# Patient Record
Sex: FEMALE | Race: BLACK OR AFRICAN AMERICAN | Employment: OTHER | URBAN - METROPOLITAN AREA
[De-identification: names, ages, dates, MRNs, and addresses within clinical notes are randomized per-mention and may not be internally consistent; named-entity substitution may affect disease eponyms.]

---

## 2017-01-20 DIAGNOSIS — Z01.812 ENCOUNTER FOR PRE-OPERATIVE LABORATORY TESTING: Primary | ICD-10-CM

## 2017-01-20 DIAGNOSIS — Z01.810 PRE-OPERATIVE CARDIOVASCULAR EXAMINATION: ICD-10-CM

## 2017-01-20 DIAGNOSIS — Z01.811 PRE-OPERATIVE RESPIRATORY EXAMINATION: ICD-10-CM

## 2017-01-20 DIAGNOSIS — M17.11 PRIMARY OSTEOARTHRITIS OF RIGHT KNEE: ICD-10-CM

## 2017-01-23 ENCOUNTER — HOSPITAL ENCOUNTER (OUTPATIENT)
Dept: PREADMISSION TESTING | Age: 82
Discharge: HOME | End: 2017-01-23
Payer: MEDICARE

## 2017-01-23 ENCOUNTER — HOSPITAL ENCOUNTER (OUTPATIENT)
Dept: GENERAL RADIOLOGY | Age: 82
Discharge: HOME | End: 2017-01-23
Payer: MEDICARE

## 2017-01-23 DIAGNOSIS — Z01.810 PRE-OPERATIVE CARDIOVASCULAR EXAMINATION: ICD-10-CM

## 2017-01-23 DIAGNOSIS — Z01.812 ENCOUNTER FOR PRE-OPERATIVE LABORATORY TESTING: ICD-10-CM

## 2017-01-23 DIAGNOSIS — Z01.811 PRE-OPERATIVE RESPIRATORY EXAMINATION: ICD-10-CM

## 2017-01-23 DIAGNOSIS — M17.11 PRIMARY OSTEOARTHRITIS OF RIGHT KNEE: ICD-10-CM

## 2017-01-23 LAB
ALBUMIN SERPL BCP-MCNC: 3.4 G/DL (ref 3.4–5)
ANION GAP BLD CALC-SCNC: 9 MMOL/L (ref 3–18)
APPEARANCE UR: CLEAR
APTT PPP: 34.5 SEC (ref 23–36.4)
ATRIAL RATE: 86 BPM
BACTERIA URNS QL MICRO: ABNORMAL /HPF
BASOPHILS # BLD AUTO: 0 K/UL (ref 0–0.1)
BASOPHILS # BLD: 0 % (ref 0–2)
BILIRUB UR QL: NEGATIVE
BUN SERPL-MCNC: 32 MG/DL (ref 7–18)
BUN/CREAT SERPL: 23 (ref 12–20)
CALCIUM SERPL-MCNC: 9.1 MG/DL (ref 8.5–10.1)
CALCULATED P AXIS, ECG09: 64 DEGREES
CALCULATED R AXIS, ECG10: 32 DEGREES
CALCULATED T AXIS, ECG11: 53 DEGREES
CHLORIDE SERPL-SCNC: 107 MMOL/L (ref 100–108)
CO2 SERPL-SCNC: 26 MMOL/L (ref 21–32)
COLOR UR: YELLOW
CREAT SERPL-MCNC: 1.42 MG/DL (ref 0.6–1.3)
DIAGNOSIS, 93000: NORMAL
DIFFERENTIAL METHOD BLD: ABNORMAL
EOSINOPHIL # BLD: 0.1 K/UL (ref 0–0.4)
EOSINOPHIL NFR BLD: 1 % (ref 0–5)
EPITH CASTS URNS QL MICRO: ABNORMAL /LPF (ref 0–5)
ERYTHROCYTE [DISTWIDTH] IN BLOOD BY AUTOMATED COUNT: 13.8 % (ref 11.6–14.5)
EST. AVERAGE GLUCOSE BLD GHB EST-MCNC: 108 MG/DL
GLUCOSE SERPL-MCNC: 116 MG/DL (ref 74–99)
GLUCOSE UR STRIP.AUTO-MCNC: NEGATIVE MG/DL
HBA1C MFR BLD: 5.4 % (ref 4.2–5.6)
HCT VFR BLD AUTO: 31.4 % (ref 35–45)
HGB BLD-MCNC: 10.1 G/DL (ref 12–16)
HGB UR QL STRIP: NEGATIVE
INR PPP: 1 (ref 0.8–1.2)
KETONES UR QL STRIP.AUTO: NEGATIVE MG/DL
LEUKOCYTE ESTERASE UR QL STRIP.AUTO: ABNORMAL
LYMPHOCYTES # BLD AUTO: 19 % (ref 21–52)
LYMPHOCYTES # BLD: 1.5 K/UL (ref 0.9–3.6)
MCH RBC QN AUTO: 27.1 PG (ref 24–34)
MCHC RBC AUTO-ENTMCNC: 32.2 G/DL (ref 31–37)
MCV RBC AUTO: 84.2 FL (ref 74–97)
MONOCYTES # BLD: 0.3 K/UL (ref 0.05–1.2)
MONOCYTES NFR BLD AUTO: 4 % (ref 3–10)
NEUTS SEG # BLD: 6.2 K/UL (ref 1.8–8)
NEUTS SEG NFR BLD AUTO: 76 % (ref 40–73)
NITRITE UR QL STRIP.AUTO: NEGATIVE
P-R INTERVAL, ECG05: 166 MS
PH UR STRIP: 5 [PH] (ref 5–8)
PLATELET # BLD AUTO: 324 K/UL (ref 135–420)
PMV BLD AUTO: 11 FL (ref 9.2–11.8)
POTASSIUM SERPL-SCNC: 4.1 MMOL/L (ref 3.5–5.5)
PROT UR STRIP-MCNC: NEGATIVE MG/DL
PROTHROMBIN TIME: 13.3 SEC (ref 11.5–15.2)
Q-T INTERVAL, ECG07: 358 MS
QRS DURATION, ECG06: 92 MS
QTC CALCULATION (BEZET), ECG08: 428 MS
RBC # BLD AUTO: 3.73 M/UL (ref 4.2–5.3)
RBC #/AREA URNS HPF: ABNORMAL /HPF (ref 0–5)
SODIUM SERPL-SCNC: 142 MMOL/L (ref 136–145)
SP GR UR REFRACTOMETRY: 1.02 (ref 1–1.03)
URATE CRY URNS QL MICRO: ABNORMAL
UROBILINOGEN UR QL STRIP.AUTO: 0.2 EU/DL (ref 0.2–1)
VENTRICULAR RATE, ECG03: 86 BPM
WBC # BLD AUTO: 8.1 K/UL (ref 4.6–13.2)
WBC URNS QL MICRO: ABNORMAL /HPF (ref 0–5)

## 2017-01-23 PROCEDURE — 85610 PROTHROMBIN TIME: CPT | Performed by: ORTHOPAEDIC SURGERY

## 2017-01-23 PROCEDURE — 85025 COMPLETE CBC W/AUTO DIFF WBC: CPT | Performed by: ORTHOPAEDIC SURGERY

## 2017-01-23 PROCEDURE — 81001 URINALYSIS AUTO W/SCOPE: CPT | Performed by: ORTHOPAEDIC SURGERY

## 2017-01-23 PROCEDURE — 93005 ELECTROCARDIOGRAM TRACING: CPT

## 2017-01-23 PROCEDURE — 87086 URINE CULTURE/COLONY COUNT: CPT | Performed by: ORTHOPAEDIC SURGERY

## 2017-01-23 PROCEDURE — 85730 THROMBOPLASTIN TIME PARTIAL: CPT | Performed by: ORTHOPAEDIC SURGERY

## 2017-01-23 PROCEDURE — 82040 ASSAY OF SERUM ALBUMIN: CPT | Performed by: ORTHOPAEDIC SURGERY

## 2017-01-23 PROCEDURE — 86900 BLOOD TYPING SEROLOGIC ABO: CPT | Performed by: ORTHOPAEDIC SURGERY

## 2017-01-23 PROCEDURE — 87186 SC STD MICRODIL/AGAR DIL: CPT | Performed by: ORTHOPAEDIC SURGERY

## 2017-01-23 PROCEDURE — 87077 CULTURE AEROBIC IDENTIFY: CPT | Performed by: ORTHOPAEDIC SURGERY

## 2017-01-23 PROCEDURE — 80048 BASIC METABOLIC PNL TOTAL CA: CPT | Performed by: ORTHOPAEDIC SURGERY

## 2017-01-23 PROCEDURE — 83036 HEMOGLOBIN GLYCOSYLATED A1C: CPT | Performed by: ORTHOPAEDIC SURGERY

## 2017-01-23 PROCEDURE — 71020 XR CHEST PA LAT: CPT

## 2017-01-23 PROCEDURE — 36415 COLL VENOUS BLD VENIPUNCTURE: CPT | Performed by: ORTHOPAEDIC SURGERY

## 2017-01-23 NOTE — PROGRESS NOTES
Edmundo Mcgarry attended the Joint Replacement Pre-Operative class on 1/23/17. Topics discussed included surgery preparation, what to expect the day of surgery, medications, physical and occupational therapy, and discharge planning. She was given a knee patient education notebook to take home. Opportunity was given to ask questions and phone number of  was given for any questions or concerns that may arise later.

## 2017-01-24 LAB
ABO + RH BLD: NORMAL
BLOOD GROUP ANTIBODIES SERPL: NORMAL
SPECIMEN EXP DATE BLD: NORMAL

## 2017-01-26 DIAGNOSIS — N39.0 URINARY TRACT INFECTION WITHOUT HEMATURIA, SITE UNSPECIFIED: Primary | ICD-10-CM

## 2017-01-26 LAB
BACTERIA SPEC CULT: NORMAL
SERVICE CMNT-IMP: NORMAL

## 2017-01-26 RX ORDER — CIPROFLOXACIN 500 MG/1
500 TABLET ORAL 2 TIMES DAILY
Qty: 14 TAB | Refills: 0 | Status: CANCELLED | OUTPATIENT
Start: 2017-01-26

## 2017-01-26 RX ORDER — CIPROFLOXACIN 500 MG/1
500 TABLET ORAL 2 TIMES DAILY
Qty: 14 TAB | Refills: 0 | Status: SHIPPED | OUTPATIENT
Start: 2017-01-26 | End: 2017-04-05

## 2017-02-02 ENCOUNTER — OFFICE VISIT (OUTPATIENT)
Dept: ORTHOPEDIC SURGERY | Facility: CLINIC | Age: 82
End: 2017-02-02

## 2017-02-02 VITALS
DIASTOLIC BLOOD PRESSURE: 51 MMHG | SYSTOLIC BLOOD PRESSURE: 148 MMHG | HEIGHT: 64 IN | HEART RATE: 84 BPM | TEMPERATURE: 97.5 F | WEIGHT: 228 LBS | BODY MASS INDEX: 38.93 KG/M2

## 2017-02-02 DIAGNOSIS — M25.561 RIGHT KNEE PAIN, UNSPECIFIED CHRONICITY: Primary | ICD-10-CM

## 2017-02-02 RX ORDER — CELECOXIB 100 MG/1
400 CAPSULE ORAL ONCE
Status: CANCELLED | OUTPATIENT
Start: 2017-02-02 | End: 2017-02-02

## 2017-02-02 RX ORDER — PREGABALIN 25 MG/1
50 CAPSULE ORAL ONCE
Status: CANCELLED | OUTPATIENT
Start: 2017-02-02 | End: 2017-02-02

## 2017-02-02 RX ORDER — ACETAMINOPHEN 325 MG/1
1000 TABLET ORAL ONCE
Status: CANCELLED | OUTPATIENT
Start: 2017-02-02 | End: 2017-02-02

## 2017-02-02 RX ORDER — WARFARIN 1 MG/1
10 TABLET ORAL ONCE
Status: CANCELLED | OUTPATIENT
Start: 2017-02-02 | End: 2017-02-02

## 2017-02-02 RX ORDER — OXYCODONE HCL 10 MG/1
10 TABLET, FILM COATED, EXTENDED RELEASE ORAL EVERY 12 HOURS
Status: CANCELLED | OUTPATIENT
Start: 2017-02-02

## 2017-02-02 NOTE — H&P
[address] [place] [address], [facility]  [phone]           HISTORY & PHYSICAL      Patient: Zackary Calhoun                MRN: [id number]       SSN: [id number]  YOB: 1935        AGE: 80 y.o. SEX: female  Body mass index is 39.14 kg/(m^2). PCP: Zeina Graham MD  02/02/17      CC: right knee end stage OA  Problem List Items Addressed This Visit     None      Visit Diagnoses     Right knee pain, unspecified chronicity    -  Primary    Relevant Orders    AMB POC XRAY, KNEE; COMPLETE, 4+ VIEW (Completed)            HPI:  The patient is a pleasant 80 y.o. whom has end stage OA of their Right knee and has failed conservative treatment including but not limited to NSAIDS, cortisone injections, viscosupplementation, PT, and pain medicine. Due to the current findings and affected activities of daily living, surgical intervention is indicated. The alternatives, risks, complications, as well as expected outcome were discussed. These include but are not limited to infection, blood loss, need for blood transfusion, neurovascular damage, DVT, PE,  post-op stiffness and pain, leg length discrepancy, dislocation, anesthetic complications, prothesis longevity, need for more surgery, MI, stroke, and even death. The patient understands and wishes to proceed with surgery.       Past Medical History   Diagnosis Date    Arthritis     Arthritis of both hips     Back pain     Balance problem     Chronic back pain     Cough     Easy bruising     Essential hypertension     GERD (gastroesophageal reflux disease)     Hiatal hernia     Hypertension     Irregular heart beat     Left hip pain 10/8/2010    Neck pain     Nervousness     Night sweat     Osteoarthritis, knee bilateral     Pain with urination     Polymyalgia rheumatica (HCC)     Reflux     Spinal stenosis     Trapezius strain     Trochanteric bursitis of left hip     Venous insufficiency          Current Outpatient Prescriptions:     ciprofloxacin HCl (CIPRO) 500 mg tablet, Take 1 Tab by mouth two (2) times a day., Disp: 14 Tab, Rfl: 0    potassium 99 mg tablet, Take 99 mg by mouth daily. , Disp: , Rfl:     omeprazole (PRILOSEC) 40 mg capsule, Take 40 mg by mouth daily. , Disp: , Rfl:     acetaminophen (TYLENOL) 650 mg CR tablet, Take 1,300 mg by mouth two (2) times a day., Disp: , Rfl:     aMILoride-hydrochlorothiazide (MODURETIC) 5-50 mg tab, Take 1 Tab by mouth daily. , Disp: , Rfl:     amLODIPine (NORVASC) 10 mg tablet, 10 mg daily. , Disp: , Rfl: 3    losartan (COZAAR) 100 mg tablet, Take 100 mg by mouth daily. , Disp: , Rfl:     metoclopramide HCl (REGLAN) 5 mg tablet, Take 5 mg by mouth two (2) times a day., Disp: , Rfl:     cetirizine (ZYRTEC) 10 mg tablet, Take  by mouth daily. , Disp: , Rfl:     aspirin 81 mg tablet, Take  by mouth., Disp: , Rfl:     Allergies   Allergen Reactions    Citric Acid Unknown (comments)    Crinone [Progesterone Micronized] Unknown (comments)    Iodine Unknown (comments)    Percocet [Oxycodone-Acetaminophen] Other (comments)     Gets rebound headaches after taking Percocet       Social History     Social History    Marital status:      Spouse name: N/A    Number of children: N/A    Years of education: N/A     Occupational History    Not on file.      Social History Main Topics    Smoking status: Never Smoker    Smokeless tobacco: Never Used    Alcohol use No    Drug use: No    Sexual activity: No     Other Topics Concern    Not on file     Social History Narrative       Past Surgical History   Procedure Laterality Date    Hx hysterectomy      Hx cholecystectomy      Hx orthopaedic       right foot and ankle    Hx heent  06/2011     left cornea transplant    Hx ankle fracture tx      Colonoscopy N/A 7/27/2016     COLONOSCOPY w/polypectomy performed by Mich Venegas MD at [address] [place] [address] Place       PE:  Visit Vitals    /51    Pulse 84    Temp 97.5 °F (36.4 °C)    Ht 5' 4\" (1.626 m)    Wt 228 lb (103.4 kg)    BMI 39.14 kg/m2     A&O X3, NAD, well develop, well nourished  Heart: S1-S2, rrr  Lungs: CTA bilat  Abd: soft, nt, nt, + bs in all quadrants  Ext:  Pos distal pulses to DP, PT      X-ray: right knee shows end stage OA    Labs: labs were reviewed and wnl.  ua pos, treated with Cipro    A:  Right  knee end stage OA    P:  At this point we will move forward with surgery. Again, the alternatives, risks, complications, as well as expected outcome were discussed and the patient wishes to proceed with surgery. Pt has been instructed to stop aspirin, nsaids, rheumatologic medications and blood thinners. They have also been instructed to continue on any heart and bp meds and to take them the morning of surgery with sips of water.          Kellen Rajan

## 2017-02-02 NOTE — PATIENT INSTRUCTIONS
Total Knee Replacement: Before Your Surgery  What is a total knee replacement? A total knee replacement replaces the worn ends of the bones where they meet at the knee. Those bones are the thighbone (femur) and the lower leg bone (tibia). Your doctor will remove the damaged bone. Then he or she will replace it with plastic and metal parts. These new parts may be attached to your bones with cement. Your doctor will make a cut down the center of your knee. This cut is called an incision. It will be about 8 to 10 inches long. Sometimes the surgery can be done with a smaller incision that is 4 to 6 inches. Both kinds of incisions leave scars that usually fade with time. You probably will be able to go home about 3 to 5 days after surgery. If you have both knees done at the same time, you may need to be in the hospital for 1 week. If there is no one to help you at home, you may go to a rehab center. Most people go back to normal activities or work in 4 to 16 weeks. This depends on your health. It also depends on how well your knee does in your rehab program. This may take longer if you have both knees done at the same time. Follow-up care is a key part of your treatment and safety. Be sure to make and go to all appointments, and call your doctor if you are having problems. It's also a good idea to know your test results and keep a list of the medicines you take. What happens before surgery? Surgery can be stressful. This information will help you understand what you can expect. And it will help you safely prepare for surgery. Preparing for surgery  · Understand exactly what surgery is planned, along with the risks, benefits, and other options. · Tell your doctors ALL the medicines, vitamins, supplements, and herbal remedies you take. Some of these can increase the risk of bleeding or interact with anesthesia.   · If you take blood thinners, such as warfarin (Coumadin), clopidogrel (Plavix), or aspirin, be sure to talk to your doctor. He or she will tell you if you should stop taking these medicines before your surgery. Make sure that you understand exactly what your doctor wants you to do. · Your doctor will tell you which medicines to take or stop before your surgery. You may need to stop taking certain medicines a week or more before surgery. So talk to your doctor as soon as you can. · If you have an advance directive, let your doctor know. It may include a living will and a durable power of  for health care. Bring a copy to the hospital. If you don't have one, you may want to prepare one. It lets your doctor and loved ones know your health care wishes. Doctors advise that everyone prepare these papers before any type of surgery or procedure. · You may need to shower or bathe with a special soap the night before and the morning of your surgery. The soap contains chlorhexidine. It reduces the amount of bacteria on your skin that could cause an infection after surgery. What happens on the day of surgery? · Follow the instructions exactly about when to stop eating and drinking. If you don't, your surgery may be canceled. If your doctor told you to take your medicines on the day of surgery, take them with only a sip of water. · Take a bath or shower before you come in for your surgery. Do not apply lotions, perfumes, deodorants, or nail polish. · Do not shave the surgical site yourself. · Take off all jewelry and piercings. And take out contact lenses, if you wear them. At the hospital or surgery center  · Bring a picture ID. · The area for surgery is often marked to make sure there are no errors. · You will be kept comfortable and safe by your anesthesia provider. The anesthesia may make you sleep. Or it may just numb the area being worked on.  · You also will get antibiotics through the IV tube before surgery. This lowers the risk of an infection of the incision.   · The surgery will take about 2 to 3 hours. Going home  · Be sure you have someone to drive you home. Anesthesia and pain medicine make it unsafe for you to drive. · You will be given more specific instructions about recovering from your surgery. They will cover things like diet, wound care, follow-up care, driving, and getting back to your normal routine. When should you call your doctor? · You have questions or concerns. · You don't understand how to prepare for your surgery. · You become ill before the surgery (such as fever, flu, or a cold). · You need to reschedule or have changed your mind about having the surgery. Where can you learn more? Go to http://bernadette-zaida.info/. Enter R433 in the search box to learn more about \"Total Knee Replacement: Before Your Surgery. \"  Current as of: May 23, 2016  Content Version: 11.1  © 9994-5394 [employer]. Care instructions adapted under license by [employer] (which disclaims liability or warranty for this information). If you have questions about a medical condition or this instruction, always ask your healthcare professional. [address] any warranty or liability for your use of this information.

## 2017-02-03 ENCOUNTER — HOSPITAL ENCOUNTER (OUTPATIENT)
Dept: LAB | Age: 82
Discharge: HOME | DRG: 470 | End: 2017-02-03
Payer: MEDICARE

## 2017-02-03 DIAGNOSIS — N39.0 URINARY TRACT INFECTION, SITE NOT SPECIFIED: Primary | ICD-10-CM

## 2017-02-03 DIAGNOSIS — N39.0 URINARY TRACT INFECTION, SITE NOT SPECIFIED: ICD-10-CM

## 2017-02-03 DIAGNOSIS — Z01.818 PREOPERATIVE TESTING: ICD-10-CM

## 2017-02-03 LAB
APPEARANCE UR: CLEAR
BACTERIA URNS QL MICRO: ABNORMAL /HPF
BILIRUB UR QL: NEGATIVE
COLOR UR: YELLOW
EPITH CASTS URNS QL MICRO: ABNORMAL /LPF (ref 0–5)
GLUCOSE UR STRIP.AUTO-MCNC: NEGATIVE MG/DL
HGB UR QL STRIP: NEGATIVE
KETONES UR QL STRIP.AUTO: NEGATIVE MG/DL
LEUKOCYTE ESTERASE UR QL STRIP.AUTO: ABNORMAL
NITRITE UR QL STRIP.AUTO: NEGATIVE
PH UR STRIP: 5 [PH] (ref 5–8)
PROT UR STRIP-MCNC: NEGATIVE MG/DL
RBC #/AREA URNS HPF: NEGATIVE /HPF (ref 0–5)
SP GR UR REFRACTOMETRY: 1.02 (ref 1–1.03)
UROBILINOGEN UR QL STRIP.AUTO: 0.2 EU/DL (ref 0.2–1)
WBC URNS QL MICRO: ABNORMAL /HPF (ref 0–4)

## 2017-02-05 ENCOUNTER — ANESTHESIA EVENT (OUTPATIENT)
Dept: SURGERY | Age: 82
DRG: 470 | End: 2017-02-05
Payer: MEDICARE

## 2017-02-05 LAB
BACTERIA SPEC CULT: NORMAL
SERVICE CMNT-IMP: NORMAL

## 2017-02-06 ENCOUNTER — HOSPITAL ENCOUNTER (INPATIENT)
Age: 82
LOS: 4 days | Discharge: NURSING, REHAB OR LONG-TERM CARE | DRG: 470 | End: 2017-02-10
Attending: ORTHOPAEDIC SURGERY | Admitting: ORTHOPAEDIC SURGERY
Payer: MEDICARE

## 2017-02-06 ENCOUNTER — ANESTHESIA (OUTPATIENT)
Dept: SURGERY | Age: 82
DRG: 470 | End: 2017-02-06
Payer: MEDICARE

## 2017-02-06 ENCOUNTER — SURGERY (OUTPATIENT)
Age: 82
End: 2017-02-06

## 2017-02-06 ENCOUNTER — APPOINTMENT (OUTPATIENT)
Dept: GENERAL RADIOLOGY | Age: 82
DRG: 470 | End: 2017-02-06
Attending: PHYSICIAN ASSISTANT
Payer: MEDICARE

## 2017-02-06 DIAGNOSIS — M17.10 ARTHRITIS OF KNEE: Primary | ICD-10-CM

## 2017-02-06 PROCEDURE — 64447 NJX AA&/STRD FEMORAL NRV IMG: CPT | Performed by: ANESTHESIOLOGY

## 2017-02-06 PROCEDURE — 77030008683 HC TU ET CUF COVD -A: Performed by: ANESTHESIOLOGY

## 2017-02-06 PROCEDURE — 77030012411 HC DRN WND CARD -A: Performed by: ORTHOPAEDIC SURGERY

## 2017-02-06 PROCEDURE — 0SRC0J9 REPLACEMENT OF RIGHT KNEE JOINT WITH SYNTHETIC SUBSTITUTE, CEMENTED, OPEN APPROACH: ICD-10-PCS | Performed by: ORTHOPAEDIC SURGERY

## 2017-02-06 PROCEDURE — 77030011640 HC PAD GRND REM COVD -A: Performed by: ORTHOPAEDIC SURGERY

## 2017-02-06 PROCEDURE — 97162 PT EVAL MOD COMPLEX 30 MIN: CPT

## 2017-02-06 PROCEDURE — C1713 ANCHOR/SCREW BN/BN,TIS/BN: HCPCS | Performed by: ORTHOPAEDIC SURGERY

## 2017-02-06 PROCEDURE — C9290 INJ, BUPIVACAINE LIPOSOME: HCPCS | Performed by: PHYSICIAN ASSISTANT

## 2017-02-06 PROCEDURE — 77030018836 HC SOL IRR NACL ICUM -A: Performed by: ORTHOPAEDIC SURGERY

## 2017-02-06 PROCEDURE — 77030016544 HC BLD SAW RECIP1 STRY -B: Performed by: ORTHOPAEDIC SURGERY

## 2017-02-06 PROCEDURE — 74011250637 HC RX REV CODE- 250/637: Performed by: PHYSICIAN ASSISTANT

## 2017-02-06 PROCEDURE — 76010000131 HC OR TIME 2 TO 2.5 HR: Performed by: ORTHOPAEDIC SURGERY

## 2017-02-06 PROCEDURE — 77030034850: Performed by: ORTHOPAEDIC SURGERY

## 2017-02-06 PROCEDURE — 3E0T3CZ INTRODUCE REGIONAL ANESTH IN PERIPH NRV, PLEXI, PERC: ICD-10-PCS | Performed by: ANESTHESIOLOGY

## 2017-02-06 PROCEDURE — C1776 JOINT DEVICE (IMPLANTABLE): HCPCS | Performed by: ORTHOPAEDIC SURGERY

## 2017-02-06 PROCEDURE — 77030028224 HC PDNG CST BSNM -A: Performed by: ORTHOPAEDIC SURGERY

## 2017-02-06 PROCEDURE — 77030033263 HC DRSG MEPILEX 16-48IN BORD MOLN -B: Performed by: ORTHOPAEDIC SURGERY

## 2017-02-06 PROCEDURE — 77030010785: Performed by: ORTHOPAEDIC SURGERY

## 2017-02-06 PROCEDURE — 74011250636 HC RX REV CODE- 250/636: Performed by: NURSE ANESTHETIST, CERTIFIED REGISTERED

## 2017-02-06 PROCEDURE — 74011000258 HC RX REV CODE- 258

## 2017-02-06 PROCEDURE — 76210000006 HC OR PH I REC 0.5 TO 1 HR: Performed by: ORTHOPAEDIC SURGERY

## 2017-02-06 PROCEDURE — 74011000250 HC RX REV CODE- 250: Performed by: PHYSICIAN ASSISTANT

## 2017-02-06 PROCEDURE — 97530 THERAPEUTIC ACTIVITIES: CPT

## 2017-02-06 PROCEDURE — 74011000250 HC RX REV CODE- 250

## 2017-02-06 PROCEDURE — 74011250637 HC RX REV CODE- 250/637: Performed by: NURSE ANESTHETIST, CERTIFIED REGISTERED

## 2017-02-06 PROCEDURE — 77030008467 HC STPLR SKN COVD -B: Performed by: ORTHOPAEDIC SURGERY

## 2017-02-06 PROCEDURE — 77030012890: Performed by: ORTHOPAEDIC SURGERY

## 2017-02-06 PROCEDURE — 74011250636 HC RX REV CODE- 250/636

## 2017-02-06 PROCEDURE — 77030029494 HC CLD THER UNIT ICMN DJOR -B

## 2017-02-06 PROCEDURE — 77030020782 HC GWN BAIR PAWS FLX 3M -B: Performed by: ORTHOPAEDIC SURGERY

## 2017-02-06 PROCEDURE — 77030020753 HC CUF TRNQT 1BLA STRY -B: Performed by: ORTHOPAEDIC SURGERY

## 2017-02-06 PROCEDURE — 74011000258 HC RX REV CODE- 258: Performed by: PHYSICIAN ASSISTANT

## 2017-02-06 PROCEDURE — 76942 ECHO GUIDE FOR BIOPSY: CPT | Performed by: ANESTHESIOLOGY

## 2017-02-06 PROCEDURE — 65270000029 HC RM PRIVATE

## 2017-02-06 PROCEDURE — 74011250636 HC RX REV CODE- 250/636: Performed by: PHYSICIAN ASSISTANT

## 2017-02-06 PROCEDURE — 73560 X-RAY EXAM OF KNEE 1 OR 2: CPT

## 2017-02-06 PROCEDURE — 74011000250 HC RX REV CODE- 250: Performed by: ORTHOPAEDIC SURGERY

## 2017-02-06 PROCEDURE — 77030002933 HC SUT MCRYL J&J -A: Performed by: ORTHOPAEDIC SURGERY

## 2017-02-06 PROCEDURE — 77030013708 HC HNDPC SUC IRR PULS STRY –B: Performed by: ORTHOPAEDIC SURGERY

## 2017-02-06 PROCEDURE — 77030031139 HC SUT VCRL2 J&J -A: Performed by: ORTHOPAEDIC SURGERY

## 2017-02-06 PROCEDURE — 74011250637 HC RX REV CODE- 250/637: Performed by: INTERNAL MEDICINE

## 2017-02-06 PROCEDURE — 77030003029 HC SUT VCRL J&J -B: Performed by: ORTHOPAEDIC SURGERY

## 2017-02-06 PROCEDURE — 77030003666 HC NDL SPINAL BD -A: Performed by: ORTHOPAEDIC SURGERY

## 2017-02-06 PROCEDURE — 77030018883 HC BLD SAW SAG4 STRY -B: Performed by: ORTHOPAEDIC SURGERY

## 2017-02-06 PROCEDURE — 74011250637 HC RX REV CODE- 250/637: Performed by: ORTHOPAEDIC SURGERY

## 2017-02-06 PROCEDURE — 74011000258 HC RX REV CODE- 258: Performed by: ORTHOPAEDIC SURGERY

## 2017-02-06 PROCEDURE — 76060000035 HC ANESTHESIA 2 TO 2.5 HR: Performed by: ORTHOPAEDIC SURGERY

## 2017-02-06 PROCEDURE — 77030019557 HC ELECTRD VES SEAL MEDT -F: Performed by: ORTHOPAEDIC SURGERY

## 2017-02-06 PROCEDURE — 74011250636 HC RX REV CODE- 250/636: Performed by: ORTHOPAEDIC SURGERY

## 2017-02-06 DEVICE — COMPNT FEM PS CEM TRIATHLN 4 R --: Type: IMPLANTABLE DEVICE | Site: KNEE | Status: FUNCTIONAL

## 2017-02-06 DEVICE — INSERT TIB SZ 3 THK11MM KNEE X3 TOT STBL + TRIATHLON: Type: IMPLANTABLE DEVICE | Site: KNEE | Status: FUNCTIONAL

## 2017-02-06 DEVICE — CEMENT BNE 20ML 41GM FULL DOSE PMMA W/ TOBRA M VISC RADPQ: Type: IMPLANTABLE DEVICE | Site: KNEE | Status: FUNCTIONAL

## 2017-02-06 DEVICE — COMPONENT KNEE CEM X3 TRIATHLON: Type: IMPLANTABLE DEVICE | Site: KNEE | Status: FUNCTIONAL

## 2017-02-06 DEVICE — PAT ASYM TRITHLON X3 32X10MM -- TRIATHLON ASYMMETRIC X3: Type: IMPLANTABLE DEVICE | Site: KNEE | Status: FUNCTIONAL

## 2017-02-06 DEVICE — BASEPLT TIB UNIV TRIATHLN 3 --: Type: IMPLANTABLE DEVICE | Site: KNEE | Status: FUNCTIONAL

## 2017-02-06 RX ORDER — SODIUM CHLORIDE 0.9 % (FLUSH) 0.9 %
5-10 SYRINGE (ML) INJECTION EVERY 8 HOURS
Status: DISCONTINUED | OUTPATIENT
Start: 2017-02-06 | End: 2017-02-10 | Stop reason: HOSPADM

## 2017-02-06 RX ORDER — FAMOTIDINE 20 MG/1
20 TABLET, FILM COATED ORAL ONCE
Status: COMPLETED | OUTPATIENT
Start: 2017-02-06 | End: 2017-02-06

## 2017-02-06 RX ORDER — FENTANYL CITRATE 50 UG/ML
INJECTION, SOLUTION INTRAMUSCULAR; INTRAVENOUS AS NEEDED
Status: DISCONTINUED | OUTPATIENT
Start: 2017-02-06 | End: 2017-02-06 | Stop reason: HOSPADM

## 2017-02-06 RX ORDER — GLYCOPYRROLATE 0.2 MG/ML
INJECTION INTRAMUSCULAR; INTRAVENOUS AS NEEDED
Status: DISCONTINUED | OUTPATIENT
Start: 2017-02-06 | End: 2017-02-06 | Stop reason: HOSPADM

## 2017-02-06 RX ORDER — PREGABALIN 50 MG/1
50 CAPSULE ORAL ONCE
Status: COMPLETED | OUTPATIENT
Start: 2017-02-06 | End: 2017-02-06

## 2017-02-06 RX ORDER — DIPHENHYDRAMINE HYDROCHLORIDE 50 MG/ML
12.5 INJECTION, SOLUTION INTRAMUSCULAR; INTRAVENOUS
Status: DISCONTINUED | OUTPATIENT
Start: 2017-02-06 | End: 2017-02-10 | Stop reason: HOSPADM

## 2017-02-06 RX ORDER — PROPOFOL 10 MG/ML
INJECTION, EMULSION INTRAVENOUS AS NEEDED
Status: DISCONTINUED | OUTPATIENT
Start: 2017-02-06 | End: 2017-02-06 | Stop reason: HOSPADM

## 2017-02-06 RX ORDER — CELECOXIB 100 MG/1
100 CAPSULE ORAL 2 TIMES DAILY
Status: DISCONTINUED | OUTPATIENT
Start: 2017-02-06 | End: 2017-02-07

## 2017-02-06 RX ORDER — ZOLPIDEM TARTRATE 5 MG/1
5 TABLET ORAL
Status: DISCONTINUED | OUTPATIENT
Start: 2017-02-06 | End: 2017-02-10 | Stop reason: HOSPADM

## 2017-02-06 RX ORDER — LOSARTAN POTASSIUM 50 MG/1
100 TABLET ORAL DAILY
Status: DISCONTINUED | OUTPATIENT
Start: 2017-02-06 | End: 2017-02-06

## 2017-02-06 RX ORDER — AMLODIPINE BESYLATE 10 MG/1
10 TABLET ORAL DAILY
Status: DISCONTINUED | OUTPATIENT
Start: 2017-02-06 | End: 2017-02-06

## 2017-02-06 RX ORDER — SODIUM CHLORIDE, SODIUM LACTATE, POTASSIUM CHLORIDE, CALCIUM CHLORIDE 600; 310; 30; 20 MG/100ML; MG/100ML; MG/100ML; MG/100ML
75 INJECTION, SOLUTION INTRAVENOUS CONTINUOUS
Status: DISCONTINUED | OUTPATIENT
Start: 2017-02-06 | End: 2017-02-06 | Stop reason: HOSPADM

## 2017-02-06 RX ORDER — LIDOCAINE HYDROCHLORIDE 10 MG/ML
3 INJECTION, SOLUTION EPIDURAL; INFILTRATION; INTRACAUDAL; PERINEURAL ONCE
Status: DISCONTINUED | OUTPATIENT
Start: 2017-02-06 | End: 2017-02-06 | Stop reason: HOSPADM

## 2017-02-06 RX ORDER — FENTANYL CITRATE 50 UG/ML
25 INJECTION, SOLUTION INTRAMUSCULAR; INTRAVENOUS AS NEEDED
Status: DISCONTINUED | OUTPATIENT
Start: 2017-02-06 | End: 2017-02-06 | Stop reason: HOSPADM

## 2017-02-06 RX ORDER — SODIUM CHLORIDE 9 MG/ML
100 INJECTION, SOLUTION INTRAVENOUS CONTINUOUS
Status: DISPENSED | OUTPATIENT
Start: 2017-02-06 | End: 2017-02-07

## 2017-02-06 RX ORDER — BUPIVACAINE HYDROCHLORIDE 5 MG/ML
INJECTION, SOLUTION EPIDURAL; INTRACAUDAL AS NEEDED
Status: DISCONTINUED | OUTPATIENT
Start: 2017-02-06 | End: 2017-02-06 | Stop reason: HOSPADM

## 2017-02-06 RX ORDER — LOSARTAN POTASSIUM 25 MG/1
25 TABLET ORAL DAILY
Status: DISCONTINUED | OUTPATIENT
Start: 2017-02-07 | End: 2017-02-07

## 2017-02-06 RX ORDER — SUCCINYLCHOLINE CHLORIDE 20 MG/ML
INJECTION INTRAMUSCULAR; INTRAVENOUS AS NEEDED
Status: DISCONTINUED | OUTPATIENT
Start: 2017-02-06 | End: 2017-02-06 | Stop reason: HOSPADM

## 2017-02-06 RX ORDER — DEXTROSE 50 % IN WATER (D50W) INTRAVENOUS SYRINGE
25-50 AS NEEDED
Status: DISCONTINUED | OUTPATIENT
Start: 2017-02-06 | End: 2017-02-06 | Stop reason: HOSPADM

## 2017-02-06 RX ORDER — MIDAZOLAM HYDROCHLORIDE 1 MG/ML
2 INJECTION, SOLUTION INTRAMUSCULAR; INTRAVENOUS ONCE
Status: COMPLETED | OUTPATIENT
Start: 2017-02-06 | End: 2017-02-06

## 2017-02-06 RX ORDER — ACETAMINOPHEN 500 MG
1000 TABLET ORAL ONCE
Status: COMPLETED | OUTPATIENT
Start: 2017-02-06 | End: 2017-02-06

## 2017-02-06 RX ORDER — CELECOXIB 100 MG/1
200 CAPSULE ORAL 2 TIMES DAILY
Status: DISCONTINUED | OUTPATIENT
Start: 2017-02-06 | End: 2017-02-06

## 2017-02-06 RX ORDER — AMILORIDE HYDROCHLORIDE 5 MG/1
5 TABLET ORAL DAILY
Status: DISCONTINUED | OUTPATIENT
Start: 2017-02-06 | End: 2017-02-06

## 2017-02-06 RX ORDER — SODIUM CHLORIDE 0.9 % (FLUSH) 0.9 %
5-10 SYRINGE (ML) INJECTION EVERY 8 HOURS
Status: DISCONTINUED | OUTPATIENT
Start: 2017-02-06 | End: 2017-02-06 | Stop reason: HOSPADM

## 2017-02-06 RX ORDER — MAGNESIUM SULFATE 100 %
4 CRYSTALS MISCELLANEOUS AS NEEDED
Status: DISCONTINUED | OUTPATIENT
Start: 2017-02-06 | End: 2017-02-06 | Stop reason: HOSPADM

## 2017-02-06 RX ORDER — INSULIN LISPRO 100 [IU]/ML
INJECTION, SOLUTION INTRAVENOUS; SUBCUTANEOUS ONCE
Status: DISCONTINUED | OUTPATIENT
Start: 2017-02-06 | End: 2017-02-06 | Stop reason: HOSPADM

## 2017-02-06 RX ORDER — LABETALOL HYDROCHLORIDE 5 MG/ML
INJECTION, SOLUTION INTRAVENOUS AS NEEDED
Status: DISCONTINUED | OUTPATIENT
Start: 2017-02-06 | End: 2017-02-06 | Stop reason: HOSPADM

## 2017-02-06 RX ORDER — FENTANYL CITRATE 50 UG/ML
100 INJECTION, SOLUTION INTRAMUSCULAR; INTRAVENOUS ONCE
Status: COMPLETED | OUTPATIENT
Start: 2017-02-06 | End: 2017-02-06

## 2017-02-06 RX ORDER — ONDANSETRON 2 MG/ML
4 INJECTION INTRAMUSCULAR; INTRAVENOUS
Status: DISCONTINUED | OUTPATIENT
Start: 2017-02-06 | End: 2017-02-10 | Stop reason: HOSPADM

## 2017-02-06 RX ORDER — ROPIVACAINE HYDROCHLORIDE 2 MG/ML
30 INJECTION, SOLUTION EPIDURAL; INFILTRATION; PERINEURAL
Status: COMPLETED | OUTPATIENT
Start: 2017-02-06 | End: 2017-02-06

## 2017-02-06 RX ORDER — LANOLIN ALCOHOL/MO/W.PET/CERES
1 CREAM (GRAM) TOPICAL 2 TIMES DAILY WITH MEALS
Status: DISCONTINUED | OUTPATIENT
Start: 2017-02-06 | End: 2017-02-10 | Stop reason: HOSPADM

## 2017-02-06 RX ORDER — SODIUM CHLORIDE 0.9 % (FLUSH) 0.9 %
5-10 SYRINGE (ML) INJECTION AS NEEDED
Status: DISCONTINUED | OUTPATIENT
Start: 2017-02-06 | End: 2017-02-06 | Stop reason: HOSPADM

## 2017-02-06 RX ORDER — LIDOCAINE HYDROCHLORIDE 20 MG/ML
INJECTION, SOLUTION EPIDURAL; INFILTRATION; INTRACAUDAL; PERINEURAL AS NEEDED
Status: DISCONTINUED | OUTPATIENT
Start: 2017-02-06 | End: 2017-02-06 | Stop reason: HOSPADM

## 2017-02-06 RX ORDER — PANTOPRAZOLE SODIUM 40 MG/1
40 TABLET, DELAYED RELEASE ORAL
Status: DISCONTINUED | OUTPATIENT
Start: 2017-02-07 | End: 2017-02-08

## 2017-02-06 RX ORDER — VANCOMYCIN HYDROCHLORIDE 1 G/20ML
INJECTION, POWDER, LYOPHILIZED, FOR SOLUTION INTRAVENOUS AS NEEDED
Status: DISCONTINUED | OUTPATIENT
Start: 2017-02-06 | End: 2017-02-06 | Stop reason: HOSPADM

## 2017-02-06 RX ORDER — CELECOXIB 400 MG/1
400 CAPSULE ORAL ONCE
Status: COMPLETED | OUTPATIENT
Start: 2017-02-06 | End: 2017-02-06

## 2017-02-06 RX ORDER — SODIUM CHLORIDE 0.9 % (FLUSH) 0.9 %
5-10 SYRINGE (ML) INJECTION AS NEEDED
Status: DISCONTINUED | OUTPATIENT
Start: 2017-02-06 | End: 2017-02-10 | Stop reason: HOSPADM

## 2017-02-06 RX ORDER — ONDANSETRON 2 MG/ML
4 INJECTION INTRAMUSCULAR; INTRAVENOUS ONCE
Status: DISCONTINUED | OUTPATIENT
Start: 2017-02-06 | End: 2017-02-06 | Stop reason: HOSPADM

## 2017-02-06 RX ORDER — NEOSTIGMINE METHYLSULFATE 5 MG/5 ML
SYRINGE (ML) INTRAVENOUS AS NEEDED
Status: DISCONTINUED | OUTPATIENT
Start: 2017-02-06 | End: 2017-02-06 | Stop reason: HOSPADM

## 2017-02-06 RX ORDER — HYDROCODONE BITARTRATE AND ACETAMINOPHEN 7.5; 325 MG/1; MG/1
1-2 TABLET ORAL
Status: DISCONTINUED | OUTPATIENT
Start: 2017-02-06 | End: 2017-02-10 | Stop reason: HOSPADM

## 2017-02-06 RX ORDER — MELATONIN
1000 DAILY
COMMUNITY

## 2017-02-06 RX ORDER — NALOXONE HYDROCHLORIDE 0.4 MG/ML
0.4 INJECTION, SOLUTION INTRAMUSCULAR; INTRAVENOUS; SUBCUTANEOUS AS NEEDED
Status: DISCONTINUED | OUTPATIENT
Start: 2017-02-06 | End: 2017-02-10 | Stop reason: HOSPADM

## 2017-02-06 RX ORDER — POLYMYXIN B 500000 [USP'U]/1
INJECTION, POWDER, LYOPHILIZED, FOR SOLUTION INTRAMUSCULAR; INTRATHECAL; INTRAVENOUS; OPHTHALMIC AS NEEDED
Status: DISCONTINUED | OUTPATIENT
Start: 2017-02-06 | End: 2017-02-06 | Stop reason: HOSPADM

## 2017-02-06 RX ORDER — ROCURONIUM BROMIDE 10 MG/ML
INJECTION, SOLUTION INTRAVENOUS AS NEEDED
Status: DISCONTINUED | OUTPATIENT
Start: 2017-02-06 | End: 2017-02-06 | Stop reason: HOSPADM

## 2017-02-06 RX ORDER — AMLODIPINE BESYLATE 5 MG/1
5 TABLET ORAL DAILY
Status: DISCONTINUED | OUTPATIENT
Start: 2017-02-07 | End: 2017-02-10 | Stop reason: HOSPADM

## 2017-02-06 RX ORDER — CEFAZOLIN SODIUM 2 G/50ML
2 SOLUTION INTRAVENOUS
Status: COMPLETED | OUTPATIENT
Start: 2017-02-06 | End: 2017-02-06

## 2017-02-06 RX ORDER — HYDROCHLOROTHIAZIDE 25 MG/1
50 TABLET ORAL DAILY
Status: DISCONTINUED | OUTPATIENT
Start: 2017-02-06 | End: 2017-02-06

## 2017-02-06 RX ORDER — WARFARIN 10 MG/1
10 TABLET ORAL ONCE
Status: COMPLETED | OUTPATIENT
Start: 2017-02-06 | End: 2017-02-06

## 2017-02-06 RX ORDER — ADHESIVE BANDAGE
30 BANDAGE TOPICAL DAILY PRN
Status: DISCONTINUED | OUTPATIENT
Start: 2017-02-06 | End: 2017-02-10 | Stop reason: HOSPADM

## 2017-02-06 RX ORDER — DIPHENHYDRAMINE HYDROCHLORIDE 50 MG/ML
12.5 INJECTION, SOLUTION INTRAMUSCULAR; INTRAVENOUS
Status: DISCONTINUED | OUTPATIENT
Start: 2017-02-06 | End: 2017-02-06 | Stop reason: HOSPADM

## 2017-02-06 RX ORDER — OXYCODONE HCL 10 MG/1
10 TABLET, FILM COATED, EXTENDED RELEASE ORAL ONCE
Status: COMPLETED | OUTPATIENT
Start: 2017-02-06 | End: 2017-02-06

## 2017-02-06 RX ORDER — KETOROLAC TROMETHAMINE 30 MG/ML
INJECTION, SOLUTION INTRAMUSCULAR; INTRAVENOUS AS NEEDED
Status: DISCONTINUED | OUTPATIENT
Start: 2017-02-06 | End: 2017-02-06 | Stop reason: HOSPADM

## 2017-02-06 RX ORDER — ONDANSETRON 2 MG/ML
INJECTION INTRAMUSCULAR; INTRAVENOUS AS NEEDED
Status: DISCONTINUED | OUTPATIENT
Start: 2017-02-06 | End: 2017-02-06 | Stop reason: HOSPADM

## 2017-02-06 RX ORDER — EPINEPHRINE 1 MG/ML
INJECTION, SOLUTION, CONCENTRATE INTRAVENOUS AS NEEDED
Status: DISCONTINUED | OUTPATIENT
Start: 2017-02-06 | End: 2017-02-06 | Stop reason: HOSPADM

## 2017-02-06 RX ORDER — METOCLOPRAMIDE 5 MG/1
5 TABLET ORAL 2 TIMES DAILY
Status: DISCONTINUED | OUTPATIENT
Start: 2017-02-06 | End: 2017-02-10 | Stop reason: HOSPADM

## 2017-02-06 RX ORDER — EPHEDRINE SULFATE/0.9% NACL/PF 25 MG/5 ML
SYRINGE (ML) INTRAVENOUS AS NEEDED
Status: DISCONTINUED | OUTPATIENT
Start: 2017-02-06 | End: 2017-02-06 | Stop reason: HOSPADM

## 2017-02-06 RX ORDER — DEXAMETHASONE SODIUM PHOSPHATE 4 MG/ML
INJECTION, SOLUTION INTRA-ARTICULAR; INTRALESIONAL; INTRAMUSCULAR; INTRAVENOUS; SOFT TISSUE AS NEEDED
Status: DISCONTINUED | OUTPATIENT
Start: 2017-02-06 | End: 2017-02-06 | Stop reason: HOSPADM

## 2017-02-06 RX ADMIN — METOCLOPRAMIDE HYDROCHLORIDE 5 MG: 5 TABLET ORAL at 17:13

## 2017-02-06 RX ADMIN — PREGABALIN 50 MG: 50 CAPSULE ORAL at 06:54

## 2017-02-06 RX ADMIN — PROPOFOL 150 MG: 10 INJECTION, EMULSION INTRAVENOUS at 07:32

## 2017-02-06 RX ADMIN — TRANEXAMIC ACID 1 G: 100 INJECTION, SOLUTION INTRAVENOUS at 07:43

## 2017-02-06 RX ADMIN — Medication 325 MG: at 17:13

## 2017-02-06 RX ADMIN — ACETAMINOPHEN 1000 MG: 500 TABLET, FILM COATED ORAL at 06:53

## 2017-02-06 RX ADMIN — Medication 10 MG: at 08:47

## 2017-02-06 RX ADMIN — POLYMYXIN B SULFATE 750000 UNITS: 500000 INJECTION, POWDER, LYOPHILIZED, FOR SOLUTION INTRAMUSCULAR; INTRATHECAL; INTRAVENOUS; OPHTHALMIC at 08:12

## 2017-02-06 RX ADMIN — Medication 2 MG: at 09:30

## 2017-02-06 RX ADMIN — ALUMINUM HYDROXIDE AND MAGNESIUM HYDROXIDE 10 ML: 200; 200 SUSPENSION ORAL at 19:36

## 2017-02-06 RX ADMIN — SODIUM CHLORIDE 250 ML: 9 INJECTION, SOLUTION INTRAVENOUS at 08:15

## 2017-02-06 RX ADMIN — METOCLOPRAMIDE HYDROCHLORIDE 5 MG: 5 TABLET ORAL at 11:23

## 2017-02-06 RX ADMIN — SODIUM CHLORIDE 100 ML/HR: 900 INJECTION, SOLUTION INTRAVENOUS at 11:00

## 2017-02-06 RX ADMIN — Medication 10 ML: at 14:00

## 2017-02-06 RX ADMIN — FENTANYL CITRATE 100 MCG: 50 INJECTION, SOLUTION INTRAMUSCULAR; INTRAVENOUS at 07:32

## 2017-02-06 RX ADMIN — ONDANSETRON 4 MG: 2 INJECTION INTRAMUSCULAR; INTRAVENOUS at 07:32

## 2017-02-06 RX ADMIN — TRANEXAMIC ACID 1 G: 100 INJECTION, SOLUTION INTRAVENOUS at 08:53

## 2017-02-06 RX ADMIN — FENTANYL CITRATE 150 MCG: 50 INJECTION, SOLUTION INTRAMUSCULAR; INTRAVENOUS at 07:45

## 2017-02-06 RX ADMIN — FAMOTIDINE 20 MG: 20 TABLET ORAL at 06:54

## 2017-02-06 RX ADMIN — Medication 10 ML: at 20:39

## 2017-02-06 RX ADMIN — OXYCODONE HYDROCHLORIDE 10 MG: 10 TABLET, FILM COATED, EXTENDED RELEASE ORAL at 06:54

## 2017-02-06 RX ADMIN — FENTANYL CITRATE 100 MCG: 50 INJECTION INTRAMUSCULAR; INTRAVENOUS at 07:06

## 2017-02-06 RX ADMIN — VANCOMYCIN HYDROCHLORIDE 3 G: 1 INJECTION, POWDER, LYOPHILIZED, FOR SOLUTION INTRAVENOUS at 08:12

## 2017-02-06 RX ADMIN — ROPIVACAINE HYDROCHLORIDE 60 MG: 2 INJECTION, SOLUTION EPIDURAL; INFILTRATION at 07:10

## 2017-02-06 RX ADMIN — MIDAZOLAM HYDROCHLORIDE 1 MG: 1 INJECTION, SOLUTION INTRAMUSCULAR; INTRAVENOUS at 07:06

## 2017-02-06 RX ADMIN — SUCCINYLCHOLINE CHLORIDE 100 MG: 20 INJECTION INTRAMUSCULAR; INTRAVENOUS at 07:32

## 2017-02-06 RX ADMIN — KETOROLAC TROMETHAMINE 30 MG: 30 INJECTION, SOLUTION INTRAMUSCULAR; INTRAVENOUS at 08:14

## 2017-02-06 RX ADMIN — SODIUM CHLORIDE, SODIUM LACTATE, POTASSIUM CHLORIDE, AND CALCIUM CHLORIDE 75 ML/HR: 600; 310; 30; 20 INJECTION, SOLUTION INTRAVENOUS at 06:40

## 2017-02-06 RX ADMIN — SODIUM CHLORIDE 50 ML: 9 INJECTION, SOLUTION INTRAVENOUS at 08:13

## 2017-02-06 RX ADMIN — ROCURONIUM BROMIDE 10 MG: 10 INJECTION, SOLUTION INTRAVENOUS at 07:32

## 2017-02-06 RX ADMIN — CELECOXIB 100 MG: 100 CAPSULE ORAL at 17:13

## 2017-02-06 RX ADMIN — CEFAZOLIN SODIUM 3 G: 2 SOLUTION INTRAVENOUS at 07:30

## 2017-02-06 RX ADMIN — BUPIVACAINE HYDROCHLORIDE 25 ML: 5 INJECTION, SOLUTION EPIDURAL; INTRACAUDAL at 08:14

## 2017-02-06 RX ADMIN — LABETALOL HYDROCHLORIDE 5 MG: 5 INJECTION, SOLUTION INTRAVENOUS at 08:15

## 2017-02-06 RX ADMIN — BUPIVACAINE 266 MG: 13.3 INJECTION, SUSPENSION, LIPOSOMAL INFILTRATION at 08:28

## 2017-02-06 RX ADMIN — LIDOCAINE HYDROCHLORIDE 20 MG: 20 INJECTION, SOLUTION EPIDURAL; INFILTRATION; INTRACAUDAL; PERINEURAL at 07:32

## 2017-02-06 RX ADMIN — Medication 3 G: at 15:40

## 2017-02-06 RX ADMIN — CELECOXIB 400 MG: 400 CAPSULE ORAL at 06:54

## 2017-02-06 RX ADMIN — GLYCOPYRROLATE 0.2 MG: 0.2 INJECTION INTRAMUSCULAR; INTRAVENOUS at 09:30

## 2017-02-06 RX ADMIN — SODIUM CHLORIDE 100 ML/HR: 900 INJECTION, SOLUTION INTRAVENOUS at 20:36

## 2017-02-06 RX ADMIN — DEXAMETHASONE SODIUM PHOSPHATE 4 MG: 4 INJECTION, SOLUTION INTRA-ARTICULAR; INTRALESIONAL; INTRAMUSCULAR; INTRAVENOUS; SOFT TISSUE at 07:32

## 2017-02-06 RX ADMIN — WARFARIN SODIUM 10 MG: 10 TABLET ORAL at 06:54

## 2017-02-06 RX ADMIN — EPINEPHRINE 0.5 MG: 1 INJECTION, SOLUTION INTRAMUSCULAR; SUBCUTANEOUS at 08:14

## 2017-02-06 NOTE — H&P (VIEW-ONLY)
[address] [place] [address]  [phone]           HISTORY & PHYSICAL      Patient: Betty Goldstein                MRN: [id number]       SSN: [id number]  YOB: 1935        AGE: 80 y.o. SEX: female  Body mass index is 39.14 kg/(m^2). PCP: Isael Sanchez MD  02/02/17      CC: right knee end stage OA  Problem List Items Addressed This Visit     None      Visit Diagnoses     Right knee pain, unspecified chronicity    -  Primary    Relevant Orders    AMB POC XRAY, KNEE; COMPLETE, 4+ VIEW (Completed)            HPI:  The patient is a pleasant 80 y.o. whom has end stage OA of their Right knee and has failed conservative treatment including but not limited to NSAIDS, cortisone injections, viscosupplementation, PT, and pain medicine. Due to the current findings and affected activities of daily living, surgical intervention is indicated. The alternatives, risks, complications, as well as expected outcome were discussed. These include but are not limited to infection, blood loss, need for blood transfusion, neurovascular damage, DVT, PE,  post-op stiffness and pain, leg length discrepancy, dislocation, anesthetic complications, prothesis longevity, need for more surgery, MI, stroke, and even death. The patient understands and wishes to proceed with surgery.       Past Medical History   Diagnosis Date    Arthritis     Arthritis of both hips     Back pain     Balance problem     Chronic back pain     Cough     Easy bruising     Essential hypertension     GERD (gastroesophageal reflux disease)     Hiatal hernia     Hypertension     Irregular heart beat     Left hip pain 10/8/2010    Neck pain     Nervousness     Night sweat     Osteoarthritis, knee bilateral     Pain with urination     Polymyalgia rheumatica (HCC)     Reflux     Spinal stenosis     Trapezius strain     Trochanteric bursitis of left hip     Venous insufficiency          Current Outpatient Prescriptions:     ciprofloxacin HCl (CIPRO) 500 mg tablet, Take 1 Tab by mouth two (2) times a day., Disp: 14 Tab, Rfl: 0    potassium 99 mg tablet, Take 99 mg by mouth daily. , Disp: , Rfl:     omeprazole (PRILOSEC) 40 mg capsule, Take 40 mg by mouth daily. , Disp: , Rfl:     acetaminophen (TYLENOL) 650 mg CR tablet, Take 1,300 mg by mouth two (2) times a day., Disp: , Rfl:     aMILoride-hydrochlorothiazide (MODURETIC) 5-50 mg tab, Take 1 Tab by mouth daily. , Disp: , Rfl:     amLODIPine (NORVASC) 10 mg tablet, 10 mg daily. , Disp: , Rfl: 3    losartan (COZAAR) 100 mg tablet, Take 100 mg by mouth daily. , Disp: , Rfl:     metoclopramide HCl (REGLAN) 5 mg tablet, Take 5 mg by mouth two (2) times a day., Disp: , Rfl:     cetirizine (ZYRTEC) 10 mg tablet, Take  by mouth daily. , Disp: , Rfl:     aspirin 81 mg tablet, Take  by mouth., Disp: , Rfl:     Allergies   Allergen Reactions    Citric Acid Unknown (comments)    Crinone [Progesterone Micronized] Unknown (comments)    Iodine Unknown (comments)    Percocet [Oxycodone-Acetaminophen] Other (comments)     Gets rebound headaches after taking Percocet       Social History     Social History    Marital status:      Spouse name: N/A    Number of children: N/A    Years of education: N/A     Occupational History    Not on file.      Social History Main Topics    Smoking status: Never Smoker    Smokeless tobacco: Never Used    Alcohol use No    Drug use: No    Sexual activity: No     Other Topics Concern    Not on file     Social History Narrative       Past Surgical History   Procedure Laterality Date    Hx hysterectomy      Hx cholecystectomy      Hx orthopaedic       right foot and ankle    Hx heent  06/2011     left cornea transplant    Hx ankle fracture tx      Colonoscopy N/A 7/27/2016     COLONOSCOPY w/polypectomy performed by Altagracia Shipley MD at [address] [place] [address] Place       PE:  Visit Vitals    /51    Pulse 84    Temp 97.5 °F (36.4 °C)    Ht 5' 4\" (1.626 m)    Wt 228 lb (103.4 kg)    BMI 39.14 kg/m2     A&O X3, NAD, well develop, well nourished  Heart: S1-S2, rrr  Lungs: CTA bilat  Abd: soft, nt, nt, + bs in all quadrants  Ext:  Pos distal pulses to DP, PT      X-ray: right knee shows end stage OA    Labs: labs were reviewed and wnl.  ua pos, treated with Cipro    A:  Right  knee end stage OA    P:  At this point we will move forward with surgery. Again, the alternatives, risks, complications, as well as expected outcome were discussed and the patient wishes to proceed with surgery. Pt has been instructed to stop aspirin, nsaids, rheumatologic medications and blood thinners. They have also been instructed to continue on any heart and bp meds and to take them the morning of surgery with sips of water.          Shahab Sanchez

## 2017-02-06 NOTE — ANESTHESIA PROCEDURE NOTES
Peripheral Block    Start time: 2/6/2017 7:03 AM  End time: 2/6/2017 7:11 AM  Performed by: Stefania Drivers  Authorized by: Stefania Drivers       Pre-procedure: Indications: at surgeon's request, post-op pain management and procedure for pain    Preanesthetic Checklist: patient identified, risks and benefits discussed, site marked, timeout performed, anesthesia consent given and patient being monitored    Timeout Time: 07:04          Block Type:   Block Type:  Femoral single shot  Laterality:  Right  Monitoring:  Standard ASA monitoring, continuous pulse ox, frequent vital sign checks, oxygen, heart rate and responsive to questions  Injection Technique:  Single shot  Procedures: ultrasound guided and nerve stimulator    Patient Position: supine  Prep: chlorhexidine    Location:  Upper thigh  Needle Type:  Stimuplex  Needle Gauge:  21 G  Needle Localization:  Ultrasound guidance and nerve stimulator  Motor Response: minimal motor response >0.4 mA    Medication Injected:  0.2%  ropivacaine    Assessment:  Number of attempts:  1  Injection Assessment:  No paresthesia, incremental injection every 5 mL, ultrasound image on chart, no intravascular symptoms, negative aspiration for blood and local visualized surrounding nerve on ultrasound  Patient tolerance:  Patient tolerated the procedure well with no immediate complications  Location:  PREOP HOLDING    Patient given 1 mg IV Versed and 100 mcg IV Fentanyl for sedation.     2/6/2017     7:19 AM     Lisa Chinchilla MD

## 2017-02-06 NOTE — ROUTINE PROCESS
Bedside and Verbal shift change report given to Najma Devi (oncoming nurse) by Shirley Albright RN (offgoing nurse). Report included the following information SBAR, Kardex, MAR and Recent Results.     SITUATION:    Code Status: No Order   Reason for Admission: Primary osteoarthritis of right knee [address] day: 0   Problem List:       Hospital Problems  Date Reviewed: 2/6/2017          Codes Class Noted POA    Arthritis of knee ICD-10-CM: M19.90  ICD-9-CM: 716.96  2/6/2017 Unknown              BACKGROUND:    Past Medical History:   Past Medical History   Diagnosis Date    Arthritis     Arthritis of both hips     Back pain     Balance problem     Chronic back pain     Cough     Easy bruising     Essential hypertension     GERD (gastroesophageal reflux disease)     Hiatal hernia     Hypertension     Irregular heart beat     Left hip pain 10/8/2010    Neck pain     Nervousness     Night sweat     Osteoarthritis, knee bilateral     Pain with urination     Polymyalgia rheumatica (HCC)     Reflux     Spinal stenosis     Trapezius strain     Trochanteric bursitis of left hip     Venous insufficiency          Patient taking anticoagulants yes     ASSESSMENT:    Changes in Assessment Throughout Shift: n/a     Patient has Central Line: no Reasons if yes: n/a   Patient has Krishna Cath: yes Reasons if yes: surgery      Last Vitals:     Vitals:    02/06/17 1005 02/06/17 1059 02/06/17 1101 02/06/17 1546   BP: 113/56 102/59 102/59 102/50   Pulse: 80 73 73 77   Resp:   16 16   Temp:   97.6 °F (36.4 °C) 96.8 °F (36 °C)   SpO2:   96% 97%   Weight:       Height:            IV and DRAINS (will only show if present)   Peripheral IV 02/06/17 Left Wrist-Site Assessment: Clean, dry, & intact  Jean-Claude-Reyes Drain 02/06/17 Right Knee-Site Assessment: Clean, dry, & intact     WOUND (if present)   Wound Type:    knee   Dressing present Dressing Present : Yes   Wound Concerns/Notes:  none     PAIN    Pain Assessment    Pain Intensity 1: 0 (02/06/17 1128)    Pain Location 1: Knee         Patient Stated Pain Goal: 0  o Interventions for Pain:  See mar pt denies  o Intervention effective:   o Time of last intervention  o Reassessment Completed:     Last 3 Weights:  Last 3 Recorded Weights in this Encounter    01/23/17 1022 02/06/17 0612   Weight: 103.4 kg (228 lb) 104 kg (229 lb 6 oz)     Weight change:      INTAKE/OUPUT    Current Shift: 02/06 0701 - 02/06 1900  In: 1011 [P.O.:240; I.V.:1000]  Out: 550 [Urine:450]    Last three shifts:       LAB RESULTS   No results for input(s): WBC, HGB, HCT, PLT, HGBEXT, HCTEXT, PLTEXT in the last 72 hours. No results for input(s): NA, K, GLU, BUN, CREA, CA, MG, INR in the last 72 hours. No lab exists for component: PT, PTT, INREXT    RECOMMENDATIONS AND DISCHARGE PLANNING     1. Pending tests/procedures/ Plan of Care or Other Needs: physical therapy and drain removal     2. Discharge plan for patient and Needs/Barriers: home    3. Estimated Discharge Date: 2/8/17 Posted on Whiteboard in [facility]: yes      4. The patient's care plan was reviewed with the oncoming nurse. \"HEALS\" SAFETY CHECK      Fall Risk    Total Score: 3    Safety Measures: Safety Measures: Bed/Chair-Wheels locked, Bed in low position, Call light within reach, Gripper socks    A safety check occurred in the patient's room between off going nurse and oncoming nurse listed above.     The safety check included the below items  Area Items   H  High Alert Medications - Verify all high alert medication drips (heparin, PCA, etc.)   E  Equipment - Suction is set up for ALL patients (with yanker)  - Red plugs utilized for all equipment (IV pumps, etc.)  - WOWs wiped down at end of shift.  - Room stocked with oxygen, suction, and other unit-specific supplies   A  Alarms - Bed alarm is set for fall risk patients  - Ensure chair alarm is in place and activated if patient is up in a chair   L  Lines - Check IV for any infiltration  - Krishna bag is empty if patient has a Krishna   - Tubing and IV bags are labeled   S  Safety   - Room is clean, patient is clean, and equipment is clean. - Hallways are clear from equipment besides carts. - Fall bracelet on for fall risk patients  - Ensure room is clear and free of clutter  - Suction is set up for ALL patients (with yanker)  - Hallways are clear from equipment besides carts.    - Isolation precautions followed, supplies available outside room, sign posted     Vasu Nova RN

## 2017-02-06 NOTE — PROGRESS NOTES
Pt informed her potassium medication is not stocked at the hospital and asked if she could bring from home.

## 2017-02-06 NOTE — CONSULTS
[facility]  Pulmonary, Critical Care, and Sleep Medicine    Initial Patient Consult    Name: Zackary Calhoun MRN: [id number]   : 1935 Hospital: [address] [place] [address] Dr   Date: 2017        Subjective: This patient has been seen and evaluated at the request of Dr. Dinora Wheeler for post-op medical management. Patient is s/p RT TKR today  Patient is a 80 y. o.AA female with hx of HTN, chronic RT shoulder pains, RT knee OA. Patient awake, alert  Pain controlled  She has no chest pains or SOB or nausea. EBL: 50 mls    Past Medical History   Diagnosis Date    Arthritis     Arthritis of both hips     Back pain     Balance problem     Chronic back pain     Cough     Easy bruising     Essential hypertension     GERD (gastroesophageal reflux disease)     Hiatal hernia     Hypertension     Irregular heart beat     Left hip pain 10/8/2010    Neck pain     Nervousness     Night sweat     Osteoarthritis, knee bilateral     Pain with urination     Polymyalgia rheumatica (HCC)     Reflux     Spinal stenosis     Trapezius strain     Trochanteric bursitis of left hip     Venous insufficiency       Past Surgical History   Procedure Laterality Date    Hx hysterectomy      Hx cholecystectomy      Hx orthopaedic       right foot and ankle    Hx heent  2011     left cornea transplant    Hx ankle fracture tx      Colonoscopy N/A 2016     COLONOSCOPY w/polypectomy performed by Deejay Rosas MD at [address]      Prior to Admission medications    Medication Sig Start Date End Date Taking? Authorizing Provider   cholecalciferol (VITAMIN D3) 1,000 unit tablet Take 1,000 Units by mouth daily. Yes Historical Provider   potassium 99 mg tablet Take 99 mg by mouth daily. Yes Historical Provider   omeprazole (PRILOSEC) 40 mg capsule Take 40 mg by mouth daily.    Yes Historical Provider   acetaminophen (TYLENOL) 650 mg CR tablet Take 1,300 mg by mouth two (2) times a day.   Yes Historical Provider   aMILoride-hydrochlorothiazide (MODURETIC) 5-50 mg tab Take 1 Tab by mouth daily. Yes Historical Provider   amLODIPine (NORVASC) 10 mg tablet 10 mg daily. 9/10/15  Yes Historical Provider   losartan (COZAAR) 100 mg tablet Take 100 mg by mouth daily. Yes Historical Provider   metoclopramide HCl (REGLAN) 5 mg tablet Take 5 mg by mouth two (2) times a day. Yes Historical Provider   cetirizine (ZYRTEC) 10 mg tablet Take  by mouth daily. Yes Historical Provider   aspirin 81 mg tablet Take  by mouth. Yes Historical Provider   ciprofloxacin HCl (CIPRO) 500 mg tablet Take 1 Tab by mouth two (2) times a day.  1/26/17   Vivica Klinefelter, MD     Allergies   Allergen Reactions    Citric Acid Unknown (comments)    Crinone [Progesterone Micronized] Unknown (comments)    Iodine Unknown (comments)    Percocet [Oxycodone-Acetaminophen] Other (comments)     Gets rebound headaches after taking Percocet      Social History   Substance Use Topics    Smoking status: Never Smoker    Smokeless tobacco: Never Used    Alcohol use No      Family History   Problem Relation Age of Onset    Arthritis-rheumatoid Other     Diabetes Other     Hypertension Other     Arthritis-osteo Other         Current Facility-Administered Medications   Medication Dose Route Frequency    WARFARIN INFORMATION NOTE (COUMADIN)   Other Q24H    amLODIPine (NORVASC) tablet 10 mg  10 mg Oral DAILY    losartan (COZAAR) tablet 100 mg  100 mg Oral DAILY    metoclopramide HCl (REGLAN) tablet 5 mg  5 mg Oral BID    [START ON 2/7/2017] pantoprazole (PROTONIX) tablet 40 mg  40 mg Oral ACB    potassium tablet 99 mg  99 mg Oral DAILY    0.9% sodium chloride infusion  100 mL/hr IntraVENous CONTINUOUS    sodium chloride (NS) flush 5-10 mL  5-10 mL IntraVENous Q8H    ceFAZolin (ANCEF) 3 g in 0.9%  ml IVPB  3 g IntraVENous Q8H    ferrous sulfate tablet 325 mg  1 Tab Oral BID WITH MEALS    celecoxib (CELEBREX) capsule 200 mg  200 mg Oral BID    aMILoride (MIDAMOR) tablet 5 mg  5 mg Oral DAILY    And    hydroCHLOROthiazide (HYDRODIURIL) tablet 50 mg  50 mg Oral DAILY       Review of Systems:  HEENT: No epistaxis, no nasal drainage, no difficulty in swallowing  Respiratory: no cough or SOB  Cardiovascular: no chest pain, no palpitations, no chronic leg edema, no syncope  Gastrointestinal: no abd pain, no vomiting, no diarrhea, no bleeding symptoms  Genitourinary: No urinary symptoms  Integument/breast: No ulcers  Musculoskeletal: as above  Neurological: No focal weakness, no seizures, no headaches  Behvioral/Psych: No anxiety, no depression  Constitutional: No fever, no chills, no weight loss, no night sweats    Objective:   Vital Signs:    Visit Vitals    /59    Pulse 73    Temp 97.6 °F (36.4 °C)    Resp 16    Ht 5' 4\" (1.626 m)    Wt 104 kg (229 lb 6 oz)    SpO2 96%    Breastfeeding Unknown    BMI 39.37 kg/m2       O2 Device: Nasal cannula   O2 Flow Rate (L/min): 2 l/min   Temp (24hrs), Av.7 °F (36.5 °C), Min:97.6 °F (36.4 °C), Max:97.8 °F (36.6 °C)       Intake/Output:     Intake/Output Summary (Last 24 hours) at 17 1149  Last data filed at 17 0951   Gross per 24 hour   Intake             1000 ml   Output              300 ml   Net              700 ml         Physical Exam:   General: comfortable; on 2 lits nc O2  Neck: No adenopathy or thyroid swelling  CVS: S1S2 no murmurs  RS: Good AE bilaterally, lungs clear  Abd: soft, non tender, no hepatosplenomegaly  Neuro: non focal, awake, alert  Extrm: no leg edema   Skin: no rash  RT knee dressing; no bleeding or hematoma     Data review:   Labs:  No results for input(s): WBC, HGB, HCT, PLT, HGBEXT, HCTEXT, PLTEXT in the last 72 hours. No results for input(s): NA, K, CL, CO2, GLU, BUN, CREA, CA, MG, PHOS, ALB, TBIL, SGOT, ALT, INR in the last 72 hours.     No lab exists for component: INREXT  No results for input(s): PH, PCO2, PO2, HCO3, FIO2 in the last 72 hours. Ref. Range 1/23/2017 10:14   WBC Latest Ref Range: 4.6 - 13.2 K/uL 8.1   RBC Latest Ref Range: 4.20 - 5.30 M/uL 3.73 (L)   HGB Latest Ref Range: 12.0 - 16.0 g/dL 10.1 (L)   HCT Latest Ref Range: 35.0 - 45.0 % 31.4 (L)   MCV Latest Ref Range: 74.0 - 97.0 FL 84.2   MCH Latest Ref Range: 24.0 - 34.0 PG 27.1   MCHC Latest Ref Range: 31.0 - 37.0 g/dL 32.2   RDW Latest Ref Range: 11.6 - 14.5 % 13.8   PLATELET Latest Ref Range: 135 - 420 K/uL 324      Ref. Range 1/23/2017 10:14   Sodium Latest Ref Range: 136 - 145 mmol/L 142   Potassium Latest Ref Range: 3.5 - 5.5 mmol/L 4.1   Chloride Latest Ref Range: 100 - 108 mmol/L 107   CO2 Latest Ref Range: 21 - 32 mmol/L 26   Anion gap Latest Ref Range: 3.0 - 18 mmol/L 9   Glucose Latest Ref Range: 74 - 99 mg/dL 116 (H)   BUN Latest Ref Range: 7.0 - 18 MG/DL 32 (H)   Creatinine Latest Ref Range: 0.6 - 1.3 MG/DL 1.42 (H)   BUN/Creatinine ratio Latest Ref Range: 12 - 20   23 (H)   Calcium Latest Ref Range: 8.5 - 10.1 MG/DL 9.1        Ref. Range 1/23/2017 10:14   Hemoglobin A1c, (calculated) Latest Ref Range: 4.2 - 5.6 % 5.4     1/23/17  Diagnosis   Final      Normal sinus rhythm   Normal ECG   When compared with ECG of 20-APR-2011 11:16,   No significant change was found   Confirmed by Hernan Diaz ([id number]) on 1/23/2017 1:17:55 PM        Imaging:  I have personally reviewed the patients radiographs and have reviewed the reports:  CXR 1/23/17  Pre-Op. COMPARISON: Chest x-ray April 20, 2011. FINDINGS:   PA and lateral views obtained. The cardiac silhouette is normal. Calcified  plaque is seen in the aortic arch with mild tortuosity of the arch and  descending thoracic aorta. The lungs are clear. The costophrenic angles are  sharply defined. Pulmonary vascularity is normal. Probable mild compression  fracture of T4, T5, and T7 unchanged. Mild disc space narrowing with marginal  spurring of the thoracic spine.  Stable mild compression fractures of the upper  and mid thoracic spine. 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   Stable multiple mild compression fractures of the thoracic spine     IMPRESSION:   · RT Knee OA - s/p TKR   · HTN  · Chronic RT shoulder pain       RECOMMENDATIONS:   Hold diuretics till renal function stable  Resume HTN meds in low doses - amlodipine and losartan   IV fluids with NS at 100/hr; follow IOs  Pain control, iron tabs   Incentive spirometry   Post op labs - cbc, bmp tomorrow   Post op joint management and rehab per Ortho team   DVT proph - management per Ortho team - patient on coumadin   Dc kaur cath as per post-op protocol   D/w Nurse to let Ortho team know of patient chronic RT shoulder pains   Discussed with patient   Thank you for the consultation Annalise Stringer MD

## 2017-02-06 NOTE — PROGRESS NOTES
Problem: Mobility Impaired (Adult and Pediatric)  Goal: *Acute Goals and Plan of Care (Insert Text)  STGs to be addressed within 3 days:  1. Bed mobility: Supine to sit to supine S with HR for meals. 2. Activity tolerance: Tolerate up in chair 1-2 hrs for ADLs. 3. Transfers: Sit to stand to chair S with LRAD for ADLs. LTGs to be addressed within 7 days:  1. Standing/Ambulation Balance: Increase to Good with LRAD for safe transfers and gait. 2. Ambulation: Ambulate > 200 ft. S with LRAD for home mobility. 3. Patient Education: Independent with HEP for home safety. 4. Stairs: Up/Down 3 steps CGA with HR for home entry. Outcome: Progressing Towards Goal  PHYSICAL THERAPY EVALUATION     Patient: Joan Ward (40 y.o. female)  Date: 2/6/2017  Primary Diagnosis: Primary osteoarthritis of right knee [M17.11]  Procedure(s) (LRB):  RIGHT TOTAL KNEE ARTHROPLASTY (Right) Day of Surgery   Precautions: Fall, WBAT      ASSESSMENT :  Based on the objective data described below, the patient presents to PT s/p R total knee replacement with decreased functional mobility including bed mobility, transfers, ambulation, stairs, and general activity tolerance. PTA, patient reports ambulating with SPC with increased pain, performed all ADLs independently. Today, patient presents supine in bed with HOB elevated, heating pad at R shoulder, daughter in room. Patient trained in supine bed exercises including ankle pumps, quad sets, SLR, and heel slides. She demonstrated (+) quad lag on R LE during SLR, secondary to ongoing effects of femoral nerve shot and importance of mindful activation of R quad during mobility at this time. Patient performed bed mobility with Virgilio DRIVER, trained in seated LAQ with notable decreased R knee AROM. Patient demonstrated seated R knee AROM 55-90 degrees with inability to sustain extension against gravity.  Patient required Mod A X 2 for sit <> stand transfer from EOB X 2 attempts with assistance at R knee for improved quad control. Patient demonstrated inability to support self on R LE at this time secondary to decreased active quad control, gait training deferred until able. Patient returned to supine in bed with HOB elevated, heating pad on R shoulder for pain relief, ice machine applied to R knee. Patient encouraged to perform supine exercises throughout the day and to call for assistance prior to standing with RW. Patient will benefit from skilled intervention to address the above impairments and facilitate D/C planning. Patients rehabilitation potential is considered to be Good  Factors which may influence rehabilitation potential include:   [ ]         None noted  [ ]         Mental ability/status  [X]         Medical condition  [ ]         Home/family situation and support systems  [X]         Safety awareness  [X]         Pain tolerance/management  [ ]         Other:        PLAN :  Recommendations and Planned Interventions:  [X]           Bed Mobility Training             [X]    Neuromuscular Re-Education  [X]           Transfer Training                   [ ]    Orthotic/Prosthetic Training  [X]           Gait Training                          [ ]    Modalities  [X]           Therapeutic Exercises          [ ]    Edema Management/Control  [X]           Therapeutic Activities            [X]    Patient and Family Training/Education  [ ]           Other (comment):     Frequency/Duration: Patient will be followed by physical therapy twice daily to address goals. Discharge Recommendations: Home Health  Further Equipment Recommendations for Discharge: rolling walker and N/A       SUBJECTIVE:   Patient stated I'm very tired right now, I didn't sleep much last night.       OBJECTIVE DATA SUMMARY:       Past Medical History   Diagnosis Date    Arthritis      Arthritis of both hips      Back pain      Balance problem      Chronic back pain      Cough      Easy bruising      Essential hypertension  GERD (gastroesophageal reflux disease)      Hiatal hernia      Hypertension      Irregular heart beat      Left hip pain 10/8/2010    Neck pain      Nervousness      Night sweat      Osteoarthritis, knee bilateral      Pain with urination      Polymyalgia rheumatica (HCC)      Reflux      Spinal stenosis      Trapezius strain      Trochanteric bursitis of left hip      Venous insufficiency       Past Surgical History   Procedure Laterality Date    Hx hysterectomy        Hx cholecystectomy        Hx orthopaedic           right foot and ankle    Hx heent   06/2011       left cornea transplant    Hx ankle fracture tx        Colonoscopy N/A 7/27/2016       COLONOSCOPY w/polypectomy performed by Samra Armenta MD at [facility]     Barriers to Learning/Limitations: None  Compensate with: N/A  Prior Level of Function/Home Situation:  Home Situation  Home Environment: Private residence  # Steps to Enter: 3  Rails to Enter: Yes  Hand Rails : Bilateral  One/Two Story Residence: Two story, live on 1st floor  # of Interior Steps: 14  Height of Each Step (in): 0 inches  Interior Rails: Both  Lift Chair Available: No  Living Alone: No  Support Systems: Spouse/Significant Other/Partner, Family member(s)  Patient Expects to be Discharged to[de-identified] Private residence  Current DME Used/Available at Home: princess Rosario  Critical Behavior:  Neurologic State: Alert  Psychosocial  Patient Behaviors: Calm; Cooperative  Family  Behaviors: Calm; Cooperative (Daughter)  Purposeful Interaction: Yes  Pt Identified Daily Priority: Clinical issues (comment)  Caring Interventions: Reassure; Therapeutic modalities  Reassure: Informing;Support family  Therapeutic Modalities: Deep breathing  Strength:    Strength: Grossly decreased, non-functional (R LE, all else [facility])  Tone & Sensation:   Sensation: Intact (B LE to LT)   Range Of Motion:  AROM: Grossly decreased, non-functional (R LE, all else [facility])  Functional Mobility:  Bed Mobility:  Rolling: Minimum assistance  Supine to Sit: Minimum assistance  Sit to Supine: Minimum assistance  Scooting: Minimum assistance  Transfers:  Sit to Stand: Moderate assistance;Assist x2  Stand to Sit: Moderate assistance;Assist x2  Balance:   Sitting: Impaired  Sitting - Static: Fair (occasional)  Sitting - Dynamic: Fair (occasional)  Standing: Impaired  Standing - Static: Poor   Therapeutic Exercises:   Supine ankle pumps, quad sets, heel slides, SLR X 5. Seated LAQ X 5  Pain:  Pain Scale 1: Numeric (0 - 10)  Pain Intensity 1: 0  Pain Location 1: Knee  Pain Orientation 1: Right;Left  Pain Description 1: Aching  Activity Tolerance:   Fair/good  Please refer to the flowsheet for vital signs taken during this treatment. After treatment:   [ ] Patient left in no apparent distress sitting up in chair  [ ] Patient left sitting on EOB  [X] Patient left in no apparent distress in bed  [ ] Patient declined to be OOB at this time due to  [X] Call bell left within reach  [X] Nursing notified([facility])  [X] Caregiver present  [ ] Bed alarm activated  COMMUNICATION/EDUCATION:   [X]         Fall prevention education was provided and the patient/caregiver indicated understanding. [X]         Patient/family have participated as able in goal setting and plan of care. [X]         Patient/family agree to work toward stated goals and plan of care. [ ]         Patient understands intent and goals of therapy, but is neutral about his/her participation. [ ]         Patient is unable to participate in goal setting and plan of care. Thank you for this referral.  Edinson Lane   Time Calculation: 33 mins     G-codes: Mobility [id number] Current  CK= 40-59%   Goal  CI= 1-19%. The severity rating is based on the Level of Assistance required for Functional Mobility and ADLs.      Eval Complexity: History: MEDIUM  Complexity : 1-2 comorbidities / personal factors will impact the outcome/ POC Exam:MEDIUM Complexity : 3 Standardized tests and measures addressing body structure, function, activity limitation and / or participation in recreation  Presentation: MEDIUM Complexity : Evolving with changing characteristics  Overall Complexity:MEDIUM

## 2017-02-06 NOTE — PROGRESS NOTES
conducted a pre-surgery visit with Stephany Santillan, who is a 80 y.o.,female. The  provided the following Interventions:  Initiated a relationship of care and support. Plan:  Chaplains will continue to follow and will provide pastoral care on an as needed/requested basis.  recommends bedside caregivers page  on duty if patient shows signs of acute spiritual or emotional distress.     [address] [facility]   Board Certified [address] [place] [address]   [phone]

## 2017-02-06 NOTE — IP AVS SNAPSHOT
Current Discharge Medication List  
  
Take these medications at their scheduled times Dose & Instructions Dispensing Information Comments Morning Noon Evening Bedtime  
 acetaminophen 650 mg CR tablet Commonly known as:  TYLENOL Your next dose is: Today, Tomorrow Other:  ____________ Dose:  1300 mg Take 1,300 mg by mouth two (2) times a day. Refills:  0  
     
   
   
   
  
 aMILoride-hydroCHLOROthiazide 5-50 mg Tab Commonly known as:  MODURETIC Your next dose is: Today, Tomorrow Other:  ____________ Dose:  1 Tab Take 1 Tab by mouth daily. Refills:  0  
     
   
   
   
  
 amLODIPine 10 mg tablet Commonly known as:  Rudene Post Your next dose is: Today, Tomorrow Other:  ____________ Dose:  10 mg  
10 mg daily. Refills:  3  
     
   
   
   
  
 aspirin 325 mg tablet Commonly known as:  ASPIRIN Your next dose is: Today, Tomorrow Other:  ____________ Dose:  325 mg Take 1 Tab by mouth two (2) times a day. Quantity:  60 Tab Refills:  0  
     
   
   
   
  
 ciprofloxacin HCl 500 mg tablet Commonly known as:  CIPRO Your next dose is: Today, Tomorrow Other:  ____________ Dose:  500 mg Take 1 Tab by mouth two (2) times a day. Quantity:  14 Tab Refills:  0  
     
   
   
   
  
 ferrous sulfate 325 mg (65 mg iron) tablet Your next dose is: Today, Tomorrow Other:  ____________ Dose:  325 mg Take 1 Tab by mouth two (2) times daily (with meals). Quantity:  60 Tab Refills:  2  
     
   
   
   
  
 losartan 100 mg tablet Commonly known as:  COZAAR Your next dose is: Today, Tomorrow Other:  ____________ Dose:  100 mg Take 100 mg by mouth daily. Refills:  0  
     
   
   
   
  
 metoclopramide HCl 5 mg tablet Commonly known as:  REGLAN Your next dose is: Today, Tomorrow Other:  ____________ Dose:  5 mg Take 5 mg by mouth two (2) times a day. Refills:  0  
     
   
   
   
  
 omeprazole 40 mg capsule Commonly known as:  PRILOSEC Your next dose is: Today, Tomorrow Other:  ____________ Dose:  40 mg Take 40 mg by mouth daily. Refills:  0  
     
   
   
   
  
 potassium 99 mg tablet Your next dose is: Today, Tomorrow Other:  ____________ Dose:  99 mg Take 99 mg by mouth daily. Refills:  0  
     
   
   
   
  
 VITAMIN D3 1,000 unit tablet Generic drug:  cholecalciferol Your next dose is: Today, Tomorrow Other:  ____________ Dose:  1000 Units Take 1,000 Units by mouth daily. Refills:  0 ZyrTEC 10 mg tablet Generic drug:  cetirizine Your next dose is: Today, Tomorrow Other:  ____________ Take  by mouth daily. Refills:  0 Take these medications as needed Dose & Instructions Dispensing Information Comments Morning Noon Evening Bedtime HYDROcodone-acetaminophen 7.5-325 mg per tablet Commonly known as:  Cori Chandlerthergill Your next dose is: Today, Tomorrow Other:  ____________ Dose:  1-2 Tab Take 1-2 Tabs by mouth every four (4) hours as needed. Max Daily Amount: 12 Tabs. Quantity:  60 Tab Refills:  0 Take these medications as directed Dose & Instructions Dispensing Information Comments Morning Noon Evening Bedtime  
 levoFLOXacin 500 mg tablet Commonly known as:  Aleshia Sarmiento Your next dose is: Today, Tomorrow Other:  ____________ 1 po q day x 7 days Quantity:  7 Tab Refills:  0 Where to Get Your Medications Information about where to get these medications is not yet available ! Ask your nurse or doctor about these medications  
  aspirin 325 mg tablet ferrous sulfate 325 mg (65 mg iron) tablet HYDROcodone-acetaminophen 7.5-325 mg per tablet  
 levoFLOXacin 500 mg tablet

## 2017-02-06 NOTE — ANESTHESIA POSTPROCEDURE EVALUATION
Post-Anesthesia Evaluation and Assessment    Patient: Alonzo Cespedes MRN: [id number]  SSN: [id number]    YOB: 1935  Age: 80 y.o. Sex: female       Cardiovascular Function/Vital Signs  Visit Vitals    /56    Pulse 80    Temp 36.6 °C (97.8 °F)    Resp 16    Ht 5' 4\" (1.626 m)    Wt 104 kg (229 lb 6 oz)    SpO2 99%    BMI 39.37 kg/m2       Patient is status post general anesthesia for Procedure(s):  RIGHT TOTAL KNEE ARTHROPLASTY. Nausea/Vomiting: None    Postoperative hydration reviewed and adequate. Pain:  Pain Scale 1: Numeric (0 - 10) (02/06/17 1003)  Pain Intensity 1: 0 (02/06/17 1003)   Managed    Neurological Status:   Neuro (WDL): Within Defined Limits (02/06/17 0945)   At baseline    Mental Status and Level of Consciousness: Arousable    Pulmonary Status:   O2 Device: Nasal cannula (02/06/17 1003)   Adequate oxygenation and airway patent    Complications related to anesthesia: None    Post-anesthesia assessment completed.  No concerns    Signed By: Geri White MD     February 6, 2017

## 2017-02-06 NOTE — IP AVS SNAPSHOT
[address] Patient: Zackary Calhoun MRN: [id number] MALDONADO:[birth year] You are allergic to the following Allergen Reactions Citric Acid Unknown (comments) Crinone (Progesterone Micronized) Unknown (comments) Iodine Unknown (comments) Percocet (Oxycodone-Acetaminophen) Other (comments) Gets rebound headaches after taking Percocet Recent Documentation Height Weight Breastfeeding? BMI OB Status Smoking Status 1.626 m 113.8 kg Unknown 43.06 kg/m2 Hysterectomy Never Smoker Emergency Contacts Name Discharge Info Relation Home Work Mobile [address] [facility] CAREGIVER [3] Spouse [3] [phone] Layla Viveros  Daughter [21] [phone] About your hospitalization You were admitted on:  February 6, 2017 You last received care in the:  [facility] [address] You were discharged on:  February 10, 2017 Unit phone number:  [phone] Why you were hospitalized Your primary diagnosis was:  Not on File Your diagnoses also included: Arthritis Of Knee Providers Seen During Your Hospitalizations Provider Role Specialty Primary office phone Christine Chadwick MD Attending Provider Orthopedic Surgery [phone] Your Primary Care Physician (PCP) Primary Care Physician Office Phone Office Fax Willa Killian Delgado 72 [phone] Follow-up Information Follow up With Details Comments Contact Info Serjio Escamilla PA-C On 2/23/2017 Appointment at 10:30am [place] [address] [place] [address] 
[phone] Jessica Fong MD On 2/14/2017 Appointment at 2:00 pm [address] [place] [address] 
[phone] Your Appointments  Thursday February 23, 2017 10:30 AM EST  
POST OP with Serjio Escamilla PA-C  
 [address] [facility], [address] and [facility] ([facility]) [address] [facility], [address] [place] [address]  
[phone] Current Discharge Medication List  
  
START taking these medications Dose & Instructions Dispensing Information Comments Morning Noon Evening Bedtime  
 ferrous sulfate 325 mg (65 mg iron) tablet Your next dose is: Today, Tomorrow Other:  _________ Dose:  325 mg Take 1 Tab by mouth two (2) times daily (with meals). Quantity:  60 Tab Refills:  2 HYDROcodone-acetaminophen 7.5-325 mg per tablet Commonly known as:  Martha Fothergill Your next dose is: Today, Tomorrow Other:  _________ Dose:  1-2 Tab Take 1-2 Tabs by mouth every four (4) hours as needed. Max Daily Amount: 12 Tabs. Quantity:  60 Tab Refills:  0  
     
   
   
   
  
 levoFLOXacin 500 mg tablet Commonly known as:  Aleshia Guillermina Your next dose is: Today, Tomorrow Other:  _________ 1 po q day x 7 days Quantity:  7 Tab Refills:  0 CONTINUE these medications which have CHANGED Dose & Instructions Dispensing Information Comments Morning Noon Evening Bedtime  
 aspirin 325 mg tablet Commonly known as:  ASPIRIN What changed:   
- medication strength 
- how much to take - when to take this Your next dose is: Today, Tomorrow Other:  _________ Dose:  325 mg Take 1 Tab by mouth two (2) times a day. Quantity:  60 Tab Refills:  0 CONTINUE these medications which have NOT CHANGED Dose & Instructions Dispensing Information Comments Morning Noon Evening Bedtime  
 acetaminophen 650 mg CR tablet Commonly known as:  TYLENOL Your next dose is: Today, Tomorrow Other:  _________ Dose:  1300 mg Take 1,300 mg by mouth two (2) times a day. Refills:  0 aMILoride-hydroCHLOROthiazide 5-50 mg Tab Commonly known as:  MODURETIC Your next dose is: Today, Tomorrow Other:  _________ Dose:  1 Tab Take 1 Tab by mouth daily. Refills:  0  
     
   
   
   
  
 amLODIPine 10 mg tablet Commonly known as:  Antonio Tae Your next dose is: Today, Tomorrow Other:  _________ Dose:  10 mg  
10 mg daily. Refills:  3  
     
   
   
   
  
 ciprofloxacin HCl 500 mg tablet Commonly known as:  CIPRO Your next dose is: Today, Tomorrow Other:  _________ Dose:  500 mg Take 1 Tab by mouth two (2) times a day. Quantity:  14 Tab Refills:  0  
     
   
   
   
  
 losartan 100 mg tablet Commonly known as:  COZAAR Your next dose is: Today, Tomorrow Other:  _________ Dose:  100 mg Take 100 mg by mouth daily. Refills:  0  
     
   
   
   
  
 metoclopramide HCl 5 mg tablet Commonly known as:  REGLAN Your next dose is: Today, Tomorrow Other:  _________ Dose:  5 mg Take 5 mg by mouth two (2) times a day. Refills:  0  
     
   
   
   
  
 omeprazole 40 mg capsule Commonly known as:  PRILOSEC Your next dose is: Today, Tomorrow Other:  _________ Dose:  40 mg Take 40 mg by mouth daily. Refills:  0  
     
   
   
   
  
 potassium 99 mg tablet Your next dose is: Today, Tomorrow Other:  _________ Dose:  99 mg Take 99 mg by mouth daily. Refills:  0  
     
   
   
   
  
 VITAMIN D3 1,000 unit tablet Generic drug:  cholecalciferol Your next dose is: Today, Tomorrow Other:  _________ Dose:  1000 Units Take 1,000 Units by mouth daily. Refills:  0 ZyrTEC 10 mg tablet Generic drug:  cetirizine Your next dose is: Today, Tomorrow Other:  _________ Take  by mouth daily. Refills:  0 Where to Get Your Medications Information on where to get these meds will be given to you by the nurse or doctor. ! Ask your nurse or doctor about these medications  
  aspirin 325 mg tablet  
 ferrous sulfate 325 mg (65 mg iron) tablet HYDROcodone-acetaminophen 7.5-325 mg per tablet  
 levoFLOXacin 500 mg tablet Discharge Instructions DISCHARGE SUMMARY from Nurse The following personal items are in your possession at time of discharge: 
 
Dental Appliances: Lowers, Partials, Uppers, None Visual Aid: Glasses, At bedside Hearing Aids/Status: Does not own Home Medications: None Jewelry: Ring Clothing: Pants, Shirt, Undergarments Other Valuables: None Personal Items Sent to Safe: n/a PATIENT INSTRUCTIONS: 
 
After general anesthesia or intravenous sedation, for 24 hours or while taking prescription Narcotics: · Limit your activities · Do not drive and operate hazardous machinery · Do not make important personal or business decisions · Do  not drink alcoholic beverages · If you have not urinated within 8 hours after discharge, please contact your surgeon on call. Report the following to your surgeon: 
· Excessive pain, swelling, redness or odor of or around the surgical area · Temperature over 100.5 · Nausea and vomiting lasting longer than 4 hours or if unable to take medications · Any signs of decreased circulation or nerve impairment to extremity: change in color, persistent  numbness, tingling, coldness or increase pain · Any questions Patient armband removed and shredded [employer] Activation Thank you for requesting access to [facility]. Please follow the instructions below to securely access and download your online medical record. [facility]ista allows you to send messages to your doctor, view your test results, renew your prescriptions, schedule appointments, and more. How Do I Sign Up? 1. In your internet browser, go to [email or web address] 
 2. Click on the First Time User? Click Here link in the Sign In box. You will be redirect to the New Member Sign Up page. 3. Enter your HeyAnita Access Code exactly as it appears below. You will not need to use this code after youve completed the sign-up process. If you do not sign up before the expiration date, you must request a new code. Ximalayahart Access Code: Activation code not generated Current HeyAnita Status: Patient Declined (This is the date your MyChart access code will ) 4. Enter the last four digits of your Social Security Number (xxxx) and Date of Birth (mm/dd/yyyy) as indicated and click Submit. You will be taken to the next sign-up page. 5. Create a [employer] ID. This will be your HeyAnita login ID and cannot be changed, so think of one that is secure and easy to remember. 6. Create a HeyAnita password. You can change your password at any time. 7. Enter your Password Reset Question and Answer. This can be used at a later time if you forget your password. 8. Enter your e-mail address. You will receive e-mail notification when new information is available in [address] 9. Click Sign Up. You can now view and download portions of your medical record. 10. Click the Download Summary menu link to download a portable copy of your medical information. Additional Information If you have questions, please visit the Frequently Asked Questions section of the HeyAnita website at [email or web address] Remember, HeyAnita is NOT to be used for urgent needs. For medical emergencies, dial 911. What to do at Home: 
Recommended activity: Activity as tolerated *  Please give a list of your current medications to your Primary Care Provider. *  Please update this list whenever your medications are discontinued, doses are 
    changed, or new medications (including over-the-counter products) are added.  
 
*  Please carry medication information at all times in case of emergency situations. These are general instructions for a healthy lifestyle: No smoking/ No tobacco products/ Avoid exposure to second hand smoke Surgeon General's Warning:  Quitting smoking now greatly reduces serious risk to your health. Obesity, smoking, and sedentary lifestyle greatly increases your risk for illness A healthy diet, regular physical exercise & weight monitoring are important for maintaining a healthy lifestyle You may be retaining fluid if you have a history of heart failure or if you experience any of the following symptoms:  Weight gain of 3 pounds or more overnight or 5 pounds in a week, increased swelling in our hands or feet or shortness of breath while lying flat in bed. Please call your doctor as soon as you notice any of these symptoms; do not wait until your next office visit. Recognize signs and symptoms of STROKE: 
 
F-face looks uneven A-arms unable to move or move unevenly S-speech slurred or non-existent T-time-call 911 as soon as signs and symptoms begin-DO NOT go Back to bed or wait to see if you get better-TIME IS BRAIN. Warning Signs of HEART ATTACK Call 911 if you have these symptoms: 
? Chest discomfort. Most heart attacks involve discomfort in the center of the chest that lasts more than a few minutes, or that goes away and comes back. It can feel like uncomfortable pressure, squeezing, fullness, or pain. ? Discomfort in other areas of the upper body. Symptoms can include pain or discomfort in one or both arms, the back, neck, jaw, or stomach. ? Shortness of breath with or without chest discomfort. ? Other signs may include breaking out in a cold sweat, nausea, or lightheadedness. Don't wait more than five minutes to call [address]! Fast action can save your life. Calling 911 is almost always the fastest way to get lifesaving treatment.  Emergency Medical Services staff can begin treatment when they arrive  up to an hour sooner than if someone gets to the hospital by car. The discharge information has been reviewed with the patient. The patient verbalized understanding. Discharge medications reviewed with the patient and appropriate educational materials and side effects teaching were provided. Discharge Orders Procedure Order Date Status Priority Quantity Spec Type Associated Dx WALKER STANDARD 02/07/17 1024 Normal Routine 1  Arthritis of knee [4816298] ELEVATED TOILET SEAT 02/07/17 1024 Normal Routine 1  Arthritis of knee [[id number]] COMMODE CHAIR 02/07/17 1024 Normal Routine 1  Arthritis of knee [[id number]] SHOWER CHAIR 02/07/17 1024 Normal Routine 1  Arthritis of knee [[id number]] [facility] 02/07/17 1024 Normal Routine 1  Arthritis of knee [[id number]] Comments: Total knee protocol, wbat Aspirin therapy 
aquacel ag dressing pod 7 and prn MyChart Announcement We are excited to announce that we are making your provider's discharge notes available to you in Withlocal[employer]. You will see these notes when they are completed and signed by the physician that discharged you from your recent hospital stay. If you have any questions or concerns about any information you see in [employer], please call the Health Information Department where you were seen or reach out to your Primary Care Provider for more information about your plan of care. General Information Please provide this summary of care documentation to your next provider. Patient Signature:  ____________________________________________________________ Date:  ____________________________________________________________  
  
Estiven Parson Provider Signature:  ____________________________________________________________ Date:  ____________________________________________________________

## 2017-02-06 NOTE — OP NOTES
[address]    Name:  Home Smith  MR#:  [id number]  :  1935  Account #:  [de-identified]  Date of Adm:  2017  Date of Surgery:  2017      PREOPERATIVE DIAGNOSES  1. End-stage arthritis, right knee. 2. Significant morbid obesity, body mass index of 40. POSTOPERATIVE DIAGNOSES  1. End-stage arthritis, right knee. 2. Significant morbid obesity, body mass index of 40. PROCEDURES PERFORMED: Right total knee replacement using the  Triathlon system, size 4 right posterior stabilized femoral component,  size 3 tibia, a size 3 11 TS insert and a 32 asymmetric patella. COMPLICATIONS: No complication. SPECIMENS REMOVED: No specimen. ESTIMATED BLOOD LOSS: 60 mL. SURGEON: Maximino Mccall MD    FIRST ASSISTANT: Danna Minor    SECOND ASSISTANT: Luis Wilcox    ANESTHETIST: Dr. Nadine Lake: Preoperative femoral nerve block with light general.    DESCRIPTION OF PROCEDURE: After the anesthetic was  successfully induced, it was confirmed the patient did receive  preoperative antibiotics and Coumadin, and appropriate time-out was  performed. Midline incision, knee debrided in the usual fashion. Femoral canal aspirated, lavaged, and re-aspirated prior to  instrumentation, cut for 5 degrees to the appropriate side. Crab claw  was utilized to prevent undercutting. All cuts checked for trueness and  squareness and all soft tissue structures protected during the sawing  process.  The knee would be gap balanced and we would end up taking  an extra 2 off the distal femur to help correct the fixed flexion deformity  and made preliminary femoral cuts and switched our attention to the  tibia, used the external alignment guide and resected enough to get a  decent cleanup cut, ensuring no skive and protecting neurovascular  structures and then increased the medial release proximally, removed  posterior osteophytes while protecting neurovascular bundle and used  the Aquamantys and Exparel cocktail. With an appropriate medial  release, we then gap balanced the knee between medial and lateral,  flexion and extension, thus, confirming correct femoral rotation. We  then did our femoral finishing, followed by placement of the trial  components to set our tibial rotation, which was marked and later re-  punched. Resurfaced the patella, restoring patellar thickness  anatomically and using a rongeur to smooth out the edges, with all trial  components in place. We checked the overall alignment, range of  motion, soft tissue balance, patellar tracking, stability and balance of  the knee, all of which we were delighted with. Fashioned a bone plug  for the femoral canal, cemented in the knee, removing all extraneous  cement and holding the knee in full extension until the cement was fully  cured. Further cement removal, further pulse lavage, further trialing. I  was happiest with the 11, locked it in place, again reducing the knee. Let the tourniquet down, routine closure, and fully flexed the knee prior  to closure of the skin. At the end of the case, instrument, sponge and  needle counts were correct. There were no complications. The patient  tolerated the procedure well, and blood loss less than 50 mL. Excellent  outcome.         Onur Cole MD AM / Fareed  D:  02/06/2017   09:25  T:  02/06/2017   09:46  Job #:  [id number]

## 2017-02-06 NOTE — ANESTHESIA PREPROCEDURE EVALUATION
Anesthetic History               Review of Systems / Medical History  Patient summary reviewed, nursing notes reviewed and pertinent labs reviewed    Pulmonary                   Neuro/Psych              Cardiovascular    Hypertension: well controlled                   GI/Hepatic/Renal     GERD: poorly controlled           Endo/Other        Arthritis     Other Findings   Comments: Current Smoker? NO       Elective Surgery? Yes       Abstained from smoking 24 hours prior to anesthesia? N/A    Risk Factors for Postoperative nausea/vomiting:       History of postoperative nausea/vomiting? NO       Female? YES       Motion sickness? NO       Intended opioid administration for postoperative analgesia?   YES         Physical Exam    Airway  Mallampati: III  TM Distance: > 6 cm  Neck ROM: normal range of motion   Mouth opening: Normal     Cardiovascular    Rhythm: regular  Rate: normal         Dental    Dentition: Poor dentition     Pulmonary  Breath sounds clear to auscultation               Abdominal  GI exam deferred       Other Findings            Anesthetic Plan    ASA: 3  Anesthesia type: general      Post-op pain plan if not by surgeon: peripheral nerve block single    Induction: Intravenous  Anesthetic plan and risks discussed with: Patient

## 2017-02-06 NOTE — PROGRESS NOTES
Pt arrived to unit. Pt set up and oriented to room. SCDs in place. Vitals stable. Pt placed in new gown. Pt complaining of shoulder pain and wanted heating pad. Will notify doctor when he arrives.  Pedal pulses and sensation present bilateral.

## 2017-02-06 NOTE — PERIOP NOTES
TRANSFER - OUT REPORT:    Verbal report given to Vito Champion on Stephanie Sings  being transferred to [facility] for routine post - op       Report consisted of patients Situation, Background, Assessment and   Recommendations(SBAR). Information from the following report(s) SBAR and MAR was reviewed with the receiving nurse. Lines:   Peripheral IV 02/06/17 Left Wrist (Active)   Site Assessment Clean, dry, & intact 2/6/2017  9:45 AM   Phlebitis Assessment 0 2/6/2017  9:45 AM   Infiltration Assessment 0 2/6/2017  9:45 AM   Dressing Status Clean, dry, & intact 2/6/2017  9:45 AM   Dressing Type Tape;Transparent 2/6/2017  9:45 AM   Hub Color/Line Status Pink;Positional 2/6/2017  9:45 AM        Opportunity for questions and clarification was provided.       Patient transported with:   O2 @ 2 liters

## 2017-02-06 NOTE — BRIEF OP NOTE
BRIEF OPERATIVE NOTE    Date of Procedure: 2/6/2017   Preoperative Diagnosis: Primary osteoarthritis of right knee [M17.11]  Postoperative Diagnosis: Primary osteoarthritis of right knee [M17.11]    Procedure(s):  RIGHT TOTAL KNEE ARTHROPLASTY  Surgeon(s) and Role:     * Rashida Wills MD - Primary       Physician Assistant: Monse Morris PA-C    Surgical Staff:  Circ-1: Arianna Amin RN  Physician Assistant: Monse Morris PA-C  Scrub Tech-1: Dany Alba  Surg Asst-1: Corky Reamer  Event Time In   Incision Start 0801   Incision Close      Anesthesia: General   Estimated Blood Loss: 50ml  Specimens: * No specimens in log *   Findings: same   Complications: none  Implants:   Implant Name Type Inv.  Item Serial No.  Lot No. LRB No. Used Action   CEMENT BNE SIMPLEX TOBRA 4 --  - [id number]  CEMENT BNE SIMPLEX TOBRA 4 --   [employer] HOW [id number] Right 1 Implanted   CEMENT BNE SIMPLEX TOBRA 4 --  - [id number]  CEMENT BNE SIMPLEX TOBRA 4 --   [employer] HOW [id number] Right 1 Implanted   PAT ASYM TRITHLON X3 [id number] -- TRIATHLON ASYMMETRIC X3 - [id number]  PAT ASYM TRITHLON X3 [id number] -- TRIATHLON ASYMMETRIC X3  [employer] HOW [id number] Right 1 Implanted   BASEPLT TIB UNIV TRIATHLN 3 --  - [id number]  BASEPLT TIB UNIV TRIATHLN 3 --   [employer] HOW [id number] Right 1 Implanted   COMPNT FEM PS KALA TRIATHLN 4 R --  - [id number]  COMPNT FEM PS KALA TRIATHLN 4 R --   [employer] HOW [id number] Right 1 Implanted   INSERT TIB+TS TRIATH X3 [id number] --  - [id number]   INSERT TIB+TS TRIATH X3 [id number] --    [employer] HOW [id number] Right 1 Implanted

## 2017-02-07 ENCOUNTER — APPOINTMENT (OUTPATIENT)
Dept: ULTRASOUND IMAGING | Age: 82
DRG: 470 | End: 2017-02-07
Attending: INTERNAL MEDICINE
Payer: MEDICARE

## 2017-02-07 ENCOUNTER — HOME HEALTH ADMISSION (OUTPATIENT)
Dept: HOME HEALTH SERVICES | Facility: HOME HEALTH | Age: 82
End: 2017-02-07

## 2017-02-07 LAB
ANION GAP BLD CALC-SCNC: 10 MMOL/L (ref 3–18)
BASOPHILS # BLD AUTO: 0 K/UL (ref 0–0.06)
BASOPHILS # BLD: 0 % (ref 0–2)
BUN SERPL-MCNC: 29 MG/DL (ref 7–18)
BUN/CREAT SERPL: 20 (ref 12–20)
CALCIUM SERPL-MCNC: 8.3 MG/DL (ref 8.5–10.1)
CHLORIDE SERPL-SCNC: 110 MMOL/L (ref 100–108)
CO2 SERPL-SCNC: 23 MMOL/L (ref 21–32)
CREAT SERPL-MCNC: 1.47 MG/DL (ref 0.6–1.3)
DIFFERENTIAL METHOD BLD: ABNORMAL
EOSINOPHIL # BLD: 0 K/UL (ref 0–0.4)
EOSINOPHIL NFR BLD: 0 % (ref 0–5)
ERYTHROCYTE [DISTWIDTH] IN BLOOD BY AUTOMATED COUNT: 14.1 % (ref 11.6–14.5)
FERRITIN SERPL-MCNC: 295 NG/ML (ref 8–388)
GLUCOSE SERPL-MCNC: 99 MG/DL (ref 74–99)
HCT VFR BLD AUTO: 26.3 % (ref 35–45)
HCT VFR BLD AUTO: 27.1 % (ref 35–45)
HGB BLD-MCNC: 8.6 G/DL (ref 12–16)
HGB BLD-MCNC: 8.7 G/DL (ref 12–16)
INR PPP: 1.4 (ref 0.8–1.2)
IRON SATN MFR SERPL: 12 %
IRON SERPL-MCNC: 29 UG/DL (ref 50–175)
LYMPHOCYTES # BLD AUTO: 10 % (ref 21–52)
LYMPHOCYTES # BLD: 1.2 K/UL (ref 0.9–3.6)
MCH RBC QN AUTO: 26.9 PG (ref 24–34)
MCHC RBC AUTO-ENTMCNC: 32.1 G/DL (ref 31–37)
MCV RBC AUTO: 83.9 FL (ref 74–97)
MONOCYTES # BLD: 1.1 K/UL (ref 0.05–1.2)
MONOCYTES NFR BLD AUTO: 9 % (ref 3–10)
NEUTS SEG # BLD: 9.7 K/UL (ref 1.8–8)
NEUTS SEG NFR BLD AUTO: 81 % (ref 40–73)
PHOSPHATE SERPL-MCNC: 2.5 MG/DL (ref 2.5–4.9)
PLATELET # BLD AUTO: 291 K/UL (ref 135–420)
PMV BLD AUTO: 10.5 FL (ref 9.2–11.8)
POTASSIUM SERPL-SCNC: 3.8 MMOL/L (ref 3.5–5.5)
PROTHROMBIN TIME: 16.3 SEC (ref 11.5–15.2)
RBC # BLD AUTO: 3.23 M/UL (ref 4.2–5.3)
SODIUM SERPL-SCNC: 143 MMOL/L (ref 136–145)
TIBC SERPL-MCNC: 251 UG/DL (ref 250–450)
WBC # BLD AUTO: 12 K/UL (ref 4.6–13.2)

## 2017-02-07 PROCEDURE — 74011250637 HC RX REV CODE- 250/637: Performed by: PHYSICIAN ASSISTANT

## 2017-02-07 PROCEDURE — 74011250637 HC RX REV CODE- 250/637: Performed by: ORTHOPAEDIC SURGERY

## 2017-02-07 PROCEDURE — 97110 THERAPEUTIC EXERCISES: CPT

## 2017-02-07 PROCEDURE — 97116 GAIT TRAINING THERAPY: CPT

## 2017-02-07 PROCEDURE — 80048 BASIC METABOLIC PNL TOTAL CA: CPT | Performed by: ORTHOPAEDIC SURGERY

## 2017-02-07 PROCEDURE — 74011250636 HC RX REV CODE- 250/636: Performed by: ORTHOPAEDIC SURGERY

## 2017-02-07 PROCEDURE — 36415 COLL VENOUS BLD VENIPUNCTURE: CPT | Performed by: ORTHOPAEDIC SURGERY

## 2017-02-07 PROCEDURE — 82728 ASSAY OF FERRITIN: CPT | Performed by: INTERNAL MEDICINE

## 2017-02-07 PROCEDURE — 65270000029 HC RM PRIVATE

## 2017-02-07 PROCEDURE — 84100 ASSAY OF PHOSPHORUS: CPT | Performed by: INTERNAL MEDICINE

## 2017-02-07 PROCEDURE — 74011250637 HC RX REV CODE- 250/637: Performed by: INTERNAL MEDICINE

## 2017-02-07 PROCEDURE — 97535 SELF CARE MNGMENT TRAINING: CPT

## 2017-02-07 PROCEDURE — 85610 PROTHROMBIN TIME: CPT | Performed by: ORTHOPAEDIC SURGERY

## 2017-02-07 PROCEDURE — 77030012935 HC DRSG AQUACEL BMS -B

## 2017-02-07 PROCEDURE — 85018 HEMOGLOBIN: CPT | Performed by: INTERNAL MEDICINE

## 2017-02-07 PROCEDURE — 76770 US EXAM ABDO BACK WALL COMP: CPT

## 2017-02-07 PROCEDURE — 85025 COMPLETE CBC W/AUTO DIFF WBC: CPT | Performed by: ORTHOPAEDIC SURGERY

## 2017-02-07 PROCEDURE — 83540 ASSAY OF IRON: CPT | Performed by: INTERNAL MEDICINE

## 2017-02-07 PROCEDURE — 74011250636 HC RX REV CODE- 250/636: Performed by: PHYSICIAN ASSISTANT

## 2017-02-07 PROCEDURE — 97165 OT EVAL LOW COMPLEX 30 MIN: CPT

## 2017-02-07 RX ORDER — WARFARIN 7.5 MG/1
7.5 TABLET ORAL EVERY EVENING
Status: COMPLETED | OUTPATIENT
Start: 2017-02-07 | End: 2017-02-07

## 2017-02-07 RX ORDER — ASPIRIN 325 MG
325 TABLET ORAL 2 TIMES DAILY
Qty: 60 TAB | Refills: 0 | Status: SHIPPED | OUTPATIENT
Start: 2017-02-07 | End: 2017-04-05

## 2017-02-07 RX ORDER — LANOLIN ALCOHOL/MO/W.PET/CERES
325 CREAM (GRAM) TOPICAL 2 TIMES DAILY WITH MEALS
Qty: 60 TAB | Refills: 2 | Status: SHIPPED | OUTPATIENT
Start: 2017-02-07 | End: 2017-04-05

## 2017-02-07 RX ORDER — SODIUM CHLORIDE 9 MG/ML
500 INJECTION, SOLUTION INTRAVENOUS ONCE
Status: COMPLETED | OUTPATIENT
Start: 2017-02-07 | End: 2017-02-07

## 2017-02-07 RX ORDER — HYDROCODONE BITARTRATE AND ACETAMINOPHEN 7.5; 325 MG/1; MG/1
1-2 TABLET ORAL
Qty: 60 TAB | Refills: 0 | Status: SHIPPED | OUTPATIENT
Start: 2017-02-07 | End: 2017-04-05

## 2017-02-07 RX ADMIN — WARFARIN SODIUM 7.5 MG: 7.5 TABLET ORAL at 20:00

## 2017-02-07 RX ADMIN — Medication 325 MG: at 08:26

## 2017-02-07 RX ADMIN — Medication 10 ML: at 04:35

## 2017-02-07 RX ADMIN — Medication 3 G: at 00:59

## 2017-02-07 RX ADMIN — SODIUM CHLORIDE 500 ML: 900 INJECTION, SOLUTION INTRAVENOUS at 15:40

## 2017-02-07 RX ADMIN — Medication 325 MG: at 16:57

## 2017-02-07 RX ADMIN — Medication 10 ML: at 16:58

## 2017-02-07 RX ADMIN — HYDROCODONE BITARTRATE AND ACETAMINOPHEN 1 TABLET: 7.5; 325 TABLET ORAL at 08:26

## 2017-02-07 RX ADMIN — HYDROCODONE BITARTRATE AND ACETAMINOPHEN 2 TABLET: 7.5; 325 TABLET ORAL at 13:35

## 2017-02-07 RX ADMIN — METOCLOPRAMIDE HYDROCHLORIDE 5 MG: 5 TABLET ORAL at 08:25

## 2017-02-07 RX ADMIN — ALUMINUM HYDROXIDE AND MAGNESIUM HYDROXIDE 10 ML: 200; 200 SUSPENSION ORAL at 01:00

## 2017-02-07 RX ADMIN — CELECOXIB 100 MG: 100 CAPSULE ORAL at 08:24

## 2017-02-07 RX ADMIN — Medication 10 ML: at 23:04

## 2017-02-07 RX ADMIN — HYDROCODONE BITARTRATE AND ACETAMINOPHEN 1 TABLET: 7.5; 325 TABLET ORAL at 04:35

## 2017-02-07 RX ADMIN — METOCLOPRAMIDE HYDROCHLORIDE 5 MG: 5 TABLET ORAL at 18:00

## 2017-02-07 RX ADMIN — SODIUM CHLORIDE 100 ML/HR: 900 INJECTION, SOLUTION INTRAVENOUS at 06:24

## 2017-02-07 RX ADMIN — LOSARTAN POTASSIUM 25 MG: 25 TABLET ORAL at 08:26

## 2017-02-07 RX ADMIN — PANTOPRAZOLE SODIUM 40 MG: 40 TABLET, DELAYED RELEASE ORAL at 08:25

## 2017-02-07 NOTE — PROGRESS NOTES
Hemovac drain dc'd from right knee and dressing changed. Staples intact and wound edges well approximated. No drainage noted. Aquacel dsg applied. Ice pack reapplied to knee. Tolerated well by patient.

## 2017-02-07 NOTE — PROGRESS NOTES
Ortho    Pt. Seen and evaluated. Doing well, pain well controlled, up in chair  Denies cp, sob, abd pain    Blood pressure 101/55, pulse 85, temperature 97.9 °F (36.6 °C), resp. rate 18, height 5' 4\" (1.626 m), weight 229 lb 6 oz (104 kg), SpO2 98 %, unknown if currently breastfeeding.    rightKnee woundclean, dry, no drainage  Sensory intact to LT  Motor intact  nv intact  Neg calf tenderness    Labs:  CBC  @  CBC:   Lab Results   Component Value Date/Time    WBC 12.0 02/07/2017 03:28 AM    RBC 3.23 02/07/2017 03:28 AM    HGB 8.7 02/07/2017 03:28 AM    HCT 27.1 02/07/2017 03:28 AM    PLATELET 789 11/97/9216 03:28 AM     BMP:   Lab Results   Component Value Date/Time    Glucose 99 02/07/2017 03:28 AM    Sodium 143 02/07/2017 03:28 AM    Potassium 3.8 02/07/2017 03:28 AM    Chloride 110 02/07/2017 03:28 AM    CO2 23 02/07/2017 03:28 AM    BUN 29 02/07/2017 03:28 AM    Creatinine 1.47 02/07/2017 03:28 AM    Calcium 8.3 02/07/2017 03:28 AM   @  Coagulation  Lab Results   Component Value Date    INR 1.4 (H) 02/07/2017    APTT 34.5 01/23/2017      Basic Metabolic Profile  Lab Results   Component Value Date     02/07/2017    CO2 23 02/07/2017    BUN 29 (H) 02/07/2017       Assesment: right Orthopedic / Rheumatologic: Total Knee Replacement    Plan: coumadin, PT

## 2017-02-07 NOTE — CONSULTS
Consult Note  Consult requested by: dr Ofe Reyes is a 80 y.o. female [address] who is being seen on consult for crf  No chief complaint on file. Admission diagnosis: <principal problem not specified>     HPI: 80 y o  [place] female admitted for knee surgery. asked to evaluate for crf. hx of htn,no dm,no nsaids prior to admission. denies uti,kidney stones,gross hematuria  Past Medical History   Diagnosis Date    Arthritis     Arthritis of both hips     Back pain     Balance problem     Chronic back pain     Cough     Easy bruising     Essential hypertension     GERD (gastroesophageal reflux disease)     Hiatal hernia     Hypertension     Irregular heart beat     Left hip pain 10/8/2010    Neck pain     Nervousness     Night sweat     Osteoarthritis, knee bilateral     Pain with urination     Polymyalgia rheumatica (HCC)     Reflux     Spinal stenosis     Trapezius strain     Trochanteric bursitis of left hip     Venous insufficiency       Past Surgical History   Procedure Laterality Date    Hx hysterectomy      Hx cholecystectomy      Hx orthopaedic       right foot and ankle    Hx heent  06/2011     left cornea transplant    Hx ankle fracture tx      Colonoscopy N/A 7/27/2016     COLONOSCOPY w/polypectomy performed by Elvin Burkitt, MD at [facility]       Social History     Social History    Marital status:      Spouse name: N/A    Number of children: N/A    Years of education: N/A     Occupational History    Not on file.      Social History Main Topics    Smoking status: Never Smoker    Smokeless tobacco: Never Used    Alcohol use No    Drug use: No    Sexual activity: No     Other Topics Concern    Not on file     Social History Narrative       Family History   Problem Relation Age of Onset    Arthritis-rheumatoid Other     Diabetes Other     Hypertension Other     Arthritis-osteo Other      Allergies   Allergen Reactions    Citric Acid Unknown (comments)    Crinone [Progesterone Micronized] Unknown (comments)    Iodine Unknown (comments)    Percocet [Oxycodone-Acetaminophen] Other (comments)     Gets rebound headaches after taking Percocet        Home Medications:     Prior to Admission Medications   Prescriptions Last Dose Informant Patient Reported? Taking? aMILoride-hydrochlorothiazide (MODURETIC) 5-50 mg tab 2/5/2017 at pm  Yes Yes   Sig: Take 1 Tab by mouth daily. acetaminophen (TYLENOL) 650 mg CR tablet 2/5/2017 at pm  Yes Yes   Sig: Take 1,300 mg by mouth two (2) times a day. amLODIPine (NORVASC) 10 mg tablet 2/6/2017 at 0500  Yes Yes   Sig: 10 mg daily. aspirin 81 mg tablet 1/30/2017  Yes Yes   Sig: Take  by mouth. cetirizine (ZYRTEC) 10 mg tablet 2/5/2017 at pm  Yes Yes   Sig: Take  by mouth daily. cholecalciferol (VITAMIN D3) 1,000 unit tablet 2/5/2017 at afternoon  Yes Yes   Sig: Take 1,000 Units by mouth daily. ciprofloxacin HCl (CIPRO) 500 mg tablet 2/2/2017 completed  No No   Sig: Take 1 Tab by mouth two (2) times a day. losartan (COZAAR) 100 mg tablet 2/6/2017 at 0500  Yes Yes   Sig: Take 100 mg by mouth daily. metoclopramide HCl (REGLAN) 5 mg tablet 2/5/2017 at pm  Yes Yes   Sig: Take 5 mg by mouth two (2) times a day. omeprazole (PRILOSEC) 40 mg capsule 2/5/2017 at pm  Yes Yes   Sig: Take 40 mg by mouth daily. potassium 99 mg tablet 2/5/2017 at am  Yes Yes   Sig: Take 99 mg by mouth daily.       Facility-Administered Medications: None       Current Facility-Administered Medications   Medication Dose Route Frequency    0.9% sodium chloride infusion 500 mL  500 mL IntraVENous ONCE    warfarin (COUMADIN) tablet 7.5 mg  7.5 mg Oral QPM    WARFARIN INFORMATION NOTE (COUMADIN)   Other Q24H    metoclopramide HCl (REGLAN) tablet 5 mg  5 mg Oral BID    pantoprazole (PROTONIX) tablet 40 mg  40 mg Oral ACB    0.9% sodium chloride infusion  100 mL/hr IntraVENous CONTINUOUS    sodium chloride (NS) flush 5-10 mL  5-10 mL IntraVENous Q8H    sodium chloride (NS) flush 5-10 mL  5-10 mL IntraVENous PRN    naloxone (NARCAN) injection 0.4 mg  0.4 mg IntraVENous PRN    ceFAZolin (ANCEF) 3 g in 0.9%  ml IVPB  3 g IntraVENous Q8H    ferrous sulfate tablet 325 mg  1 Tab Oral BID WITH MEALS    diphenhydrAMINE (BENADRYL) injection 12.5 mg  12.5 mg IntraVENous Q6H PRN    zolpidem (AMBIEN) tablet 5 mg  5 mg Oral QHS PRN    HYDROcodone-acetaminophen (NORCO) 7.5-325 mg per tablet 1-2 Tab  1-2 Tab Oral Q4H PRN    ondansetron (ZOFRAN) injection 4 mg  4 mg IntraVENous Q4H PRN    magnesium hydroxide (MILK OF MAGNESIA) 400 mg/5 mL oral suspension 30 mL  30 mL Oral DAILY PRN    amLODIPine (NORVASC) tablet 5 mg  5 mg Oral DAILY    aluminum-magnesium hydroxide (MAALOX) oral suspension 10 mL  10 mL Oral TID PRN       Review of Systems:   A comprehensive review of systems was negative except for that written in the HPI. Data Review:    Labs: Results:       Chemistry Recent Labs      02/07/17 0328   GLU  99   NA  143   K  3.8   CL  110*   CO2  23   BUN  29*   CREA  1.47*   CA  8.3*   AGAP  10   BUCR  20      PTH  Lab Results   Component Value Date/Time    Calcium 8.3 02/07/2017 03:28 AM      CBC w/Diff Recent Labs      02/07/17 0328   WBC  12.0   RBC  3.23*   HGB  8.7*   HCT  27.1*   PLT  291   GRANS  81*   LYMPH  10*   EOS  0      Coagulation Recent Labs      02/07/17   0328   PTP  16.3*   INR  1.4*       Iron/Ferritin No results for input(s): IRON in the last 72 hours. No lab exists for component: TIBCCALC   BNP No results for input(s): BNPP in the last 72 hours. Cardiac Enzymes No results for input(s): CPK, CKND1, JOSETTE in the last 72 hours. No lab exists for component: CKRMB, TROIP   Liver Enzymes No results for input(s): TP, ALB, TBIL, AP, SGOT, GPT in the last 72 hours.     No lab exists for component: DBIL   Thyroid Studies No results found for: T4, T3U, TSH, TSHEXT     Urinalysis Lab Results   Component Value Date/Time    Color YELLOW 02/03/2017 01:33 PM    Appearance CLEAR 02/03/2017 01:33 PM    Specific gravity 1.022 02/03/2017 01:33 PM    pH (UA) 5.0 02/03/2017 01:33 PM    Protein NEGATIVE  02/03/2017 01:33 PM    Glucose NEGATIVE  02/03/2017 01:33 PM    Ketone NEGATIVE  02/03/2017 01:33 PM    Bilirubin NEGATIVE  02/03/2017 01:33 PM    Urobilinogen 0.2 02/03/2017 01:33 PM    Nitrites NEGATIVE  02/03/2017 01:33 PM    Leukocyte Esterase SMALL 02/03/2017 01:33 PM    Epithelial cells 1+ 02/03/2017 01:33 PM    Bacteria FEW 02/03/2017 01:33 PM    WBC 0 to 3 02/03/2017 01:33 PM    RBC NEGATIVE  02/03/2017 01:33 PM         IMAGES:   XR Results (maximum last 3): Results from [place] encounter on 02/06/17   XR KNEE RT MAX 2 VWS   Narrative Portable right knee, AP and crosstable lateral view, 2 views:        INDICATION:    History of primary osteoarthritis of right knee joint. Status post prosthetic  replacement of right knee joint. COMPARISON STUDY: None. FINDINGS:    The study shows findings of total prosthetic replacement of right knee joint. The femoral, tibial and patellar prostheses are well secured to negative bones  and in anatomic positions and alignments. There is surgical drainage tube at the  upper superolateral aspect of the joint. Impression IMPRESSION:    Status post total right knee arthroplasty with anatomic positions and alignments  of the prostheses. Results from Hospital Encounter encounter on 01/23/17   XR CHEST PA LAT   Narrative CHEST PA AND LATERAL    CPT CODE: 67469    HISTORY: Preop knee replacement, primary osteoarthritis of the right knee ,  Pre-Op. COMPARISON: Chest x-ray April 20, 2011. FINDINGS:     PA and lateral views obtained. The cardiac  silhouette is normal.  Calcified  plaque is seen in the aortic arch with mild tortuosity of the arch and  descending thoracic aorta. The lungs are clear.   The costophrenic angles are  sharply defined. Pulmonary vascularity is normal. Probable mild compression  fracture of T4, T5, and T7 unchanged. Mild disc space narrowing with marginal  spurring of the thoracic spine. Stable mild compression fractures of the upper  and mid thoracic spine. 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 Stable multiple mild compression fractures of the thoracic spine      Results from Hospital Encounter encounter on 08/02/16   [place] XR TECHNOLOGIST SERVICE   Narrative Fluoroscopy was provided for a bundled exam for documentation purposes. Impression IMPRESSION:    Please see above. FLUORO TIME: 00.19    [facility]          CT Results (maximum last 3): Results from [place] encounter on 10/25/12   CT ABD WO CONT   Narrative CT abdomen without contrast    HISTORY: Back pain, elevated LFT    COMPARISON: None. TECHNIQUE: Helical scan through the abdomen is obtained  from the diaphragm to  the iliac crest  without  IV contrast administration. Patient has of allergy  history to iodine. FINDINGS: The study is suboptimal due to lack of IV contrast.    Imaging portion to the lung bases appears clear. The liver appears unremarkable on this noncontrast CT. No fatty infiltration or  focal lesion seen. The gallbladder is not clearly seen, assuming surgically  removed or contracted. No biliary dilatation identified. The spleen, pancreas, bilateral kidneys and adrenal glands appear unremarkable. The stomach is suboptimally distended. The small and large bowel and  nondilated. Moderate fecal material identified in the colon. No bowel  dilatation or inflammation identified. No retroperitoneal mass or adenopathy. Mild atherosclerotic aortic disease present. Spondylosis of the lumbar spine. IMPRESSION    Unremarkable noncontrast abdomen CT as above. Thank you for your referral.        MRI Results (maximum last 3):     Results from Hospital Encounter encounter on 08/31/15   MRI LUMB SPINE WO CONT   Narrative EXAM:  MRI LUMB SPINE WO CONT    INDICATION:   INCREASE LEAH RADIC    COMPARISON: MR lumbar spine September 29, 2012    TECHNIQUE:   MR imaging of the lumbar spine was performed with sagittal T1, T2, STIR;  axial  T1, T2. Contrast was not administered. FINDINGS:  Is no lumbar vertebral body labeling. Suggestion of transitional lumbosacral  anatomy. Labeling will be done in accordance with prior MRI lumbar spine. There  is grade 1 anterior listhesis of L4 on L5. Remainder vertebral bodies maintain  normal alignment. Vertebral body heights are maintained. Marrow signal is within  normal limits. Moderate degenerative discogenic disease at L4-L5. Milder  degenerative discogenic disease at the remaining levels. Conus medullaris  terminates at L1-L2. Suggestion of partial sacralization on the left at L5. T12/L1:  The spinal canal and neuroforamina are widely patent. L1/2:  Mild facet arthropathy. No significant central canal or foraminal  stenosis. L2/3:  Broad-based disc protrusion. Mild/moderate facet arthropathy. Moderate  foraminal stenosis. Mild narrowing of the central canal. Midline AP diameter of  the thecal sac is 9 mm. L3/4:  Diffuse disc bulge. Moderate facet and ligamentous hypertrophy. Mild/moderate narrowing of the central canal. Midline AP diameter of the thecal  sac is 8.2 mm.    L4/5:  Grade 1 anterior listhesis. Diffuse disc bulge with broad-based posterior  disc protrusion. Moderate to severe facet and ligamentous hypertrophy. At least  moderate narrowing of the central canal. Mild to moderate right and mild left  foraminal stenosis. L5/S1:  Diffuse disc bulge. Mild facet arthropathy. No significant central canal  or foraminal stenosis. Impression IMPRESSION:      Please the lumbar vertebral body labeling. Suggestion of transitional  lumbosacral anatomy.     Grade 1 anterior listhesis, moderate to severe facet arthropathy and diffuse  disc bulge at L4-L5 narrowing the central canal. This appears progressed in  comparison to prior. Degenerative changes at L2-L3 results in mild central canal stenosis. Thank you for this referral.        [facility]   MRI CERV SPINE WO CONT   Narrative MRI CERVICAL SPINE    INDICATIONS: As above. COMPARISON STUDIES: NONE. TECHNIQUE: Sagittal/axial T2, sagittal T1, and sagittal inversion recovery T2  weighted sequences were obtained. FINDINGS:    Alignment is anatomic. Vertebral column marrow signal intensity is normal. The  spinal cord, and visualized contents of the posterior fossa are unremarkable. The spinal canal is of normal capacity. The prevertebral/paravertebral soft  tissues are unremarkable. Normal flow voids are visible within the vascular  structures of the neck. C2/C3: No focal bulge, herniation, or exit foramen compromise. C3/C4: Tiny central protrusion of disc material, with minimal affect the thecal  sac and adjacent cord. Exit foramen patent. C4/C5: Mild diffuse bulging disc annulus. Asymmetric right greater than left  uncovertebral hypertrophy. Right greater than left moderate to severe bony exit  foramen stenosis. Central canal is patent. C5/C6: Diffuse bulging disc annulus with central disc prominence. There is  moderate ventral impression on thecal sac without cord distortion. Bilateral  combined bony and soft tissue exit foramen stenosis, mild to moderate  appreciated. C6/C7: Mild diffuse bulging disc annulus with slight central disc prominence. There is local effacement of ventral CSF without distortion of cord and  anatomy. Exit foramen patent. C7/T1: Normal    Visualize upper thoracic intervertebral disc spaces and thoracic spinal canal       Impression normal. IMPRESSION:    Multilevel cervical spondylosis.  Asymmetric right greater than left  predominantly bony exit foramen stenosis at C4/C5. Additional levels of mild to  moderate combined bony and soft tissue exit stenosis identified at 5/  6. No significant central canal stenosis, cord distortion, abnormal signal  within the cord substance or posterior fossa contents. The paravertebral soft  tissues are unremarkable. Results from [place] encounter on 09/29/12   MRI LUMB SPINE WO CONT   Narrative Procedure: MRI of the lumbar spine without contrast.    CPT code: 58748    Comparisons: January 10, 2001. Indications:  INCREASED BACK PAIN    Technique: T1 weighted, T2 FSE with fat saturation, FSE inversion recovery  sagittal images are supplemented by T2 weighted with fat saturation and T1  weighted axial images. Findings:        Sagittal images reveal overall normal vertebral body morphology. No fractures  noted. No suspicious lesions. Alignments are anatomic, no evidence for subluxation. Conus medullaris ends at the L1 vertebral body level. Correlation of axial and sagittal images reveals the following:    At L1-L2: Minimal broad-based disc osteophyte complex posteriorly. Mild facet  arthropathy. No central canal stenosis. No foraminal stenosis. At L2-L3: Mild/moderate disc osteophyte complex posteriorly. Mild facet  arthropathy and some buckling of the ligamentum flavum. Still no central canal  stenosis with an AP diameter 14.7 cm. Mild foraminal stenosis only. At L3-L4: Mild circumferential disc osteophyte complex. Mild to moderate facet  arthropathy and buckling of the ligamentum flavum. Mild foraminal stenosis. Mild central canal stenosis. At L4-L5: Again grade 1 anterolisthesis of L4 on L5 appears to be degenerative  in nature. See no evidence for spondylolysis. There is moderate to severe facet  arthropathy with a chronic slippage. There is mild to moderate left and  moderate right foraminal stenosis. Mild central canal stenosis. At L5-S1: Suspect partial sacralization of L5 on the left. No disc pathology. No central canal or foraminal stenosis. Visualized portions of the sacroiliac joints are unremarkable. Incidentally  imaged retroperitoneal structures are unremarkable as well. Impression Impression:    Degenerative changes as above. Nuclear Medicine Results (maximum last 3): No results found for this or any previous visit. US Results (maximum last 3): No results found for this or any previous visit. DEXA Results (maximum last 3): No results found for this or any previous visit. PERI Results (maximum last 3): No results found for this or any previous visit. IR Results (maximum last 3): No results found for this or any previous visit. VAS/US Results (maximum last 3): No results found for this or any previous visit. PET Results (maximum last 3): No results found for this or any previous visit. No results found for this or any previous visit. @[email or web address]([id number])    CULTURE:   )No results for input(s): SDES, CULT in the last 72 hours. No results for input(s): CULT in the last 72 hours. Physical Assessment:     Visit Vitals    /55 (BP 1 Location: Right arm, BP Patient Position: Supine)    Pulse 85    Temp 97.9 °F (36.6 °C)    Resp 18    Ht 5' 4\" (1.626 m)    Wt 104 kg (229 lb 6 oz)    SpO2 98%    Breastfeeding Unknown    BMI 39.37 kg/m2     Last 3 Recorded Weights in this Encounter    01/23/17 1022 02/06/17 0612   Weight: 103.4 kg (228 lb) 104 kg (229 lb 6 oz)       Intake/Output Summary (Last 24 hours) at 02/07/17 1048  Last data filed at 02/07/17 1039   Gross per 24 hour   Intake             1760 ml   Output              980 ml   Net              780 ml       Physial Exam:  General appearance: alert, cooperative, no distress, appears stated age  Skin: normal coloration and turgor, no rashes, no suspicious skin lesions noted. HEENT: Head; normocephalic, atraumatic. JEANINE. ENT- ENT exam normal, no neck nodes or sinus tenderness. Lungs: clear to auscultation bilaterally  Heart: regular rate and rhythm, S1, S2 normal, no murmur, click, rub or gallop  Abdomen: soft, non-tender. Bowel sounds normal. No masses,  no organomegaly  Extremities: extremities normal, atraumatic, no cyanosis or edema    PLAN / RECOMMENDATION:    crf stage 3,reviewed medical records and labs. probably related to htn,age. agree with stopping diuretics. I will also stop cozaar. check renal ultrasound,spot urine for protein/creatinine  Hypotension. stop cozaar. hold norvasc for bp less than 110  Check phos and pth  Anemia ,worse post op. check iron profile  followup in my office when discharged     Thank you for the consultation to participate in patient's care. I have personally discussed my plan with the referring physician.      Nora Lechuga MD  February 7, 2017

## 2017-02-07 NOTE — DISCHARGE SUMMARY
2/6/2017  5:49 AM    2/8/2017, 10:24 AM    Primary Dx:right Orthopedic / Rheumatologic: Total Knee Replacement  Secondary Dx: Etiological Diagnoses: none    HPI:  Pt has end stage OA and had failed conservative treatment. Due to the current findings and affected activity of daily living surgical intervention is indicated.   The alternatives, risks, complications as well as expected outcome were discussed, the patient understands and wishes to proceed with surgery    Past Medical History   Diagnosis Date    Arthritis     Arthritis of both hips     Back pain     Balance problem     Chronic back pain     Cough     Easy bruising     Essential hypertension     GERD (gastroesophageal reflux disease)     Hiatal hernia     Hypertension     Irregular heart beat     Left hip pain 10/8/2010    Neck pain     Nervousness     Night sweat     Osteoarthritis, knee bilateral     Pain with urination     Polymyalgia rheumatica (HCC)     Reflux     Spinal stenosis     Trapezius strain     Trochanteric bursitis of left hip     Venous insufficiency          Current Facility-Administered Medications:     0.9% sodium chloride infusion 500 mL, 500 mL, IntraVENous, ONCE, Shruti Jenkins PA-C    warfarin (COUMADIN) tablet 7.5 mg, 7.5 mg, Oral, QPM, Shruti Jenkins PA-C    WARFARIN INFORMATION NOTE (COUMADIN), , Other, Q24H, Ketan Torres MD    metoclopramide HCl (REGLAN) tablet 5 mg, 5 mg, Oral, BID, Ketan Torres MD, 5 mg at 02/07/17 0825    pantoprazole (PROTONIX) tablet 40 mg, 40 mg, Oral, ACB, Ketan Torres MD, 40 mg at 02/07/17 0825    potassium tablet 99 mg, 99 mg, Oral, DAILY, Ketan Torres MD    0.9% sodium chloride infusion, 100 mL/hr, IntraVENous, CONTINUOUS, Ketan Torres MD, Last Rate: 100 mL/hr at 02/07/17 0624, 100 mL/hr at 02/07/17 0624    sodium chloride (NS) flush 5-10 mL, 5-10 mL, IntraVENous, Q8H, Ketan Torres MD, 10 mL at 02/07/17 0435    sodium chloride (NS) flush 5-10 mL, 5-10 mL, IntraVENous, PRN, aLurie Henao MD    naloxone [facility]) injection 0.4 mg, 0.4 mg, IntraVENous, PRN, Laurie Henao MD    ceFAZolin (ANCEF) 3 g in 0.9%  ml IVPB, 3 g, IntraVENous, Q8H, Laurie Henao MD, 3 g at 02/07/17 0059    ferrous sulfate tablet 325 mg, 1 Tab, Oral, BID WITH MEALS, Laurie Henao MD, 325 mg at 02/07/17 7090    diphenhydrAMINE (BENADRYL) injection 12.5 mg, 12.5 mg, IntraVENous, Q6H PRN, Laurie Henao MD    zolpidem (AMBIEN) tablet 5 mg, 5 mg, Oral, QHS PRN, Laurie Henao MD    HYDROcodone-acetaminophen (NORCO) 7.5-325 mg per tablet 1-2 Tab, 1-2 Tab, Oral, Q4H PRN, Laurie Henao MD, 1 Tab at 02/07/17 0826    ondansetron (ZOFRAN) injection 4 mg, 4 mg, IntraVENous, Q4H PRN, Laurie Henao MD    magnesium hydroxide (MILK OF MAGNESIA) 400 mg/5 mL oral suspension 30 mL, 30 mL, Oral, DAILY PRN, Laurie Henao MD    amLODIPine (NORVASC) tablet 5 mg, 5 mg, Oral, DAILY, Mely Sanchez MD, Stopped at 02/07/17 0825    losartan (COZAAR) tablet 25 mg, 25 mg, Oral, DAILY, Mely Sanchez MD, 25 mg at 02/07/17 9247    aluminum-magnesium hydroxide (MAALOX) oral suspension 10 mL, 10 mL, Oral, TID PRN, Mely Sanchez MD, 10 mL at 02/07/17 0100      Citric acid; Crinone [progesterone micronized]; Iodine; and Percocet [oxycodone-acetaminophen]    Physical Exam:  General A&O x3 NAD, well developed, well nourished, normal affect  Heart: S1-S2, RRR  Lungs: CTA Bilat  Abd: soft NT, ND  Ext: n/v intact    Hospital Course:    Pt. Had rightOrthopedic / Rheumatologic: Total Knee Replacement    Post -op Course: The patient tolerated the procedure well. They were followed by internal medicine for help with medical management. Pt. Was place on Abx pre and post-op for prophylaxis against infection as well as coumadin pre and post-op for prophylaxis against DVT. Vitals signs remained stable, remained af. The wound wasclean, dry, no drainage.   Pain was well controlled. Pt. Had negative calf tenderness or swelling, no evidence for DVT. Patient had PT/OT consult for evaluation and treatment. CBC  Lab Results   Component Value Date/Time    WBC 12.0 02/07/2017 03:28 AM    RBC 3.23 (L) 02/07/2017 03:28 AM    HCT 27.1 (L) 02/07/2017 03:28 AM    MCV 83.9 02/07/2017 03:28 AM    MCH 26.9 02/07/2017 03:28 AM    MCHC 32.1 02/07/2017 03:28 AM    RDW 14.1 02/07/2017 03:28 AM     Coagulation  Lab Results   Component Value Date    INR 1.4 (H) 02/07/2017    APTT 34.5 01/23/2017      Basic Metabolic Profile  Lab Results   Component Value Date     02/07/2017    CO2 23 02/07/2017    BUN 29 (H) 02/07/2017       Discharge Plan:  The patient will be d/c'd to home, total knee protocol,  }WBAT. [facility] and [facility]. Total joint protocol. Pt safe for homebound transfer, sp Total joint replacement. A walker, bedside commode, and shower chair will be utilized for ADL's. Follow up with Dr. Delaney Herrera in 10-12 days. Call with any questions or concerns.

## 2017-02-07 NOTE — PROGRESS NOTES
[facility]  Pulmonary, Critical Care, and Sleep Medicine    Name: Zackary Calhoun MRN: [id number]   : 1935 Hospital: [address] [place] [address] Dr   Date: 2017        Subjective:     Post-op medical management. Patient is s/p RT TKR POD#1  Patient is a 80 y. o.AA female with hx of HTN, chronic RT shoulder pains, RT knee OA. Patient awake, alert  No acute events overnight  Acid reflux +  Cough incontinence  Pain controlled  She has no chest pains or SOB or nausea.    Afebrile     Past Medical History   Diagnosis Date    Arthritis     Arthritis of both hips     Back pain     Balance problem     Chronic back pain     Cough     Easy bruising     Essential hypertension     GERD (gastroesophageal reflux disease)     Hiatal hernia     Hypertension     Irregular heart beat     Left hip pain 10/8/2010    Neck pain     Nervousness     Night sweat     Osteoarthritis, knee bilateral     Pain with urination     Polymyalgia rheumatica (HCC)     Reflux     Spinal stenosis     Trapezius strain     Trochanteric bursitis of left hip     Venous insufficiency       Allergies   Allergen Reactions    Citric Acid Unknown (comments)    Crinone [Progesterone Micronized] Unknown (comments)    Iodine Unknown (comments)    Percocet [Oxycodone-Acetaminophen] Other (comments)     Gets rebound headaches after taking Percocet      Social History   Substance Use Topics    Smoking status: Never Smoker    Smokeless tobacco: Never Used    Alcohol use No       Current Facility-Administered Medications   Medication Dose Route Frequency    0.9% sodium chloride infusion 500 mL  500 mL IntraVENous ONCE    warfarin (COUMADIN) tablet 7.5 mg  7.5 mg Oral QPM    WARFARIN INFORMATION NOTE (COUMADIN)   Other Q24H    metoclopramide HCl (REGLAN) tablet 5 mg  5 mg Oral BID    pantoprazole (PROTONIX) tablet 40 mg  40 mg Oral ACB    potassium tablet 99 mg  99 mg Oral DAILY    0.9% sodium chloride infusion  100 mL/hr IntraVENous CONTINUOUS    sodium chloride (NS) flush 5-10 mL  5-10 mL IntraVENous Q8H    ceFAZolin (ANCEF) 3 g in 0.9%  ml IVPB  3 g IntraVENous Q8H    ferrous sulfate tablet 325 mg  1 Tab Oral BID WITH MEALS    amLODIPine (NORVASC) tablet 5 mg  5 mg Oral DAILY    losartan (COZAAR) tablet 25 mg  25 mg Oral DAILY       Review of Systems:  HEENT: No epistaxis, no nasal drainage, no difficulty in swallowing  Respiratory: no cough or SOB  Cardiovascular: no chest pain, no palpitations, no chronic leg edema, no syncope  Gastrointestinal: no abd pain, no vomiting, no diarrhea, no bleeding symptoms  Genitourinary: No urinary symptoms  Integument/breast: No ulcers  Musculoskeletal: as above  Neurological: No focal weakness, no seizures, no headaches  Behvioral/Psych: No anxiety, no depression  Constitutional: No fever, no chills, no weight loss, no night sweats    Objective:   Vital Signs:    Visit Vitals    /55 (BP 1 Location: Right arm, BP Patient Position: Supine)    Pulse 85    Temp 97.9 °F (36.6 °C)    Resp 18    Ht 5' 4\" (1.626 m)    Wt 104 kg (229 lb 6 oz)    SpO2 98%    Breastfeeding Unknown    BMI 39.37 kg/m2       O2 Device: Nasal cannula   O2 Flow Rate (L/min): 2 l/min   Temp (24hrs), Av.8 °F (36.6 °C), Min:96.8 °F (36 °C), Max:98.5 °F (36.9 °C)       Intake/Output:     Intake/Output Summary (Last 24 hours) at 17 0840  Last data filed at 17 1017   Gross per 24 hour   Intake             2640 ml   Output             1240 ml   Net             1400 ml         Physical Exam:   General: comfortable; on room air now  Neck: No adenopathy or thyroid swelling  CVS: S1S2 no murmurs  RS: Good AE bilaterally, lungs clear  Abd: soft, non tender, no hepatosplenomegaly  Neuro: non focal, awake, alert  Extrm: no leg edema   Skin: no rash  RT knee dressing; no bleeding or hematoma     Data review:   Labs:  Recent Labs      17   0328   WBC  12.0   HGB  8.7*   HCT 27.1*   PLT  291     Recent Labs      02/07/17   0328   NA  143   K  3.8   CL  110*   CO2  23   GLU  99   BUN  29*   CREA  1.47*   CA  8.3*   INR  1.4*        Ref. Range 1/23/2017 10:14   HGB Latest Ref Range: 12.0 - 16.0 g/dL 10.1 (L)   HCT Latest Ref Range: 35.0 - 45.0 % 31.4 (L)      Ref. Range 1/23/2017 10:14   BUN Latest Ref Range: 7.0 - 18 MG/DL 32 (H)   Creatinine Latest Ref Range: 0.6 - 1.3 MG/DL 1.42 (H)   BUN/Creatinine ratio Latest Ref Range: 12 - 20   23 (H)        Ref. Range 1/23/2017 10:14   Hemoglobin A1c, (calculated) Latest Ref Range: 4.2 - 5.6 % 5.4     1/23/17  Diagnosis   Final      Normal sinus rhythm   Normal ECG   When compared with ECG of 20-APR-2011 11:16,   No significant change was found   Confirmed by Hernan Lau ([id number]) on 1/23/2017 1:17:55 PM        Imaging:  I have personally reviewed the patients radiographs and have reviewed the reports:  CXR 1/23/17  Pre-Op. COMPARISON: Chest x-ray April 20, 2011. FINDINGS:   PA and lateral views obtained. The cardiac silhouette is normal. Calcified  plaque is seen in the aortic arch with mild tortuosity of the arch and  descending thoracic aorta. The lungs are clear. The costophrenic angles are  sharply defined. Pulmonary vascularity is normal. Probable mild compression  fracture of T4, T5, and T7 unchanged. Mild disc space narrowing with marginal  spurring of the thoracic spine. Stable mild compression fractures of the upper  and mid thoracic spine. 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   Stable multiple mild compression fractures of the thoracic spine     IMPRESSION:   · RT Knee OA - s/p TKR   · HTN  · Chronic RT shoulder pain   · CKD stage 3  · Anemia post-op   · GERD      RECOMMENDATIONS:   Hold diuretics for now; UA showed no protein; stopped Celebrex due to CKD  HTN meds in low doses - amlodipine and losartan   IV fluids with NS at 100/hr; follow IOs  Pain control, iron tabs   Incentive spirometry Post op labs - watch Hb;  Tx if < 8 gm/dl  Post op joint management and rehab per Ortho team   DVT proph - management per Ortho team - patient on coumadin   Dc'd kaur cath this am  PPI; prn Maalox  Consult Nephrology - Dr Artur Riggs MD

## 2017-02-07 NOTE — PROGRESS NOTES
Problem: Mobility Impaired (Adult and Pediatric)  Goal: *Acute Goals and Plan of Care (Insert Text)  STGs to be addressed within 3 days:  1. Bed mobility: Supine to sit to supine S with HR for meals. 2. Activity tolerance: Tolerate up in chair 1-2 hrs for ADLs. 3. Transfers: Sit to stand to chair S with LRAD for ADLs. LTGs to be addressed within 7 days:  1. Standing/Ambulation Balance: Increase to Good with LRAD for safe transfers and gait. 2. Ambulation: Ambulate > 200 ft. S with LRAD for home mobility. 3. Patient Education: Independent with HEP for home safety. 4. Stairs: Up/Down 3 steps CGA with HR for home entry. Outcome: Progressing Towards Goal  PHYSICAL THERAPY TREATMENT     Patient: Betty Goldstein (51 y.o. female)  Date: 2/7/2017  Diagnosis: Primary osteoarthritis of right knee [M17.11] <principal problem not specified>  Procedure(s) (LRB):  RIGHT TOTAL KNEE ARTHROPLASTY (Right) 1 Day Post-Op  Precautions: Fall, WBAT  Chart, physical therapy assessment, plan of care and goals were reviewed. ASSESSMENT:  Pt demonstrating good progress towards goals today, able to progress with gait training with verbal cues for walker management and sequencing. Pt instructed in LE exercises for strength and ROM to improve functional mobility. Progression toward goals:  [X]      Improving appropriately and progressing toward goals  [ ]      Improving slowly and progressing toward goals  [ ]      Not making progress toward goals and plan of care will be adjusted       PLAN:  Patient continues to benefit from skilled intervention to address the above impairments. Continue treatment per established plan of care. Discharge Recommendations:  Home Health  Further Equipment Recommendations for Discharge:  rolling walker; pt has standard walker that can be fitted with wheels       SUBJECTIVE:   Patient stated I have reflux and it makes my throat hurt.   Mobility [id number] Current  CJ= 20-39%   Goal  CI= 1-19%.  The severity rating is based on the Other Level of assist required for functional mobility      OBJECTIVE DATA SUMMARY:   Critical Behavior:  Neurologic State: Alert  Orientation Level: Appropriate for age  Cognition: Appropriate decision making  Safety/Judgement: Fall prevention  Functional Mobility Training:  Bed Mobility:  Rolling: Stand-by asssistance  Supine to Sit: Stand-by asssistance  Scooting: Stand-by asssistance  Transfers:  Sit to Stand: Minimum assistance  Stand to Sit: Contact guard assistance  Balance:  Sitting: Intact  Standing: Impaired  Standing - Static: Fair  Standing - Dynamic : Fair  Ambulation/Gait Training:  Distance (ft): 15 Feet (ft)  Assistive Device: Walker, rolling  Ambulation - Level of Assistance: Stand-by asssistance  Gait Abnormalities: Decreased step clearance; Step to gait  Base of Support: Widened  Speed/Nikole: Slow  Stairs - Level of Assistance:  (unable at this time)  Therapeutic Exercises: Ankle pumps, quad set, glute squeeze, hip abd, heel slides with sheet and in seated  Pain:  Pt pain was reported as  4 pre-treatment. Pt pain was reported as 6 post-treatment. Intervention: jet stream cooler, nurse notified     Activity Tolerance:   Good   Please refer to the flowsheet for vital signs taken during this treatment.   After treatment:   [X] Patient left in no apparent distress sitting up in chair  [ ] Patient left in no apparent distress in bed  [X] Call bell left within reach  [X] Nursing notified  [ ] Caregiver present  [ ] Bed alarm activated      Rosie Manzano PTA   Time Calculation: 25 mins

## 2017-02-07 NOTE — PROGRESS NOTES
Problem: Self Care Deficits Care Plan (Adult)  Goal: *Acute Goals and Plan of Care (Insert Text)  Outcome: Resolved/Met Date Met:  02/07/17  OCCUPATIONAL THERAPY EVALUATION/DISCHARGE     Patient: Smita Langston (87 y.o. female)  Date: 2/7/2017  Primary Diagnosis: Primary osteoarthritis of right knee [M17.11]  Procedure(s) (LRB):  RIGHT TOTAL KNEE ARTHROPLASTY (Right) 1 Day Post-Op   Precautions:   Fall      ASSESSMENT AND RECOMMENDATIONS:  Based on the objective data described below, the patient is able to perform basic self care tasks without assistance while seated, given extra time. CGA needed for functional transfers. Will defer to PT for mobility training. Patient has a supportive  to assist her at home prn and needed DME for bathroom safety. Discussed importance of pain control at home and she verbalized understanding. Skilled occupational therapy is not indicated at this time. Discharge Recommendations: None  Further Equipment Recommendations for Discharge: N/A       COMPLEXITY      Eval Complexity: History: MEDIUM Complexity : Expanded review of history including physical, cognitive and psychosocial  history ; Examination: LOW Complexity : 1-3 performance deficits relating to physical, cognitive , or psychosocial skils that result in activity limitations and / or participation restrictions ; Decision Making:LOW Complexity : No comorbidities that affect functional and no verbal or physical assistance needed to complete eval tasks  Assessment: Low Complexity          G-CODES:      Self Care  Current  CI= 1-19%   Goal  CI= 1-19%   D/C  CI= 1-19%. The severity rating is based on the Level of Assistance required for Functional Mobility and ADLs. SUBJECTIVE:   Patient stated Terrea Child a little when I cough.       OBJECTIVE DATA SUMMARY:       Past Medical History   Diagnosis Date    Arthritis      Arthritis of both hips      Back pain      Balance problem      Chronic back pain      Cough      Easy bruising      Essential hypertension      GERD (gastroesophageal reflux disease)      Hiatal hernia      Hypertension      Irregular heart beat      Left hip pain 10/8/2010    Neck pain      Nervousness      Night sweat      Osteoarthritis, knee bilateral      Pain with urination      Polymyalgia rheumatica (HCC)      Reflux      Spinal stenosis      Trapezius strain      Trochanteric bursitis of left hip      Venous insufficiency       Past Surgical History   Procedure Laterality Date    Hx hysterectomy        Hx cholecystectomy        Hx orthopaedic           right foot and ankle    Hx heent   06/2011       left cornea transplant    Hx ankle fracture tx        Colonoscopy N/A 7/27/2016       COLONOSCOPY w/polypectomy performed by Freddy Travis MD at [facility]     Prior Level of Function/Home Situation: Pt was independent with basic self care tasks and used a [facility] for functional mobility PTA. Home Situation  Home Environment: Private residence  # Steps to Enter: 3  Rails to Enter: Yes  Hand Rails : Bilateral  One/Two Story Residence: Two story, live on 1st floor  # of Interior Steps: 14  Height of Each Step (in): 0 inches  Interior Rails: Both  Lift Chair Available: No  Living Alone: No  Support Systems: Spouse/Significant Other/Partner, Family member(s)  Patient Expects to be Discharged to[de-identified] Private residence  Current DME Used/Available at Home: [address] [facility], straight, Shower chair  Tub or Shower Type: Tub/Shower combination (with seat)  [X]     Right hand dominant       [ ]     Left hand dominant  Cognitive/Behavioral Status:  Neurologic State: Alert  Orientation Level: Oriented X4  Cognition: Appropriate decision making; Follows commands  Safety/Judgement: Awareness of environment; Fall prevention  Skin: Intact on UEs  Edema: None noted in UEs  Vision/Perceptual:    Acuity: Within Defined Limits    Coordination:  Fine Motor Skills-Upper: Left Intact; Right Intact Gross Motor Skills-Upper: Left Intact; Right Intact  Balance:  Sitting: Intact  Standing: Impaired  Standing - Static: Fair  Standing - Dynamic : Fair  Strength:  Strength: Within functional limits (UEs)  Tone & Sensation:  Sensation: Intact (UEs)  Range of Motion:  AROM: Within functional limits (UEs)  Functional Mobility and Transfers for ADLs:  Bed Mobility:  Rolling: Stand-by asssistance  Supine to Sit: Stand-by asssistance  Scooting: Stand-by asssistance  Transfers:  Sit to Stand: Contact guard assistance              Toilet Transfer : Contact guard assistance  ADL Assessment:  Feeding: Independent     Oral Facial Hygiene/Grooming: Independent     Bathing: Supervision (sponge bath)     Upper Body Dressing: Independent     Lower Body Dressing: Contact guard assistance (for standing balance)     Toileting: Supervision  ADL Intervention:  Patient with bladder accident in chair. She was able to stand with CGA using a RW to remove her soiled underwear and complete hygiene without assistance. Extra time needed to thread clean underwear over RLE but no assist given. She was able to stand again with CGA to pull up underwear. Cognitive Retraining  Safety/Judgement: Awareness of environment; Fall prevention     Pain:  Pt reports 10/10 pain or discomfort prior to treatment, in right knee. Nursing aware and meds given. Pt reports 10/10 pain or discomfort post treatment, in right knee. Patient resting in bed at end of session with polar ice applied. Activity Tolerance:   Good  Please refer to the flowsheet for vital signs taken during this treatment.   After treatment:   [ ]  Patient left in no apparent distress sitting up in chair  [X]  Patient left in no apparent distress in bed  [X]  Call bell left within reach  [ ]  Nursing notified  [ ]  Caregiver present  [ ]  Bed alarm activated      COMMUNICATION/EDUCATION:   Communication/Collaboration:  [X]      Home safety education was provided and the patient/caregiver indicated understanding. [X]      Patient/family have participated as able and agree with findings and recommendations. [ ]      Patient is unable to participate in plan of care at this time.      Beth Davis, MS OTR/L  Time Calculation: 25 mins

## 2017-02-07 NOTE — PROGRESS NOTES
Care Management Interventions  PCP Verified by CM:  Yes  Mode of Transport at Discharge: BLS  Transition of Care Consult (CM Consult): [address]: Yes  MyChart Signup: No  Discharge Durable Medical Equipment: No  Physical Therapy Consult: No  Occupational Therapy Consult: No  Speech Therapy Consult: No  Current Support Network: Lives with Spouse  Confirm Follow Up Transport: Family  Plan discussed with Pt/Family/Caregiver: Yes  Freedom of Choice Offered: Yes  Discharge Location  Discharge Placement: Home with home health

## 2017-02-07 NOTE — PROGRESS NOTES
1500: PT voided without difficulty by 1430. Denied burning/pain upon urination, no odor no sediment.

## 2017-02-07 NOTE — PROGRESS NOTES
Care Management Interventions  PCP Verified by CM: Yes  Mode of Transport at Discharge: BLS  Transition of Care Consult (CM Consult): [address]: Yes  MyChart Signup: No  Discharge Durable Medical Equipment: No  Physical Therapy Consult: No  Occupational Therapy Consult: No  Speech Therapy Consult: No  Current Support Network: Lives with Spouse  Confirm Follow Up Transport: Family  Plan discussed with Pt/Family/Caregiver: Yes  Freedom of Choice Offered: Yes  Discharge Location  Discharge Placement: Home with home health     79 y/o female admitted with osteoarthritis of right knee. Pt states she has a straight cane, shower chair, elevated toilet seat and a standard walker. She states she would like to have a rolling walker and a BSC prior to discharge. She states she has had HH in the past but can't remember the name of the agency. Pt states she is  and plans on going back home with [facility] at discharge. FOC given and signed by pt and she chose [facility]. [facility] notified.

## 2017-02-07 NOTE — PROGRESS NOTES
1936  Received pt in stable condition,   General: lying in bed in supine, not apparent distress  Neuro: AOx4, denies numbness, 0 tingling, able to wiggle toes to bilateral extremities  Cardio: pedal pulses palpable, denies chest pain  Respiratory: denies shortness of breath  Skin: Dressing to right knee clean, dry and intact, polar ice in place  GI: kaur, clear, yellow urine, no odor  : denies nausea and vomiting, maalox given for heart burn  Mus: weakness to RLE had nerve block  Bed in low position and call bell within reach. 0102  Patient AOx4, no apparent disstress, voiced no c/o at this time, dressing to right knee clean, dry and intact. Bilateral lower extremities: pedal pulses present and palpable, denies numbness, able to wiggle toes. No changes in status, in stable condition. 2219  Patient AOx4, no apparent disstress, dressing to right knee clean, dry and intact. Bilateral lower extremities: pedal pulses present and palpable, denies numbness, able to wiggle toes. No changes in status, in stable condition.

## 2017-02-07 NOTE — ROUTINE PROCESS
Bedside and Verbal shift change report given to Fifi Perrin LPN (oncoming nurse) by Nicholas Gould RN (offgoing nurse). Report included the following information SBAR, Kardex, MAR and Recent Results.     SITUATION:    Code Status: No Order   Reason for Admission: Primary osteoarthritis of right knee Aline Atkinson day: 1   Problem List:       Hospital Problems  Date Reviewed: 2/6/2017          Codes Class Noted POA    Arthritis of knee ICD-10-CM: M19.90  ICD-9-CM: 716.96  2/6/2017 Unknown              BACKGROUND:    Past Medical History:   Past Medical History   Diagnosis Date    Arthritis     Arthritis of both hips     Back pain     Balance problem     Chronic back pain     Cough     Easy bruising     Essential hypertension     GERD (gastroesophageal reflux disease)     Hiatal hernia     Hypertension     Irregular heart beat     Left hip pain 10/8/2010    Neck pain     Nervousness     Night sweat     Osteoarthritis, knee bilateral     Pain with urination     Polymyalgia rheumatica (HCC)     Reflux     Spinal stenosis     Trapezius strain     Trochanteric bursitis of left hip     Venous insufficiency          Patient taking anticoagulants yes     ASSESSMENT:    Changes in Assessment Throughout Shift: n/a     Patient has Central Line: no Reasons if yes: n/a   Patient has Krishna Cath: no Reasons if yes:      Last Vitals:     Vitals:    02/06/17 1546 02/06/17 2001 02/07/17 0040 02/07/17 0503   BP: 102/50 116/59 100/49 111/57   Pulse: 77 77 86 81   Resp: 16 18 18 18   Temp: 96.8 °F (36 °C) 98.5 °F (36.9 °C) 98.1 °F (36.7 °C) 97.7 °F (36.5 °C)   SpO2: 97% 99% 100% 100%   Weight:       Height:            IV and DRAINS (will only show if present)   Peripheral IV 02/06/17 Left Wrist-Site Assessment: Clean, dry, & intact  Jean-Claude-Reyes Drain 02/06/17 Right Knee-Site Assessment: Clean, dry, & intact     WOUND (if present)   Wound Type:  surgical   Dressing present Dressing Present : Yes   Wound Concerns/Notes:  none     PAIN    Pain Assessment    Pain Intensity 1: 0 (02/07/17 0503)    Pain Location 1: Knee    Pain Intervention(s) 1: Medication (see MAR)    Patient Stated Pain Goal: 0  o Interventions for Pain:  Medication   o Intervention effective: yes   o Time of last intervention: see mar  o Reassessment Completed: yes     Last 3 Weights:  Last 3 Recorded Weights in this Encounter    01/23/17 1022 02/06/17 0612   Weight: 103.4 kg (228 lb) 104 kg (229 lb 6 oz)     Weight change:      INTAKE/OUPUT    Current Shift: 02/06 1901 - 02/07 0700  In: 1400 [P.O.:200; I.V.:1200]  Out: 590 [Urine:350; Drains:240]    Last three shifts: 02/05 0701 - 02/06 1900  In: 8544 [P.O.:240; I.V.:1000]  Out: 550 [Urine:450]     LAB RESULTS     Recent Labs      02/07/17   0328   WBC  12.0   HGB  8.7*   HCT  27.1*   PLT  291        Recent Labs      02/07/17   0328   NA  143   K  3.8   GLU  99   BUN  29*   CREA  1.47*   CA  8.3*   INR  1.4*       RECOMMENDATIONS AND DISCHARGE PLANNING     1. Pending tests/procedures/ Plan of Care or Other Needs: physical therapy and drain removal , pain management, wound dressing change POD1    2. Discharge plan for patient and Needs/Barriers: home    3. Estimated Discharge Date: 2/8/17 Posted on Whiteboard in [facility]: yes      4. The patient's care plan was reviewed with the oncoming nurse. \"HEALS\" SAFETY CHECK      Fall Risk    Total Score: 3    Safety Measures: Safety Measures: Bed/Chair-Wheels locked, Bed in low position, Call light within reach, Fall prevention (comment), Gripper socks    A safety check occurred in the patient's room between off going nurse and oncoming nurse listed above.     The safety check included the below items  Area Items   H  High Alert Medications - Verify all high alert medication drips (heparin, PCA, etc.)   E  Equipment - Suction is set up for ALL patients (with yanker)  - Red plugs utilized for all equipment (IV pumps, etc.)  - WOWs wiped down at end of shift.  - Room stocked with oxygen, suction, and other unit-specific supplies   A  Alarms - Bed alarm is set for fall risk patients  - Ensure chair alarm is in place and activated if patient is up in a chair   L  Lines - Check IV for any infiltration  - Krishna bag is empty if patient has a Krishna   - Tubing and IV bags are labeled   S  Safety   - Room is clean, patient is clean, and equipment is clean. - Hallways are clear from equipment besides carts. - Fall bracelet on for fall risk patients  - Ensure room is clear and free of clutter  - Suction is set up for ALL patients (with yanker)  - Hallways are clear from equipment besides carts.    - Isolation precautions followed, supplies available outside room, sign posted     Tomas Mckinney RN

## 2017-02-07 NOTE — HOME CARE
Rec HC order - d/c plan for tomorrow - ordered RW and BSC for patient to go home with from [facility] with [facility] - to be delivered to room prior to planned d/c home tomorrow -  to transport pt home - D Sonal [place]

## 2017-02-07 NOTE — PROGRESS NOTES
Problem: Mobility Impaired (Adult and Pediatric)  Goal: *Acute Goals and Plan of Care (Insert Text)  STGs to be addressed within 3 days:  1. Bed mobility: Supine to sit to supine S with HR for meals. 2. Activity tolerance: Tolerate up in chair 1-2 hrs for ADLs. 3. Transfers: Sit to stand to chair S with LRAD for ADLs. LTGs to be addressed within 7 days:  1. Standing/Ambulation Balance: Increase to Good with LRAD for safe transfers and gait. 2. Ambulation: Ambulate > 200 ft. S with LRAD for home mobility. 3. Patient Education: Independent with HEP for home safety. 4. Stairs: Up/Down 3 steps CGA with HR for home entry. Outcome: Progressing Towards Goal  PHYSICAL THERAPY TREATMENT     Patient: Jessica Fernando (26 y.o. female)  Date: 2/7/2017  Diagnosis: Primary osteoarthritis of right knee [M17.11] <principal problem not specified>  Procedure(s) (LRB):  RIGHT TOTAL KNEE ARTHROPLASTY (Right) 1 Day Post-Op  Precautions: Fall, WBAT  Chart, physical therapy assessment, plan of care and goals were reviewed. ASSESSMENT:  Pt continues to demonstrate good progress; increased activity tolerance this afternoon with improved gait pacing and step length. ROM 0-95 this afternoon. Progression toward goals:  [X]      Improving appropriately and progressing toward goals  [ ]      Improving slowly and progressing toward goals  [ ]      Not making progress toward goals and plan of care will be adjusted       PLAN:  Patient continues to benefit from skilled intervention to address the above impairments. Continue treatment per established plan of care. Discharge Recommendations:  Home Health  Further Equipment Recommendations for Discharge:  rolling walker       SUBJECTIVE:   Patient stated I have been doing my exercises.       OBJECTIVE DATA SUMMARY:   Critical Behavior:  Neurologic State: Alert, Appropriate for age, Eyes open spontaneously  Orientation Level: Appropriate for age, Oriented X4  Cognition: Appropriate decision making, Appropriate for age attention/concentration, Appropriate safety awareness, Follows commands  Safety/Judgement: Fall prevention  Functional Mobility Training:  Transfers:  Sit to Stand: Minimum assistance  Stand to Sit: Contact guard assistance  Balance:  Sitting: Intact  Standing: Impaired  Standing - Static: Fair  Standing - Dynamic : Fair  Ambulation/Gait Training:  Distance (ft): 50 Feet (ft)  Assistive Device: Walker, rolling  Ambulation - Level of Assistance: Stand-by asssistance  Gait Abnormalities: Decreased step clearance; Step to gait  Base of Support: Widened  Speed/Nikole: Slow  Stairs - Level of Assistance:  (unable at this time)  Therapeutic Exercises: Ankle pumps, LAQ, glute squeeze, heel slides  Pain:  Pt pain was reported as  4 pre-treatment. Pt pain was reported as 5 post-treatment. Intervention: jet stream cooler     Activity Tolerance:   Good   Please refer to the flowsheet for vital signs taken during this treatment.   After treatment:   [X] Patient left in no apparent distress sitting up in chair  [ ] Patient left in no apparent distress in bed  [X] Call bell left within reach  [ ] Nursing notified  [X] Caregiver present  [ ] Bed alarm activated      Emerald Shine PTA   Time Calculation: 23 mins

## 2017-02-08 ENCOUNTER — TELEPHONE (OUTPATIENT)
Dept: ORTHOPEDIC SURGERY | Facility: CLINIC | Age: 82
End: 2017-02-08

## 2017-02-08 LAB
ANION GAP BLD CALC-SCNC: 8 MMOL/L (ref 3–18)
APPEARANCE UR: ABNORMAL
BASOPHILS # BLD AUTO: 0 K/UL (ref 0–0.1)
BASOPHILS # BLD: 0 % (ref 0–2)
BILIRUB UR QL: NEGATIVE
BUN SERPL-MCNC: 34 MG/DL (ref 7–18)
BUN/CREAT SERPL: 21 (ref 12–20)
CALCIUM SERPL-MCNC: 8.5 MG/DL (ref 8.5–10.1)
CHLORIDE SERPL-SCNC: 105 MMOL/L (ref 100–108)
CO2 SERPL-SCNC: 25 MMOL/L (ref 21–32)
COLOR UR: YELLOW
CREAT SERPL-MCNC: 1.59 MG/DL (ref 0.6–1.3)
CREAT UR-MCNC: 179 MG/DL (ref 30–125)
DIFFERENTIAL METHOD BLD: ABNORMAL
EOSINOPHIL # BLD: 0.4 K/UL (ref 0–0.4)
EOSINOPHIL NFR BLD: 4 % (ref 0–5)
EPITH CASTS URNS QL MICRO: ABNORMAL /LPF (ref 0–5)
ERYTHROCYTE [DISTWIDTH] IN BLOOD BY AUTOMATED COUNT: 14.2 % (ref 11.6–14.5)
GLUCOSE SERPL-MCNC: 112 MG/DL (ref 74–99)
GLUCOSE UR STRIP.AUTO-MCNC: NEGATIVE MG/DL
HCT VFR BLD AUTO: 27.1 % (ref 35–45)
HGB BLD-MCNC: 8.7 G/DL (ref 12–16)
HGB UR QL STRIP: NEGATIVE
INR PPP: 1.5 (ref 0.8–1.2)
KETONES UR QL STRIP.AUTO: NEGATIVE MG/DL
LEUKOCYTE ESTERASE UR QL STRIP.AUTO: ABNORMAL
LYMPHOCYTES # BLD AUTO: 11 % (ref 21–52)
LYMPHOCYTES # BLD: 1.2 K/UL (ref 0.9–3.6)
MCH RBC QN AUTO: 27.1 PG (ref 24–34)
MCHC RBC AUTO-ENTMCNC: 32.1 G/DL (ref 31–37)
MCV RBC AUTO: 84.4 FL (ref 74–97)
MONOCYTES # BLD: 1.2 K/UL (ref 0.05–1.2)
MONOCYTES NFR BLD AUTO: 10 % (ref 3–10)
NEUTS SEG # BLD: 8.7 K/UL (ref 1.8–8)
NEUTS SEG NFR BLD AUTO: 75 % (ref 40–73)
NITRITE UR QL STRIP.AUTO: NEGATIVE
PH UR STRIP: 5 [PH] (ref 5–8)
PLATELET # BLD AUTO: 276 K/UL (ref 135–420)
PMV BLD AUTO: 10.5 FL (ref 9.2–11.8)
POTASSIUM SERPL-SCNC: 3.7 MMOL/L (ref 3.5–5.5)
PROT UR STRIP-MCNC: NEGATIVE MG/DL
PROT UR-MCNC: 44 MG/DL
PROTHROMBIN TIME: 17.9 SEC (ref 11.5–15.2)
RBC # BLD AUTO: 3.21 M/UL (ref 4.2–5.3)
RBC #/AREA URNS HPF: ABNORMAL /HPF (ref 0–5)
SODIUM SERPL-SCNC: 138 MMOL/L (ref 136–145)
SODIUM UR-SCNC: 35 MMOL/L (ref 20–110)
SP GR UR REFRACTOMETRY: 1.02 (ref 1–1.03)
UROBILINOGEN UR QL STRIP.AUTO: 0.2 EU/DL (ref 0.2–1)
WBC # BLD AUTO: 11.6 K/UL (ref 4.6–13.2)
WBC URNS QL MICRO: ABNORMAL /HPF (ref 0–4)
YEAST URNS QL MICRO: ABNORMAL

## 2017-02-08 PROCEDURE — 94640 AIRWAY INHALATION TREATMENT: CPT

## 2017-02-08 PROCEDURE — 97530 THERAPEUTIC ACTIVITIES: CPT

## 2017-02-08 PROCEDURE — 81001 URINALYSIS AUTO W/SCOPE: CPT | Performed by: ORTHOPAEDIC SURGERY

## 2017-02-08 PROCEDURE — 80048 BASIC METABOLIC PNL TOTAL CA: CPT | Performed by: ORTHOPAEDIC SURGERY

## 2017-02-08 PROCEDURE — 85610 PROTHROMBIN TIME: CPT | Performed by: ORTHOPAEDIC SURGERY

## 2017-02-08 PROCEDURE — 87086 URINE CULTURE/COLONY COUNT: CPT | Performed by: INTERNAL MEDICINE

## 2017-02-08 PROCEDURE — 84156 ASSAY OF PROTEIN URINE: CPT | Performed by: ORTHOPAEDIC SURGERY

## 2017-02-08 PROCEDURE — 74011250637 HC RX REV CODE- 250/637: Performed by: ORTHOPAEDIC SURGERY

## 2017-02-08 PROCEDURE — 77030027138 HC INCENT SPIROMETER -A

## 2017-02-08 PROCEDURE — 74011250637 HC RX REV CODE- 250/637: Performed by: PHYSICIAN ASSISTANT

## 2017-02-08 PROCEDURE — 74011000258 HC RX REV CODE- 258: Performed by: INTERNAL MEDICINE

## 2017-02-08 PROCEDURE — 74011250637 HC RX REV CODE- 250/637: Performed by: INTERNAL MEDICINE

## 2017-02-08 PROCEDURE — 74011000250 HC RX REV CODE- 250: Performed by: INTERNAL MEDICINE

## 2017-02-08 PROCEDURE — 82570 ASSAY OF URINE CREATININE: CPT | Performed by: ORTHOPAEDIC SURGERY

## 2017-02-08 PROCEDURE — 84300 ASSAY OF URINE SODIUM: CPT | Performed by: ORTHOPAEDIC SURGERY

## 2017-02-08 PROCEDURE — 74011250636 HC RX REV CODE- 250/636: Performed by: PHYSICIAN ASSISTANT

## 2017-02-08 PROCEDURE — 65270000029 HC RM PRIVATE

## 2017-02-08 PROCEDURE — 85025 COMPLETE CBC W/AUTO DIFF WBC: CPT | Performed by: ORTHOPAEDIC SURGERY

## 2017-02-08 PROCEDURE — 36415 COLL VENOUS BLD VENIPUNCTURE: CPT | Performed by: ORTHOPAEDIC SURGERY

## 2017-02-08 RX ORDER — SODIUM CHLORIDE 450 MG/100ML
50 INJECTION, SOLUTION INTRAVENOUS CONTINUOUS
Status: DISCONTINUED | OUTPATIENT
Start: 2017-02-08 | End: 2017-02-09

## 2017-02-08 RX ORDER — IPRATROPIUM BROMIDE AND ALBUTEROL SULFATE 2.5; .5 MG/3ML; MG/3ML
3 SOLUTION RESPIRATORY (INHALATION)
Status: DISCONTINUED | OUTPATIENT
Start: 2017-02-08 | End: 2017-02-08

## 2017-02-08 RX ORDER — WARFARIN 7.5 MG/1
7.5 TABLET ORAL EVERY EVENING
Status: COMPLETED | OUTPATIENT
Start: 2017-02-08 | End: 2017-02-08

## 2017-02-08 RX ORDER — IPRATROPIUM BROMIDE AND ALBUTEROL SULFATE 2.5; .5 MG/3ML; MG/3ML
3 SOLUTION RESPIRATORY (INHALATION)
Status: DISCONTINUED | OUTPATIENT
Start: 2017-02-09 | End: 2017-02-10 | Stop reason: HOSPADM

## 2017-02-08 RX ORDER — SODIUM CHLORIDE 9 MG/ML
500 INJECTION, SOLUTION INTRAVENOUS ONCE
Status: COMPLETED | OUTPATIENT
Start: 2017-02-08 | End: 2017-02-08

## 2017-02-08 RX ORDER — LEVOFLOXACIN 250 MG/1
250 TABLET ORAL EVERY 24 HOURS
Status: DISCONTINUED | OUTPATIENT
Start: 2017-02-08 | End: 2017-02-10 | Stop reason: HOSPADM

## 2017-02-08 RX ORDER — LEVOFLOXACIN 250 MG/1
500 TABLET ORAL EVERY 24 HOURS
Status: DISCONTINUED | OUTPATIENT
Start: 2017-02-08 | End: 2017-02-08 | Stop reason: DRUGHIGH

## 2017-02-08 RX ORDER — PANTOPRAZOLE SODIUM 40 MG/1
40 TABLET, DELAYED RELEASE ORAL
Status: DISCONTINUED | OUTPATIENT
Start: 2017-02-08 | End: 2017-02-10 | Stop reason: HOSPADM

## 2017-02-08 RX ADMIN — HYDROCODONE BITARTRATE AND ACETAMINOPHEN 2 TABLET: 7.5; 325 TABLET ORAL at 09:10

## 2017-02-08 RX ADMIN — IPRATROPIUM BROMIDE AND ALBUTEROL SULFATE 3 ML: .5; 3 SOLUTION RESPIRATORY (INHALATION) at 19:47

## 2017-02-08 RX ADMIN — WARFARIN SODIUM 7.5 MG: 7.5 TABLET ORAL at 17:29

## 2017-02-08 RX ADMIN — PANTOPRAZOLE SODIUM 40 MG: 40 TABLET, DELAYED RELEASE ORAL at 16:36

## 2017-02-08 RX ADMIN — IPRATROPIUM BROMIDE AND ALBUTEROL SULFATE 3 ML: .5; 3 SOLUTION RESPIRATORY (INHALATION) at 14:29

## 2017-02-08 RX ADMIN — PANTOPRAZOLE SODIUM 40 MG: 40 TABLET, DELAYED RELEASE ORAL at 09:11

## 2017-02-08 RX ADMIN — Medication 325 MG: at 16:36

## 2017-02-08 RX ADMIN — HYDROCODONE BITARTRATE AND ACETAMINOPHEN 2 TABLET: 7.5; 325 TABLET ORAL at 04:04

## 2017-02-08 RX ADMIN — LEVOFLOXACIN 250 MG: 250 TABLET, FILM COATED ORAL at 12:06

## 2017-02-08 RX ADMIN — HYDROCODONE BITARTRATE AND ACETAMINOPHEN 2 TABLET: 7.5; 325 TABLET ORAL at 13:06

## 2017-02-08 RX ADMIN — METOCLOPRAMIDE HYDROCHLORIDE 5 MG: 5 TABLET ORAL at 09:11

## 2017-02-08 RX ADMIN — Medication 10 ML: at 14:06

## 2017-02-08 RX ADMIN — METOCLOPRAMIDE HYDROCHLORIDE 5 MG: 5 TABLET ORAL at 17:29

## 2017-02-08 RX ADMIN — SODIUM CHLORIDE 500 ML: 900 INJECTION, SOLUTION INTRAVENOUS at 09:11

## 2017-02-08 RX ADMIN — AMLODIPINE BESYLATE 5 MG: 5 TABLET ORAL at 09:10

## 2017-02-08 RX ADMIN — SODIUM CHLORIDE 50 ML/HR: 450 INJECTION, SOLUTION INTRAVENOUS at 14:12

## 2017-02-08 RX ADMIN — Medication 325 MG: at 09:11

## 2017-02-08 NOTE — PROGRESS NOTES
02/08/17 0414   Patient Observation   Observations pt inc of uriine;palced pt on bedpan to try to get urine sample but pt missed bedpan;pericare given;call bell within reach

## 2017-02-08 NOTE — PROGRESS NOTES
Problem: Mobility Impaired (Adult and Pediatric)  Goal: *Acute Goals and Plan of Care (Insert Text)  STGs to be addressed within 3 days:  1. Bed mobility: Supine to sit to supine S with HR for meals. 2. Activity tolerance: Tolerate up in chair 1-2 hrs for ADLs. 3. Transfers: Sit to stand to chair S with LRAD for ADLs. LTGs to be addressed within 7 days:  1. Standing/Ambulation Balance: Increase to Good with LRAD for safe transfers and gait. 2. Ambulation: Ambulate > 200 ft. S with LRAD for home mobility. 3. Patient Education: Independent with HEP for home safety. 4. Stairs: Up/Down 3 steps CGA with HR for home entry. Outcome: Progressing Towards Goal  PHYSICAL THERAPY TREATMENT     Patient: Eleuterio Marshall (57 y.o. female)  Date: 2/8/2017  Diagnosis: Primary osteoarthritis of right knee [M17.11] <principal problem not specified>  Procedure(s) (LRB):  RIGHT TOTAL KNEE ARTHROPLASTY (Right) 2 Days Post-Op  Precautions: Fall  Chart, physical therapy assessment, plan of care and goals were reviewed. ASSESSMENT:  Pt demonstrating improved activity tolerance with increased distance during gait training with verbal cues for walker management and posture correction. Pt demonstrates continued compliance with exercises for strengthening and ROM. Progression toward goals:  [X]      Improving appropriately and progressing toward goals  [ ]      Improving slowly and progressing toward goals  [ ]      Not making progress toward goals and plan of care will be adjusted       PLAN:  Patient continues to benefit from skilled intervention to address the above impairments. Continue treatment per established plan of care. Discharge Recommendations:  Home Health  Further Equipment Recommendations for Discharge:  rolling walker       SUBJECTIVE:   Patient stated I have been getting up to go to the bathroom.       OBJECTIVE DATA SUMMARY:   Critical Behavior:  Neurologic State: Alert, Eyes open spontaneously  Orientation Level: Oriented X4  Cognition: Appropriate decision making, Appropriate safety awareness, Appropriate for age attention/concentration, Follows commands  Safety/Judgement: Awareness of environment, Fall prevention  Functional Mobility Training:  Transfers:  Sit to Stand: Stand-by asssistance  Stand to Sit: Stand-by asssistance  Balance:  Sitting: Intact  Standing: Impaired  Standing - Static: Good  Standing - Dynamic : Fair  Ambulation/Gait Training:  Distance (ft): 70 Feet (ft)  Assistive Device: Walker, rolling  Ambulation - Level of Assistance: Stand-by asssistance  Gait Abnormalities: Antalgic;Decreased step clearance; Step to gait  Base of Support: Center of gravity altered  Speed/Nikole: Slow  Stairs:  Stairs - Level of Assistance:  (unable at this time)  Therapeutic Exercises: Ankle pumps, quad set, glute squeeze, heel slides  Pain:  Pt pain was reported as  2 pre-treatment. Pt pain was reported as 2 post-treatment. Intervention: jet stream cooler     Activity Tolerance:   Good   Please refer to the flowsheet for vital signs taken during this treatment.   After treatment:   [X] Patient left in no apparent distress sitting up in chair  [ ] Patient left in no apparent distress in bed  [X] Call bell left within reach  [ ] Nursing notified  [ ] Caregiver present  [ ] Bed alarm activated      Steve Ip, PTA   Time Calculation: 19 mins

## 2017-02-08 NOTE — PROGRESS NOTES
[facility]  Pulmonary, Critical Care, and Sleep Medicine    Name: Irma Kilpatrick MRN: [id number]   : 1935 Hospital: [address] [place] [address] Dr   Date: 2017        Subjective:     Post-op medical management. Patient is s/p RT TKR POD#1  Patient is a 80 y. o.AA female with hx of HTN, chronic RT shoulder pains, RT knee OA. Patient awake, alert  No acute events overnight  Pain controlled  She has no chest pains or SOB or nausea.    Afebrle  Feels betterh  Occasional cough  Some wheezing on exam h/o Asthma    Past Medical History   Diagnosis Date    Arthritis     Arthritis of both hips     Back pain     Balance problem     Chronic back pain     Cough     Easy bruising     Essential hypertension     GERD (gastroesophageal reflux disease)     Hiatal hernia     Hypertension     Irregular heart beat     Left hip pain 10/8/2010    Neck pain     Nervousness     Night sweat     Osteoarthritis, knee bilateral     Pain with urination     Polymyalgia rheumatica (HCC)     Reflux     Spinal stenosis     Trapezius strain     Trochanteric bursitis of left hip     Venous insufficiency       Allergies   Allergen Reactions    Citric Acid Unknown (comments)    Crinone [Progesterone Micronized] Unknown (comments)    Iodine Unknown (comments)    Percocet [Oxycodone-Acetaminophen] Other (comments)     Gets rebound headaches after taking Percocet      Social History   Substance Use Topics    Smoking status: Never Smoker    Smokeless tobacco: Never Used    Alcohol use No       Current Facility-Administered Medications   Medication Dose Route Frequency    warfarin (COUMADIN) tablet 7.5 mg  7.5 mg Oral QPM    levoFLOXacin (LEVAQUIN) tablet 250 mg  250 mg Oral Q24H    WARFARIN INFORMATION NOTE (COUMADIN)   Other Q24H    metoclopramide HCl (REGLAN) tablet 5 mg  5 mg Oral BID    pantoprazole (PROTONIX) tablet 40 mg  40 mg Oral ACB    sodium chloride (NS) flush 5-10 mL  5-10 mL IntraVENous Q8H    ferrous sulfate tablet 325 mg  1 Tab Oral BID WITH MEALS    amLODIPine (NORVASC) tablet 5 mg  5 mg Oral DAILY       Review of Systems:  HEENT: No epistaxis, no nasal drainage, no difficulty in swallowing  Respiratory: no cough or SOB  Cardiovascular: no chest pain, no palpitations, no chronic leg edema, no syncope  Gastrointestinal: no abd pain, no vomiting, no diarrhea, no bleeding symptoms  Genitourinary: No urinary symptoms  Integument/breast: No ulcers  Musculoskeletal: as above  Neurological: No focal weakness, no seizures, no headaches  Behvioral/Psych: No anxiety, no depression  Constitutional: No fever, no chills, no weight loss, no night sweats    Objective:   Vital Signs:    Visit Vitals    /71 (BP 1 Location: Left arm, BP Patient Position: At rest)    Pulse 93    Temp 98.8 °F (37.1 °C)    Resp 18    Ht 5' 4\" (1.626 m)    Wt 104 kg (229 lb 4.5 oz)    SpO2 93%    Breastfeeding Unknown    BMI 39.36 kg/m2       O2 Device: Room air   O2 Flow Rate (L/min): 2 l/min   Temp (24hrs), Av.7 °F (37.1 °C), Min:97.2 °F (36.2 °C), Max:99.2 °F (37.3 °C)       Intake/Output:     Intake/Output Summary (Last 24 hours) at 17 1344  Last data filed at 17 1200   Gross per 24 hour   Intake              660 ml   Output              150 ml   Net              510 ml         Physical Exam:   General: comfortable; on room air now  Neck: No adenopathy or thyroid swelling  CVS: S1S2 no murmurs  RS: Good AE bilaterally, lungs clear  Abd: soft, non tender, no hepatosplenomegaly  Neuro: non focal, awake, alert  Extrm: no leg edema   Skin: no rash  RT knee dressing; no bleeding or hematoma     Data review:   Labs:  Recent Labs      17   0305  17   1830  17   0328   WBC  11.6   --   12.0   HGB  8.7*  8.6*  8.7*   HCT  27.1*  26.3*  27.1*   PLT  276   --   291     Recent Labs      17   0305  17   1327  17   0328   NA  138   --   143   K  3.7   --   3.8 CL  105   --   110*   CO2  25   --   23   GLU  112*   --   99   BUN  34*   --   29*   CREA  1.59*   --   1.47*   CA  8.5   --   8.3*   PHOS   --   2.5   --    INR  1.5*   --   1.4*        Ref. Range 1/23/2017 10:14   HGB Latest Ref Range: 12.0 - 16.0 g/dL 10.1 (L)   HCT Latest Ref Range: 35.0 - 45.0 % 31.4 (L)      Ref. Range 1/23/2017 10:14   BUN Latest Ref Range: 7.0 - 18 MG/DL 32 (H)   Creatinine Latest Ref Range: 0.6 - 1.3 MG/DL 1.42 (H)   BUN/Creatinine ratio Latest Ref Range: 12 - 20   23 (H)        Ref. Range 1/23/2017 10:14   Hemoglobin A1c, (calculated) Latest Ref Range: 4.2 - 5.6 % 5.4     1/23/17  Diagnosis   Final      Normal sinus rhythm   Normal ECG   When compared with ECG of 20-APR-2011 11:16,   No significant change was found   Confirmed by Hernan Phan ([id number]) on 1/23/2017 1:17:55 PM        Imaging:  I have personally reviewed the patients radiographs and have reviewed the reports:  CXR 1/23/17  Pre-Op. COMPARISON: Chest x-ray April 20, 2011. FINDINGS:   PA and lateral views obtained. The cardiac silhouette is normal. Calcified  plaque is seen in the aortic arch with mild tortuosity of the arch and  descending thoracic aorta. The lungs are clear. The costophrenic angles are  sharply defined. Pulmonary vascularity is normal. Probable mild compression  fracture of T4, T5, and T7 unchanged. Mild disc space narrowing with marginal  spurring of the thoracic spine. Stable mild compression fractures of the upper  and mid thoracic spine. 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 Stable multiple mild compression fractures of the thoracic spine     IMPRESSION:   · RT Knee OA - s/p TKR   · HTN  · Chronic RT shoulder pain   · CKD stage 3  · Anemia post-op   · GERD  · Wheezing h/o Asthma      RECOMMENDATIONS:   On levaquin now for ?  UTI uculture is negative   Hold diuretics for now; UA showed no protein; stopped Celebrex due to CKD  HTN meds in low doses - amlodipine and losartan   Ed IV fluids Start 1/2 ns due to elevated bun cr  Increase GERD treatment  Some rales monitor with duoneb if no improvement considerxray and lasix  Incentive spirometry   Post op labs - watch Hb; Tx if < 8 gm/dl  Post op joint management and rehab per Ortho team   DVT proph - management per Ortho team - patient on coumadin   Dc'd kaur cath this am  PPI; prn Maalox  Consult Nephrology - Dr Guidry Has following  I spent 35min of time excluding procedure, with face to face evaluation  >50% on complex decision making, coordination of care and counseling patient.           Tuan Yanez MD

## 2017-02-08 NOTE — PROGRESS NOTES
02/07/17 6278   Patient Observation   Observations encouraged ICs to assist pt to cough up phlegm;pt using call bell within reach

## 2017-02-08 NOTE — PROGRESS NOTES
Pt is alert and oriented. Vitals are stable lungs are clear with some expiratory wheezing. Pt denies numbness or tingling to RLE. Pedal pulse is present and cap refill less than 3 seconds. Drsg is dry, clean and intact. Pt is stable will continue to monitor.

## 2017-02-08 NOTE — ROUTINE PROCESS
Bedside and Verbal shift change report given to Guanakito Bledsoe (oncoming nurse) by Rocky Cortes RN   (offgoing nurse). Report included the following information SBAR, Kardex, OR Summary, Procedure Summary, Intake/Output, MAR and Recent Results.

## 2017-02-08 NOTE — TELEPHONE ENCOUNTER
Mamta Prince from [place] lab called to let you know that the patients urine screen showed moderate Leukocytes in it.

## 2017-02-08 NOTE — PROGRESS NOTES
02/07/17 2001   Patient Observation   Observations med given;enc ICS ;pt voices understanding call bell withi nreach

## 2017-02-08 NOTE — PROGRESS NOTES
Bedside and Verbal shift change report given to Dipti Ahmadi RN (oncoming nurse) by Silviano Cortes (offgoing nurse). Report included the following information SBAR, Kardex, MAR and Recent Results. SITUATION:    Code Status: No Order   Reason for Admission: Primary osteoarthritis of right knee [address] day: 2   Problem List:       Hospital Problems  Date Reviewed: 2/6/2017          Codes Class Noted POA    Arthritis of knee ICD-10-CM: M19.90  ICD-9-CM: 716.96  2/6/2017 Unknown              BACKGROUND:    Past Medical History:   Past Medical History   Diagnosis Date    Arthritis     Arthritis of both hips     Back pain     Balance problem     Chronic back pain     Cough     Easy bruising     Essential hypertension     GERD (gastroesophageal reflux disease)     Hiatal hernia     Hypertension     Irregular heart beat     Left hip pain 10/8/2010    Neck pain     Nervousness     Night sweat     Osteoarthritis, knee bilateral     Pain with urination     Polymyalgia rheumatica (HCC)     Reflux     Spinal stenosis     Trapezius strain     Trochanteric bursitis of left hip     Venous insufficiency          Patient taking anticoagulants yes     ASSESSMENT:    Changes in Assessment Throughout Shift: no     Patient has Central Line: yes Reasons if yes: .  Patient has Krishna Cath: no Reasons if yes: .       Last Vitals:     Vitals:    02/08/17 1038 02/08/17 1429 02/08/17 1641 02/08/17 1700   BP: 122/71  135/72    Pulse: 93  91    Resp: 18  18    Temp: 98.8 °F (37.1 °C)  98.7 °F (37.1 °C)    SpO2: 93% 93% 93% 93%   Weight:       Height:            IV and DRAINS (will only show if present)   [REMOVED] Peripheral IV 02/06/17 Left Wrist-Site Assessment: Clean, dry, & intact  [REMOVED] Jean-Claude-Reyes Drain 02/06/17 Right Knee-Site Assessment: Clean, dry, & intact  Peripheral IV 02/07/17 Right Hand-Site Assessment: Clean, dry, & intact     WOUND (if present)   Wound Type:  none   Dressing present Dressing Present : Yes, Intact, not due to be changed   Wound Concerns/Notes:  none     PAIN    Pain Assessment    Pain Intensity 1: 3 (02/08/17 1357)    Pain Location 1: Knee    Pain Intervention(s) 1: Medication (see MAR)    Patient Stated Pain Goal: 0  o Interventions for Pain:  See MAR  o Intervention effective: yes  o Time of last intervention: See MAR   o Reassessment Completed: yes      Last 3 Weights:  Last 3 Recorded Weights in this Encounter    01/23/17 1022 02/06/17 0612 02/07/17 1843   Weight: 103.4 kg (228 lb) 104 kg (229 lb 6 oz) 104 kg (229 lb 4.5 oz)     Weight change:      INTAKE/OUPUT    Current Shift: 02/08 0701 - 02/08 1900  In: 120 [P.O.:120]  Out: 300 [Urine:300]    Last three shifts: 02/06 1901 - 02/08 0700  In: 2260 [P.O.:1060; I.V.:1200]  Out: 730 [Urine:450; Drains:280]     LAB RESULTS     Recent Labs      02/08/17   0305  02/07/17   1830  02/07/17   0328   WBC  11.6   --   12.0   HGB  8.7*  8.6*  8.7*   HCT  27.1*  26.3*  27.1*   PLT  276   --   291        Recent Labs      02/08/17   0305  02/07/17   0328   NA  138  143   K  3.7  3.8   GLU  112*  99   BUN  34*  29*   CREA  1.59*  1.47*   CA  8.5  8.3*   INR  1.5*  1.4*       RECOMMENDATIONS AND DISCHARGE PLANNING     1. Pending tests/procedures/ Plan of Care or Other Needs: none     2. Discharge plan for patient and Needs/Barriers: home    3. Estimated Discharge Date: 2-8-17 Posted on Whiteboard in hospitals: yes      4. The patient's care plan was reviewed with the oncoming nurse. \"HEALS\" SAFETY CHECK      Fall Risk    Total Score: 3    Safety Measures: Safety Measures: Bed/Chair-Wheels locked, Bed in low position, Call light within reach, Gripper socks    A safety check occurred in the patient's room between off going nurse and oncoming nurse listed above.     The safety check included the below items  Area Items   H  High Alert Medications - Verify all high alert medication drips (heparin, PCA, etc.)   E  Equipment - Suction is set up for ALL patients (with hermilo)  - Red plugs utilized for all equipment (IV pumps, etc.)  - WOWs wiped down at end of shift.  - Room stocked with oxygen, suction, and other unit-specific supplies   A  Alarms - Bed alarm is set for fall risk patients  - Ensure chair alarm is in place and activated if patient is up in a chair   L  Lines - Check IV for any infiltration  - Krishna bag is empty if patient has a Krishna   - Tubing and IV bags are labeled   S  Safety   - Room is clean, patient is clean, and equipment is clean. - Hallways are clear from equipment besides carts. - Fall bracelet on for fall risk patients  - Ensure room is clear and free of clutter  - Suction is set up for ALL patients (with hermilo)  - Hallways are clear from equipment besides carts.    - Isolation precautions followed, supplies available outside room, sign posted     Rosmery Ugarte

## 2017-02-08 NOTE — PROGRESS NOTES
RENAL DAILY PROGRESS NOTE    Patient: Irma Kilpatrick               Sex: female          DOA: 2/6/2017  5:49 AM        YOB: 1935      Age:  80 y.o.        LOS:  LOS: 2 days     Subjective:     Irma Kilpatrick is a 80 y.o.  who presents with Primary osteoarthritis of right knee [M17.11]. Asked to evaluate for crf.admitted for knee surgery. hx of htn,uti,was on antibiotics for uti prior to admission. no hx of dm  Chief complains: Patient denies nausea, vomiting, chest pain, dizziness, shortness of breath or headache.  - Reviewed last 24 hrs events     Current Facility-Administered Medications   Medication Dose Route Frequency    warfarin (COUMADIN) tablet 7.5 mg  7.5 mg Oral QPM    levoFLOXacin (LEVAQUIN) tablet 250 mg  250 mg Oral Q24H    WARFARIN INFORMATION NOTE (COUMADIN)   Other Q24H    metoclopramide HCl (REGLAN) tablet 5 mg  5 mg Oral BID    pantoprazole (PROTONIX) tablet 40 mg  40 mg Oral ACB    sodium chloride (NS) flush 5-10 mL  5-10 mL IntraVENous Q8H    sodium chloride (NS) flush 5-10 mL  5-10 mL IntraVENous PRN    naloxone (NARCAN) injection 0.4 mg  0.4 mg IntraVENous PRN    ferrous sulfate tablet 325 mg  1 Tab Oral BID WITH MEALS    diphenhydrAMINE (BENADRYL) injection 12.5 mg  12.5 mg IntraVENous Q6H PRN    zolpidem (AMBIEN) tablet 5 mg  5 mg Oral QHS PRN    HYDROcodone-acetaminophen (NORCO) 7.5-325 mg per tablet 1-2 Tab  1-2 Tab Oral Q4H PRN    ondansetron (ZOFRAN) injection 4 mg  4 mg IntraVENous Q4H PRN    magnesium hydroxide (MILK OF MAGNESIA) 400 mg/5 mL oral suspension 30 mL  30 mL Oral DAILY PRN    amLODIPine (NORVASC) tablet 5 mg  5 mg Oral DAILY    aluminum-magnesium hydroxide (MAALOX) oral suspension 10 mL  10 mL Oral TID PRN       Objective:     Visit Vitals    /71 (BP 1 Location: Left arm, BP Patient Position: At rest)    Pulse 93    Temp 98.8 °F (37.1 °C)    Resp 18    Ht 5' 4\" (1.626 m)    Wt 104 kg (229 lb 4.5 oz)    SpO2 93%    Breastfeeding Unknown    BMI 39.36 kg/m2       Intake/Output Summary (Last 24 hours) at 02/08/17 1335  Last data filed at 02/08/17 1200   Gross per 24 hour   Intake              660 ml   Output              150 ml   Net              510 ml       Physical Examination:     GEN: AAO X 3, NAD  RS: Chest is bilateral equal, no wheezing / rales / crackles  CVS: S1-S2 heard, RRR, No S3 / murmur  Abdomen: Soft, Non tender, Not distended, Positive bowel sounds, no organomegaly, no CVA / supra pubic tenderness  Extremities: No edema, no cyanosis, skin is warm on touch  CNS: Awake & follows commands, CN II-XII are grossly intact. HEENT: Head is atraumatic, PERRLA, conjunctiva pink & non icteric. No JVD or carotid bruit   Psychiatric: Normal mood, affect, judgement & memory    Musculoskeletal: No gross joints or bone deformities   Lymph Node: No palpable cervical, axillary or groin lymphadenopathy. Data Review:      Labs:     Hematology: Recent Labs      02/08/17   0305  02/07/17   1830  02/07/17   0328   WBC  11.6   --   12.0   HGB  8.7*  8.6*  8.7*   HCT  27.1*  26.3*  27.1*     Chemistry: Recent Labs      02/08/17   0305  02/07/17   1327  02/07/17   0328   BUN  34*   --   29*   CREA  1.59*   --   1.47*   CA  8.5   --   8.3*   K  3.7   --   3.8   NA  138   --   143   CL  105   --   110*   CO2  25   --   23   PHOS   --   2.5   --    GLU  112*   --   99        Images:    XR (Most Recent). CXR reviewed by me and compared with previous CXR   Results from Hospital Encounter encounter on 02/06/17   XR KNEE RT MAX 2 VWS   Narrative Portable right knee, AP and crosstable lateral view, 2 views:        INDICATION:    History of primary osteoarthritis of right knee joint. Status post prosthetic  replacement of right knee joint. COMPARISON STUDY: None. FINDINGS:    The study shows findings of total prosthetic replacement of right knee joint.   The femoral, tibial and patellar prostheses are well secured to negative bones  and in anatomic positions and alignments. There is surgical drainage tube at the  upper superolateral aspect of the joint. Impression IMPRESSION:    Status post total right knee arthroplasty with anatomic positions and alignments  of the prostheses. CT (Most Recent)   Results from Hospital Encounter encounter on 10/25/12   CT ABD WO CONT   Narrative CT abdomen without contrast    HISTORY: Back pain, elevated LFT    COMPARISON: None. TECHNIQUE: Helical scan through the abdomen is obtained  from the diaphragm to  the iliac crest  without  IV contrast administration. Patient has of allergy  history to iodine. FINDINGS: The study is suboptimal due to lack of IV contrast.    Imaging portion to the lung bases appears clear. The liver appears unremarkable on this noncontrast CT. No fatty infiltration or  focal lesion seen. The gallbladder is not clearly seen, assuming surgically  removed or contracted. No biliary dilatation identified. The spleen, pancreas, bilateral kidneys and adrenal glands appear unremarkable. The stomach is suboptimally distended. The small and large bowel and  nondilated. Moderate fecal material identified in the colon. No bowel  dilatation or inflammation identified. No retroperitoneal mass or adenopathy. Mild atherosclerotic aortic disease present. Spondylosis of the lumbar spine. IMPRESSION    Unremarkable noncontrast abdomen CT as above. Thank you for your referral.         EKG No results found for this or any previous visit. I have personally reviewed the old medical records and patient's labs    Plan / Recommendation:      1. crf stage 3,renal ultrasound showed small kidneys,urine protein/creatinine is pending.etiology is most likely htn and age. further workup will depend on results of urine test  2.uti,was on antibiotics prior to admission and receiving levaquin now,,repeat urine culture. 3.anemia,s/p surgery. may need further evaluation and epo as outpatient  4.htn ,controlled,on norvasc.had stopped cozaar and diuretics    D/w Dr. Chase Mendez MD  Nephrology  2/8/2017

## 2017-02-08 NOTE — PROGRESS NOTES
02/07/17 2330   Patient Observation   Observations heatpad  to right shoulder on;pt denies shoulder pain;replacedd polar ice to RLE;call bell withi nreach

## 2017-02-08 NOTE — PROGRESS NOTES
Ortho    Pt. Seen and evaluated. Doing well, pain well controlled, progressing well with PT  Denies cp, sob, abd pain    Blood pressure 125/70, pulse 82, temperature 99.2 °F (37.3 °C), resp. rate 18, height 5' 4\" (1.626 m), weight 229 lb 4.5 oz (104 kg), SpO2 95 %, unknown if currently breastfeeding. rightKnee woundclean, dry, no drainage  Sensory intact to LT  Motor intact  nv intact  Neg calf tenderness    Labs:  CBC  @  CBC:   Lab Results   Component Value Date/Time    WBC 11.6 02/08/2017 03:05 AM    RBC 3.21 02/08/2017 03:05 AM    HGB 8.7 02/08/2017 03:05 AM    HCT 27.1 02/08/2017 03:05 AM    PLATELET 580 95/51/5957 03:05 AM     BMP:   Lab Results   Component Value Date/Time    Glucose 112 02/08/2017 03:05 AM    Sodium 138 02/08/2017 03:05 AM    Potassium 3.7 02/08/2017 03:05 AM    Chloride 105 02/08/2017 03:05 AM    CO2 25 02/08/2017 03:05 AM    BUN 34 02/08/2017 03:05 AM    Creatinine 1.59 02/08/2017 03:05 AM    Calcium 8.5 02/08/2017 03:05 AM   @  Coagulation  Lab Results   Component Value Date    INR 1.5 (H) 02/08/2017    APTT 34.5 01/23/2017      Basic Metabolic Profile  Lab Results   Component Value Date     02/08/2017    CO2 25 02/08/2017    BUN 34 (H) 02/08/2017       Assesment: right Orthopedic / Rheumatologic: Total Knee Replacement    Plan: coumadin, PT, IV fluids, plan for home tomorrow. Monitor kidney function.

## 2017-02-08 NOTE — ROUTINE PROCESS
Bedside and Verbal shift change report given to Harish Verdin RN (oncoming nurse) by Juan Diego Gates LPN (offgoing nurse). Report included the following information SBAR, Kardex, MAR and Recent Results.     SITUATION:    Code Status: No Order   Reason for Admission: Primary osteoarthritis of right knee Aline Atkinson day: 1   Problem List:       Hospital Problems  Date Reviewed: 2/6/2017          Codes Class Noted POA    Arthritis of knee ICD-10-CM: M19.90  ICD-9-CM: 716.96  2/6/2017 Unknown              BACKGROUND:    Past Medical History:   Past Medical History   Diagnosis Date    Arthritis     Arthritis of both hips     Back pain     Balance problem     Chronic back pain     Cough     Easy bruising     Essential hypertension     GERD (gastroesophageal reflux disease)     Hiatal hernia     Hypertension     Irregular heart beat     Left hip pain 10/8/2010    Neck pain     Nervousness     Night sweat     Osteoarthritis, knee bilateral     Pain with urination     Polymyalgia rheumatica (HCC)     Reflux     Spinal stenosis     Trapezius strain     Trochanteric bursitis of left hip     Venous insufficiency          Patient taking anticoagulants yes , Coumadin    ASSESSMENT:    Changes in Assessment Throughout Shift: n/a     Patient has Central Line: no Reasons if yes: n/a   Patient has Krishna Cath: no Reasons if yes:      Last Vitals:     Vitals:    02/07/17 0752 02/07/17 1115 02/07/17 1608 02/07/17 1843   BP: 101/55 116/63 111/56    Pulse: 85 84 96    Resp: 18 18 16    Temp: 97.9 °F (36.6 °C) 97.4 °F (36.3 °C) 97.2 °F (36.2 °C)    SpO2: 98% 93% 93%    Weight:    104 kg (229 lb 4.5 oz)   Height:            IV and DRAINS (will only show if present)   [REMOVED] Peripheral IV 02/06/17 Left Wrist-Site Assessment: Clean, dry, & intact  [REMOVED] Jean-Claude-Reyes Drain 02/06/17 Right Knee-Site Assessment: Clean, dry, & intact  Peripheral IV 02/07/17 Right Hand-Site Assessment: Clean, dry, & intact     WOUND (if present)   Wound Type:  surgical   Dressing present Dressing Present : No   Wound Concerns/Notes:  none     PAIN    Pain Assessment    Pain Intensity 1: 8 (02/07/17 1335)    Pain Location 1: Knee    Pain Intervention(s) 1: Medication (see MAR)    Patient Stated Pain Goal: 0  o Interventions for Pain:  Medication   o Intervention effective: yes   o Time of last intervention: see mar  o Reassessment Completed: yes     Last 3 Weights:  Last 3 Recorded Weights in this Encounter    01/23/17 1022 02/06/17 0612 02/07/17 1843   Weight: 103.4 kg (228 lb) 104 kg (229 lb 6 oz) 104 kg (229 lb 4.5 oz)     Weight change:      INTAKE/OUPUT    Current Shift:      Last three shifts: 02/06 0701 - 02/07 1900  In: 3160 [P.O.:960; I.V.:2200]  Out: 1280 [Urine:900; Drains:280]     LAB RESULTS     Recent Labs      02/07/17   0328   WBC  12.0   HGB  8.7*   HCT  27.1*   PLT  291        Recent Labs      02/07/17   0328   NA  143   K  3.8   GLU  99   BUN  29*   CREA  1.47*   CA  8.3*   INR  1.4*       RECOMMENDATIONS AND DISCHARGE PLANNING     Pending tests/procedures/ Plan of Care or Other Needs: physical therapy   1. Discharge plan for patient and Needs/Barriers: home    2. Estimated Discharge Date: 2/8/17 Posted on White[facility] in [facility]: yes      4. The patient's care plan was reviewed with the oncoming nurse. \"HEALS\" SAFETY CHECK      Fall Risk    Total Score: 3    Safety Measures: Safety Measures: Bed/Chair-Wheels locked, Call light within reach    A safety check occurred in the patient's room between off going nurse and oncoming nurse listed above.     The safety check included the below items  Area Items   H  High Alert Medications - Verify all high alert medication drips (heparin, PCA, etc.)   E  Equipment - Suction is set up for ALL patients (with yanker)  - Red plugs utilized for all equipment (IV pumps, etc.)  - WOWs wiped down at end of shift.  - Room stocked with oxygen, suction, and other unit-specific supplies   A  Alarms - Bed alarm is set for fall risk patients  - Ensure chair alarm is in place and activated if patient is up in a chair   L  Lines - Check IV for any infiltration  - Krishna bag is empty if patient has a Krishna   - Tubing and IV bags are labeled   S  Safety   - Room is clean, patient is clean, and equipment is clean. - Hallways are clear from equipment besides carts. - Fall bracelet on for fall risk patients  - Ensure room is clear and free of clutter  - Suction is set up for ALL patients (with mengker)  - Hallways are clear from equipment besides carts.    - Isolation precautions followed, supplies available outside room, sign posted     Dylan Osman LPN

## 2017-02-08 NOTE — PROGRESS NOTES
02/08/17 0357 02/08/17 0406   Patient Observation   Observations pt c/o pain pt medicated for pain;call bell within reach

## 2017-02-09 ENCOUNTER — APPOINTMENT (OUTPATIENT)
Dept: GENERAL RADIOLOGY | Age: 82
DRG: 470 | End: 2017-02-09
Attending: PHYSICIAN ASSISTANT
Payer: MEDICARE

## 2017-02-09 LAB
ANION GAP BLD CALC-SCNC: 10 MMOL/L (ref 3–18)
BASOPHILS # BLD AUTO: 0 K/UL (ref 0–0.1)
BASOPHILS # BLD: 0 % (ref 0–2)
BUN SERPL-MCNC: 30 MG/DL (ref 7–18)
BUN/CREAT SERPL: 21 (ref 12–20)
CALCIUM SERPL-MCNC: 8.5 MG/DL (ref 8.5–10.1)
CHLORIDE SERPL-SCNC: 104 MMOL/L (ref 100–108)
CO2 SERPL-SCNC: 22 MMOL/L (ref 21–32)
CREAT SERPL-MCNC: 1.41 MG/DL (ref 0.6–1.3)
DIFFERENTIAL METHOD BLD: ABNORMAL
EOSINOPHIL # BLD: 0.3 K/UL (ref 0–0.4)
EOSINOPHIL NFR BLD: 2 % (ref 0–5)
ERYTHROCYTE [DISTWIDTH] IN BLOOD BY AUTOMATED COUNT: 14.2 % (ref 11.6–14.5)
GLUCOSE SERPL-MCNC: 93 MG/DL (ref 74–99)
HCT VFR BLD AUTO: 24.9 % (ref 35–45)
HGB BLD-MCNC: 8.1 G/DL (ref 12–16)
INR PPP: 2 (ref 0.8–1.2)
LYMPHOCYTES # BLD AUTO: 15 % (ref 21–52)
LYMPHOCYTES # BLD: 2 K/UL (ref 0.9–3.6)
MCH RBC QN AUTO: 27.2 PG (ref 24–34)
MCHC RBC AUTO-ENTMCNC: 32.5 G/DL (ref 31–37)
MCV RBC AUTO: 83.6 FL (ref 74–97)
MONOCYTES # BLD: 1.3 K/UL (ref 0.05–1.2)
MONOCYTES NFR BLD AUTO: 10 % (ref 3–10)
NEUTS SEG # BLD: 9.8 K/UL (ref 1.8–8)
NEUTS SEG NFR BLD AUTO: 73 % (ref 40–73)
PLATELET # BLD AUTO: 269 K/UL (ref 135–420)
PMV BLD AUTO: 10.6 FL (ref 9.2–11.8)
POTASSIUM SERPL-SCNC: 3.5 MMOL/L (ref 3.5–5.5)
PROTHROMBIN TIME: 22 SEC (ref 11.5–15.2)
RBC # BLD AUTO: 2.98 M/UL (ref 4.2–5.3)
SODIUM SERPL-SCNC: 136 MMOL/L (ref 136–145)
WBC # BLD AUTO: 13.3 K/UL (ref 4.6–13.2)

## 2017-02-09 PROCEDURE — 74011250637 HC RX REV CODE- 250/637: Performed by: PHYSICIAN ASSISTANT

## 2017-02-09 PROCEDURE — 80048 BASIC METABOLIC PNL TOTAL CA: CPT | Performed by: ORTHOPAEDIC SURGERY

## 2017-02-09 PROCEDURE — 36415 COLL VENOUS BLD VENIPUNCTURE: CPT | Performed by: ORTHOPAEDIC SURGERY

## 2017-02-09 PROCEDURE — 94640 AIRWAY INHALATION TREATMENT: CPT

## 2017-02-09 PROCEDURE — 97116 GAIT TRAINING THERAPY: CPT

## 2017-02-09 PROCEDURE — 74011250637 HC RX REV CODE- 250/637: Performed by: INTERNAL MEDICINE

## 2017-02-09 PROCEDURE — 74011000250 HC RX REV CODE- 250: Performed by: INTERNAL MEDICINE

## 2017-02-09 PROCEDURE — 74011250637 HC RX REV CODE- 250/637: Performed by: ORTHOPAEDIC SURGERY

## 2017-02-09 PROCEDURE — 85610 PROTHROMBIN TIME: CPT | Performed by: ORTHOPAEDIC SURGERY

## 2017-02-09 PROCEDURE — 65270000029 HC RM PRIVATE

## 2017-02-09 PROCEDURE — 97530 THERAPEUTIC ACTIVITIES: CPT

## 2017-02-09 PROCEDURE — 85025 COMPLETE CBC W/AUTO DIFF WBC: CPT | Performed by: ORTHOPAEDIC SURGERY

## 2017-02-09 PROCEDURE — 93971 EXTREMITY STUDY: CPT

## 2017-02-09 PROCEDURE — 71020 XR CHEST AP LAT: CPT

## 2017-02-09 RX ORDER — WARFARIN 2 MG/1
2 TABLET ORAL EVERY EVENING
Status: COMPLETED | OUTPATIENT
Start: 2017-02-09 | End: 2017-02-09

## 2017-02-09 RX ADMIN — Medication 325 MG: at 17:20

## 2017-02-09 RX ADMIN — AMLODIPINE BESYLATE 5 MG: 5 TABLET ORAL at 08:46

## 2017-02-09 RX ADMIN — HYDROCODONE BITARTRATE AND ACETAMINOPHEN 2 TABLET: 7.5; 325 TABLET ORAL at 11:52

## 2017-02-09 RX ADMIN — HYDROCODONE BITARTRATE AND ACETAMINOPHEN 2 TABLET: 7.5; 325 TABLET ORAL at 18:24

## 2017-02-09 RX ADMIN — LEVOFLOXACIN 250 MG: 250 TABLET, FILM COATED ORAL at 11:52

## 2017-02-09 RX ADMIN — HYDROCODONE BITARTRATE AND ACETAMINOPHEN 2 TABLET: 7.5; 325 TABLET ORAL at 06:18

## 2017-02-09 RX ADMIN — IPRATROPIUM BROMIDE AND ALBUTEROL SULFATE 3 ML: 2.5; .5 SOLUTION RESPIRATORY (INHALATION) at 20:33

## 2017-02-09 RX ADMIN — PANTOPRAZOLE SODIUM 40 MG: 40 TABLET, DELAYED RELEASE ORAL at 17:20

## 2017-02-09 RX ADMIN — PANTOPRAZOLE SODIUM 40 MG: 40 TABLET, DELAYED RELEASE ORAL at 08:46

## 2017-02-09 RX ADMIN — METOCLOPRAMIDE HYDROCHLORIDE 5 MG: 5 TABLET ORAL at 17:20

## 2017-02-09 RX ADMIN — WARFARIN SODIUM 2 MG: 2 TABLET ORAL at 17:20

## 2017-02-09 RX ADMIN — Medication 325 MG: at 08:46

## 2017-02-09 RX ADMIN — HYDROCODONE BITARTRATE AND ACETAMINOPHEN 1 TABLET: 7.5; 325 TABLET ORAL at 22:38

## 2017-02-09 RX ADMIN — IPRATROPIUM BROMIDE AND ALBUTEROL SULFATE 3 ML: 2.5; .5 SOLUTION RESPIRATORY (INHALATION) at 09:15

## 2017-02-09 RX ADMIN — Medication 10 ML: at 15:06

## 2017-02-09 RX ADMIN — METOCLOPRAMIDE HYDROCHLORIDE 5 MG: 5 TABLET ORAL at 08:46

## 2017-02-09 NOTE — PROGRESS NOTES
RENAL DAILY PROGRESS NOTE    Patient: Chapo Martin               Sex: female          DOA: 2/6/2017  5:49 AM        YOB: 1935      Age:  80 y.o.        LOS:  LOS: 3 days     Subjective:     Chapo Martin is a 80 y.o.  who presents with Primary osteoarthritis of right knee [M17.11]. Asked to evaluate for crf.admitted for knee surgery. hx of htn,uti,was on antibiotics for uti prior to admission. no hx of dm  Chief complains: Patient denies nausea, vomiting, chest pain, dizziness, shortness of breath or headache.  - Reviewed last 24 hrs events     Current Facility-Administered Medications   Medication Dose Route Frequency    warfarin (COUMADIN) tablet 2 mg  2 mg Oral QPM    levoFLOXacin (LEVAQUIN) tablet 250 mg  250 mg Oral Q24H    0.45% sodium chloride infusion  50 mL/hr IntraVENous CONTINUOUS    pantoprazole (PROTONIX) tablet 40 mg  40 mg Oral ACB&D    albuterol-ipratropium (DUO-NEB) 2.5 MG-0.5 MG/3 ML  3 mL Nebulization Q6HWA RT    WARFARIN INFORMATION NOTE (COUMADIN)   Other Q24H    metoclopramide HCl (REGLAN) tablet 5 mg  5 mg Oral BID    sodium chloride (NS) flush 5-10 mL  5-10 mL IntraVENous Q8H    sodium chloride (NS) flush 5-10 mL  5-10 mL IntraVENous PRN    naloxone (NARCAN) injection 0.4 mg  0.4 mg IntraVENous PRN    ferrous sulfate tablet 325 mg  1 Tab Oral BID WITH MEALS    diphenhydrAMINE (BENADRYL) injection 12.5 mg  12.5 mg IntraVENous Q6H PRN    zolpidem (AMBIEN) tablet 5 mg  5 mg Oral QHS PRN    HYDROcodone-acetaminophen (NORCO) 7.5-325 mg per tablet 1-2 Tab  1-2 Tab Oral Q4H PRN    ondansetron (ZOFRAN) injection 4 mg  4 mg IntraVENous Q4H PRN    magnesium hydroxide (MILK OF MAGNESIA) 400 mg/5 mL oral suspension 30 mL  30 mL Oral DAILY PRN    amLODIPine (NORVASC) tablet 5 mg  5 mg Oral DAILY    aluminum-magnesium hydroxide (MAALOX) oral suspension 10 mL  10 mL Oral TID PRN       Objective:     Visit Vitals    /76 (BP 1 Location: Right arm, BP Patient Position: At rest)    Pulse 93    Temp 99.1 °F (37.3 °C)    Resp 16    Ht 5' 4\" (1.626 m)    Wt 113.9 kg (251 lb 1.6 oz)    SpO2 95%    Breastfeeding Unknown    BMI 43.1 kg/m2       Intake/Output Summary (Last 24 hours) at 02/09/17 1202  Last data filed at 02/09/17 0245   Gross per 24 hour   Intake                0 ml   Output              600 ml   Net             -600 ml       Physical Examination:     GEN: AAO X 3, NAD  RS: Chest is bilateral equal, no wheezing / rales / crackles  CVS: S1-S2 heard, RRR, No S3 / murmur  Abdomen: Soft, Non tender, Not distended, Positive bowel sounds, no organomegaly, no CVA / supra pubic tenderness  Extremities: No edema, no cyanosis, skin is warm on touch  CNS: Awake & follows commands, CN II-XII are grossly intact. HEENT: Head is atraumatic, PERRLA, conjunctiva pink & non icteric. No JVD or carotid bruit   Psychiatric: Normal mood, affect, judgement & memory    Musculoskeletal: No gross joints or bone deformities   Lymph Node: No palpable cervical, axillary or groin lymphadenopathy. Data Review:      Labs:     Hematology:   Recent Labs      02/09/17   0235  02/08/17   0305  02/07/17   1830  02/07/17   0328   WBC  13.3*  11.6   --   12.0   HGB  8.1*  8.7*  8.6*  8.7*   HCT  24.9*  27.1*  26.3*  27.1*     Chemistry:   Recent Labs      02/09/17   0235  02/08/17   0305  02/07/17   1327  02/07/17   0328   BUN  30*  34*   --   29*   CREA  1.41*  1.59*   --   1.47*   CA  8.5  8.5   --   8.3*   K  3.5  3.7   --   3.8   NA  136  138   --   143   CL  104  105   --   110*   CO2  22  25   --   23   PHOS   --    --   2.5   --    GLU  93  112*   --   99        Images:    XR (Most Recent). CXR reviewed by me and compared with previous CXR   Results from Hospital Encounter encounter on 02/06/17   XR KNEE RT MAX 2 VWS   Narrative Portable right knee, AP and crosstable lateral view, 2 views:        INDICATION:    History of primary osteoarthritis of right knee joint. Status post prosthetic  replacement of right knee joint. COMPARISON STUDY: None. FINDINGS:    The study shows findings of total prosthetic replacement of right knee joint. The femoral, tibial and patellar prostheses are well secured to negative bones  and in anatomic positions and alignments. There is surgical drainage tube at the  upper superolateral aspect of the joint. Impression IMPRESSION:    Status post total right knee arthroplasty with anatomic positions and alignments  of the prostheses. CT (Most Recent)   Results from Hospital Encounter encounter on 10/25/12   CT ABD WO CONT   Narrative CT abdomen without contrast    HISTORY: Back pain, elevated LFT    COMPARISON: None. TECHNIQUE: Helical scan through the abdomen is obtained  from the diaphragm to  the iliac crest  without  IV contrast administration. Patient has of allergy  history to iodine. FINDINGS: The study is suboptimal due to lack of IV contrast.    Imaging portion to the lung bases appears clear. The liver appears unremarkable on this noncontrast CT. No fatty infiltration or  focal lesion seen. The gallbladder is not clearly seen, assuming surgically  removed or contracted. No biliary dilatation identified. The spleen, pancreas, bilateral kidneys and adrenal glands appear unremarkable. The stomach is suboptimally distended. The small and large bowel and  nondilated. Moderate fecal material identified in the colon. No bowel  dilatation or inflammation identified. No retroperitoneal mass or adenopathy. Mild atherosclerotic aortic disease present. Spondylosis of the lumbar spine. IMPRESSION    Unremarkable noncontrast abdomen CT as above. Thank you for your referral.         EKG No results found for this or any previous visit.      I have personally reviewed the old medical records and patient's labs    Plan / Recommendation:      1. crf stage 3,renal ultrasound showed small kidneys,urine protein/creatinine 0.24.etiology is most likely htn and age. improved ,stop ivf as po intake is adequate  2.uti,was on antibiotics prior to admission and receiving levaquin now,,repeat urine culture is neg  3.anemia,s/p surgery. may need further evaluation and epo as outpatient  4.htn ,controlled,on norvasc.had stopped cozaar and diuretics  5.fever,leukocytosis,had cxr this am.results pending    D/w Akilah James MD  Nephrology  2/9/2017

## 2017-02-09 NOTE — PROCEDURES
[facility] ***    Name: Rachael Sadler  MRN: [id number]    Inpatient  : 05 Aug 1935  HIS Order #: [id number]  [address] [facility] Visit #: [id number]  Date: 2017    TYPE OF TEST: Peripheral Venous Testing    REASON FOR TEST  Pain in limb, Limb swelling    Right Leg:-  Deep venous thrombosis:           No  Superficial venous thrombosis:    No  Deep venous insufficiency:        Not examined  Superficial venous insufficiency: Not examined      INTERPRETATION/FINDINGS  Right leg :  Duplex images were obtained using 2-D gray scale, color flow, and  spectral Doppler analysis. 1. No evidence of deep venous thrombosis detected in the veins  visualized. 2. Deep veins visualized include the common femoral, femoral,  popliteal, posterior tibial and peroneal veins. 3. No evidence of superficial thrombosis detected. 4. Superficial veins visualized include the proximal great saphenous  vein. 5. No evidence of deep vein thrombosis in the contralateral common  femoral vein. ADDITIONAL COMMENTS  Limitations: Body habitus  Unable to perform adequate compression analysis of the proximal calf  vessels. Patency of these vessels is demonstrated by color flow and  Doppler signal. Cannot rule out non-occlusive thrombus in these  segments. I have personally reviewed the data relevant to the interpretation of  this  study. TECHNOLOGIST: Eden Healy RVT  Signed: 2017 11:04 AM    PHYSICIAN: Robert Dorsey MD  Signed: 2017 01:44 PM

## 2017-02-09 NOTE — PROGRESS NOTES
Bedside and Verbal shift change report given to [address] [place] [address], RN (oncoming nurse) by Funmilayo Vallejo (offgoing nurse). Report included the following information SBAR, Kardex, MAR and Recent Results. SITUATION:    Code Status: No Order   Reason for Admission: Primary osteoarthritis of right knee [address] day: 3   Problem List:       Hospital Problems  Date Reviewed: 2/6/2017          Codes Class Noted POA    Arthritis of knee ICD-10-CM: M19.90  ICD-9-CM: 716.96  2/6/2017 Unknown              BACKGROUND:    Past Medical History:   Past Medical History   Diagnosis Date    Arthritis     Arthritis of both hips     Back pain     Balance problem     Chronic back pain     Cough     Easy bruising     Essential hypertension     GERD (gastroesophageal reflux disease)     Hiatal hernia     Hypertension     Irregular heart beat     Left hip pain 10/8/2010    Neck pain     Nervousness     Night sweat     Osteoarthritis, knee bilateral     Pain with urination     Polymyalgia rheumatica (HCC)     Reflux     Spinal stenosis     Trapezius strain     Trochanteric bursitis of left hip     Venous insufficiency          Patient taking anticoagulants yes     ASSESSMENT:    Changes in Assessment Throughout Shift: no     Patient has Central Line: no Reasons if yes: .  Patient has Krishna Cath: no Reasons if yes: .       Last Vitals:     Vitals:    02/09/17 0915 02/09/17 1124 02/09/17 1151 02/09/17 1531   BP:  141/76  110/59   Pulse:  93  93   Resp:  16  16   Temp:  99.1 °F (37.3 °C)  98.3 °F (36.8 °C)   SpO2: 96% 95%  94%   Weight:   113.9 kg (251 lb 1.6 oz)    Height:            IV and DRAINS (will only show if present)   [REMOVED] Peripheral IV 02/06/17 Left Wrist-Site Assessment: Clean, dry, & intact  [REMOVED] Jean-Claude-Reyes Drain 02/06/17 Right Knee-Site Assessment: Clean, dry, & intact  [REMOVED] Peripheral IV 02/07/17 Right Hand-Site Assessment: Clean, dry, & intact     WOUND (if present)   Wound Type:  none   Dressing present Dressing Present : Yes, Intact, not due to be changed   Wound Concerns/Notes:  none     PAIN    Pain Assessment    Pain Intensity 1: 5 (02/09/17 1824)    Pain Location 1: Knee    Pain Intervention(s) 1: Medication (see MAR)    Patient Stated Pain Goal: 0  o Interventions for Pain:  See MAR  o Intervention effective: yes  o Time of last intervention: See MAR   o Reassessment Completed: yes      Last 3 Weights:  Last 3 Recorded Weights in this Encounter    02/06/17 0612 02/07/17 1843 02/09/17 1151   Weight: 104 kg (229 lb 6 oz) 104 kg (229 lb 4.5 oz) 113.9 kg (251 lb 1.6 oz)     Weight change:      INTAKE/OUPUT    Current Shift: 02/09 0701 - 02/09 1900  In: 320 [P.O.:320]  Out: 350 [Urine:350]    Last three shifts: 02/07 1901 - 02/09 0700  In: 460 [P.O.:460]  Out: 750 [Urine:750]     LAB RESULTS     Recent Labs      02/09/17   0235  02/08/17   0305  02/07/17   1830  02/07/17   0328   WBC  13.3*  11.6   --   12.0   HGB  8.1*  8.7*  8.6*  8.7*   HCT  24.9*  27.1*  26.3*  27.1*   PLT  269  276   --   291        Recent Labs      02/09/17   0235  02/08/17   0305  02/07/17   0328   NA  136  138  143   K  3.5  3.7  3.8   GLU  93  112*  99   BUN  30*  34*  29*   CREA  1.41*  1.59*  1.47*   CA  8.5  8.5  8.3*   INR  2.0*  1.5*  1.4*       RECOMMENDATIONS AND DISCHARGE PLANNING     1. Pending tests/procedures/ Plan of Care or Other Needs: none     2. Discharge plan for patient and Needs/Barriers: home    3. Estimated Discharge Date: 2-10-17 Posted on Whiteboard in [facility]: yes      4. The patient's care plan was reviewed with the oncoming nurse. \"HEALS\" SAFETY CHECK      Fall Risk    Total Score: 3    Safety Measures: Safety Measures: Bed/Chair-Wheels locked, Bed in low position, Call light within reach    A safety check occurred in the patient's room between off going nurse and oncoming nurse listed above.     The safety check included the below items  Area Items H  High Alert Medications - Verify all high alert medication drips (heparin, PCA, etc.)   E  Equipment - Suction is set up for ALL patients (with hermilo)  - Red plugs utilized for all equipment (IV pumps, etc.)  - WOWs wiped down at end of shift.  - Room stocked with oxygen, suction, and other unit-specific supplies   A  Alarms - Bed alarm is set for fall risk patients  - Ensure chair alarm is in place and activated if patient is up in a chair   L  Lines - Check IV for any infiltration  - Krishna bag is empty if patient has a Krishna   - Tubing and IV bags are labeled   S  Safety   - Room is clean, patient is clean, and equipment is clean. - Hallways are clear from equipment besides carts. - Fall bracelet on for fall risk patients  - Ensure room is clear and free of clutter  - Suction is set up for ALL patients (with hermilo)  - Hallways are clear from equipment besides carts.    - Isolation precautions followed, supplies available outside room, sign posted     Manuel Calhoun

## 2017-02-09 NOTE — HOME CARE
D/C noted for today - then noted d/c had been changed to tomorrow  -  to transport pt home - [facility] will follow per Dr. Serina Huffman knee protocol - planning [facility] for Sunday - LUIS Pruitt RN

## 2017-02-09 NOTE — PROGRESS NOTES
Pt is alert and oriented. Vitals stable but pt has a slight temp due to UTI. Pt denies any SOB but has some wheezing. Pt has no complaints of numbness or tingling to lower extremities.  Pedal pulses present bilaterally (full assessment in flow sheet)

## 2017-02-09 NOTE — PROGRESS NOTES
Problem: Mobility Impaired (Adult and Pediatric)  Goal: *Acute Goals and Plan of Care (Insert Text)  STGs to be addressed within 3 days:  1. Bed mobility: Supine to sit to supine S with HR for meals. 2. Activity tolerance: Tolerate up in chair 1-2 hrs for ADLs. 3. Transfers: Sit to stand to chair S with LRAD for ADLs. LTGs to be addressed within 7 days:  1. Standing/Ambulation Balance: Increase to Good with LRAD for safe transfers and gait. 2. Ambulation: Ambulate > 200 ft. S with LRAD for home mobility. 3. Patient Education: Independent with HEP for home safety. 4. Stairs: Up/Down 3 steps CGA with HR for home entry. PHYSICAL THERAPY TREATMENT     Patient: Stephanie Olsen (65 y.o. female)  Date: 2/9/2017  Diagnosis: Primary osteoarthritis of right knee [M17.11] <principal problem not specified>  Procedure(s) (LRB):  RIGHT TOTAL KNEE ARTHROPLASTY (Right) 3 Days Post-Op  Precautions: Fall  Chart, physical therapy assessment, plan of care and goals were reviewed. ASSESSMENT:  Pt presents alert and agreeable to therapy, Duplex (-) for DVT. Pt transferred sup to sit then scooted to EOB with standby assist. Pt stood with Marcos and transferred back to sitting to transfer sit to stand again for carryover of technique and performed with CG. Pt then used RW to ambulate 75ft and distance was limited by pt request to turn around for her room. Pt required reinforcement via verbal and tactile cues for walker placement; pt anxious and letting RW get too far in front of DAMIAN, she was instead instructed to stop and perform deep breathing if she felt she was becoming fatigued. At conclusion of session pt transferred stand to sit in locked recliner with standby assist and was left resting with feet elevated, towel roll under ankle, and call bell and family by her side. Pt will continue to benefit from therapy to increase mobility and ensure safe D/C home.    Progression toward goals:  [ ]      Improving appropriately and progressing toward goals  [X]      Improving slowly and progressing toward goals  [ ]      Not making progress toward goals and plan of care will be adjusted       PLAN:  Patient continues to benefit from skilled intervention to address the above impairments. Continue treatment per established plan of care. Discharge Recommendations:  Home Health (pt needs to navigate 3 steps next session to ensure safe transport into her home)  Further Equipment Recommendations for Discharge:  rolling walker       G-CODES:      Mobility  Current  CJ= 20-39%   Goal  CI= 1-19%. The severity rating is based on the Level of Assistance required for Functional Mobility and ADLs. SUBJECTIVE:   Patient stated I'm anxious. I have to get it in my head that I'm going to stand up.        OBJECTIVE DATA SUMMARY:   Critical Behavior:  Neurologic State: Alert  Orientation Level: Oriented X4  Cognition: Appropriate decision making  Safety/Judgement: Awareness of environment, Fall prevention  Functional Mobility Training:  Bed Mobility:   Supine to Sit: Stand-by asssistance   Transfers:  Sit to Stand: Contact guard assistance;Minimum assistance (x2 trials for repetition and carryover of technique)  Stand to Sit: Supervision      Balance:  Sitting: Intact  Standing: Impaired; With support  Standing - Static: Good  Standing - Dynamic : Fair  Ambulation/Gait Training:  Distance (ft): 75 Feet (ft)  Assistive Device: Walker, rolling  Ambulation - Level of Assistance: Supervision   Gait Abnormalities: Antalgic;Decreased step clearance;Trunk sway increased   Base of Support: Widened   Speed/Nikole: Slow   Ambulation distance limited by pt fatigue and request to turn around for her room   Pain:  Pt reports 0/10 pain or discomfort prior to treatment. Pt reports 0/10 pain or discomfort post treatment. Activity Tolerance:   Pt demonstrated decreased endurance as evidenced by minimal SOB with activity.  Pt also required VC's for proper use of RW with need for reinforcement throughout session. Please refer to the flowsheet for vital signs taken during this treatment.   After treatment:   [X] Patient left in no apparent distress sitting up in chair  [ ] Patient left in no apparent distress in bed  [X] Call bell left within reach  [ ] Nursing notified  [X] Caregiver present  [ ] Bed alarm activated      Amy Bo, PT   Time Calculation: 24 mins

## 2017-02-09 NOTE — DISCHARGE INSTRUCTIONS
DISCHARGE SUMMARY from Nurse    The following personal items are in your possession at time of discharge:    Dental Appliances: Lowers, Partials, Uppers, None  Visual Aid: Glasses, At bedside  Hearing Aids/Status: Does not own  Home Medications: None  Jewelry: Ring  Clothing: Pants, Shirt, Undergarments  Other Valuables: None  Personal Items Sent to Safe: n/a          PATIENT INSTRUCTIONS:    After general anesthesia or intravenous sedation, for 24 hours or while taking prescription Narcotics:  · Limit your activities  · Do not drive and operate hazardous machinery  · Do not make important personal or business decisions  · Do  not drink alcoholic beverages  · If you have not urinated within 8 hours after discharge, please contact your surgeon on call. Report the following to your surgeon:  · Excessive pain, swelling, redness or odor of or around the surgical area  · Temperature over 100.5  · Nausea and vomiting lasting longer than 4 hours or if unable to take medications  · Any signs of decreased circulation or nerve impairment to extremity: change in color, persistent  numbness, tingling, coldness or increase pain  · Any questions    Patient armband removed and shredded  MyChart Activation    Thank you for requesting access to [facility]. Please follow the instructions below to securely access and download your online medical record. [facility] allows you to send messages to your doctor, view your test results, renew your prescriptions, schedule appointments, and more. How Do I Sign Up? 1. In your internet browser, go to [email or web address]  2. Click on the First Time User? Click Here link in the Sign In box. You will be redirect to the New Member Sign Up page. 3. Enter your [facility] Access Code exactly as it appears below. You will not need to use this code after youve completed the sign-up process. If you do not sign up before the expiration date, you must request a new code.     [facility] Access Code: Activation code not generated  Current [facility] Status: Patient Declined (This is the date your [facility] access code will )    4. Enter the last four digits of your Social Security Number (xxxx) and Date of Birth (mm/dd/yyyy) as indicated and click Submit. You will be taken to the next sign-up page. 5. Create a [employer] ID. This will be your [facility] login ID and cannot be changed, so think of one that is secure and easy to remember. 6. Create a [facility] password. You can change your password at any time. 7. Enter your Password Reset Question and Answer. This can be used at a later time if you forget your password. 8. Enter your e-mail address. You will receive e-mail notification when new information is available in [address] 9. Click Sign Up. You can now view and download portions of your medical record. 10. Click the Download Summary menu link to download a portable copy of your medical information. Additional Information    If you have questions, please visit the Frequently Asked Questions section of the [facility] website at [email or web address] Remember, [facility] is NOT to be used for urgent needs. For medical emergencies, dial 911. What to do at Home:  Recommended activity: Activity as tolerated    *  Please give a list of your current medications to your Primary Care Provider. *  Please update this list whenever your medications are discontinued, doses are      changed, or new medications (including over-the-counter products) are added. *  Please carry medication information at all times in case of emergency situations. These are general instructions for a healthy lifestyle:    No smoking/ No tobacco products/ Avoid exposure to second hand smoke    Surgeon General's Warning:  Quitting smoking now greatly reduces serious risk to your health.     Obesity, smoking, and sedentary lifestyle greatly increases your risk for illness    A healthy diet, regular physical exercise & weight monitoring are important for maintaining a healthy lifestyle    You may be retaining fluid if you have a history of heart failure or if you experience any of the following symptoms:  Weight gain of 3 pounds or more overnight or 5 pounds in a week, increased swelling in our hands or feet or shortness of breath while lying flat in bed. Please call your doctor as soon as you notice any of these symptoms; do not wait until your next office visit. Recognize signs and symptoms of STROKE:    F-face looks uneven    A-arms unable to move or move unevenly    S-speech slurred or non-existent    T-time-call 911 as soon as signs and symptoms begin-DO NOT go       Back to bed or wait to see if you get better-TIME IS BRAIN. Warning Signs of HEART ATTACK     Call 911 if you have these symptoms:   Chest discomfort. Most heart attacks involve discomfort in the center of the chest that lasts more than a few minutes, or that goes away and comes back. It can feel like uncomfortable pressure, squeezing, fullness, or pain.  Discomfort in other areas of the upper body. Symptoms can include pain or discomfort in one or both arms, the back, neck, jaw, or stomach.  Shortness of breath with or without chest discomfort.  Other signs may include breaking out in a cold sweat, nausea, or lightheadedness. Don't wait more than five minutes to call 911 - MINUTES MATTER! Fast action can save your life. Calling 911 is almost always the fastest way to get lifesaving treatment. Emergency Medical Services staff can begin treatment when they arrive -- up to an hour sooner than if someone gets to the hospital by car. The discharge information has been reviewed with the patient. The patient verbalized understanding. Discharge medications reviewed with the patient and appropriate educational materials and side effects teaching were provided.

## 2017-02-09 NOTE — PROGRESS NOTES
[facility]   Pulmonary and Sleep Medicine     Pulmonary Progress Note    Name: Jacek Triana   : 1935   MRN: [id number]   Date: 2017    [x]I have reviewed the flowsheet and previous days notes. Events, vitals, medications and notes from last 24 hours reviewed. Care plan discussed with nursing and   patient. IMPRESSION:   · RT Knee OA - s/p TKR   · HTN  · Chronic RT shoulder pain   · CKD stage 3  · Anemia post-op   · GERD  · Wheezing h/o Asthma    PLAN:   · On levaquin, for UTI ? uculture is negative   · Hold diuretics for now; UA showed no protein;   Celebrex has been discontinued  due to CKD  · Low grade fever- cause unknown   · HTN meds in low doses - amlodipine and losartan   · Ed IV fluids Start 1/2 ns -now Creatinine is 1.4  · Right lower extremity  Venous duplex is negative - consider pan culture for fever. · Some rales monitor with duoneb if no improvement considerxray and lasix  · Incentive spirometry   · Post op labs - watch Hb; Tx if < 8 gm/dl  · Post op joint management and rehab per Ortho team   · DVT proph - management per Ortho team - patient on coumadin   · Dc'd kaur cath this am  · PPI; prn Maalox  · Consult Nephrology - Dr Baron Washington following  · I spent 35min of time excluding procedure, with face to face evaluation >50% on complex decision making, coordination of care and counseling patient. Subjective: Patient with Right TKR- mildly elevated, but stable creatinine due to CKD. Fever of unknown origin. Venous duplex of right lower extremity is negative. Review of Systems   HENT: Negative. Eyes: Negative. Respiratory: Negative. Cardiovascular: Negative. Gastrointestinal: Negative. Genitourinary: Negative. Musculoskeletal: Negative. Skin: Negative. Neurological: Negative. Endo/Heme/Allergies: Negative. Psychiatric/Behavioral: Negative.         Vital Signs:    Blood pressure 141/76, pulse 93, temperature 99.1 °F (37.3 °C), resp. rate 16, height 5' 4\" (1.626 m), weight 104 kg (229 lb 4.5 oz), SpO2 95 %, unknown if currently breastfeeding. Body mass index is 39.36 kg/(m^2). O2 Device: Room air   O2 Flow Rate (L/min): 2 l/min   Temp (24hrs), Av.6 °F (37.6 °C), Min:97.9 °F (36.6 °C), Max:101.2 °F (38.4 °C)       Intake/Output:   Last shift:         Last 3 shifts:  1901 -  0700  In: 460 [P.O.:460]  Out: 750 [Urine:750]    Intake/Output Summary (Last 24 hours) at 17 1138  Last data filed at 17 0245   Gross per 24 hour   Intake              120 ml   Output              600 ml   Net             -480 ml       Physical Exam   Constitutional: She is oriented to person, place, and time. HENT:   Head: Normocephalic and atraumatic. Eyes: EOM are normal. Pupils are equal, round, and reactive to light. Neck: Normal range of motion. Neck supple. Cardiovascular: Normal rate and regular rhythm. Pulmonary/Chest: Effort normal and breath sounds normal.   Abdominal: Soft. Bowel sounds are normal.   Musculoskeletal: Normal range of motion. She exhibits no edema. Right knee surgical wound is not examined    Neurological: She is alert and oriented to person, place, and time. Skin: Skin is warm and dry. Psychiatric: She has a normal mood and affect.        DATA:   Current Facility-Administered Medications   Medication Dose Route Frequency    warfarin (COUMADIN) tablet 2 mg  2 mg Oral QPM    levoFLOXacin (LEVAQUIN) tablet 250 mg  250 mg Oral Q24H    0.45% sodium chloride infusion  50 mL/hr IntraVENous CONTINUOUS    pantoprazole (PROTONIX) tablet 40 mg  40 mg Oral ACB&D    albuterol-ipratropium (DUO-NEB) 2.5 MG-0.5 MG/3 ML  3 mL Nebulization Q6HWA RT    WARFARIN INFORMATION NOTE (COUMADIN)   Other Q24H    metoclopramide HCl (REGLAN) tablet 5 mg  5 mg Oral BID    sodium chloride (NS) flush 5-10 mL  5-10 mL IntraVENous Q8H    sodium chloride (NS) flush 5-10 mL  5-10 mL IntraVENous PRN    naloxone [facility]) injection 0.4 mg  0.4 mg IntraVENous PRN    ferrous sulfate tablet 325 mg  1 Tab Oral BID WITH MEALS    diphenhydrAMINE (BENADRYL) injection 12.5 mg  12.5 mg IntraVENous Q6H PRN    zolpidem (AMBIEN) tablet 5 mg  5 mg Oral QHS PRN    HYDROcodone-acetaminophen (NORCO) 7.5-325 mg per tablet 1-2 Tab  1-2 Tab Oral Q4H PRN    ondansetron (ZOFRAN) injection 4 mg  4 mg IntraVENous Q4H PRN    magnesium hydroxide (MILK OF MAGNESIA) 400 mg/5 mL oral suspension 30 mL  30 mL Oral DAILY PRN    amLODIPine (NORVASC) tablet 5 mg  5 mg Oral DAILY    aluminum-magnesium hydroxide (MAALOX) oral suspension 10 mL  10 mL Oral TID PRN                    Labs:  Recent Results (from the past 24 hour(s))   CBC WITH AUTOMATED DIFF    Collection Time: 02/09/17  2:35 AM   Result Value Ref Range    WBC 13.3 (H) 4.6 - 13.2 K/uL    RBC 2.98 (L) 4.20 - 5.30 M/uL    HGB 8.1 (L) 12.0 - 16.0 g/dL    HCT 24.9 (L) 35.0 - 45.0 %    MCV 83.6 74.0 - 97.0 FL    MCH 27.2 24.0 - 34.0 PG    MCHC 32.5 31.0 - 37.0 g/dL    RDW 14.2 11.6 - 14.5 %    PLATELET 307 627 - 099 K/uL    MPV 10.6 9.2 - 11.8 FL    NEUTROPHILS 73 40 - 73 %    LYMPHOCYTES 15 (L) 21 - 52 %    MONOCYTES 10 3 - 10 %    EOSINOPHILS 2 0 - 5 %    BASOPHILS 0 0 - 2 %    ABS. NEUTROPHILS 9.8 (H) 1.8 - 8.0 K/UL    ABS. LYMPHOCYTES 2.0 0.9 - 3.6 K/UL    ABS. MONOCYTES 1.3 (H) 0.05 - 1.2 K/UL    ABS. EOSINOPHILS 0.3 0.0 - 0.4 K/UL    ABS.  BASOPHILS 0.0 0.0 - 0.1 K/UL    DF AUTOMATED     PROTHROMBIN TIME + INR    Collection Time: 02/09/17  2:35 AM   Result Value Ref Range    Prothrombin time 22.0 (H) 11.5 - 15.2 sec    INR 2.0 (H) 0.8 - 1.2     METABOLIC PANEL, BASIC    Collection Time: 02/09/17  2:35 AM   Result Value Ref Range    Sodium 136 136 - 145 mmol/L    Potassium 3.5 3.5 - 5.5 mmol/L    Chloride 104 100 - 108 mmol/L    CO2 22 21 - 32 mmol/L    Anion gap 10 3.0 - 18 mmol/L    Glucose 93 74 - 99 mg/dL    BUN 30 (H) 7.0 - 18 MG/DL    Creatinine 1.41 (H) 0.6 - 1.3 MG/DL BUN/Creatinine ratio 21 (H) 12 - 20      GFR est AA 44 (L) >60 ml/min/1.73m2    GFR est non-AA 36 (L) >60 ml/min/1.73m2    Calcium 8.5 8.5 - 10.1 MG/DL           No results for input(s): FIO2I, IFO2, HCO3I, IHCO3, HCOPOC, PCO2I, PCOPOC, IPHI, PHI, PHPOC, PO2I, PO2POC in the last 72 hours. No lab exists for component: IPOC2  All Micro Results     Procedure Component Value Units Date/Time    CULTURE, URINE [[id number]] Collected:  02/08/17 0855    Order Status:  Completed Specimen:  Urine from Clean catch Updated:  02/09/17 1009     Special Requests: NO SPECIAL REQUESTS        Culture result: NO GROWTH AFTER 17 HOURS                 Imaging:  [x]I have personally reviewed the patients chest radiographs images and report with the patient      Results from Hospital Encounter encounter on 02/06/17   XR KNEE RT MAX 2 VWS   Narrative Portable right knee, AP and crosstable lateral view, 2 views:        INDICATION:    History of primary osteoarthritis of right knee joint. Status post prosthetic  replacement of right knee joint. COMPARISON STUDY: None. FINDINGS:    The study shows findings of total prosthetic replacement of right knee joint. The femoral, tibial and patellar prostheses are well secured to negative bones  and in anatomic positions and alignments. There is surgical drainage tube at the  upper superolateral aspect of the joint. Impression IMPRESSION:    Status post total right knee arthroplasty with anatomic positions and alignments  of the prostheses. Results from [place] encounter on 10/25/12   CT ABD WO CONT   Narrative CT abdomen without contrast    HISTORY: Back pain, elevated LFT    COMPARISON: None. TECHNIQUE: Helical scan through the abdomen is obtained  from the diaphragm to  the iliac crest  without  IV contrast administration. Patient has of allergy  history to iodine.     FINDINGS: The study is suboptimal due to lack of IV contrast.    Imaging portion to the lung bases appears clear. The liver appears unremarkable on this noncontrast CT. No fatty infiltration or  focal lesion seen. The gallbladder is not clearly seen, assuming surgically  removed or contracted. No biliary dilatation identified. The spleen, pancreas, bilateral kidneys and adrenal glands appear unremarkable. The stomach is suboptimally distended. The small and large bowel and  nondilated. Moderate fecal material identified in the colon. No bowel  dilatation or inflammation identified. No retroperitoneal mass or adenopathy. Mild atherosclerotic aortic disease present. Spondylosis of the lumbar spine. IMPRESSION    Unremarkable noncontrast abdomen CT as above.     Thank you for your referral.         [x]See my orders for details    My assessment, plan of care, findings, medications, side effects etc were discussed with:  [x]nursing []PT/OT    []respiratory therapy []Dr. Omid Stovall []Patient       Nichole Robison MD, DORETHA

## 2017-02-09 NOTE — PROGRESS NOTES
Ortho    Pt. Seen and evaluated. Improving  Reports fever  Denies cp, sob, abd pain    Blood pressure 120/74, pulse (!) 102, temperature (!) 100.6 °F (38.1 °C), resp. rate 18, height 5' 4\" (1.626 m), weight 229 lb 4.5 oz (104 kg), SpO2 96 %, unknown if currently breastfeeding. rightKnee woundclean, dry, no drainage  Sensory intact to LT  Motor intact  nv intact  Neg calf tenderness    Labs:  CBC  @  CBC:   Lab Results   Component Value Date/Time    WBC 13.3 02/09/2017 02:35 AM    RBC 2.98 02/09/2017 02:35 AM    HGB 8.1 02/09/2017 02:35 AM    HCT 24.9 02/09/2017 02:35 AM    PLATELET 096 59/74/1710 02:35 AM     BMP:   Lab Results   Component Value Date/Time    Glucose 93 02/09/2017 02:35 AM    Sodium 136 02/09/2017 02:35 AM    Potassium 3.5 02/09/2017 02:35 AM    Chloride 104 02/09/2017 02:35 AM    CO2 22 02/09/2017 02:35 AM    BUN 30 02/09/2017 02:35 AM    Creatinine 1.41 02/09/2017 02:35 AM    Calcium 8.5 02/09/2017 02:35 AM   @  Coagulation  Lab Results   Component Value Date    INR 2.0 (H) 02/09/2017    APTT 34.5 01/23/2017      Basic Metabolic Profile  Lab Results   Component Value Date     02/09/2017    CO2 22 02/09/2017    BUN 30 (H) 02/09/2017       Assesment: right Orthopedic / Rheumatologic: Total Knee Replacement    Plan: coumadin, PT, cxr, doppler us right LE, levaquin started for uti.

## 2017-02-10 VITALS
OXYGEN SATURATION: 94 % | SYSTOLIC BLOOD PRESSURE: 144 MMHG | BODY MASS INDEX: 42.83 KG/M2 | WEIGHT: 250.88 LBS | RESPIRATION RATE: 20 BRPM | TEMPERATURE: 100.3 F | HEART RATE: 104 BPM | DIASTOLIC BLOOD PRESSURE: 74 MMHG | HEIGHT: 64 IN

## 2017-02-10 LAB
BACTERIA SPEC CULT: NORMAL
INR PPP: 2.2 (ref 0.8–1.2)
PROTHROMBIN TIME: 23.3 SEC (ref 11.5–15.2)
SERVICE CMNT-IMP: NORMAL

## 2017-02-10 PROCEDURE — 74011250637 HC RX REV CODE- 250/637: Performed by: ORTHOPAEDIC SURGERY

## 2017-02-10 PROCEDURE — 85610 PROTHROMBIN TIME: CPT | Performed by: ORTHOPAEDIC SURGERY

## 2017-02-10 PROCEDURE — 74011250637 HC RX REV CODE- 250/637: Performed by: PHYSICIAN ASSISTANT

## 2017-02-10 PROCEDURE — 94640 AIRWAY INHALATION TREATMENT: CPT

## 2017-02-10 PROCEDURE — 36415 COLL VENOUS BLD VENIPUNCTURE: CPT | Performed by: ORTHOPAEDIC SURGERY

## 2017-02-10 PROCEDURE — 74011250637 HC RX REV CODE- 250/637: Performed by: INTERNAL MEDICINE

## 2017-02-10 PROCEDURE — 97116 GAIT TRAINING THERAPY: CPT

## 2017-02-10 PROCEDURE — 74011000250 HC RX REV CODE- 250: Performed by: INTERNAL MEDICINE

## 2017-02-10 RX ORDER — LEVOFLOXACIN 500 MG/1
TABLET, FILM COATED ORAL
Qty: 7 TAB | Refills: 0 | Status: SHIPPED | OUTPATIENT
Start: 2017-02-10 | End: 2017-04-05

## 2017-02-10 RX ADMIN — METOCLOPRAMIDE HYDROCHLORIDE 5 MG: 5 TABLET ORAL at 16:13

## 2017-02-10 RX ADMIN — AMLODIPINE BESYLATE 5 MG: 5 TABLET ORAL at 09:07

## 2017-02-10 RX ADMIN — LEVOFLOXACIN 250 MG: 250 TABLET, FILM COATED ORAL at 13:37

## 2017-02-10 RX ADMIN — IPRATROPIUM BROMIDE AND ALBUTEROL SULFATE 3 ML: 2.5; .5 SOLUTION RESPIRATORY (INHALATION) at 14:49

## 2017-02-10 RX ADMIN — PANTOPRAZOLE SODIUM 40 MG: 40 TABLET, DELAYED RELEASE ORAL at 09:07

## 2017-02-10 RX ADMIN — HYDROCODONE BITARTRATE AND ACETAMINOPHEN 2 TABLET: 7.5; 325 TABLET ORAL at 14:49

## 2017-02-10 RX ADMIN — METOCLOPRAMIDE HYDROCHLORIDE 5 MG: 5 TABLET ORAL at 09:07

## 2017-02-10 RX ADMIN — Medication 325 MG: at 09:07

## 2017-02-10 RX ADMIN — HYDROCODONE BITARTRATE AND ACETAMINOPHEN 2 TABLET: 7.5; 325 TABLET ORAL at 04:29

## 2017-02-10 RX ADMIN — PANTOPRAZOLE SODIUM 40 MG: 40 TABLET, DELAYED RELEASE ORAL at 16:13

## 2017-02-10 RX ADMIN — Medication 325 MG: at 16:13

## 2017-02-10 NOTE — PROGRESS NOTES
Ortho    Pt. Seen and evaluated. Doing well, feels good, progressed well with PT  Denies cp, sob, abd pain    Blood pressure 135/79, pulse 97, temperature 99.5 °F (37.5 °C), resp. rate 16, height 5' 4\" (1.626 m), weight 251 lb 1.6 oz (113.9 kg), SpO2 97 %, unknown if currently breastfeeding. rightKnee woundclean, dry, no drainage  Sensory intact to LT  Motor intact  nv intact  Neg calf tenderness    Labs:  CBC  @  CBC:   Lab Results   Component Value Date/Time    WBC 13.3 02/09/2017 02:35 AM    RBC 2.98 02/09/2017 02:35 AM    HGB 8.1 02/09/2017 02:35 AM    HCT 24.9 02/09/2017 02:35 AM    PLATELET 889 42/89/0176 02:35 AM     BMP:   Lab Results   Component Value Date/Time    Glucose 93 02/09/2017 02:35 AM    Sodium 136 02/09/2017 02:35 AM    Potassium 3.5 02/09/2017 02:35 AM    Chloride 104 02/09/2017 02:35 AM    CO2 22 02/09/2017 02:35 AM    BUN 30 02/09/2017 02:35 AM    Creatinine 1.41 02/09/2017 02:35 AM    Calcium 8.5 02/09/2017 02:35 AM   @  Coagulation  Lab Results   Component Value Date    INR 2.2 (H) 02/10/2017    APTT 34.5 01/23/2017      Basic Metabolic Profile  Lab Results   Component Value Date     02/09/2017    CO2 22 02/09/2017    BUN 30 (H) 02/09/2017     Doppler neg    cxr- atelectasis    Assesment: right Orthopedic / Rheumatologic: Total Knee Replacement    Plan: coumadin, PT, home today. levaquin rx provided.

## 2017-02-10 NOTE — PROGRESS NOTES
Roula from [facility] states it was a mistake. [facility] will be picking pt up today at 1900 to take her to [place] [address]. Pt, family, nurse and facility aware.

## 2017-02-10 NOTE — PROGRESS NOTES
Problem: Mobility Impaired (Adult and Pediatric)  Goal: *Acute Goals and Plan of Care (Insert Text)  STGs to be addressed within 3 days:  1. Bed mobility: Supine to sit to supine S with HR for meals. 2. Activity tolerance: Tolerate up in chair 1-2 hrs for ADLs. 3. Transfers: Sit to stand to chair S with LRAD for ADLs. LTGs to be addressed within 7 days:  1. Standing/Ambulation Balance: Increase to Good with LRAD for safe transfers and gait. 2. Ambulation: Ambulate > 200 ft. S with LRAD for home mobility. 3. Patient Education: Independent with HEP for home safety. 4. Stairs: Up/Down 3 steps CGA with HR for home entry. PHYSICAL THERAPY TREATMENT     Patient: Betty Goldstein (53 y.o. female)  Date: 2/10/2017  Diagnosis: Primary osteoarthritis of right knee [M17.11] <principal problem not specified>  Procedure(s) (LRB):  RIGHT TOTAL KNEE ARTHROPLASTY (Right) 4 Days Post-Op  Precautions: Fall, WBAT  Chart, physical therapy assessment, plan of care and goals were reviewed. ASSESSMENT:  Pt presents alert and agreeable to therapy. Pt transferred from sup to sit with standby assist and increased time. Pt then scooted to EOB with standby assist. Pt attempted to stand several times without success and due to girth has difficulty using UE's to push from bed and uses walker to pull up from with PT supporting walker. Pt required several attempts at rocking to use momentum to stand, however still needs Marcos/CG to stand. PT inquired what has been happening from the other day when she was standby sit <> stand transfer with therapy to the past two session when pt has needed more assistance; pt reports \"I think I'm just nervous. I try to get it in my head that I have to stand up but getting up is the hardest part. \" Once pt standing at RW amb 90ft in hallway with VC's for walker use and sat in [facility] to be transported to [facility]. Pt then navigated 4 steps with gait belt and Marcos from PT using 2 HR's.  As pt only has 1 HR at home, pt took seated rest break several mins prior to training 4 steps going sideways using R HR only. Pt still required Marcos and again expresses anxiety about performing stairs at home. Pt then transferred into [facility] and was transported back to room. Pt transferred WC to recliner then recliner to bed in order to get a bath with nursing. Pt's anxiety and inability to navigate stairs without Marcos leads to a PT recommendation of SNF for short term rehab to ensure safe D/C home. Progression toward goals:  [X]      Improving appropriately and progressing toward goals  [ ]      Improving slowly and progressing toward goals  [ ]      Not making progress toward goals and plan of care will be adjusted       PLAN:  Patient continues to benefit from skilled intervention to address the above impairments. Continue treatment per established plan of care. Discharge Recommendations:  Ok Lauren  Further Equipment Recommendations for Discharge:  rolling walker       G-CODES:      Mobility [id number] Current  CJ= 20-39%   Goal  CI= 1-19%. The severity rating is based on the Level of Assistance required for Functional Mobility and ADLs. SUBJECTIVE:   Patient stated I'm just so nervous. I think it's getting in my head.       OBJECTIVE DATA SUMMARY:   Critical Behavior:  Neurologic State: Alert  Orientation Level: Oriented X4  Cognition: Appropriate decision making  Safety/Judgement: Awareness of environment, Fall prevention  Functional Mobility Training:  Bed Mobility:   Supine to Sit: Stand-by asssistance; Additional time  Sit to Supine: Stand-by asssistance; Additional time  Scooting: Stand-by asssistance   Transfers:  Sit to Stand: Contact guard assistance;Minimum assistance  Stand to Sit: Supervision      Balance:  Sitting: Intact  Standing: Impaired; With support  Standing - Static: Good  Standing - Dynamic : Fair  Ambulation/Gait Training:  Distance (ft): 90 Feet (ft)  Assistive Device: Johnette Severe, rolling  Ambulation - Level of Assistance: Supervision   Gait Abnormalities: Antalgic;Decreased step clearance;Trunk sway increased   Base of Support: Widened   Speed/Nikole: Slow  Stairs:  Number of Stairs Trained: 4 (x2)  Stairs - Level of Assistance: Minimum assistance   Pain:  Pt reports 3/10 pain or discomfort prior to treatment. Pt reports 3/10 pain or discomfort post treatment. Activity Tolerance:   Pt demonstrates decreased endurance and required Marcos/CG for stair training; pt was anxious during session about both transferring from sit to stand from bed and with stair training. Pt required VC's for proper technique and safety training for stair and sit <> stand transfer. Pt would required Marcos to navigate steps to enter home safely and PT recommendation would be for SNF for short term rehab to ensure pt is safe as he assistance at home comes from her  who is 83yo and per pt \"has stents in his heart\". Please refer to the flowsheet for vital signs taken during this treatment.   After treatment:   [ ] Patient left in no apparent distress sitting up in chair  [X] Patient left in no apparent distress in bed  [X] Call bell left within reach  [ ] Nursing notified  [ ] Caregiver present  [ ] Bed alarm activated      Vinson Snellen, PT   Time Calculation: 41 mins

## 2017-02-10 NOTE — PROGRESS NOTES
[facility]   Pulmonary and Sleep Medicine     Pulmonary Progress Note    Name: Rebeca Cordoba   : 1935   MRN: [id number]   Date: 2/10/2017    [x]I have reviewed the flowsheet and previous days notes. Events, vitals, medications and notes from last 24 hours reviewed. Care plan discussed with nursing and   patient. IMPRESSION:   · RT Knee OA - s/p TKR   · HTN  · Chronic RT shoulder pain   · CKD stage 3  · Anemia post-op   · GERD  · Low grade fever   · Wheezing h/o Asthma    PLAN:   · On levaquin, for UTI ? uculture is negative   · Hold diuretics for now; UA showed no protein;  · Low grade fever-Continue to follow fever curve. Follow culture. If  Necessary consider ID input   · HTN meds in low doses - amlodipine and losartan   · Ed IV fluids Start 1/2 ns -now Creatinine is 1.4  · Right lower extremity  Venous duplex is negative -Follow fever curve   · Some rales monitor with duoneb if no improvement considerxray and lasix  · Incentive spirometry   · Post op labs - watch Hb; Tx if < 8 gm/dl  · Post op joint management and rehab per Ortho team   · DVT proph - management per Ortho team - patient on coumadin   · Dc'd kaur cath this am  · PPI; prn Maalox  · Consult Nephrology - Dr Amelia Poon following  · I spent 35min of time excluding procedure, with face to face evaluation >50% on complex decision making, coordination of care and counseling patient. Subjective: Patient with Right TKR- mildly elevated, but stable creatinine due to CKD. Patient continue spike low grade Fever of unknown origin. Venous duplex of right lower extremity is negative. Review of Systems   HENT: Negative. Eyes: Negative. Respiratory: Negative. Cardiovascular: Negative. Gastrointestinal: Negative. Genitourinary: Negative. Musculoskeletal: Negative. Skin: Negative. Neurological: Negative. Endo/Heme/Allergies: Negative. Psychiatric/Behavioral: Negative.         Vital Signs: Blood pressure 135/79, pulse 97, temperature 99.5 °F (37.5 °C), resp. rate 16, height 5' 4\" (1.626 m), weight 113.9 kg (251 lb 1.6 oz), SpO2 97 %, unknown if currently breastfeeding. Body mass index is 43.1 kg/(m^2). O2 Device: Room air   O2 Flow Rate (L/min): 2 l/min   Temp (24hrs), Av.7 °F (37.6 °C), Min:98.3 °F (36.8 °C), Max:100.6 °F (38.1 °C)       Intake/Output:   Last shift:         Last 3 shifts:  1901 - 02/10 0700  In: 420 [P.O.:420]  Out: 800 [Urine:800]    Intake/Output Summary (Last 24 hours) at 02/10/17 1213  Last data filed at 17 2243   Gross per 24 hour   Intake              420 ml   Output              350 ml   Net               70 ml       Physical Exam   Constitutional: She is oriented to person, place, and time. HENT:   Head: Normocephalic and atraumatic. Eyes: EOM are normal. Pupils are equal, round, and reactive to light. Neck: Normal range of motion. Neck supple. Cardiovascular: Normal rate and regular rhythm. Pulmonary/Chest: Effort normal and breath sounds normal.   Abdominal: Soft. Bowel sounds are normal.   Musculoskeletal: Normal range of motion. She exhibits no edema. Right knee surgical wound is not examined    Neurological: She is alert and oriented to person, place, and time. Skin: Skin is warm and dry. Psychiatric: She has a normal mood and affect.        DATA:   Current Facility-Administered Medications   Medication Dose Route Frequency    levoFLOXacin (LEVAQUIN) tablet 250 mg  250 mg Oral Q24H    pantoprazole (PROTONIX) tablet 40 mg  40 mg Oral ACB&D    albuterol-ipratropium (DUO-NEB) 2.5 MG-0.5 MG/3 ML  3 mL Nebulization Q6HWA RT    WARFARIN INFORMATION NOTE (COUMADIN)   Other Q24H    metoclopramide HCl (REGLAN) tablet 5 mg  5 mg Oral BID    sodium chloride (NS) flush 5-10 mL  5-10 mL IntraVENous Q8H    sodium chloride (NS) flush 5-10 mL  5-10 mL IntraVENous PRN    naloxone (NARCAN) injection 0.4 mg  0.4 mg IntraVENous PRN    ferrous sulfate tablet 325 mg  1 Tab Oral BID WITH MEALS    diphenhydrAMINE (BENADRYL) injection 12.5 mg  12.5 mg IntraVENous Q6H PRN    zolpidem (AMBIEN) tablet 5 mg  5 mg Oral QHS PRN    HYDROcodone-acetaminophen (NORCO) 7.5-325 mg per tablet 1-2 Tab  1-2 Tab Oral Q4H PRN    ondansetron (ZOFRAN) injection 4 mg  4 mg IntraVENous Q4H PRN    magnesium hydroxide (MILK OF MAGNESIA) 400 mg/5 mL oral suspension 30 mL  30 mL Oral DAILY PRN    amLODIPine (NORVASC) tablet 5 mg  5 mg Oral DAILY    aluminum-magnesium hydroxide (MAALOX) oral suspension 10 mL  10 mL Oral TID PRN                    Labs:  Recent Results (from the past 24 hour(s))   PROTHROMBIN TIME + INR    Collection Time: 02/10/17  4:00 AM   Result Value Ref Range    Prothrombin time 23.3 (H) 11.5 - 15.2 sec    INR 2.2 (H) 0.8 - 1.2             No results for input(s): FIO2I, IFO2, HCO3I, IHCO3, HCOPOC, PCO2I, PCOPOC, IPHI, PHI, PHPOC, PO2I, PO2POC in the last 72 hours. No lab exists for component: IPOC2  All Micro Results     Procedure Component Value Units Date/Time    CULTURE, URINE [[id number]] Collected:  02/08/17 0855    Order Status:  Completed Specimen:  Urine from Clean catch Updated:  02/10/17 0840     Special Requests: NO SPECIAL REQUESTS        Culture result: NO GROWTH 2 DAYS                 Imaging:  [x]I have personally reviewed the patients chest radiographs images and report with the patient      Results from [place] encounter on 02/06/17   XR CHEST AP LAT   Narrative Chest AP and lateral    INDICATION: Fever    COMPARISON: 1/23/2017    FINDINGS:    Two views of the chest were obtained. Lungs are hypoinflated. Slightly  suboptimal exposure limiting evaluation. Arthrosclerosis. Cardiac silhouette is  stable. Minimal lung base hazy opacity best seen on the lateral view, may  represent atelectasis or infiltrate. No acute osseous abnormality.          Impression IMPRESSION:    Minimal lung base hazy opacity best seen on the lateral view, may represent  atelectasis or infiltrate. Results from [place] encounter on 10/25/12   CT ABD WO CONT   Narrative CT abdomen without contrast    HISTORY: Back pain, elevated LFT    COMPARISON: None. TECHNIQUE: Helical scan through the abdomen is obtained  from the diaphragm to  the iliac crest  without  IV contrast administration. Patient has of allergy  history to iodine. FINDINGS: The study is suboptimal due to lack of IV contrast.    Imaging portion to the lung bases appears clear. The liver appears unremarkable on this noncontrast CT. No fatty infiltration or  focal lesion seen. The gallbladder is not clearly seen, assuming surgically  removed or contracted. No biliary dilatation identified. The spleen, pancreas, bilateral kidneys and adrenal glands appear unremarkable. The stomach is suboptimally distended. The small and large bowel and  nondilated. Moderate fecal material identified in the colon. No bowel  dilatation or inflammation identified. No retroperitoneal mass or adenopathy. Mild atherosclerotic aortic disease present. Spondylosis of the lumbar spine. IMPRESSION    Unremarkable noncontrast abdomen CT as above.     Thank you for your referral.         [x]See my orders for details    My assessment, plan of care, findings, medications, side effects etc were discussed with:  [x]nursing []PT/OT    []respiratory therapy []Dr. Ousmane Khoury []Patient       Rajesh Sandoval MD, DORETHA

## 2017-02-10 NOTE — PROGRESS NOTES
conducted a Follow up consultation and Spiritual Assessment for Catalino Anderson, who is a 80 y.o.,female. The  provided the following Interventions:  Continued the relationship of care and support. Listened empathically. Offered prayer and assurance of continued prayer on patients behalf. Chart reviewed. The following outcomes were achieved:  Patient expressed gratitude for 's visit. Assessment:  There are no further spiritual or Denominational issues which require Spiritual Care Services interventions at this time. Plan:  Chaplains will continue to follow and will provide pastoral care on an as needed/requested basis.  recommends bedside caregivers page  on duty if patient shows signs of acute spiritual or emotional distress.        [address] [facility] [address] [phone]

## 2017-02-10 NOTE — PROGRESS NOTES
Pt states she has decided it would be best if she went to a SNF. [facility] made aware. FOC given and signed by pt and she chose [address]. Placed into e-discharge. CM to follow.

## 2017-02-10 NOTE — PROGRESS NOTES
Pt states she doesn't want to go to a SNF. She states she will be going back home with [place] at discharge.

## 2017-02-10 NOTE — HOME CARE
D/c noted for today [facility] will follow per Dr. Piysuh Gar knee protocol - LUIS Pruitt RN

## 2017-02-10 NOTE — DISCHARGE SUMMARY
Addendum:    Pt had an extended stay in the hospital due to fever. Her workup showed atelectasis and a UTI. She will be discharged on Levaquin. Follow up in office in 2 weeks.

## 2017-02-10 NOTE — PROGRESS NOTES
[address] states they will take pt. Called [facility] per pt's request and they state they will not set up transportation until they speak to pt. Pt was given the phone number to [facility] [phone]. CM to follow.

## 2017-02-10 NOTE — PROGRESS NOTES
2242  Received pt in stable condition,   General: lying in bed in supine, not apparent distress, family at bed side  Neuro: AOx4, denies numbness, 0 tingling, able to wiggle toes to bilateral extremities  Cardio: pedal pulses palpable, denies chest pain  Respiratory: denies shortness of breath, encourage to use ICS and education given about PNA prevention. Pt verbalized understanding. Skin: Dressing to right knee clean, dry and intact, pt refuse to have polar ice placed. Education provided, but continue to refuse. GI: voiding  : denies nausea and vomiting  Mus: weakness to RLE   Bed in low position and call bell within reach. 0222  Patient AOx4, no apparent disstress, voiced no c/o at this time, using ICS,  dressing to right knee clean, dry and intact. Bilateral lower extremities: pedal pulses present and palpable, denies numbness, able to wiggle toes. No changes in status, in stable condition. 6424  Patient AOx4, no apparent disstress,encourage to use ICS more for pneumonia prevention, pt verbalized understanding. Dressing to right knee clean, dry and intact. Bilateral lower extremities: pedal pulses present and palpable, denies numbness, able to wiggle toes. No changes in status, in stable condition.

## 2017-02-10 NOTE — PROGRESS NOTES
Bedside and Verbal shift change report given to Hermann Worthington RN (oncoming nurse) by Zulema Carrillo RN (offgoing nurse). Report included the following information SBAR, Kardex, MAR and Recent Results. SITUATION:    Code Status: No Order   Reason for Admission: Primary osteoarthritis of right knee [address] day: 4   Problem List:       Hospital Problems  Date Reviewed: 2/6/2017          Codes Class Noted POA    Arthritis of knee ICD-10-CM: M19.90  ICD-9-CM: 716.96  2/6/2017 Unknown              BACKGROUND:    Past Medical History:   Past Medical History   Diagnosis Date    Arthritis     Arthritis of both hips     Back pain     Balance problem     Chronic back pain     Cough     Easy bruising     Essential hypertension     GERD (gastroesophageal reflux disease)     Hiatal hernia     Hypertension     Irregular heart beat     Left hip pain 10/8/2010    Neck pain     Nervousness     Night sweat     Osteoarthritis, knee bilateral     Pain with urination     Polymyalgia rheumatica (HCC)     Reflux     Spinal stenosis     Trapezius strain     Trochanteric bursitis of left hip     Venous insufficiency          Patient taking anticoagulants yes     ASSESSMENT:    Changes in Assessment Throughout Shift: no     Patient has Central Line: no Reasons if yes: .  Patient has Krishna Cath: no Reasons if yes: .       Last Vitals:     Vitals:    02/09/17 1151 02/09/17 1531 02/09/17 2102 02/10/17 0421   BP:  110/59 105/64 139/73   Pulse:  93 93 95   Resp:  16 18 16   Temp:  98.3 °F (36.8 °C) 100.2 °F (37.9 °C) (!) 100.6 °F (38.1 °C)   SpO2:  94% 93% 95%   Weight: 113.9 kg (251 lb 1.6 oz)      Height:            IV and DRAINS (will only show if present)   [REMOVED] Peripheral IV 02/06/17 Left Wrist-Site Assessment: Clean, dry, & intact  [REMOVED] Jean-Claude-Reyes Drain 02/06/17 Right Knee-Site Assessment: Clean, dry, & intact  [REMOVED] Peripheral IV 02/07/17 Right Hand-Site Assessment: Clean, dry, & intact     WOUND (if present)   Wound Type:  surgical   Dressing present: yes   Wound Concerns/Notes:  none     PAIN    Pain Assessment    Pain Intensity 1: 0 (02/10/17 0607)    Pain Location 1: Knee    Pain Intervention(s) 1: Medication (see MAR)    Patient Stated Pain Goal: 0  o Interventions for Pain:  medication  o Intervention effective: yes  o Time of last intervention: See MAR   o Reassessment Completed: yes      Last 3 Weights:  Last 3 Recorded Weights in this Encounter    02/06/17 0612 02/07/17 1843 02/09/17 1151   Weight: 104 kg (229 lb 6 oz) 104 kg (229 lb 4.5 oz) 113.9 kg (251 lb 1.6 oz)     Weight change:      INTAKE/OUPUT    Current Shift: 02/09 1901 - 02/10 0700  In: 100 [P.O.:100]  Out: -     Last three shifts: 02/08 0701 - 02/09 1900  In: 440 [P.O.:440]  Out: 1100 [Urine:1100]     LAB RESULTS     Recent Labs      02/09/17   0235  02/08/17   0305  02/07/17   1830   WBC  13.3*  11.6   --    HGB  8.1*  8.7*  8.6*   HCT  24.9*  27.1*  26.3*   PLT  269  276   --         Recent Labs      02/10/17   0400  02/09/17   0235  02/08/17   0305   NA   --   136  138   K   --   3.5  3.7   GLU   --   93  112*   BUN   --   30*  34*   CREA   --   1.41*  1.59*   CA   --   8.5  8.5   INR  2.2*  2.0*  1.5*       RECOMMENDATIONS AND DISCHARGE PLANNING     1. Pending tests/procedures/ Plan of Care or Other Needs: pain management, labs, PT/OT    2. Discharge plan for patient and Needs/Barriers: home    3. Estimated Discharge Date: 2-11-17 Posted on Whiteboard in [facility]: yes      4. The patient's care plan was reviewed with the oncoming nurse. \"HEALS\" SAFETY CHECK      Fall Risk    Total Score: 3    Safety Measures: Safety Measures: Bed/Chair-Wheels locked, Bed in low position, Call light within reach, Fall prevention (comment), Family at bedside, Gripper socks, Caregiver at bedside    A safety check occurred in the patient's room between off going nurse and oncoming nurse listed above.     The safety check included the below items  Area Items   H  High Alert Medications - Verify all high alert medication drips (heparin, PCA, etc.)   E  Equipment - Suction is set up for ALL patients (with hermilo)  - Red plugs utilized for all equipment (IV pumps, etc.)  - WOWs wiped down at end of shift.  - Room stocked with oxygen, suction, and other unit-specific supplies   A  Alarms - Bed alarm is set for fall risk patients  - Ensure chair alarm is in place and activated if patient is up in a chair   L  Lines - Check IV for any infiltration  - Krishna bag is empty if patient has a Krishna   - Tubing and IV bags are labeled   S  Safety   - Room is clean, patient is clean, and equipment is clean. - Hallways are clear from equipment besides carts. - Fall bracelet on for fall risk patients  - Ensure room is clear and free of clutter  - Suction is set up for ALL patients (with hermilo)  - Hallways are clear from equipment besides carts.    - Isolation precautions followed, supplies available outside room, sign posted     Clint Brooke RN

## 2017-02-10 NOTE — DISCHARGE SUMMARY
Addendum:    Pt progressed slow with PT and would benefit from skilled nursing placement. She will be transferred to a snf today. She will have a PT/OT eval and treat, total knee protocol, wbat. aquacel ag dressing pod 7 and prn. She will be given rx for aspirin for dvt prevention, norco for pain, levaquin 500mg 1 po every day x 7d, and ferrous sulfate. She will continue her meds as per the [facility]. Follow up with Dr. Malick Lui in 2 weeks.

## 2017-02-13 ENCOUNTER — TELEPHONE (OUTPATIENT)
Dept: ORTHOPEDIC SURGERY | Age: 82
End: 2017-02-13

## 2017-02-13 DIAGNOSIS — M79.89 PAIN AND SWELLING OF RIGHT LOWER LEG: ICD-10-CM

## 2017-02-13 DIAGNOSIS — Z96.651 STATUS POST RIGHT KNEE REPLACEMENT: Primary | ICD-10-CM

## 2017-02-13 DIAGNOSIS — M79.661 PAIN AND SWELLING OF RIGHT LOWER LEG: ICD-10-CM

## 2017-02-13 NOTE — TELEPHONE ENCOUNTER
Pt's daughter calling,  Pt is in [facility]hab facility[facility]. Sx was 2/6/17   Daughter stated that she called the patient today to check on her mother today after her therapy appointment, states that the patient told her daughter that she has  A sharp pain in her right leg, back of right knee (the leg that was operated on) and that she has moderate swelling on the left leg. Pt's daughter is concerned as the therapist that did her therapy today suggested that she have someone look at that as she needed to be checked for a blood clot. Pt's daughter needs to know whether or not she needs to get the patient to the ER or wait until her appointment follow up on 2/23/17. Please call the patient and the daughter to advise, thank you.

## 2017-02-14 NOTE — TELEPHONE ENCOUNTER
Called the patient back and the daughter was unavailable. The patient states that her right leg is still swollen and painful. I called [facility] in [place] at [phone] and I was told to fax order to [phone] and they will take care of it. Order was written and faxed.

## 2017-02-22 ENCOUNTER — HOME HEALTH ADMISSION (OUTPATIENT)
Dept: HOME HEALTH SERVICES | Facility: HOME HEALTH | Age: 82
End: 2017-02-22

## 2017-02-23 ENCOUNTER — OFFICE VISIT (OUTPATIENT)
Dept: ORTHOPEDIC SURGERY | Facility: CLINIC | Age: 82
End: 2017-02-23

## 2017-02-23 VITALS — HEART RATE: 96 BPM | SYSTOLIC BLOOD PRESSURE: 143 MMHG | DIASTOLIC BLOOD PRESSURE: 75 MMHG | TEMPERATURE: 98.6 F

## 2017-02-23 DIAGNOSIS — Z96.651 STATUS POST TOTAL RIGHT KNEE REPLACEMENT: Primary | ICD-10-CM

## 2017-02-23 RX ORDER — HYDROCODONE BITARTRATE AND ACETAMINOPHEN 10; 325 MG/1; MG/1
1-2 TABLET ORAL
Qty: 60 TAB | Refills: 0 | Status: SHIPPED | OUTPATIENT
Start: 2017-02-23 | End: 2017-04-05

## 2017-02-23 NOTE — PROGRESS NOTES
[address] [place] [address]  [phone]           Patient: Mayra Barron                MRN: [id number]       SSN: [id number]  YOB: 1935        AGE: 80 y.o. SEX: female  There is no height or weight on file to calculate BMI. PCP: Linda Billings MD  02/23/17      This office note has been dictated. REVIEW OF SYSTEMS:  Constitutional: Negative for fever, chills, weight loss and malaise/fatigue. HENT: Negative. Eyes: Negative. Respiratory: Negative. Cardiovascular: Negative. Gastrointestinal: No bowel incontinence or constipation. Genitourinary: No bladder incontinence or saddle anesthesia. Skin: Negative. Neurological: Negative. Endo/Heme/Allergies: Negative. Psychiatric/Behavioral: Negative. Musculoskeletal: As per HPI above. Past Medical History:   Diagnosis Date    Arthritis     Arthritis of both hips     Back pain     Balance problem     Chronic back pain     Cough     Easy bruising     Essential hypertension     GERD (gastroesophageal reflux disease)     Hiatal hernia     Hypertension     Irregular heart beat     Left hip pain 10/8/2010    Neck pain     Nervousness     Night sweat     Osteoarthritis, knee bilateral     Pain with urination     Polymyalgia rheumatica (HCC)     Reflux     Spinal stenosis     Trapezius strain     Trochanteric bursitis of left hip     Venous insufficiency          Current Outpatient Prescriptions:     levoFLOXacin (LEVAQUIN) 500 mg tablet, 1 po q day x 7 days, Disp: 7 Tab, Rfl: 0    ferrous sulfate 325 mg (65 mg iron) tablet, Take 1 Tab by mouth two (2) times daily (with meals). , Disp: 60 Tab, Rfl: 2    HYDROcodone-acetaminophen (NORCO) 7.5-325 mg per tablet, Take 1-2 Tabs by mouth every four (4) hours as needed.  Max Daily Amount: 12 Tabs., Disp: 60 Tab, Rfl: 0    aspirin (ASPIRIN) 325 mg tablet, Take 1 Tab by mouth two (2) times a day., Disp: 60 Tab, Rfl: 0    cholecalciferol (VITAMIN D3) 1,000 unit tablet, Take 1,000 Units by mouth daily. , Disp: , Rfl:     ciprofloxacin HCl (CIPRO) 500 mg tablet, Take 1 Tab by mouth two (2) times a day., Disp: 14 Tab, Rfl: 0    potassium 99 mg tablet, Take 99 mg by mouth daily. , Disp: , Rfl:     omeprazole (PRILOSEC) 40 mg capsule, Take 40 mg by mouth daily. , Disp: , Rfl:     acetaminophen (TYLENOL) 650 mg CR tablet, Take 1,300 mg by mouth two (2) times a day., Disp: , Rfl:     aMILoride-hydrochlorothiazide (MODURETIC) 5-50 mg tab, Take 1 Tab by mouth daily. , Disp: , Rfl:     amLODIPine (NORVASC) 10 mg tablet, 10 mg daily. , Disp: , Rfl: 3    losartan (COZAAR) 100 mg tablet, Take 100 mg by mouth daily. , Disp: , Rfl:     metoclopramide HCl (REGLAN) 5 mg tablet, Take 5 mg by mouth two (2) times a day., Disp: , Rfl:     cetirizine (ZYRTEC) 10 mg tablet, Take  by mouth daily. , Disp: , Rfl:     Allergies   Allergen Reactions    Citric Acid Unknown (comments)    Crinone [Progesterone Micronized] Unknown (comments)    Iodine Unknown (comments)    Percocet [Oxycodone-Acetaminophen] Other (comments)     Gets rebound headaches after taking Percocet       Social History     Social History    Marital status:      Spouse name: N/A    Number of children: N/A    Years of education: N/A     Occupational History    Not on file.      Social History Main Topics    Smoking status: Never Smoker    Smokeless tobacco: Never Used    Alcohol use No    Drug use: No    Sexual activity: No     Other Topics Concern    Not on file     Social History Narrative       Past Surgical History:   Procedure Laterality Date    COLONOSCOPY N/A 7/27/2016    COLONOSCOPY w/polypectomy performed by Tamika Lindsay MD at [facility] HX CHOLECYSTECTOMY      HX HEENT  06/2011    left cornea transplant    HX HYSTERECTOMY      HX ORTHOPAEDIC      right foot and ankle           We did see Ms. Rosi Devine for follow-up in regards to her right knee replacement. The patient is now 17 days status post surgery. She was at a nursing facility and discharged to home. She is progressing well. She has been a little noncompliant working on range of motion activities. She has had no troubles with the wound. There have been no recent fevers, chills, systemic changes, or injuries to report. She denies chest pain or shortness of breath. PHYSICAL EXAMINATION: In general the patient is alert and oriented x 3 and is in no acute distress. The patient is well-developed and well-nourished with a normal affect. The patient is afebrile. Examination of the right knee reveals the skin to be intact. The surgical wound has healed nicely. She does have a little bit of swelling. There is negative joint effusion and negative patellar ballottement. There are no signs of infection or cellulitis present. Range of motion reveals she is missing approximately 9° on extension to flexion of approximately 105°. Patella tracks nicely. Stability is quite good. She has mild edema without calf tenderness. There is negative Homans. There is no evidence of DVT noted. ASSESSMENT: Status post right knee replacement. PLAN:  At this point the patient is doing well. She will get a prescription for Norco.  The staples were removed today and replaced with Steri-Strips today in the office without complications. We will get her set up with outpatient physical therapy. She is instructed on range of motion activities on her own on an hourly basis with flexion and extension. We will see her back in the office in two weeks time for reevaluation and range of motion check.                   JR Brian LUCERO, SENTHIL, ATC

## 2017-02-24 ENCOUNTER — HOSPITAL ENCOUNTER (OUTPATIENT)
Dept: PHYSICAL THERAPY | Age: 82
Discharge: HOME | End: 2017-02-24
Payer: MEDICARE

## 2017-02-24 PROCEDURE — G8979 MOBILITY GOAL STATUS: HCPCS

## 2017-02-24 PROCEDURE — G8978 MOBILITY CURRENT STATUS: HCPCS

## 2017-02-24 PROCEDURE — 97110 THERAPEUTIC EXERCISES: CPT

## 2017-02-24 PROCEDURE — 97161 PT EVAL LOW COMPLEX 20 MIN: CPT

## 2017-02-24 NOTE — PROGRESS NOTES
PT DAILY TREATMENT NOTE/KNEE EVAL 3-16    Patient Name: Viki Simms  Date:2017  : 1935  [x]  Patient  Verified  Payor: [place] MEDICARE / Plan: [place] MEDICARE PART A & B / Product Type: Medicare /    In time: 12:15  Out time: 12:55  Total Treatment Time (min): 40  Total Timed Codes (min): 8  1:1 Treatment Time ( [address] only): 40   Visit #: 1 of 18    Treatment Area: Knee pain, right [M25.561]  Presence of right artificial knee joint [Z96.651]    SUBJECTIVE  Pain Level (0-10 scale):  3/10  [x]constant []intermittent []improving []worsening [x]no change since onset    Any medication changes, allergies to medications, adverse drug reactions, diagnosis change, or new procedure performed?: [x] No    [] Yes (see summary sheet for update)  Subjective functional status/changes:     PLOF:  Ambulation with cane towards end of things. Gardening, flower arranging, reading. Limitations to PLOF: ambulation with RW  Mechanism of Injury:  19 R TKA secondary to arthritis. Went to SNF after hospital stay. D/C from there yesterday. Uses walker at home. Difficulty getting into and out of shower, has handles. Reports hard to get on shoes because of swelling. Current symptoms/Complaints: see above  Previous Treatment/Compliance: PT at hospital and SNF  PMHx/Surgical Hx: ankle surgery, hysterectomy, gallbladder removal  Work Hx: not working  Living Situation:  Lives with  at home.  Reports stairs to get inside was not problem  Pt Goals: to walk with less pain  Barriers: []pain []financial []time []transportation []other  Motivation: fair  Substance use: []Alcohol []Tobacco []other: no  FABQ Score: []low [x]elevate  Cognition: A & O x 4    Other:    OBJECTIVE/EXAMINATION    8 min Therapeutic Exercise:  [] See flow sheet : HEP   Rationale: increase ROM and increase strength to improve the patients ability to increase ease of ADLs          With   [x] TE   [] TA   [] neuro   [] other: Patient Education: [x] Review HEP [] Progressed/Changed HEP based on:   [] positioning   [] body mechanics   [] transfers   [] heat/ice application    [] other:      Physical Therapy Evaluation - Knee      Gait:  [] Normal    [] Abnormal    [x] Antalgic    [] NWB    Device: RW    Describe: decreased weight shift to R    ROM / Strength  [] Unable to assess                  AROM                      PROM                   Strength (1-5)    Left Right Left Right Left Right   Hip Flexion     4 4-    Extension          Abduction          Adduction         Knee Flexion 120 90   4+ 4    Extension 1 10   4+ 4   Ankle Plantarflexion     4 4    Dorsiflexion     4 4   Ankle MMT tested in sitting    Flexibility: [] Unable to assess at this time  Hamstrings:    (L) Tightness= [] WNL   [] Min   [x] Mod   [] Severe    (R) Tightness= [] WNL   [] Min   [x] Mod   [] Severe  Quadriceps:    (L) Tightness= [] WNL   [] Min   [] Mod   [] Severe    (R) Tightness= [] WNL   [] Min   [] Mod   [] Severe  Gastroc:      (L) Tightness= [] WNL   [] Min   [] Mod   [] Severe    (R) Tightness= [] WNL   [] Min   [] Mod   [] Severe  Other:    Palpation:   Neg/Pos  Neg/Pos  Neg/Pos   Joint Line x Quad tendon x Patellar ligament    Patella  Fibular head  Pes Anserinus    Tibial tubercle  Hamstring tendons  Infrapatellar fat pad      Optional Tests:  Patellar Positioning (Static)   []L []R Normal []L []R Lateral   []L []R Michelle Adina      []L []R Medial   []L []R [place]    Patellar Tracking   []L []R Glide (Lat)   []L []R Tilt (Lat)     []L []R Glide (Med)  []L []R Tilt (Med)      []L []R Tile (Inf)     Patellar Mobility   []L []R Hypermobile []L []R Hypomobile     Good patella mobility             Other tests/comments: decreased balance with EC Rhomberg        Pain Level (0-10 scale) post treatment:  3/10    ASSESSMENT/Changes in Function:      [x]  See Plan of Care  []  See progress note/recertification  []  See Discharge Summary         Progress towards goals / Updated goals:  See POC    PLAN  []  Upgrade activities as tolerated     [x]  Continue plan of care  []  Update interventions per flow sheet       []  Discharge due to:_  []  Other:_      Becca Sung, PT 2/24/2017  12:07 PM

## 2017-02-24 NOTE — PROGRESS NOTES
[facility]  [email or web address] [facility] [address]  [phone] fax    Plan of Care/ Statement of Necessity for Physical Therapy Services    Patient name: Swathi Lion Start of Care: 2017   Referral source: Bigg Vela MD : 1935    Medical Diagnosis: Knee pain, right [M25.561]  Presence of right artificial knee joint [Z96.651]   Onset Date: 19    Treatment Diagnosis:  R knee pain   Prior Hospitalization: see medical history Provider#: [id number]   Medications: Verified on Patient summary List    Comorbidities: HTN, arthritis    Prior Level of Function: ambulation with cane, Ind with ADLs      The Plan of Care and following information is based on the information from the initial evaluation. Assessment/ key information:  Pt. Is a 80year old female s/p R TKA on 17. She reports going to a SNF after her hospital stay and was D/C yesterday. She reports she hasn't tried her home exercises yet. She continues to have R LE edema and reports DVT was ruled out with imaging. She currently uses a RW for ambulation at home. She presents with significant decrease in R knee mobility at 10-90 degrees. She also has decreased knee strength but testing was limited by pain. Good patella mobility. She has decreased balance with eyes closed. Incision appears to be healing well and steri-strips are in place. Pt. Was educated on importance of her HEP. Skilled PT is medically necessary in order to improve knee mobility and strength for increased ease of ambulation and return to PLOF.       Evaluation Complexity History MEDIUM  Complexity : 1-2 comorbidities / personal factors will impact the outcome/ POC ; Examination MEDIUM Complexity : 3 Standardized tests and measures addressing body structure, function, activity limitation and / or participation in recreation  ;Presentation LOW Complexity : Stable, uncomplicated  ;Clinical Decision Making HIGH Complexity : FOTO score of 1- 25   Overall Complexity Rating: LOW   Problem List: pain affecting function, decrease ROM, decrease strength, edema affecting function, impaired gait/ balance, decrease ADL/ functional abilitiies, decrease activity tolerance, decrease flexibility/ joint mobility and decrease transfer abilities   Treatment Plan may include any combination of the following: Therapeutic exercise, Therapeutic activities, Neuromuscular re-education, Physical agent/modality, Gait/balance training, Manual therapy, Patient education, Self Care training and Functional mobility training  Patient / Family readiness to learn indicated by: asking questions  Persons(s) to be included in education: patient (P)  Barriers to Learning/Limitations: None  Patient Goal (s): to walk with less pain  Patient Self Reported Health Status: fair  Rehabilitation Potential: fair    Short Term Goals: To be accomplished in 1 weeks:  1. Patient will demonstrate compliance with HEP in order to improve R knee mobility. Long Term Goals: To be accomplished in 6 weeks:  1. Patient will improve FOTO score by 27 points in order to demonstrate a significant improvement in function. 2. Patient will improve R knee MMT 0-120 degrees in order to increase ease of ambulation. 3. Patient will improve R knee MMT 5/5 in order to increase ease of climbing stairs. 4. Patient will perform sit to stand transfer without UE support in order to increase ease of transfers at home. Frequency / Duration: Patient to be seen 3 times per week for 6 weeks. Patient/ Caregiver education and instruction: Diagnosis, prognosis, exercises   [x]  Plan of care has been reviewed with PTA    G-Codes (GP)  Mobility   Current  CM= 80-99%   Goal  CK= 40-59%    The severity rating is based on clinical judgment and the FOTO score.     Certification Period: 2/24/17-4/25/17    Rubi Christianson PT 2/24/2017 1:45 PM    ________________________________________________________________________    I certify that the above Therapy Services are being furnished while the patient is under my care. I agree with the treatment plan and certify that this therapy is necessary.     Physician's Signature:____________________  Date:____________Time: _________    Please sign and return to [facility]  [address] [facility] [address] [place]  [phone] fax

## 2017-02-27 ENCOUNTER — HOSPITAL ENCOUNTER (OUTPATIENT)
Dept: PHYSICAL THERAPY | Age: 82
Discharge: HOME | End: 2017-02-27
Payer: MEDICARE

## 2017-02-27 PROCEDURE — 97110 THERAPEUTIC EXERCISES: CPT

## 2017-02-27 PROCEDURE — 97016 VASOPNEUMATIC DEVICE THERAPY: CPT

## 2017-02-27 NOTE — PROGRESS NOTES
PT DAILY TREATMENT NOTE - [facility]    Patient Name: Chapo Martin  Date:2017  : 1935  [x]  Patient  Verified  Payor: Maricel Whitt / Plan: [place] MEDICARE PART A & B / Product Type: Medicare /    In time: 2:04  Out time: 2:55  Total Treatment Time (min):  51  Total Timed Codes (min): 36  1:1 Treatment Time ( [address] only): 36   Visit #: 2 of 18    Treatment Area: Knee pain, right [M25.561]  Presence of right artificial knee joint [Z96.651]    SUBJECTIVE  Pain Level (0-10 scale): 4/10  Any medication changes, allergies to medications, adverse drug reactions, diagnosis change, or new procedure performed?: [x] No    [] Yes (see summary sheet for update)  Subjective functional status/changes:   [] No changes reported  Pt.  Reports she has been working on her HEP but she continues to have a lot of swelling    OBJECTIVE    Modality rationale: decrease edema, decrease inflammation and decrease pain to improve the patients ability to increase ease of ambulation    Min Type Additional Details    [] Estim:  []Unatt       []IFC  []Premod                        []Other:  []w/ice   []w/heat  Position:  Location:    [] Estim: []Att    []TENS instruct  []NMES                    []Other:  []w/US   []w/ice   []w/heat  Position:  Location:    []  Traction: [] Cervical       []Lumbar                       [] Prone          []Supine                       []Intermittent   []Continuous Lbs:  [] before manual  [] after manual    []  Ultrasound: []Continuous   [] Pulsed                           []1MHz   []3MHz Location:  W/cm2:    []  Iontophoresis with dexamethasone         Location: [] Take home patch   [] In clinic    []  Ice     []  heat  []  Ice massage  []  Laser   []  Anodyne Position:  Location:    []  Laser with stim  []  Other: Position:  Location:   15 [x]  Vasopneumatic Device Pressure:       [x] lo [] med [] hi   Temperature: [x] lo [] med [] hi   [x] Skin assessment post-treatment:  [x]intact []redness- no adverse reaction    []redness  adverse reaction:     31 min Therapeutic Exercise:  [x] See flow sheet :   Rationale: increase ROM and increase strength to improve the patients ability to increase ease of ambulation    5 min Manual Therapy:  Patella mobs   Rationale: decrease pain, increase ROM and increase tissue extensibility to increase ease of ambulation           With   [x] TE   [] TA   [] neuro   [] other: Patient Education: [x] Review HEP    [] Progressed/Changed HEP based on:   [] positioning   [] body mechanics   [] transfers   [] heat/ice application    [] other:      Other Objective/Functional Measures:   R knee AROM: 6-100 degrees  Pt. Continues to have significant R knee edema, no calf tenderness to palpate  During extension she has most discomfort in posterior knee  She was challenged and had apprehension with EC Rhomberg     Pain Level (0-10 scale) post treatment: 2/10    ASSESSMENT/Changes in Function: pt. Initiated PT and is compliant with HEP. She demonstrates improvemed R knee mobility but continues to require a RW for ambulation     Patient will continue to benefit from skilled PT services to modify and progress therapeutic interventions, address functional mobility deficits, address ROM deficits, address strength deficits, analyze and address soft tissue restrictions, analyze and cue movement patterns and analyze and modify body mechanics/ergonomics to attain remaining goals. Progress towards goals / Updated goals:  Short Term Goals: To be accomplished in 1 weeks:  1. Patient will demonstrate compliance with HEP in order to improve R knee mobility. Met (2/27/17)     Long Term Goals: To be accomplished in 6 weeks:  1. Patient will improve FOTO score by 27 points in order to demonstrate a significant improvement in function. 2. Patient will improve R knee MMT 0-120 degrees in order to increase ease of ambulation.    3. Patient will improve R knee MMT 5/5 in order to increase ease of climbing stairs. 4. Patient will perform sit to stand transfer without UE support in order to increase ease of transfers at home.      PLAN  []  Upgrade activities as tolerated     [x]  Continue plan of care  []  Update interventions per flow sheet       []  Discharge due to:_  []  Other:_      Kevin Montilla, PT 2/27/2017  2:14 PM

## 2017-02-28 ENCOUNTER — HOME CARE VISIT (OUTPATIENT)
Dept: HOME HEALTH SERVICES | Facility: HOME HEALTH | Age: 82
End: 2017-02-28

## 2017-02-28 ENCOUNTER — HOME CARE VISIT (OUTPATIENT)
Dept: SCHEDULING | Facility: HOME HEALTH | Age: 82
End: 2017-02-28

## 2017-03-01 ENCOUNTER — HOSPITAL ENCOUNTER (OUTPATIENT)
Dept: PHYSICAL THERAPY | Age: 82
Discharge: HOME | End: 2017-03-01
Payer: MEDICARE

## 2017-03-01 PROCEDURE — 97110 THERAPEUTIC EXERCISES: CPT

## 2017-03-01 PROCEDURE — 97016 VASOPNEUMATIC DEVICE THERAPY: CPT

## 2017-03-01 NOTE — PROGRESS NOTES
PT DAILY TREATMENT NOTE - [facility]     Patient Name: Phill Wright  Date:3/1/2017  : 1935  [x]  Patient  Verified  Payor: VA MEDICARE / Plan: VA MEDICARE PART A & B / Product Type: Medicare /    In time:12:00  Out time:12:53  Total Treatment Time (min): 53  Total Timed Codes (min): 38  1:1 Treatment Time ( only): 38   Visit #: 3 of 18    Treatment Area: Knee pain, right [M25.561]  Presence of right artificial knee joint [Z96.651]    SUBJECTIVE  Pain Level (0-10 scale): 10  Any medication changes, allergies to medications, adverse drug reactions, diagnosis change, or new procedure performed?: [x] No    [] Yes (see summary sheet for update)  Subjective functional status/changes:   [] No changes reported  Pts pain is up in both knees today due to the weather. Pt takes prescribed pain medication to relieve the pain. She reports that getting up, standing, and sitting down hurts which makes it difficult. Pt enjoys working in the yard, and although she doesn't want to, she needs to be able to clean the house. Pt wants to return to walking without AD. Pt reports icing and elevating at home.      OBJECTIVE    Modality rationale: decrease inflammation, decrease pain and increase tissue extensibility to improve the patients ability to increase ease of ambulation   Min Type Additional Details    [] Estim:  []Unatt       []IFC  []Premod                        []Other:  []w/ice   []w/heat  Position:  Location:    [] Estim: []Att    []TENS instruct  []NMES                    []Other:  []w/US   []w/ice   []w/heat  Position:  Location:    []  Traction: [] Cervical       []Lumbar                       [] Prone          []Supine                       []Intermittent   []Continuous Lbs:  [] before manual  [] after manual    []  Ultrasound: []Continuous   [] Pulsed                           []1MHz   []3MHz W/cm2:  Location:    []  Iontophoresis with dexamethasone         Location: [] Take home patch   [] In clinic []  Ice     []  heat  []  Ice massage  []  Laser   []  Anodyne Position:  Location:    []  Laser with stim  []  Other:  Position:  Location:   15 [x]  Vasopneumatic Device Pressure:       [x] lo [] med [] hi   Temperature: [x] lo [] med [] hi   [x] Skin assessment post-treatment:  [x]intact []redness- no adverse reaction    []redness  adverse reaction:     33 min Therapeutic Exercise:  [x] See flow sheet :   Rationale: increase ROM and increase strength to improve the patients ability to increase ease of ambulation    5 min Manual Therapy: R  Patellar mobilization grade III: inferior/superior   Rationale: decrease pain, increase ROM and increase tissue extensibility to improve ease of ambulation and stair negotiation          With   [] TE   [] TA   [] neuro   [] other: Patient Education: [x] Review HEP    [] Progressed/Changed HEP based on:   [] positioning   [] body mechanics   [] transfers   [] heat/ice application    [] other:      Other Objective/Functional Measures:   Required cues to perform slow controlled motions during exercise (hip x 3)  Required cues to push off chair during sit to stand from bike  Required one break during therapeutic exercises for water and to rest   Limited motion available in superior and inferior patellar mobilizations  No increased pain from exercise   Pain during extension with heel slide     Pain Level (0-10 scale) post treatment: 3/10    ASSESSMENT/Changes in Function: Pt compliant with HEP which will enable pt to be independent with strengthening at home. She continues to need a RW with household and community ambulation due to weakness and imbalance. She continues to have post-surgical swelling in the R knee, but is independent with management at home. Will continue to benefit from skilled therapy to strengthen leg muscles to return to yard work and household duties with full range of motion and less pain.      Patient will continue to benefit from skilled PT services to modify and progress therapeutic interventions, address functional mobility deficits, address ROM deficits, address strength deficits, analyze and address soft tissue restrictions, analyze and cue movement patterns, analyze and modify body mechanics/ergonomics, assess and modify postural abnormalities, address imbalance/dizziness and instruct in home and community integration to attain remaining goals. Progress towards goals / Updated goals:  Short Term Goals: To be accomplished in 1 weeks:  1. Patient will demonstrate compliance with HEP in order to improve R knee mobility. Met (2/27/17)    Long Term Goals: To be accomplished in 6 weeks:  1. Patient will improve FOTO score by 27 points in order to demonstrate a significant improvement in function. 2. Patient will improve R knee MMT 0-120 degrees in order to increase ease of ambulation. 3. Patient will improve R knee MMT 5/5 in order to increase ease of climbing stairs. 4. Patient will perform sit to stand transfer without UE support in order to increase ease of transfers at home.      PLAN  [x]  Upgrade activities as tolerated     [x]  Continue plan of care  []  Update interventions per flow sheet       []  Discharge due to:_  []  Other:_      Tonja Dickerson PTA 3/1/2017  9:54 AM    Future Appointments  Date Time Provider Raisa Alvarado   3/1/2017 12:00 PM Tonja Dickerson PTA [facility]   3/2/2017 12:30 PM Tonja Dickerson PTA [facility]   3/6/2017 12:00 PM Tonja Dickerson PTA [facility]   3/8/2017 1:00 PM Tonja Dickerson PTA [facility]   3/10/2017 2:30 PM Renella Burkitt, PT [facility]   3/13/2017 12:00 PM Tonja Dickerson PTA [facility]   3/15/2017 12:00 PM Tonja Dickerson PTA [facility]   3/17/2017 12:30 PM Tonja Dickerson PTA [facility]   3/20/2017 2:30 PM Renella Burkitt, PT [facility]   3/22/2017 12:00 PM Tonja Dickerson, FRANCESCA [facility]   3/24/2017 12:00 PM Renella Burkitt, PT [facility]   3/27/2017 12:00 PM Edna Powell, PT [facility]   3/29/2017 12:00 PM Gwendolyn Philippe, PTA [facility]   3/31/2017 12:00 PM Edna Powell, PT [facility]

## 2017-03-02 ENCOUNTER — HOSPITAL ENCOUNTER (OUTPATIENT)
Dept: PHYSICAL THERAPY | Age: 82
Discharge: HOME | End: 2017-03-02
Payer: MEDICARE

## 2017-03-02 PROCEDURE — 97140 MANUAL THERAPY 1/> REGIONS: CPT

## 2017-03-02 PROCEDURE — 97110 THERAPEUTIC EXERCISES: CPT

## 2017-03-02 NOTE — PROGRESS NOTES
PT DAILY TREATMENT NOTE - [facility]     Patient Name: Jennifer Arredondo  Date:3/2/2017  : 1935  [x]  Patient  Verified  Payor: [place] MED[place] / Plan: [place] MEDICARE PART A & B / Product Type: Medicare /    In time:12:30  Out time:1:26  Total Treatment Time (min): 56  Total Timed Codes (min): 41  1:1 Treatment Time ( [address] only): 25  Visit #:4 of 18    Treatment Area: Knee pain, right [M25.561]  Presence of right artificial knee joint [Z96.651]    SUBJECTIVE  Pain Level (0-10 scale): 3/10  Any medication changes, allergies to medications, adverse drug reactions, diagnosis change, or new procedure performed?: [x] No    [] Yes (see summary sheet for update)  Subjective functional status/changes:   [] No changes reported  Pt reports pain got worse after treatment yesterday and got cold, took pain medications and doing HEP helped relieve pain. Reports leg and shoulder were both hurting yesterday which is why she thinks it's the weather to blame. Reports walking seems to get a little easier with RW. Reports difficulty with knee stretch HEP, will try a bigger sheet instead of a towel.      OBJECTIVE    Modality rationale: decrease inflammation and decrease pain to improve the patients ability to improve ease of ambulation and transfers at home    Min Type Additional Details    [] Estim:  []Unatt       []IFC  []Premod                        []Other:  []w/ice   []w/heat  Position:  Location:    [] Estim: []Att    []TENS instruct  []NMES                    []Other:  []w/US   []w/ice   []w/heat  Position:  Location:    []  Traction: [] Cervical       []Lumbar                       [] Prone          []Supine                       []Intermittent   []Continuous Lbs:  [] before manual  [] after manual    []  Ultrasound: []Continuous   [] Pulsed                           []1MHz   []3MHz W/cm2:  Location:    []  Iontophoresis with dexamethasone         Location: [] Take home patch   [] In clinic    []  Ice     []  heat  []  Ice massage  []  Laser   []  Anodyne Position:  Location:    []  Laser with stim  []  Other:  Position:  Location:   15 [x]  Vasopneumatic Device Pressure:       [x] lo [] med [] hi   Temperature: [x] lo [] med [] hi   [x] Skin assessment post-treatment:  [x]intact []redness- no adverse reaction    []redness  adverse reaction:     31 min Therapeutic Exercise:  [x] See flow sheet :   Rationale: increase ROM, increase strength, improve coordination, improve balance and increase proprioception to improve the patients ability to ambulate household distances and improve stair negotiation    10 min Manual Therapy: R Patellar mobilization, articularis genu strumming to R knee    Rationale: decrease pain, increase ROM and increase tissue extensibility to improve knee mobility to increase ease of stair negotiation and ambulation        With   [] TE   [] TA   [] neuro   [] other: Patient Education: [x] Review HEP    [] Progressed/Changed HEP based on:   [] positioning   [] body mechanics   [] transfers   [] heat/ice application    [] other:      Other Objective/Functional Measures:   R knee AROM after manual:105 degrees   Pain with knee extension during heel slides  Decreased pain after manual     Pain Level (0-10 scale) post treatment: 0/10    ASSESSMENT/Changes in Function: Pt continues to progress in therapy with R knee flexion of 105 degrees increased from 90 degrees since initial evaluation. Pt is compliant with HEP. She continues to use RW during ambulation for balance and safety. Will continue to increase strength in leg muscles to improve patients ability  to return to walking community and household distances without AD with decreased fall risk.      Patient will continue to benefit from skilled PT services to modify and progress therapeutic interventions, address functional mobility deficits, address ROM deficits, address strength deficits, analyze and address soft tissue restrictions, analyze and cue movement patterns, analyze and modify body mechanics/ergonomics, assess and modify postural abnormalities, address imbalance/dizziness and instruct in home and community integration to attain remaining goals. Progress towards goals / Updated goals:  Short Term Goals: To be accomplished in 1 weeks:  1. Patient will demonstrate compliance with HEP in order to improve R knee mobility. Met (2/27/17)    Long Term Goals: To be accomplished in 6 weeks:  1. Patient will improve FOTO score by 27 points in order to demonstrate a significant improvement in function. 2. Patient will improve R knee MMT 0-120 degrees in order to increase ease of ambulation. Progressing 3/2/17 R knee AROM after manual:105 degrees   3. Patient will improve R knee MMT 5/5 in order to increase ease of climbing stairs. 4. Patient will perform sit to stand transfer without UE support in order to increase ease of transfers at home.      PLAN  [x]  Upgrade activities as tolerated     [x]  Continue plan of care  []  Update interventions per flow sheet       []  Discharge due to:_  []  Other:_      Peace Smith PTA 3/2/2017  9:43 AM    Future Appointments  Date Time Provider Raisa Alvarado   3/2/2017 12:30 PM Peace Smith, PTA [facility]   3/6/2017 12:00 PM Peace Smith, PTA [facility]   3/8/2017 1:00 PM Peace Smith, PTA [facility]   3/10/2017 2:30 PM Jose Manuel Mullins PT [facility]   3/13/2017 12:00 PM Waundon Smith, PTA [facility]   3/15/2017 12:00 PM Waunita Luis, PTA [facility]   3/17/2017 12:30 PM Peace Parksam, PTA [facility]   3/20/2017 2:30 PM Jose Manuel Mullins PT [facility]   3/22/2017 12:00 PM Peace Smith PTA [facility]   3/24/2017 12:00 PM Jose Manuel Mullins, HELEN [facility]   3/27/2017 12:00 PM Jose Manuel Mullins, HELEN [facility]   3/29/2017 12:00 PM Peace Smith PTA [facility]   3/31/2017 12:00 PM Jose Manuel Mullins, HELEN [facility]

## 2017-03-06 ENCOUNTER — HOSPITAL ENCOUNTER (OUTPATIENT)
Dept: PHYSICAL THERAPY | Age: 82
Discharge: HOME | End: 2017-03-06
Payer: MEDICARE

## 2017-03-06 PROCEDURE — 97110 THERAPEUTIC EXERCISES: CPT

## 2017-03-06 PROCEDURE — 97016 VASOPNEUMATIC DEVICE THERAPY: CPT

## 2017-03-06 NOTE — PROGRESS NOTES
PT DAILY TREATMENT NOTE - [facility]     Patient Name: Jennifer Arredondo  Date:3/6/2017  : 1935  [x]  Patient  Verified  Payor: [place] MEDICARE / Plan: [place] MEDICARE PART A & B / Product Type: Medicare /    In time:12:04  Out time: 1:04  Total Treatment Time (min): 60  Total Timed Codes (min): 45  1:1 Treatment Time ( [address] only): 39   Visit #: 5 of 18    Treatment Area: Knee pain, right [M25.561]  Presence of right artificial knee joint [Z96.651]    SUBJECTIVE  Pain Level (0-10 scale): 5/10  Any medication changes, allergies to medications, adverse drug reactions, diagnosis change, or new procedure performed?: [x] No    [] Yes (see summary sheet for update)  Subjective functional status/changes:   [] No changes reported  Pt reports aching/soreness deep in her R knee today. Pt forgot to bring her ice packs she wanted to learn how to use correctly. Pt wants to be able to get up out of chairs without using her hands and walk without her walker. Pt has a SPC she was using at home before the surgery.      OBJECTIVE    Modality rationale: decrease inflammation and decrease pain to improve the patients ability to ambulate and transfer with LRAD   Min Type Additional Details    [] Estim:  []Unatt       []IFC  []Premod                        []Other:  []w/ice   []w/heat  Position:  Location:    [] Estim: []Att    []TENS instruct  []NMES                    []Other:  []w/US   []w/ice   []w/heat  Position:  Location:    []  Traction: [] Cervical       []Lumbar                       [] Prone          []Supine                       []Intermittent   []Continuous Lbs:  [] before manual  [] after manual    []  Ultrasound: []Continuous   [] Pulsed                           []1MHz   []3MHz W/cm2:  Location:    []  Iontophoresis with dexamethasone         Location: [] Take home patch   [] In clinic    []  Ice     []  heat  []  Ice massage  []  Laser   []  Anodyne Position:  Location:    []  Laser with stim  []  Other: Position:  Location:   15 [x]  Vasopneumatic Device Pressure:       [x] lo [] med [] hi   Temperature: [x] lo [] med [] hi   [x] Skin assessment post-treatment:  [x]intact []redness- no adverse reaction    []redness  adverse reaction:     45 min Therapeutic Exercise:  [x] See flow sheet :   Rationale: increase ROM, increase strength, improve coordination, improve balance and increase proprioception to improve the patients ability to improve ease of ambulation with LRAD          With   [] TE   [] TA   [] neuro   [] other: Patient Education: [x] Review HEP    [] Progressed/Changed HEP based on:   [] positioning   [] body mechanics   [] transfers   [] heat/ice application    [] other:      Other Objective/Functional Measures: FOTO:  34  Ambulated in // with L HR only and pt had good step length and no increased pain; only cues for heel strike  Trial of using SPC from // to plinth for ice; CGA but no evidence of imbalance or buckling; correct sequencing of using SPC  Educated on cocoa butter or vitamin E to help with dry skin; incision healing nicely  No LOB on foam romberg EO; increased ankle movements  Slight R knee pain during eccentric control when L leg coming down off of 6\" box during step ups  Able to perform sit to stands with L leg slightly forward without UEs from med/high table height    Pain Level (0-10 scale) post treatment: 3/10    ASSESSMENT/Changes in Function: Pt making good progress towards initial goals in therapy. Pt has improving balance on compliant surfaces and progressing towards using SPC instead of RW for household ambulation. Pt continues to have fluctuating healing pain in the knee depending on level of activity and swelling. Will continue working on strength and mobility to improve ease of sit to stand transfers without using UEs and to ambulate household and community distances with decreased fall risk and using LRAD.      Patient will continue to benefit from skilled PT services to modify and progress therapeutic interventions, address functional mobility deficits, address ROM deficits, address strength deficits, analyze and address soft tissue restrictions, analyze and cue movement patterns, analyze and modify body mechanics/ergonomics, assess and modify postural abnormalities, address imbalance/dizziness and instruct in home and community integration to attain remaining goals. Progress towards goals / Updated goals:  Short Term Goals: To be accomplished in 1 weeks:  1. Patient will demonstrate compliance with HEP in order to improve R knee mobility. Met (2/27/17)    Long Term Goals: To be accomplished in 6 weeks:  1. Patient will improve FOTO score by 27 points in order to demonstrate a significant improvement in function. Progressing 15 point improvement (3/6/17)  2. Patient will improve R knee MMT 0-120 degrees in order to increase ease of ambulation. Progressing 3/2/17 R knee AROM after manual:105 degrees   3. Patient will improve R knee MMT 5/5 in order to increase ease of climbing stairs. 4. Patient will perform sit to stand transfer without UE support in order to increase ease of transfers at home.  Progressing from med/high height plinth (3/6/17)    PLAN  [x]  Upgrade activities as tolerated     [x]  Continue plan of care  []  Update interventions per flow sheet       []  Discharge due to:_  []  Other:_      Nancy Garcia PTA 3/6/2017  9:19 AM    Future Appointments  Date Time Provider Raisa Alvarado   3/6/2017 12:00 PM Nancy Garcia PTA [facility]   3/8/2017 1:00 PM Nancy Garcia PTA [facility]   3/10/2017 2:30 PM Jeny Melendez PT [facility]   3/13/2017 12:00 PM Nancy Garcia PTA [facility]   3/15/2017 12:00 PM Nancy Garcia PTA [facility]   3/17/2017 12:30 PM [facility]   3/20/2017 2:30 PM Jeny Melendez PT [facility]   3/22/2017 12:00 PM Nancy Garcia PTA [facility]   3/24/2017 12:00 PM Jeny Melendez PT [facility]   3/27/2017 12:00 PM Christophe Dominguez, PT [facility]   3/29/2017 12:00 PM Xiao Foster, PTA [facility]   3/31/2017 12:00 PM Christophe Dominguez, PT [facility]

## 2017-03-08 ENCOUNTER — HOSPITAL ENCOUNTER (OUTPATIENT)
Dept: PHYSICAL THERAPY | Age: 82
Discharge: HOME | End: 2017-03-08
Payer: MEDICARE

## 2017-03-08 PROCEDURE — 97016 VASOPNEUMATIC DEVICE THERAPY: CPT

## 2017-03-08 PROCEDURE — 97110 THERAPEUTIC EXERCISES: CPT

## 2017-03-08 PROCEDURE — 97116 GAIT TRAINING THERAPY: CPT

## 2017-03-08 NOTE — PROGRESS NOTES
PT DAILY TREATMENT NOTE - [facility]     Patient Name: Swathi Lion  Date:3/8/2017  : 1935  [x]  Patient  Verified  Payor: [place] MEDICARE / Plan: [place] MEDICARE PART A & B / Product Type: Medicare /    In time:1:00 Out time:1:50  Total Treatment Time (min): 50  Total Timed Codes (min): 35  1:1 Treatment Time ( [address] only): 33   Visit #: 6 of 18    Treatment Area: Knee pain, right [M25.561]  Presence of right artificial knee joint [Z96.651]    SUBJECTIVE  Pain Level (0-10 scale): 3/10  Any medication changes, allergies to medications, adverse drug reactions, diagnosis change, or new procedure performed?: [x] No    [] Yes (see summary sheet for update)  Subjective functional status/changes:   [] No changes reported  Pt reports needing to go and get shoes with more support. Pt unable to put other shoes on due to swelling in foot being so bad. Pt continues to do HEP and it loosens up stiffness in knee. Pt reports balance still being pretty bad. Pt uses RW around the house and reports feeling insecure/nervous  with SPC last treatment. Pt reports leg feels much stronger.     OBJECTIVE    Modality rationale: decrease edema, decrease inflammation and decrease pain to improve the patients ability to ambulate with decreased pain and improve quad activation    Min Type Additional Details    [] Estim:  []Unatt       []IFC  []Premod                        []Other:  []w/ice   []w/heat  Position:  Location:    [] Estim: []Att    []TENS instruct  []NMES                    []Other:  []w/US   []w/ice   []w/heat  Position:  Location:    []  Traction: [] Cervical       []Lumbar                       [] Prone          []Supine                       []Intermittent   []Continuous Lbs:  [] before manual  [] after manual    []  Ultrasound: []Continuous   [] Pulsed                           []1MHz   []3MHz W/cm2:  Location:    []  Iontophoresis with dexamethasone         Location: [] Take home patch   [] In clinic    []  Ice     [] heat  []  Ice massage  []  Laser   []  Anodyne Position:  Location:    []  Laser with stim  []  Other:  Position:  Location:   15 [x]  Vasopneumatic Device Pressure:       [x] lo [] med [] hi   Temperature: [x] lo [] med [] hi   [x] Skin assessment post-treatment:  [x]intact []redness- no adverse reaction    []redness  adverse reaction:     15 min Therapeutic Exercise:  [x] See flow sheet :   Rationale: increase ROM, increase strength, improve coordination, improve balance and increase proprioception to improve the patients ability to ambulate with LRAD     20 min Gait Trainin and 71 feet with SPC device on level surfaces with _SBA__ level of assist   Rationale: Improve gait pattern to WNL and decrease fall risk with LRAD           With   [] TE   [] TA   [] neuro   [] other: Patient Education: [x] Review HEP    [] Progressed/Changed HEP based on:   [] positioning   [] body mechanics   [] transfers   [] heat/ice application    [] other:      Other Objective/Functional Measures:   R knee AAROM : 112 degrees   R knee flexion MMT: 5/5  R knee extension MMT: 5/5  Challenged with sit to stands and cues to not push legs into table to assist  Ambulated with proper sequencing of AD but required cues to perform heel to toe pattern to decrease fall risk    Pain Level (0-10 scale) post treatment: 1/10    ASSESSMENT/Changes in Function: Pt continuing to progress with therapy evidenced by increase strength with knee flexion and extension by 1 grade since evaluation. Pt progressing from  to [facility] but continues to need work on strength, endurance, and balance to decrease fall risk with household/community distances. Swelling continues to limit R knee mobility. Pt will benefit from further therapy to improve strength and balance of R knee for ease of ambulation, stair negotiation, and transfers.      Patient will continue to benefit from skilled PT services to modify and progress therapeutic interventions, address functional mobility deficits, address ROM deficits, address strength deficits, analyze and address soft tissue restrictions, analyze and cue movement patterns, analyze and modify body mechanics/ergonomics, assess and modify postural abnormalities, address imbalance/dizziness and instruct in home and community integration to attain remaining goals. Progress towards goals / Updated goals:  Short Term Goals: To be accomplished in 1 weeks:  1. Patient will demonstrate compliance with HEP in order to improve R knee mobility. Met (2/27/17)    Long Term Goals: To be accomplished in 6 weeks:  1. Patient will improve FOTO score by 27 points in order to demonstrate a significant improvement in function. Progressing 15 point improvement (3/6/17)  2. Patient will improve R knee MMT 0-120 degrees in order to increase ease of ambulation. Progressing 3/2/17 R knee AROM after manual:105 degrees   3. Patient will improve R knee MMT 5/5 in order to increase ease of climbing stairs. Met 3/8/17  R knee flexion MMT: 5/5  R knee extension MMT: 5/5  4. Patient will perform sit to stand transfer without UE support in order to increase ease of transfers at home.  Progressing from med/high height plinth (3/6/17)    PLAN  [x]  Upgrade activities as tolerated     [x]  Continue plan of care  []  Update interventions per flow sheet       []  Discharge due to:_  []  Other:_      Monique Sanchez 3/8/2017  9:47 AM    Future Appointments  Date Time Provider Raisa Alvarado   3/8/2017 1:00 PM Gamal Bruce PTA [facility]   3/10/2017 2:30 PM Estevan Nageotte, HELEN [facility]   3/13/2017 12:00 PM Gamal Bruce PTA [facility]   3/15/2017 12:00 PM Gamal Bruce PTA [facility]   3/17/2017 12:30 PM Gamal Bruce PTA [id number]   3/20/2017 2:30 PM Marvin Prado [id number] [facility]   3/22/2017 12:00 PM Gamal Bruce PTA [facility]   3/24/2017 12:00 PM Estevan Nageotte, PT [facility]   3/27/2017 12:00 PM Estevan Nageotte, PT [id number] [facility]   3/29/2017 12:00 PM Hyacinth Mcburney, FRANCESCA [facility]   3/31/2017 12:00 PM Taylor Ibarra, PT [facility]

## 2017-03-09 RX ORDER — HYDROCODONE BITARTRATE AND ACETAMINOPHEN 10; 325 MG/1; MG/1
1-2 TABLET ORAL
Qty: 60 TAB | Refills: 0 | Status: SHIPPED | OUTPATIENT
Start: 2017-03-09 | End: 2017-04-05

## 2017-03-09 NOTE — TELEPHONE ENCOUNTER
Last Visit: 02/23/2017 with CESAR Davis   Date of Surgery: 02/06/2017 right knee replacement  Next Appointment: noted to f/u in 2 weeks   Previous Refill Encounters: 02/23/2017 per CESAR Davis #[id number]     Requested Prescriptions     Pending Prescriptions Disp Refills    HYDROcodone-acetaminophen (NORCO)  mg tablet 60 Tab 0     Sig: Take 1-2 Tabs by mouth every six (6) hours as needed for Pain. Max Daily Amount: 8 Tabs.

## 2017-03-10 ENCOUNTER — HOSPITAL ENCOUNTER (OUTPATIENT)
Dept: PHYSICAL THERAPY | Age: 82
Discharge: HOME | End: 2017-03-10
Payer: MEDICARE

## 2017-03-10 PROCEDURE — 97110 THERAPEUTIC EXERCISES: CPT

## 2017-03-10 PROCEDURE — 97016 VASOPNEUMATIC DEVICE THERAPY: CPT

## 2017-03-10 NOTE — PROGRESS NOTES
PT DAILY TREATMENT NOTE - [facility] 3-16    Patient Name: Jayne Dickinson  Date:3/10/2017  : 1935  [x]  Patient  Verified  Payor: [place] MED[place]ARE / Plan: [place] MEDICARE PART A & B / Product Type: Medicare /    In time: 2:28  Out time: 3:18  Total Treatment Time (min): 50  Total Timed Codes (min): 35  1:1 Treatment Time ( only): 35   Visit #: 7 of 18    Treatment Area: Knee pain, right [M25.561]  Presence of right artificial knee joint [Z96.651]    SUBJECTIVE  Pain Level (0-10 scale): 4/10  Any medication changes, allergies to medications, adverse drug reactions, diagnosis change, or new procedure performed?: [x] No    [] Yes (see summary sheet for update)  Subjective functional status/changes:   [] No changes reported  Pt. Reports she has been doing pretty good. She reports compliance with her HEP.      OBJECTIVE    Modality rationale: decrease edema, decrease inflammation and decrease pain to improve the patients ability to increase ease of ambulation    Min Type Additional Details    [] Estim:  []Unatt       []IFC  []Premod                        []Other:  []w/ice   []w/heat  Position:  Location:    [] Estim: []Att    []TENS instruct  []NMES                    []Other:  []w/US   []w/ice   []w/heat  Position:  Location:    []  Traction: [] Cervical       []Lumbar                       [] Prone          []Supine                       []Intermittent   []Continuous Lbs:  [] before manual  [] after manual    []  Ultrasound: []Continuous   [] Pulsed                           []1MHz   []3MHz Location:  W/cm2:    []  Iontophoresis with dexamethasone         Location: [] Take home patch   [] In clinic    []  Ice     []  heat  []  Ice massage  []  Laser   []  Anodyne Position:  Location:    []  Laser with stim  []  Other: Position:  Location:   15 [x]  Vasopneumatic Device Pressure:       [x] lo [] med [] hi   Temperature: [] lo [] med [x] hi   [x] Skin assessment post-treatment:  [x]intact []redness- no adverse reaction    []redness  adverse reaction:     35 min Therapeutic Exercise:  [x] See flow sheet :   Rationale: increase ROM and increase strength to improve the patients ability to increase ease of ambulation           With   [x] TE   [] TA   [] neuro   [] other: Patient Education: [x] Review HEP    [] Progressed/Changed HEP based on:   [] positioning   [] body mechanics   [] transfers   [] heat/ice application    [] other:      Other Objective/Functional Measures:   R knee AROM 5-117 degrees   Pt. Had mild instability with SPC but demonstrated no LOB  Pt. Was unable to perform hipx3 with 1 UE support    Pain Level (0-10 scale) post treatment: 0/10    ASSESSMENT/Changes in Function:  Pt. Is progressing towards goals. She demonstrates improving R knee mobility and increasing ease of ambulation. Patient will continue to benefit from skilled PT services to modify and progress therapeutic interventions, address functional mobility deficits, address ROM deficits, address strength deficits, analyze and address soft tissue restrictions, analyze and cue movement patterns, analyze and modify body mechanics/ergonomics and assess and modify postural abnormalities to attain remaining goals. Progress towards goals / Updated goals:  Short Term Goals: To be accomplished in 1 weeks:  1. Patient will demonstrate compliance with HEP in order to improve R knee mobility. Met (17)    Long Term Goals: To be accomplished in 6 weeks:  1. Patient will improve FOTO score by 27 points in order to demonstrate a significant improvement in function. Progressing 15 point improvement (3/6/17)  2. Patient will improve R knee MMT 0-120 degrees in order to increase ease of ambulation. Progressin-117 degrees (3/10/17)  3. Patient will improve R knee MMT 5/5 in order to increase ease of climbing stairs. Met 3/8/17  R knee flexion MMT: 5/5 R knee extension MMT: 5/5  4.  Patient will perform sit to stand transfer without UE support in order to increase ease of transfers at home.  Progressing from med/high height plinth (3/6/17)    PLAN  []  Upgrade activities as tolerated     [x]  Continue plan of care  []  Update interventions per flow sheet       []  Discharge due to:_  []  Other:_      Nakul Hinojosa, PT 3/10/2017  2:34 PM

## 2017-03-10 NOTE — TELEPHONE ENCOUNTER
[address] notifying patient that Rx is available for  at our [facility] location

## 2017-03-13 ENCOUNTER — HOSPITAL ENCOUNTER (OUTPATIENT)
Dept: PHYSICAL THERAPY | Age: 82
Discharge: HOME | End: 2017-03-13
Payer: MEDICARE

## 2017-03-13 PROCEDURE — 97110 THERAPEUTIC EXERCISES: CPT

## 2017-03-13 PROCEDURE — 97016 VASOPNEUMATIC DEVICE THERAPY: CPT

## 2017-03-13 NOTE — PROGRESS NOTES
PT DAILY TREATMENT NOTE - [facility]     Patient Name: Keo Rosario  Date:3/13/2017  : 1935  [x]  Patient  Verified  Payor: [place] MEDICARE / Plan: [place] MEDICARE PART A & B / Product Type: Medicare /    In time: 12:00  Out time:12:51  Total Treatment Time (min): 51  Total Timed Codes (min): 36  1:1 Treatment Time ( [address] only): 30   Visit #: 8 of 18    Treatment Area: Knee pain, right [M25.561]  Presence of right artificial knee joint [Z96.651]    SUBJECTIVE  Pain Level (0-10 scale): 5/10  Any medication changes, allergies to medications, adverse drug reactions, diagnosis change, or new procedure performed?: [x] No    [] Yes (see summary sheet for update)  Subjective functional status/changes:   [] No changes reported  Pt reports increased swelling from sitting without elevating her R leg over the weekend. Pt notes a lot of walking and running errands flared up her pain today. Pt states she did walk some without her cane or walker because she forgot she needed it at her house over the weekend. Pt is still having a hard time standing from chairs and going up the stairs in her house.      OBJECTIVE    Modality rationale: decrease pain and increase tissue extensibility to improve the patients ability to ambulate with LRAD and decreased fall risk   Min Type Additional Details    [] Estim:  []Unatt       []IFC  []Premod                        []Other:  []w/ice   []w/heat  Position:  Location:    [] Estim: []Att    []TENS instruct  []NMES                    []Other:  []w/US   []w/ice   []w/heat  Position:  Location:    []  Traction: [] Cervical       []Lumbar                       [] Prone          []Supine                       []Intermittent   []Continuous Lbs:  [] before manual  [] after manual    []  Ultrasound: []Continuous   [] Pulsed                           []1MHz   []3MHz W/cm2:  Location:    []  Iontophoresis with dexamethasone         Location: [] Take home patch   [] In clinic    []  Ice     []  heat  [] Ice massage  []  Laser   []  Anodyne Position:  Location:    []  Laser with stim  []  Other:  Position:  Location:   15 [x]  Vasopneumatic Device Pressure:       [x] lo [] med [] hi   Temperature: [x] lo [] med [] hi   [x] Skin assessment post-treatment:  [x]intact []redness- no adverse reaction    []redness  adverse reaction:     36 min Therapeutic Exercise:  [x] See flow sheet :   Rationale: increase ROM, increase strength, improve coordination, improve balance and increase proprioception to improve the patients ability to ambulate with LRAD and decreased fall risk       With   [] TE   [] TA   [] neuro   [] other: Patient Education: [x] Review HEP    [] Progressed/Changed HEP based on:   [] positioning   [] body mechanics   [] transfers   [] heat/ice application    [] other:      Other Objective/Functional Measures:   Catching pain in L knee with attempt at step ups so held at this point  Added LAQ/HS curls to exercise program  Head to L knee during game ready to help with L knee pain  Able to ambulate around clinic with SPC without LOB  Challenged with sit to stands from Med height table without UEs     Pain Level (0-10 scale) post treatment: 1/10    ASSESSMENT/Changes in Function: Pt making steady progress towards goals in therapy. Pt continues to be limited from full progression to [facility] due to pain and swelling in R knee. Pt has improvement in strength but continues to be unable to perform sit to stand transfers without UE assist. Pt also has L knee pain due to arthritic changes which makes stair negotiation difficult. Will continue working on R knee mobility and strength for ease of ambulation with LRAD and ease of stair negotiation with decreased pain or fall risk.      Patient will continue to benefit from skilled PT services to modify and progress therapeutic interventions, address functional mobility deficits, address ROM deficits, address strength deficits, analyze and address soft tissue restrictions, analyze and cue movement patterns, analyze and modify body mechanics/ergonomics, assess and modify postural abnormalities, address imbalance/dizziness and instruct in home and community integration to attain remaining goals. Progress towards goals / Updated goals:  Short Term Goals: To be accomplished in 1 weeks:  1. Patient will demonstrate compliance with HEP in order to improve R knee mobility. Met (17)    Long Term Goals: To be accomplished in 6 weeks:  1. Patient will improve FOTO score by 27 points in order to demonstrate a significant improvement in function. Progressing 15 point improvement (3/6/17)  2. Patient will improve R knee MMT 0-120 degrees in order to increase ease of ambulation. Progressin-117 degrees (3/10/17)  3. Patient will improve R knee MMT 5/5 in order to increase ease of climbing stairs. Met 3/8/17  R knee flexion MMT: 5/5 R knee extension MMT: 5/5  4. Patient will perform sit to stand transfer without UE support in order to increase ease of transfers at home.  Progressing from med/high height plinth (3/6/17)    PLAN  [x]  Upgrade activities as tolerated     [x]  Continue plan of care  []  Update interventions per flow sheet       []  Discharge due to:_  []  Other:_      Naye Jenkins PTA 3/13/2017  9:13 AM    Future Appointments  Date Time Provider Raisa Alvarado   3/13/2017 12:00 PM Naye Jenkins PTA [facility]   3/15/2017 12:00 PM Naye Jenkins PTA [facility]   3/17/2017 12:30 PM Naye Jenkins PTA [facility]   3/20/2017 2:30 PM Pamila Fleischer [facility]   3/22/2017 12:00 PM Naye Jenkins PTA [facility]   3/24/2017 12:00 PM Naye Jenkins PTA [facility]   3/27/2017 12:00 PM Henrique Bashir, PT [facility]   3/29/2017 12:00 PM Naye Jenkins, PTA [facility]   3/31/2017 12:00 PM Henrique Bashir, PT [facility]

## 2017-03-15 ENCOUNTER — APPOINTMENT (OUTPATIENT)
Dept: PHYSICAL THERAPY | Age: 82
End: 2017-03-15
Payer: MEDICARE

## 2017-03-17 ENCOUNTER — APPOINTMENT (OUTPATIENT)
Dept: PHYSICAL THERAPY | Age: 82
End: 2017-03-17
Payer: MEDICARE

## 2017-03-20 ENCOUNTER — HOSPITAL ENCOUNTER (OUTPATIENT)
Dept: PHYSICAL THERAPY | Age: 82
Discharge: HOME | End: 2017-03-20
Payer: MEDICARE

## 2017-03-20 PROCEDURE — 97110 THERAPEUTIC EXERCISES: CPT

## 2017-03-20 NOTE — PROGRESS NOTES
PT DAILY TREATMENT NOTE - [facility] 3-16    Patient Name: Jayne Dickinson  Date:3/20/2017  : 1935  [x]  Patient  Verified  Payor: [place] MEDICARE / Plan: VA MEDICARE PART A & B / Product Type: Medicare /    In time:2:30  Out time:3:18  Total Treatment Time (min): 48  Total Timed Codes (min): 38  1:1 Treatment Time ( [address] only): 30   Visit #: 9 of 18    Treatment Area: Knee pain, right [M25.561]  Presence of right artificial knee joint [Z96.651]    SUBJECTIVE  Pain Level (0-10 scale): 4  Any medication changes, allergies to medications, adverse drug reactions, diagnosis change, or new procedure performed?: [x] No    [] Yes (see summary sheet for update)  Subjective functional status/changes:   [] No changes reported  \"The good knee is the bad knee today\"    OBJECTIVE  Modality rationale: decrease pain to improve the patients ability to perform ADLs   Min Type Additional Details    [] Estim:  []Unatt       []IFC  []Premod                        []Other:  []w/ice   []w/heat  Position:  Location:    [] Estim: []Att    []TENS instruct  []NMES                    []Other:  []w/US   []w/ice   []w/heat  Position:  Location:    []  Traction: [] Cervical       []Lumbar                       [] Prone          []Supine                       []Intermittent   []Continuous Lbs:  [] before manual  [] after manual    []  Ultrasound: []Continuous   [] Pulsed                           []1MHz   []3MHz Location:  W/cm2:    []  Iontophoresis with dexamethasone         Location: [] Take home patch   [] In clinic    []  Ice     []  heat  []  Ice massage  []  Laser   []  Anodyne Position:  Location:    []  Laser with stim  []  Other: Position:  Location:   10 [x]  Vasopneumatic Device Pressure:       [x] lo [] med [] hi   Temperature: [x] lo [] med [] hi   [x] Skin assessment post-treatment:  [x]intact []redness- no adverse reaction    []redness  adverse reaction:     38 min Therapeutic Exercise:  [x] See flow sheet :   Rationale: increase ROM, increase strength and improve coordination to improve the patients ability to perform functional tasks    With   [] TE   [] TA   [] neuro   [] other: Patient Education: [x] Review HEP    [] Progressed/Changed HEP based on:   [] positioning   [] body mechanics   [] transfers   [] heat/ice application    [] other:      Other Objective/Functional Measures:   Able to ambulate around clinic with SPC and CGA without LOB, however     Pain Level (0-10 scale) post treatment: 0    ASSESSMENT/Changes in Function: Patient has pain in left knee today during standing therex, is unable to perform 6\" step ups secondary to pain and requires seated rest breaks due to pain. FOTO score of 30 as compared to 34 at fifth visit. Patient will continue to benefit from skilled PT services to modify and progress therapeutic interventions, address functional mobility deficits, address ROM deficits, address strength deficits, analyze and address soft tissue restrictions, analyze and cue movement patterns, analyze and modify body mechanics/ergonomics and assess and modify postural abnormalities to attain remaining goals. []  See Plan of Care  []  See progress note/recertification  []  See Discharge Summary         Progress towards goals / Updated goals:  Short Term Goals: To be accomplished in 1 weeks:  1. Patient will demonstrate compliance with HEP in order to improve R knee mobility. Met (17)    Long Term Goals: To be accomplished in 6 weeks:  1. Patient will improve FOTO score by 27 points in order to demonstrate a significant improvement in function. -not met on 3/20/2017, decrease by 4 points  2. Patient will improve R knee MMT 0-120 degrees in order to increase ease of ambulation. Progressin-117 degrees (3/10/17)  3. Patient will improve R knee MMT 5/5 in order to increase ease of climbing stairs. Met 3/8/17  R knee flexion MMT: 5/5 R knee extension MMT: 5/5  4.  Patient will perform sit to stand transfer without UE support in order to increase ease of transfers at home.  Progressing from med/high height plinth (3/6/17)    PLAN  []  Upgrade activities as tolerated     [x]  Continue plan of care  []  Update interventions per flow sheet       []  Discharge due to:_  []  Other:_      Sona Barber 3/20/2017  1:32 PM    Future Appointments  Date Time Provider Raisa Alvarado   3/20/2017 2:30 PM Sona Barber [id number]   3/22/2017 12:00 PM Krista Bella, FRANCESCA [facility]   3/24/2017 12:00 PM Krista Bella PTA [facility]   3/27/2017 12:00 PM Guillermo Shook, PT [facility]   3/29/2017 12:00 PM Krista Bella PTA [id number]   3/31/2017 12:00 PM Guillemro Shook, PT [facility]   4/4/2017 3:00 PM Kaykay Guzman  [address]

## 2017-03-22 ENCOUNTER — HOSPITAL ENCOUNTER (OUTPATIENT)
Dept: PHYSICAL THERAPY | Age: 82
Discharge: HOME | End: 2017-03-22
Payer: MEDICARE

## 2017-03-22 PROCEDURE — 97016 VASOPNEUMATIC DEVICE THERAPY: CPT

## 2017-03-22 PROCEDURE — G8979 MOBILITY GOAL STATUS: HCPCS

## 2017-03-22 PROCEDURE — 97110 THERAPEUTIC EXERCISES: CPT

## 2017-03-22 PROCEDURE — G8978 MOBILITY CURRENT STATUS: HCPCS

## 2017-03-22 NOTE — PROGRESS NOTES
PT DAILY TREATMENT NOTE - [facility]     Patient Name: Lawyer Nath  Date:3/22/2017  : 1935  [x]  Patient  Verified  Payor: Rhea Dubois / Plan: [place] MEDICARE PART A & B / Product Type: Medicare /    In time:12:01  Out time:12:57  Total Treatment Time (min): 56  Total Timed Codes (min): 41  1:1 Treatment Time ( [address] only): 30  Visit #: 10 of 18    Treatment Area: Knee pain, right [M25.561]  Presence of right artificial knee joint [Z96.651]    SUBJECTIVE  Pain Level (0-10 scale): 4/10  Any medication changes, allergies to medications, adverse drug reactions, diagnosis change, or new procedure performed?: [x] No    [] Yes (see summary sheet for update)  Subjective functional status/changes:   [] No changes reported  Pt reports 65%-70% improvement since starting therapy. Pt notes the only thing preventing 100% is strength and getting the pain down the rest of the way. Pt notes she is walking some at home without the cane at all but mainly using her RW. Pt would like to be able to walk without any AD and get up from a chair without using her arms.  Pt notes a catching/nagging pain in her L knee and is going to ask the MD next week if she can get a shot to hold her over until a possible future surgery on the L.     OBJECTIVE    Modality rationale: decrease pain and increase tissue extensibility to improve the patients ability to improve ease of ambulation   Min Type Additional Details    [] Estim:  []Unatt       []IFC  []Premod                        []Other:  []w/ice   []w/heat  Position:  Location:    [] Estim: []Att    []TENS instruct  []NMES                    []Other:  []w/US   []w/ice   []w/heat  Position:  Location:    []  Traction: [] Cervical       []Lumbar                       [] Prone          []Supine                       []Intermittent   []Continuous Lbs:  [] before manual  [] after manual    []  Ultrasound: []Continuous   [] Pulsed                           []1MHz   []3MHz W/cm2:  Location:    [] Iontophoresis with dexamethasone         Location: [] Take home patch   [] In clinic   During game ready []  Ice     [x]  heat  []  Ice massage  []  Laser   []  Anodyne Position:seated  Location: R knee    []  Laser with stim  []  Other:  Position:  Location:   15 [x]  Vasopneumatic Device Pressure:       [x] lo [] med [] hi   Temperature: [x] lo [] med [] hi   [x] Skin assessment post-treatment:  [x]intact []redness- no adverse reaction    []redness  adverse reaction:     41 min Therapeutic Exercise:  [x] See flow sheet :   Rationale: increase ROM, increase strength, improve coordination, improve balance and increase proprioception to improve the patients ability to ambulate with LRAD and decreased pain/fall risk          With   [] TE   [] TA   [] neuro   [] other: Patient Education: [x] Review HEP    [] Progressed/Changed HEP based on:   [] positioning   [] body mechanics   [] transfers   [] heat/ice application    [] other:      Other Objective/Functional Measures:   Able to perform sit to stand without UEs from 19.25\" from floor; 15.50\" is height from chair to floor  L knee pain hindered working on gait training with SPC today  R knee AROM 3-125 degrees  No other increase in R knee pain during exercises; pt felt less stiff and less painful at end of session before game ready    Pain Level (0-10 scale) post treatment: 0/10    ASSESSMENT/Changes in Function: Pt making good progress towards goals in therapy meeting 2/5 goals in therapy. Pt has improving R knee strength and mobility but is still primarily using RW for ambulation. Pt has L knee pain as well that limits her walking tolerance. Pt improving with sit to stands without UE from lower surfaces but has not yet been able to perform from standard chair. Will continue working on strength, mobility, and balance to progress to LRAD for household and community ambulation.      Patient will continue to benefit from skilled PT services to modify and progress therapeutic interventions, address functional mobility deficits, address ROM deficits, address strength deficits, analyze and address soft tissue restrictions, analyze and cue movement patterns, analyze and modify body mechanics/ergonomics, assess and modify postural abnormalities, address imbalance/dizziness and instruct in home and community integration to attain remaining goals. []  See Plan of Care  [x]  See progress note/recertification  []  See Discharge Summary         Progress towards goals / Updated goals:  Short Term Goals: To be accomplished in 1 weeks:  1. Patient will demonstrate compliance with HEP in order to improve R knee mobility. Met (2/27/17)    Long Term Goals: To be accomplished in 6 weeks:  1. Patient will improve FOTO score by 27 points in order to demonstrate a significant improvement in function. -not met on 3/20/2017, decrease by 4 points  2. Patient will improve R knee MMT 0-120 degrees in order to increase ease of ambulation. Progressing: 3-125 degrees (3/22/17)  3. Patient will improve R knee MMT 5/5 in order to increase ease of climbing stairs. Met 3/8/17  R knee flexion MMT: 5/5 R knee extension MMT: 5/5  4. Patient will perform sit to stand transfer without UE support in order to increase ease of transfers at home.  Progressing from med/low table (19.25\") from floor; chair height in clinic from floor is 15.5\" (3/22/17)     PLAN  [x]  Upgrade activities as tolerated     [x]  Continue plan of care  []  Update interventions per flow sheet       []  Discharge due to:_  []  Other:_      Hollie Felder PTA 3/22/2017  12:25 PM    Future Appointments  Date Time Provider Raisa Alvarado   3/24/2017 12:00 PM Hollie Felder PTA [facility]   3/27/2017 12:00 PM Lonnie Traylor PT [id number]   3/29/2017 12:00 PM Hollie Felder PTA [facility]   3/30/2017 8:30 AM Dylan Hoover [facility]C [facility]   3/31/2017 12:00 PM Lonnie Traylor PT [facility]   4/4/2017 3:00 PM Talya Wilson  [address]

## 2017-03-23 NOTE — PROGRESS NOTES
[facility] [place]  [address] [facility]  [phone] fax    Medicare Progress Report    Patient name: Swathi Lion Start of Care:  17   Referral source: Bigg Vela MD : 1935   Medical/Treatment Diagnosis: Knee pain, right [M25.561]  Presence of right artificial knee joint [Z96.651] Onset Date: 17     Prior Hospitalization: see medical history Provider#: [id number]   Medications: Verified on Patient Summary List    Comorbidities:  HTN, arthritis   Prior Level of Function: ambulation with cane, Ind with ADLs  Visits from Start of Care: 10    Missed Visits: 1    Reporting Period: 17 to 3/22/17    Subjective Reports: pt. Reports a 65-70% improvement since starting PT    Current Status/ treatment goals Objective measures   1. Patient will improve FOTO score by 27 points in order to demonstrate a significant improvement in function. [] met                 [] not met  [x] progressing  eval:18    Current: 30   2. Patient will improve R knee MMT 0-120 degrees in order to increase ease of ambulation. [] met                 [] not met  [x] progressing Eval: 10-90 degrees    Current: 3-125 degrees   3. Patient will improve R knee MMT 5/5 in order to increase ease of climbing stairs. [x] met                 [] not met  [] progressing Eval: 4/5    Current: 5/5   4. Patient will perform sit to stand transfer without UE support in order to increase ease of transfers at home. [] met                 [] not met  [x] progressing Eval: unable    Current: able from table height of 19.25\"     Key functional changes:  Pt. Demonstrates improving knee mobility and strength      Problems/ barriers to goal attainment: L knee pain     Assessment / Recommendations: pt. Is progressing well with physical therapy. She demonstrates improving knee strength and mobility but continues to lack terminal knee extension.  She also continues to use a RW for ambulation, but occasionally uses cane at home. She continues to have difficulty standing up from a standard chair. Skilled PT is medically necessary in order to continue to improve knee strength and mobility for increased ease of ambulation and transfers for improved quality of life. Problem List: pain affecting function, decrease ROM, decrease strength, impaired gait/ balance, decrease ADL/ functional abilitiies, decrease activity tolerance, decrease flexibility/ joint mobility and decrease transfer abilities   Treatment Plan: Therapeutic exercise, Therapeutic activities, Neuromuscular re-education, Physical agent/modality, Gait/balance training, Manual therapy, Patient education, Self Care training and Functional mobility training    Patient Goal (s) has been updated and includes: to have less pain     Updated Goals to be accomplished in 8 treatments:  1. Patient will improve FOTO score by 27 points in order to demonstrate a significant improvement in function. 2. Patient will improve R knee MMT 0-120 degrees in order to increase ease of ambulation. 3. Patient will perform sit to stand transfer without UE support in order to increase ease of transfers at home. Frequency / Duration: Patient to be seen 2-3 times per week for 8 treatments    G-Codes (GP)  Mobility   Current  CL= 60-79%   Goal  CK= 40-59%    The severity rating is based on clinical judgment and the FOTO score.       Brett Zayas, PT 3/23/2017 10:20 AM

## 2017-03-24 ENCOUNTER — HOSPITAL ENCOUNTER (OUTPATIENT)
Dept: PHYSICAL THERAPY | Age: 82
Discharge: HOME | End: 2017-03-24
Payer: MEDICARE

## 2017-03-24 PROCEDURE — 97110 THERAPEUTIC EXERCISES: CPT

## 2017-03-24 NOTE — PROGRESS NOTES
PT DAILY TREATMENT NOTE - [facility]     Patient Name: Abran Hearing  Date:3/24/2017  : 1935  [x]  Patient  Verified  Payor: Etelvina Wang / Plan: [place] & [place] / Product Type: Medicare /    In time:12:00  Out time:12:42  Total Treatment Time (min): 42  Total Timed Codes (min): 42  1:1 Treatment Time ( [address] only): 25  Visit #: 11 of 18    Treatment Area: Knee pain, right [M25.561]  Presence of right artificial knee joint [Z96.651]    SUBJECTIVE  Pain Level (0-10 scale): 2/10  Any medication changes, allergies to medications, adverse drug reactions, diagnosis change, or new procedure performed?: [x] No    [] Yes (see summary sheet for update)  Subjective functional status/changes:   [] No changes reported  Pt reports only a slight ache in B knees but better today. Pt states she is using her cane at home but has some shoulder pain from using the RW. Pt wants to be able to walk without any device. Pt doesn't think she needs ice/heat today due to feeling better.      OBJECTIVE    42 min Therapeutic Exercise:  [x] See flow sheet :   Rationale: increase ROM, increase strength, improve coordination, improve balance and increase proprioception to improve the patients ability to ambulate without AD          With   [] TE   [] TA   [] neuro   [] other: Patient Education: [x] Review HEP    [] Progressed/Changed HEP based on:   [] positioning   [] body mechanics   [] transfers   [] heat/ice application    [] other:      Other Objective/Functional Measures:   No LOB with ambulation around clinic with [facility]; cues for heel strike to prevent foot scuffing but good sequencing with cane  Challenged with mini squats; had pt sit back to plinth to prevent anterior knee translation; cues to prevent trunk flexion versus knee flexion  Updated HEP; reviewed getting ankle weights for home use  Unable to perform S/L abduction of hip without TFL compensation; switch to clamshells  No increased pain with exercises     Pain Level (0-10 scale) post treatment: 0/10 stiff    ASSESSMENT/Changes in Function: Pt progressing towards updated goals with decreased pain and improving ambulation. Pt has increasing strength and is working towards ambulation with LRAD. Pt improving from height to which she can perform sit to stand without UEs. Pt ambulation with LRAD dependent on L knee pain as her R knee is improving and strengthening. Will continue to work on balance and knee mobility to improve safety with transfers and walking for decreased fall risk. Patient will continue to benefit from skilled PT services to modify and progress therapeutic interventions, address functional mobility deficits, address ROM deficits, address strength deficits and analyze and address soft tissue restrictions to attain remaining goals. Updated Goals to be accomplished in 8 treatments:  1. Patient will improve FOTO score by 27 points in order to demonstrate a significant improvement in function. 2. Patient will improve R knee MMT 0-120 degrees in order to increase ease of ambulation. 3. Patient will perform sit to stand transfer without UE support in order to increase ease of transfers at home.      PLAN  [x]  Upgrade activities as tolerated     [x]  Continue plan of care  []  Update interventions per flow sheet       []  Discharge due to:_  []  Other:_      Angie Page PTA 3/24/2017  12:44 PM    Future Appointments  Date Time Provider Raisa Jenny   3/27/2017 12:00 PM Angelica Escobedo PT [facility]   3/29/2017 12:00 PM Angie Page PTA [facility]   3/30/2017 8:30 AM Adrienne Mirza PA-C [facility]   3/31/2017 12:00 PM Angelica Escobedo PT [facility]   4/4/2017 3:00 PM Doris Peters  [address]

## 2017-03-27 ENCOUNTER — HOSPITAL ENCOUNTER (OUTPATIENT)
Dept: PHYSICAL THERAPY | Age: 82
Discharge: HOME | End: 2017-03-27
Payer: MEDICARE

## 2017-03-27 PROCEDURE — 97110 THERAPEUTIC EXERCISES: CPT

## 2017-03-27 NOTE — PROGRESS NOTES
PT DAILY TREATMENT NOTE - [facility] 3-16    Patient Name: Catalino Anderson  Date:3/27/2017  : 1935  [x]  Patient  Verified  Payor: Helene Live / Plan: [place] MED[place]ARE PART A & B / Product Type: Medicare /    In time: 12:00  Out time: 12:20  Total Treatment Time (min):  20  Total Timed Codes (min):  20  1:1 Treatment Time ( only): 15   Visit #: 12 of 18    Treatment Area: Knee pain, right [M25.561]  Presence of right artificial knee joint [Z96.651]    SUBJECTIVE  Pain Level (0-10 scale): 5/10  Any medication changes, allergies to medications, adverse drug reactions, diagnosis change, or new procedure performed?: [x] No    [] Yes (see summary sheet for update)  Subjective functional status/changes:   [] No changes reported  Pt. Reports she is in a lot more pain today. She reports going to sit down and feeling a pop in her R knee with immediate pain. She reports knee has been giving out on her more with walking as well. OBJECTIVE    15 min Therapeutic Exercise:  [x] See flow sheet :   Rationale: increase ROM and increase strength to improve the patients ability to increase ease of ADLs          With   [x] TE   [] TA   [] neuro   [] other: Patient Education: [x] Review HEP    [] Progressed/Changed HEP based on:   [] positioning   [] body mechanics   [] transfers   [] heat/ice application    [] other:      Other Objective/Functional Measures:   R knee AROM  degrees with pain at end ranges  Pt. Has diffuse tenderness to palpation over anterior/posterior knee  She ambulated in clinic with RW and decreased shift to R side  Pain with patella sup/inf movement but no significant change in patella mobility. She was educated on contacting physician about moving up appointment if possible secondary to increased pain, her next appointment is this Wednesday       Pain Level (0-10 scale) post treatment: 5/10    ASSESSMENT/Changes in Function:  Pt.  Was progressing well with physical therapy, but had episode of significant pain after feeling a pop in her knee. She has more edema and decreased knee mobility following this incident. Patient will continue to benefit from skilled PT services to modify and progress therapeutic interventions, address functional mobility deficits, address ROM deficits, address strength deficits, analyze and address soft tissue restrictions, analyze and cue movement patterns, analyze and modify body mechanics/ergonomics and assess and modify postural abnormalities to attain remaining goals. Progress towards goals / Updated goals:  1. Patient will improve FOTO score by 27 points in order to demonstrate a significant improvement in function. 2. Patient will improve R knee MMT 0-120 degrees in order to increase ease of ambulation. Not met:  degrees (3/27/17)  3. Patient will perform sit to stand transfer without UE support in order to increase ease of transfers at home.      PLAN  []  Upgrade activities as tolerated     [x]  Continue plan of care  []  Update interventions per flow sheet       []  Discharge due to:_  []  Other:_      Conrad Morales PT 3/27/2017  12:09 PM

## 2017-03-30 ENCOUNTER — ANESTHESIA EVENT (OUTPATIENT)
Dept: SURGERY | Age: 82
DRG: 516 | End: 2017-03-30
Payer: MEDICARE

## 2017-03-30 ENCOUNTER — OFFICE VISIT (OUTPATIENT)
Dept: ORTHOPEDIC SURGERY | Facility: CLINIC | Age: 82
End: 2017-03-30

## 2017-03-30 ENCOUNTER — HOSPITAL ENCOUNTER (OUTPATIENT)
Dept: CT IMAGING | Age: 82
Discharge: HOME | DRG: 516 | End: 2017-03-30
Attending: PHYSICIAN ASSISTANT
Payer: MEDICARE

## 2017-03-30 ENCOUNTER — HOSPITAL ENCOUNTER (OUTPATIENT)
Dept: PREADMISSION TESTING | Age: 82
Discharge: HOME | DRG: 516 | End: 2017-03-30
Payer: MEDICARE

## 2017-03-30 VITALS
SYSTOLIC BLOOD PRESSURE: 168 MMHG | BODY MASS INDEX: 37.76 KG/M2 | TEMPERATURE: 96.2 F | HEART RATE: 86 BPM | DIASTOLIC BLOOD PRESSURE: 68 MMHG | WEIGHT: 220 LBS

## 2017-03-30 DIAGNOSIS — Z01.812 ENCOUNTER FOR PRE-OPERATIVE LABORATORY TESTING: Primary | ICD-10-CM

## 2017-03-30 DIAGNOSIS — S82.009A: ICD-10-CM

## 2017-03-30 DIAGNOSIS — Z96.651 STATUS POST RIGHT KNEE REPLACEMENT: ICD-10-CM

## 2017-03-30 DIAGNOSIS — Z01.812 ENCOUNTER FOR PRE-OPERATIVE LABORATORY TESTING: ICD-10-CM

## 2017-03-30 DIAGNOSIS — S82.001A CLOSED NONDISPLACED FRACTURE OF RIGHT PATELLA, UNSPECIFIED FRACTURE MORPHOLOGY, INITIAL ENCOUNTER: ICD-10-CM

## 2017-03-30 DIAGNOSIS — Z96.651 STATUS POST RIGHT KNEE REPLACEMENT: Primary | ICD-10-CM

## 2017-03-30 DIAGNOSIS — M17.12 PRIMARY OSTEOARTHRITIS OF LEFT KNEE: ICD-10-CM

## 2017-03-30 LAB
ALBUMIN SERPL BCP-MCNC: 3.4 G/DL (ref 3.4–5)
ALBUMIN/GLOB SERPL: 0.8 {RATIO} (ref 0.8–1.7)
ALP SERPL-CCNC: 340 U/L (ref 45–117)
ALT SERPL-CCNC: 71 U/L (ref 13–56)
ANION GAP BLD CALC-SCNC: 9 MMOL/L (ref 3–18)
APTT PPP: 39.8 SEC (ref 23–36.4)
AST SERPL W P-5'-P-CCNC: 36 U/L (ref 15–37)
BASOPHILS # BLD AUTO: 0 K/UL (ref 0–0.06)
BASOPHILS # BLD: 0 % (ref 0–2)
BILIRUB SERPL-MCNC: 0.4 MG/DL (ref 0.2–1)
BUN SERPL-MCNC: 36 MG/DL (ref 7–18)
BUN/CREAT SERPL: 26 (ref 12–20)
CALCIUM SERPL-MCNC: 9.9 MG/DL (ref 8.5–10.1)
CHLORIDE SERPL-SCNC: 103 MMOL/L (ref 100–108)
CO2 SERPL-SCNC: 27 MMOL/L (ref 21–32)
CREAT SERPL-MCNC: 1.39 MG/DL (ref 0.6–1.3)
DIFFERENTIAL METHOD BLD: ABNORMAL
EOSINOPHIL # BLD: 0 K/UL (ref 0–0.4)
EOSINOPHIL NFR BLD: 0 % (ref 0–5)
ERYTHROCYTE [DISTWIDTH] IN BLOOD BY AUTOMATED COUNT: 13.2 % (ref 11.6–14.5)
GLOBULIN SER CALC-MCNC: 4.4 G/DL (ref 2–4)
GLUCOSE SERPL-MCNC: 155 MG/DL (ref 74–99)
HCT VFR BLD AUTO: 30.1 % (ref 35–45)
HGB BLD-MCNC: 9.7 G/DL (ref 12–16)
INR PPP: 1.1 (ref 0.8–1.2)
LYMPHOCYTES # BLD AUTO: 9 % (ref 21–52)
LYMPHOCYTES # BLD: 0.9 K/UL (ref 0.9–3.6)
MCH RBC QN AUTO: 26.3 PG (ref 24–34)
MCHC RBC AUTO-ENTMCNC: 32.2 G/DL (ref 31–37)
MCV RBC AUTO: 81.6 FL (ref 74–97)
MONOCYTES # BLD: 0.1 K/UL (ref 0.05–1.2)
MONOCYTES NFR BLD AUTO: 1 % (ref 3–10)
NEUTS SEG # BLD: 8.8 K/UL (ref 1.8–8)
NEUTS SEG NFR BLD AUTO: 90 % (ref 40–73)
PLATELET # BLD AUTO: 422 K/UL (ref 135–420)
PMV BLD AUTO: 10.4 FL (ref 9.2–11.8)
POTASSIUM SERPL-SCNC: 4 MMOL/L (ref 3.5–5.5)
PROT SERPL-MCNC: 7.8 G/DL (ref 6.4–8.2)
PROTHROMBIN TIME: 13.4 SEC (ref 11.5–15.2)
RBC # BLD AUTO: 3.69 M/UL (ref 4.2–5.3)
SODIUM SERPL-SCNC: 139 MMOL/L (ref 136–145)
WBC # BLD AUTO: 9.7 K/UL (ref 4.6–13.2)

## 2017-03-30 RX ORDER — BETAMETHASONE SODIUM PHOSPHATE AND BETAMETHASONE ACETATE 3; 3 MG/ML; MG/ML
6 INJECTION, SUSPENSION INTRA-ARTICULAR; INTRALESIONAL; INTRAMUSCULAR; SOFT TISSUE ONCE
Qty: 1 ML | Refills: 0
Start: 2017-03-30 | End: 2017-04-05

## 2017-03-30 NOTE — PROGRESS NOTES
[address] [place] [address]  [phone]           Patient: Aydee Anne                MRN: [id number]       SSN: [id number]  YOB: 1935        AGE: 80 y.o. SEX: female  Body mass index is 37.76 kg/(m^2). PCP: Iram Young MD  03/30/17      This office note has been dictated. REVIEW OF SYSTEMS:  Constitutional: Negative for fever, chills, weight loss and malaise/fatigue. HENT: Negative. Eyes: Negative. Respiratory: Negative. Cardiovascular: Negative. Gastrointestinal: No bowel incontinence or constipation. Genitourinary: No bladder incontinence or saddle anesthesia. Skin: Negative. Neurological: Negative. Endo/Heme/Allergies: Negative. Psychiatric/Behavioral: Negative. Musculoskeletal: As per HPI above. Past Medical History:   Diagnosis Date    Arthritis     Arthritis of both hips     Back pain     Balance problem     Chronic back pain     Cough     Easy bruising     Essential hypertension     GERD (gastroesophageal reflux disease)     Hiatal hernia     Hypertension     Irregular heart beat     Left hip pain 10/8/2010    Neck pain     Nervousness     Night sweat     Osteoarthritis, knee bilateral     Pain with urination     Polymyalgia rheumatica (HCC)     Reflux     Spinal stenosis     Trapezius strain     Trochanteric bursitis of left hip     Venous insufficiency          Current Outpatient Prescriptions:     ferrous sulfate 325 mg (65 mg iron) tablet, Take 1 Tab by mouth two (2) times daily (with meals). , Disp: 60 Tab, Rfl: 2    aspirin (ASPIRIN) 325 mg tablet, Take 1 Tab by mouth two (2) times a day., Disp: 60 Tab, Rfl: 0    cholecalciferol (VITAMIN D3) 1,000 unit tablet, Take 1,000 Units by mouth daily. , Disp: , Rfl:     potassium 99 mg tablet, Take 99 mg by mouth daily. , Disp: , Rfl:     omeprazole (PRILOSEC) 40 mg capsule, Take 40 mg by mouth daily., Disp: , Rfl:     acetaminophen (TYLENOL) 650 mg CR tablet, Take 1,300 mg by mouth two (2) times a day., Disp: , Rfl:     aMILoride-hydrochlorothiazide (MODURETIC) 5-50 mg tab, Take 1 Tab by mouth daily. , Disp: , Rfl:     amLODIPine (NORVASC) 10 mg tablet, 10 mg daily. , Disp: , Rfl: 3    losartan (COZAAR) 100 mg tablet, Take 100 mg by mouth daily. , Disp: , Rfl:     metoclopramide HCl (REGLAN) 5 mg tablet, Take 5 mg by mouth two (2) times a day., Disp: , Rfl:     cetirizine (ZYRTEC) 10 mg tablet, Take  by mouth daily. , Disp: , Rfl:     HYDROcodone-acetaminophen (NORCO)  mg tablet, Take 1-2 Tabs by mouth every six (6) hours as needed for Pain. Max Daily Amount: 8 Tabs., Disp: 60 Tab, Rfl: 0    HYDROcodone-acetaminophen (NORCO)  mg tablet, Take 1-2 Tabs by mouth every six (6) hours as needed for Pain. Max Daily Amount: 8 Tabs., Disp: 60 Tab, Rfl: 0    levoFLOXacin (LEVAQUIN) 500 mg tablet, 1 po q day x 7 days, Disp: 7 Tab, Rfl: 0    HYDROcodone-acetaminophen (NORCO) 7.5-325 mg per tablet, Take 1-2 Tabs by mouth every four (4) hours as needed. Max Daily Amount: 12 Tabs., Disp: 60 Tab, Rfl: 0    ciprofloxacin HCl (CIPRO) 500 mg tablet, Take 1 Tab by mouth two (2) times a day., Disp: 14 Tab, Rfl: 0    Allergies   Allergen Reactions    Citric Acid Unknown (comments)    Crinone [Progesterone Micronized] Unknown (comments)    Iodine Unknown (comments)    Percocet [Oxycodone-Acetaminophen] Other (comments)     Gets rebound headaches after taking Percocet       Social History     Social History    Marital status:      Spouse name: N/A    Number of children: N/A    Years of education: N/A     Occupational History    Not on file.      Social History Main Topics    Smoking status: Never Smoker    Smokeless tobacco: Never Used    Alcohol use No    Drug use: No    Sexual activity: No     Other Topics Concern    Not on file     Social History Narrative       Past Surgical History: Procedure Laterality Date    COLONOSCOPY N/A 7/27/2016    COLONOSCOPY w/polypectomy performed by Jie Green MD at [facility] ENDOSCOPY    HX [facility]      HX CHOLECYSTECTOMY      HX HEENT  06/2011    left cornea transplant    HX HYSTERECTOMY      HX ORTHOPAEDIC      right foot and ankle           * Patient was identified by name and date of birth   * Agreement on procedure being performed was verified  * Risks and Benefits explained to the patient  * Procedure site verified and marked as necessary  * Patient was positioned for comfort  * Consent was signed and verified  10:14 AM    The patient was instructed on post injection care. We did see Ms. Lynne Rod for follow-up in regards to her bilateral knees. She is status post right knee replacement and had been doing quite well up until this past Friday. She states she was sitting down into a chair and felt a pop in her knee. She had some increased discomfort at that point. She states she has had some stiffness in her knee since that time. She was using a cane and is now back to a walker. She is also having trouble with the left knee. She has known advanced osteoarthritis of the left knee. She does have discomfort, which is affecting her ability to ambulate. PHYSICAL EXAMINATION: In general the patient is alert and oriented x 3 and is in no acute distress. The patient is well-developed and well-nourished with a normal affect. The patient is afebrile. HEENT:  Head is normocephalic and atraumatic. Pupils are equally round and reactive to light and accommodation. Extraocular eye movements are intact. Neck is supple. Trachea is midline. No JVD is present. Breathing is nonlabored. Examination of the lower extremities reveals pain free range of motion of the hips. There is no pain with palpation to the trochanteric bursae. There is negative straight leg raise. There is negative calf tenderness and swelling.   There is negative Homans. There is no evidence of DVT noted. Examination of the right knee reveals the skin to be intact. There is no erythema or ecchymosis. There is no warmth or signs for infection or cellulitis. She does have some tenderness to palpation to the anterior knee. No defects are noted in the extensor mechanism, however, does have approximately 6° to 7° extensor lag. Stability is good. She is missing approximately 4° or 5° on extension. Left knee skin is intact. There is no erythema or ecchymosis. There is no warmth or signs for infection or cellulitis. There is pain with palpation to the medial joint line, as well as patellofemoral grind and crepitus anteriorly with range of motion activities. RADIOGRAPHS:  Review of radiographs including AP, lateral, skyline, and obliques of the right knee reveals there is what appears to be a lateral inferior pole patellar fracture. The button appears to be in place. The knee replacement looks good. The films were reviewed with Dr. Omer Kim. ASSESSMENT:     1. Status post right knee replacement. 2. Patellar fracture right knee. 3. Left knee advanced osteoarthritis. PLAN:  At this point we discussed treatment options in regards to the left knee. We will move forward with a Cortisone injection for the left knee today. After informed and written consent the left knee was prepped with Betadine and 6 mg of Celestone was injected without complications. The patient tolerated the injection well. She was instructed on post injection care. In regards to the right knee we are going to order a STAT CT scan with 3-D reconstruction of the patella to evaluate the patellar fracture a bit further. We are going to get her scheduled for surgery next week for ORIF right patellar fracture, which was discussed with the patient today in the office.   We will see her back after her patellar fracture ORIF.                    Siobhan LUCERO, SENTHIL, ATC

## 2017-03-30 NOTE — PATIENT INSTRUCTIONS
..DO:  1:  Sit with the leg out straight 5-10 min every hour while awake. Keep the toes pointed       up towards the duy. 2.  Bend your knee 5-10 min every hour. Bend it to the point of pain and hold it for 5-10       Min. 3.  ICE your knee 20 min every hour    DO NOT:    1. Do not place anything under your knee to prop it up  2. Do not sit in the recliner chair.

## 2017-03-31 ENCOUNTER — APPOINTMENT (OUTPATIENT)
Dept: GENERAL RADIOLOGY | Age: 82
DRG: 516 | End: 2017-03-31
Attending: ORTHOPAEDIC SURGERY
Payer: MEDICARE

## 2017-03-31 ENCOUNTER — ANESTHESIA (OUTPATIENT)
Dept: SURGERY | Age: 82
DRG: 516 | End: 2017-03-31
Payer: MEDICARE

## 2017-03-31 ENCOUNTER — SURGERY (OUTPATIENT)
Age: 82
End: 2017-03-31

## 2017-03-31 ENCOUNTER — HOSPITAL ENCOUNTER (INPATIENT)
Age: 82
LOS: 5 days | Discharge: NURSING, REHAB OR LONG-TERM CARE | DRG: 516 | End: 2017-04-05
Attending: ORTHOPAEDIC SURGERY | Admitting: ORTHOPAEDIC SURGERY
Payer: MEDICARE

## 2017-03-31 DIAGNOSIS — S82.031D CLOSED DISPLACED TRANSVERSE FRACTURE OF RIGHT PATELLA WITH ROUTINE HEALING, SUBSEQUENT ENCOUNTER: Primary | ICD-10-CM

## 2017-03-31 PROBLEM — S82.009A PATELLA FRACTURE: Status: ACTIVE | Noted: 2017-03-31

## 2017-03-31 PROCEDURE — 74011250636 HC RX REV CODE- 250/636: Performed by: NURSE ANESTHETIST, CERTIFIED REGISTERED

## 2017-03-31 PROCEDURE — 77030012890: Performed by: ORTHOPAEDIC SURGERY

## 2017-03-31 PROCEDURE — 0QSD04Z REPOSITION RIGHT PATELLA WITH INTERNAL FIXATION DEVICE, OPEN APPROACH: ICD-10-PCS | Performed by: ORTHOPAEDIC SURGERY

## 2017-03-31 PROCEDURE — 64447 NJX AA&/STRD FEMORAL NRV IMG: CPT | Performed by: ANESTHESIOLOGY

## 2017-03-31 PROCEDURE — 76210000002 HC OR PH I REC 3 TO 3.5 HR: Performed by: ORTHOPAEDIC SURGERY

## 2017-03-31 PROCEDURE — 76942 ECHO GUIDE FOR BIOPSY: CPT | Performed by: ANESTHESIOLOGY

## 2017-03-31 PROCEDURE — 77030020782 HC GWN BAIR PAWS FLX 3M -B: Performed by: ORTHOPAEDIC SURGERY

## 2017-03-31 PROCEDURE — C1713 ANCHOR/SCREW BN/BN,TIS/BN: HCPCS | Performed by: ORTHOPAEDIC SURGERY

## 2017-03-31 PROCEDURE — 74011000250 HC RX REV CODE- 250: Performed by: ORTHOPAEDIC SURGERY

## 2017-03-31 PROCEDURE — 73560 X-RAY EXAM OF KNEE 1 OR 2: CPT

## 2017-03-31 PROCEDURE — 77030018836 HC SOL IRR NACL ICUM -A: Performed by: ORTHOPAEDIC SURGERY

## 2017-03-31 PROCEDURE — 77030032490 HC SLV COMPR SCD KNE COVD -B: Performed by: ORTHOPAEDIC SURGERY

## 2017-03-31 PROCEDURE — C9290 INJ, BUPIVACAINE LIPOSOME: HCPCS | Performed by: ORTHOPAEDIC SURGERY

## 2017-03-31 PROCEDURE — 74011250636 HC RX REV CODE- 250/636: Performed by: ORTHOPAEDIC SURGERY

## 2017-03-31 PROCEDURE — 65270000029 HC RM PRIVATE

## 2017-03-31 PROCEDURE — 77030026438 HC STYL ET INTUB CARD -A: Performed by: ANESTHESIOLOGY

## 2017-03-31 PROCEDURE — 77030008467 HC STPLR SKN COVD -B: Performed by: ORTHOPAEDIC SURGERY

## 2017-03-31 PROCEDURE — 77030008190 HC RTVR SUT ARTH S&N -B: Performed by: ORTHOPAEDIC SURGERY

## 2017-03-31 PROCEDURE — 76010000153 HC OR TIME 1.5 TO 2 HR: Performed by: ORTHOPAEDIC SURGERY

## 2017-03-31 PROCEDURE — 74011000250 HC RX REV CODE- 250

## 2017-03-31 PROCEDURE — 74011250637 HC RX REV CODE- 250/637: Performed by: NURSE ANESTHETIST, CERTIFIED REGISTERED

## 2017-03-31 PROCEDURE — 74011250637 HC RX REV CODE- 250/637: Performed by: ORTHOPAEDIC SURGERY

## 2017-03-31 PROCEDURE — 76060000034 HC ANESTHESIA 1.5 TO 2 HR: Performed by: ORTHOPAEDIC SURGERY

## 2017-03-31 PROCEDURE — 74011250636 HC RX REV CODE- 250/636

## 2017-03-31 PROCEDURE — 77030008683 HC TU ET CUF COVD -A: Performed by: ANESTHESIOLOGY

## 2017-03-31 PROCEDURE — 77030027138 HC INCENT SPIROMETER -A

## 2017-03-31 PROCEDURE — 77030003601 HC NDL NRV BLK BBMI -A: Performed by: ORTHOPAEDIC SURGERY

## 2017-03-31 DEVICE — PIN STEINMANN RVRS STL THRD TP --: Type: IMPLANTABLE DEVICE | Status: FUNCTIONAL

## 2017-03-31 RX ORDER — METOCLOPRAMIDE 5 MG/1
5 TABLET ORAL 2 TIMES DAILY
Status: DISCONTINUED | OUTPATIENT
Start: 2017-03-31 | End: 2017-04-05 | Stop reason: HOSPADM

## 2017-03-31 RX ORDER — ONDANSETRON 2 MG/ML
4 INJECTION INTRAMUSCULAR; INTRAVENOUS ONCE
Status: COMPLETED | OUTPATIENT
Start: 2017-03-31 | End: 2017-03-31

## 2017-03-31 RX ORDER — GLYCOPYRROLATE 0.2 MG/ML
INJECTION INTRAMUSCULAR; INTRAVENOUS AS NEEDED
Status: DISCONTINUED | OUTPATIENT
Start: 2017-03-31 | End: 2017-03-31 | Stop reason: HOSPADM

## 2017-03-31 RX ORDER — NALOXONE HYDROCHLORIDE 0.4 MG/ML
0.4 INJECTION, SOLUTION INTRAMUSCULAR; INTRAVENOUS; SUBCUTANEOUS AS NEEDED
Status: DISCONTINUED | OUTPATIENT
Start: 2017-03-31 | End: 2017-04-05 | Stop reason: HOSPADM

## 2017-03-31 RX ORDER — MAGNESIUM SULFATE 100 %
4 CRYSTALS MISCELLANEOUS AS NEEDED
Status: DISCONTINUED | OUTPATIENT
Start: 2017-03-31 | End: 2017-03-31 | Stop reason: HOSPADM

## 2017-03-31 RX ORDER — ONDANSETRON 2 MG/ML
4 INJECTION INTRAMUSCULAR; INTRAVENOUS
Status: DISCONTINUED | OUTPATIENT
Start: 2017-03-31 | End: 2017-04-05 | Stop reason: HOSPADM

## 2017-03-31 RX ORDER — EPHEDRINE SULFATE/0.9% NACL/PF 25 MG/5 ML
SYRINGE (ML) INTRAVENOUS AS NEEDED
Status: DISCONTINUED | OUTPATIENT
Start: 2017-03-31 | End: 2017-03-31 | Stop reason: HOSPADM

## 2017-03-31 RX ORDER — HYDROCHLOROTHIAZIDE 25 MG/1
50 TABLET ORAL DAILY
Status: DISCONTINUED | OUTPATIENT
Start: 2017-04-01 | End: 2017-04-05 | Stop reason: HOSPADM

## 2017-03-31 RX ORDER — ROPIVACAINE HYDROCHLORIDE 2 MG/ML
20 INJECTION, SOLUTION EPIDURAL; INFILTRATION; PERINEURAL
Status: COMPLETED | OUTPATIENT
Start: 2017-03-31 | End: 2017-03-31

## 2017-03-31 RX ORDER — SODIUM CHLORIDE, SODIUM LACTATE, POTASSIUM CHLORIDE, CALCIUM CHLORIDE 600; 310; 30; 20 MG/100ML; MG/100ML; MG/100ML; MG/100ML
75 INJECTION, SOLUTION INTRAVENOUS CONTINUOUS
Status: DISCONTINUED | OUTPATIENT
Start: 2017-03-31 | End: 2017-03-31 | Stop reason: HOSPADM

## 2017-03-31 RX ORDER — SODIUM CHLORIDE 0.9 % (FLUSH) 0.9 %
5-10 SYRINGE (ML) INJECTION AS NEEDED
Status: DISCONTINUED | OUTPATIENT
Start: 2017-03-31 | End: 2017-03-31 | Stop reason: HOSPADM

## 2017-03-31 RX ORDER — NEOSTIGMINE METHYLSULFATE 5 MG/5 ML
SYRINGE (ML) INTRAVENOUS AS NEEDED
Status: DISCONTINUED | OUTPATIENT
Start: 2017-03-31 | End: 2017-03-31 | Stop reason: HOSPADM

## 2017-03-31 RX ORDER — BUPIVACAINE HYDROCHLORIDE 5 MG/ML
INJECTION, SOLUTION EPIDURAL; INTRACAUDAL AS NEEDED
Status: DISCONTINUED | OUTPATIENT
Start: 2017-03-31 | End: 2017-03-31 | Stop reason: HOSPADM

## 2017-03-31 RX ORDER — VANCOMYCIN HYDROCHLORIDE 1 G/20ML
INJECTION, POWDER, LYOPHILIZED, FOR SOLUTION INTRAVENOUS AS NEEDED
Status: DISCONTINUED | OUTPATIENT
Start: 2017-03-31 | End: 2017-03-31 | Stop reason: HOSPADM

## 2017-03-31 RX ORDER — POLYMYXIN B 500000 [USP'U]/1
INJECTION, POWDER, LYOPHILIZED, FOR SOLUTION INTRAMUSCULAR; INTRATHECAL; INTRAVENOUS; OPHTHALMIC AS NEEDED
Status: DISCONTINUED | OUTPATIENT
Start: 2017-03-31 | End: 2017-03-31 | Stop reason: HOSPADM

## 2017-03-31 RX ORDER — SODIUM CHLORIDE 0.9 % (FLUSH) 0.9 %
5-10 SYRINGE (ML) INJECTION EVERY 8 HOURS
Status: DISCONTINUED | OUTPATIENT
Start: 2017-03-31 | End: 2017-03-31 | Stop reason: HOSPADM

## 2017-03-31 RX ORDER — SODIUM CHLORIDE 0.9 % (FLUSH) 0.9 %
5-10 SYRINGE (ML) INJECTION AS NEEDED
Status: DISCONTINUED | OUTPATIENT
Start: 2017-03-31 | End: 2017-04-05 | Stop reason: HOSPADM

## 2017-03-31 RX ORDER — ZOLPIDEM TARTRATE 5 MG/1
5 TABLET ORAL
Status: DISCONTINUED | OUTPATIENT
Start: 2017-03-31 | End: 2017-04-05 | Stop reason: HOSPADM

## 2017-03-31 RX ORDER — OXYCODONE HCL 10 MG/1
10 TABLET, FILM COATED, EXTENDED RELEASE ORAL ONCE
Status: COMPLETED | OUTPATIENT
Start: 2017-03-31 | End: 2017-03-31

## 2017-03-31 RX ORDER — LIDOCAINE HYDROCHLORIDE 10 MG/ML
3 INJECTION INFILTRATION; PERINEURAL ONCE
Status: DISCONTINUED | OUTPATIENT
Start: 2017-03-31 | End: 2017-03-31 | Stop reason: HOSPADM

## 2017-03-31 RX ORDER — LANOLIN ALCOHOL/MO/W.PET/CERES
1 CREAM (GRAM) TOPICAL 2 TIMES DAILY WITH MEALS
Status: DISCONTINUED | OUTPATIENT
Start: 2017-04-01 | End: 2017-04-05 | Stop reason: HOSPADM

## 2017-03-31 RX ORDER — SODIUM CHLORIDE 9 MG/ML
75 INJECTION, SOLUTION INTRAVENOUS CONTINUOUS
Status: DISPENSED | OUTPATIENT
Start: 2017-03-31 | End: 2017-04-01

## 2017-03-31 RX ORDER — HYDROCODONE BITARTRATE AND ACETAMINOPHEN 10; 325 MG/1; MG/1
1-2 TABLET ORAL
Status: DISCONTINUED | OUTPATIENT
Start: 2017-03-31 | End: 2017-04-05 | Stop reason: HOSPADM

## 2017-03-31 RX ORDER — ROCURONIUM BROMIDE 10 MG/ML
INJECTION, SOLUTION INTRAVENOUS AS NEEDED
Status: DISCONTINUED | OUTPATIENT
Start: 2017-03-31 | End: 2017-03-31 | Stop reason: HOSPADM

## 2017-03-31 RX ORDER — CEFAZOLIN SODIUM 2 G/50ML
2 SOLUTION INTRAVENOUS EVERY 8 HOURS
Status: COMPLETED | OUTPATIENT
Start: 2017-03-31 | End: 2017-04-01

## 2017-03-31 RX ORDER — MIDAZOLAM HYDROCHLORIDE 1 MG/ML
2 INJECTION, SOLUTION INTRAMUSCULAR; INTRAVENOUS ONCE
Status: COMPLETED | OUTPATIENT
Start: 2017-03-31 | End: 2017-03-31

## 2017-03-31 RX ORDER — KETOROLAC TROMETHAMINE 30 MG/ML
INJECTION, SOLUTION INTRAMUSCULAR; INTRAVENOUS AS NEEDED
Status: DISCONTINUED | OUTPATIENT
Start: 2017-03-31 | End: 2017-03-31 | Stop reason: HOSPADM

## 2017-03-31 RX ORDER — INSULIN LISPRO 100 [IU]/ML
INJECTION, SOLUTION INTRAVENOUS; SUBCUTANEOUS ONCE
Status: DISCONTINUED | OUTPATIENT
Start: 2017-03-31 | End: 2017-03-31 | Stop reason: HOSPADM

## 2017-03-31 RX ORDER — AMILORIDE HYDROCHLORIDE 5 MG/1
5 TABLET ORAL DAILY
Status: DISCONTINUED | OUTPATIENT
Start: 2017-04-01 | End: 2017-04-05 | Stop reason: HOSPADM

## 2017-03-31 RX ORDER — CEFAZOLIN SODIUM 2 G/50ML
2 SOLUTION INTRAVENOUS ONCE
Status: COMPLETED | OUTPATIENT
Start: 2017-03-31 | End: 2017-03-31

## 2017-03-31 RX ORDER — FENTANYL CITRATE 50 UG/ML
INJECTION, SOLUTION INTRAMUSCULAR; INTRAVENOUS AS NEEDED
Status: DISCONTINUED | OUTPATIENT
Start: 2017-03-31 | End: 2017-03-31 | Stop reason: HOSPADM

## 2017-03-31 RX ORDER — PANTOPRAZOLE SODIUM 40 MG/1
40 TABLET, DELAYED RELEASE ORAL
Status: DISCONTINUED | OUTPATIENT
Start: 2017-04-01 | End: 2017-04-05 | Stop reason: HOSPADM

## 2017-03-31 RX ORDER — ASPIRIN 325 MG/1
325 TABLET, FILM COATED ORAL DAILY
Status: DISCONTINUED | OUTPATIENT
Start: 2017-04-01 | End: 2017-04-05 | Stop reason: HOSPADM

## 2017-03-31 RX ORDER — PREGABALIN 50 MG/1
50 CAPSULE ORAL ONCE
Status: COMPLETED | OUTPATIENT
Start: 2017-03-31 | End: 2017-03-31

## 2017-03-31 RX ORDER — ADHESIVE BANDAGE
30 BANDAGE TOPICAL DAILY PRN
Status: DISCONTINUED | OUTPATIENT
Start: 2017-03-31 | End: 2017-04-05 | Stop reason: HOSPADM

## 2017-03-31 RX ORDER — LOSARTAN POTASSIUM 50 MG/1
100 TABLET ORAL DAILY
Status: DISCONTINUED | OUTPATIENT
Start: 2017-04-01 | End: 2017-04-05 | Stop reason: HOSPADM

## 2017-03-31 RX ORDER — HYDRALAZINE HYDROCHLORIDE 20 MG/ML
INJECTION INTRAMUSCULAR; INTRAVENOUS AS NEEDED
Status: DISCONTINUED | OUTPATIENT
Start: 2017-03-31 | End: 2017-03-31 | Stop reason: HOSPADM

## 2017-03-31 RX ORDER — ACETAMINOPHEN 500 MG
1000 TABLET ORAL ONCE
Status: COMPLETED | OUTPATIENT
Start: 2017-03-31 | End: 2017-03-31

## 2017-03-31 RX ORDER — HYDROMORPHONE HYDROCHLORIDE 2 MG/ML
0.5 INJECTION, SOLUTION INTRAMUSCULAR; INTRAVENOUS; SUBCUTANEOUS AS NEEDED
Status: COMPLETED | OUTPATIENT
Start: 2017-03-31 | End: 2017-03-31

## 2017-03-31 RX ORDER — CELECOXIB 400 MG/1
400 CAPSULE ORAL ONCE
Status: COMPLETED | OUTPATIENT
Start: 2017-03-31 | End: 2017-03-31

## 2017-03-31 RX ORDER — FAMOTIDINE 20 MG/1
20 TABLET, FILM COATED ORAL ONCE
Status: COMPLETED | OUTPATIENT
Start: 2017-03-31 | End: 2017-03-31

## 2017-03-31 RX ORDER — CELECOXIB 100 MG/1
200 CAPSULE ORAL 2 TIMES DAILY
Status: DISCONTINUED | OUTPATIENT
Start: 2017-03-31 | End: 2017-04-05 | Stop reason: HOSPADM

## 2017-03-31 RX ORDER — CELECOXIB 100 MG/1
100 CAPSULE ORAL ONCE
Status: DISCONTINUED | OUTPATIENT
Start: 2017-03-31 | End: 2017-03-31

## 2017-03-31 RX ORDER — EPINEPHRINE 1 MG/ML
INJECTION, SOLUTION, CONCENTRATE INTRAVENOUS AS NEEDED
Status: DISCONTINUED | OUTPATIENT
Start: 2017-03-31 | End: 2017-03-31 | Stop reason: HOSPADM

## 2017-03-31 RX ORDER — DIPHENHYDRAMINE HYDROCHLORIDE 50 MG/ML
12.5 INJECTION, SOLUTION INTRAMUSCULAR; INTRAVENOUS
Status: DISCONTINUED | OUTPATIENT
Start: 2017-03-31 | End: 2017-04-05 | Stop reason: HOSPADM

## 2017-03-31 RX ORDER — SUCCINYLCHOLINE CHLORIDE 20 MG/ML
INJECTION INTRAMUSCULAR; INTRAVENOUS AS NEEDED
Status: DISCONTINUED | OUTPATIENT
Start: 2017-03-31 | End: 2017-03-31 | Stop reason: HOSPADM

## 2017-03-31 RX ORDER — WARFARIN 10 MG/1
10 TABLET ORAL ONCE
Status: COMPLETED | OUTPATIENT
Start: 2017-03-31 | End: 2017-03-31

## 2017-03-31 RX ORDER — PROPOFOL 10 MG/ML
INJECTION, EMULSION INTRAVENOUS AS NEEDED
Status: DISCONTINUED | OUTPATIENT
Start: 2017-03-31 | End: 2017-03-31 | Stop reason: HOSPADM

## 2017-03-31 RX ORDER — ONDANSETRON 2 MG/ML
INJECTION INTRAMUSCULAR; INTRAVENOUS AS NEEDED
Status: DISCONTINUED | OUTPATIENT
Start: 2017-03-31 | End: 2017-03-31 | Stop reason: HOSPADM

## 2017-03-31 RX ORDER — LIDOCAINE HYDROCHLORIDE 20 MG/ML
INJECTION, SOLUTION EPIDURAL; INFILTRATION; INTRACAUDAL; PERINEURAL AS NEEDED
Status: DISCONTINUED | OUTPATIENT
Start: 2017-03-31 | End: 2017-03-31 | Stop reason: HOSPADM

## 2017-03-31 RX ORDER — MORPHINE SULFATE 2 MG/ML
1-2 INJECTION, SOLUTION INTRAMUSCULAR; INTRAVENOUS
Status: DISCONTINUED | OUTPATIENT
Start: 2017-03-31 | End: 2017-04-05 | Stop reason: HOSPADM

## 2017-03-31 RX ORDER — FENTANYL CITRATE 50 UG/ML
100 INJECTION, SOLUTION INTRAMUSCULAR; INTRAVENOUS ONCE
Status: COMPLETED | OUTPATIENT
Start: 2017-03-31 | End: 2017-03-31

## 2017-03-31 RX ORDER — SODIUM CHLORIDE 0.9 % (FLUSH) 0.9 %
5-10 SYRINGE (ML) INJECTION EVERY 8 HOURS
Status: DISCONTINUED | OUTPATIENT
Start: 2017-03-31 | End: 2017-04-05 | Stop reason: HOSPADM

## 2017-03-31 RX ORDER — AMLODIPINE BESYLATE 10 MG/1
10 TABLET ORAL DAILY
Status: DISCONTINUED | OUTPATIENT
Start: 2017-04-01 | End: 2017-04-05 | Stop reason: HOSPADM

## 2017-03-31 RX ORDER — DEXTROSE 50 % IN WATER (D50W) INTRAVENOUS SYRINGE
25-50 AS NEEDED
Status: DISCONTINUED | OUTPATIENT
Start: 2017-03-31 | End: 2017-03-31 | Stop reason: HOSPADM

## 2017-03-31 RX ORDER — NALOXONE HYDROCHLORIDE 0.4 MG/ML
0.2 INJECTION, SOLUTION INTRAMUSCULAR; INTRAVENOUS; SUBCUTANEOUS AS NEEDED
Status: DISCONTINUED | OUTPATIENT
Start: 2017-03-31 | End: 2017-03-31 | Stop reason: HOSPADM

## 2017-03-31 RX ADMIN — KETOROLAC TROMETHAMINE 30 MG: 30 INJECTION, SOLUTION INTRAMUSCULAR; INTRAVENOUS at 14:59

## 2017-03-31 RX ADMIN — Medication 10 ML: at 23:00

## 2017-03-31 RX ADMIN — GLYCOPYRROLATE 0.6 MG: 0.2 INJECTION INTRAMUSCULAR; INTRAVENOUS at 15:26

## 2017-03-31 RX ADMIN — CEFAZOLIN SODIUM 2 G: 2 SOLUTION INTRAVENOUS at 14:08

## 2017-03-31 RX ADMIN — OXYCODONE HYDROCHLORIDE 10 MG: 10 TABLET, FILM COATED, EXTENDED RELEASE ORAL at 11:31

## 2017-03-31 RX ADMIN — SUCCINYLCHOLINE CHLORIDE 100 MG: 20 INJECTION INTRAMUSCULAR; INTRAVENOUS at 14:16

## 2017-03-31 RX ADMIN — HYDROMORPHONE HYDROCHLORIDE 0.5 MG: 2 INJECTION, SOLUTION INTRAMUSCULAR; INTRAVENOUS; SUBCUTANEOUS at 16:24

## 2017-03-31 RX ADMIN — Medication 3 MG: at 15:26

## 2017-03-31 RX ADMIN — CEFAZOLIN SODIUM 2 G: 2 SOLUTION INTRAVENOUS at 22:59

## 2017-03-31 RX ADMIN — LIDOCAINE HYDROCHLORIDE 80 MG: 20 INJECTION, SOLUTION EPIDURAL; INFILTRATION; INTRACAUDAL; PERINEURAL at 14:16

## 2017-03-31 RX ADMIN — ROPIVACAINE HYDROCHLORIDE 40 MG: 2 INJECTION, SOLUTION EPIDURAL; INFILTRATION at 12:12

## 2017-03-31 RX ADMIN — FENTANYL CITRATE 50 MCG: 50 INJECTION, SOLUTION INTRAMUSCULAR; INTRAVENOUS at 14:36

## 2017-03-31 RX ADMIN — POLYMYXIN B SULFATE 75000 UNITS: 500000 INJECTION, POWDER, LYOPHILIZED, FOR SOLUTION INTRAMUSCULAR; INTRATHECAL; INTRAVENOUS; OPHTHALMIC at 15:00

## 2017-03-31 RX ADMIN — FAMOTIDINE 20 MG: 20 TABLET, FILM COATED ORAL at 11:31

## 2017-03-31 RX ADMIN — ACETAMINOPHEN 1000 MG: 500 TABLET ORAL at 11:31

## 2017-03-31 RX ADMIN — ROCURONIUM BROMIDE 5 MG: 10 INJECTION, SOLUTION INTRAVENOUS at 14:14

## 2017-03-31 RX ADMIN — VANCOMYCIN HYDROCHLORIDE 3 G: 1 INJECTION, POWDER, LYOPHILIZED, FOR SOLUTION INTRAVENOUS at 15:00

## 2017-03-31 RX ADMIN — BUPIVACAINE HYDROCHLORIDE 25 ML: 5 INJECTION, SOLUTION EPIDURAL; INTRACAUDAL at 14:58

## 2017-03-31 RX ADMIN — MIDAZOLAM HYDROCHLORIDE 2 MG: 1 INJECTION, SOLUTION INTRAMUSCULAR; INTRAVENOUS at 12:10

## 2017-03-31 RX ADMIN — SODIUM CHLORIDE, SODIUM LACTATE, POTASSIUM CHLORIDE, AND CALCIUM CHLORIDE 75 ML/HR: 600; 310; 30; 20 INJECTION, SOLUTION INTRAVENOUS at 11:20

## 2017-03-31 RX ADMIN — PROPOFOL 160 MG: 10 INJECTION, EMULSION INTRAVENOUS at 14:16

## 2017-03-31 RX ADMIN — ONDANSETRON 4 MG: 2 INJECTION INTRAMUSCULAR; INTRAVENOUS at 14:49

## 2017-03-31 RX ADMIN — HYDRALAZINE HYDROCHLORIDE 5 MG: 20 INJECTION INTRAMUSCULAR; INTRAVENOUS at 14:36

## 2017-03-31 RX ADMIN — ROCURONIUM BROMIDE 20 MG: 10 INJECTION, SOLUTION INTRAVENOUS at 14:25

## 2017-03-31 RX ADMIN — VANCOMYCIN HYDROCHLORIDE 1 G: 1 INJECTION, POWDER, LYOPHILIZED, FOR SOLUTION INTRAVENOUS at 15:25

## 2017-03-31 RX ADMIN — ONDANSETRON HYDROCHLORIDE 4 MG: 2 SOLUTION INTRAMUSCULAR; INTRAVENOUS at 17:00

## 2017-03-31 RX ADMIN — HYDROMORPHONE HYDROCHLORIDE 0.5 MG: 2 INJECTION, SOLUTION INTRAMUSCULAR; INTRAVENOUS; SUBCUTANEOUS at 16:14

## 2017-03-31 RX ADMIN — FENTANYL CITRATE 50 MCG: 50 INJECTION, SOLUTION INTRAMUSCULAR; INTRAVENOUS at 14:14

## 2017-03-31 RX ADMIN — SODIUM CHLORIDE 75 ML/HR: 900 INJECTION, SOLUTION INTRAVENOUS at 22:21

## 2017-03-31 RX ADMIN — HYDROMORPHONE HYDROCHLORIDE 0.5 MG: 2 INJECTION, SOLUTION INTRAMUSCULAR; INTRAVENOUS; SUBCUTANEOUS at 17:00

## 2017-03-31 RX ADMIN — CELECOXIB 400 MG: 400 CAPSULE ORAL at 11:39

## 2017-03-31 RX ADMIN — Medication 10 MG: at 14:48

## 2017-03-31 RX ADMIN — METOCLOPRAMIDE HYDROCHLORIDE 5 MG: 5 TABLET ORAL at 23:00

## 2017-03-31 RX ADMIN — PREGABALIN 50 MG: 50 CAPSULE ORAL at 11:31

## 2017-03-31 RX ADMIN — HYDROMORPHONE HYDROCHLORIDE 0.5 MG: 2 INJECTION, SOLUTION INTRAMUSCULAR; INTRAVENOUS; SUBCUTANEOUS at 17:20

## 2017-03-31 RX ADMIN — EPINEPHRINE 0.5 MG: 1 INJECTION, SOLUTION INTRAMUSCULAR; SUBCUTANEOUS at 14:59

## 2017-03-31 RX ADMIN — FENTANYL CITRATE 50 MCG: 50 INJECTION, SOLUTION INTRAMUSCULAR; INTRAVENOUS at 15:16

## 2017-03-31 RX ADMIN — CELECOXIB 200 MG: 100 CAPSULE ORAL at 22:59

## 2017-03-31 RX ADMIN — FENTANYL CITRATE 100 MCG: 50 INJECTION INTRAMUSCULAR; INTRAVENOUS at 12:10

## 2017-03-31 RX ADMIN — BUPIVACAINE 20 ML: 13.3 INJECTION, SUSPENSION, LIPOSOMAL INFILTRATION at 14:58

## 2017-03-31 RX ADMIN — WARFARIN SODIUM 10 MG: 10 TABLET ORAL at 11:31

## 2017-03-31 RX ADMIN — FENTANYL CITRATE 25 MCG: 50 INJECTION, SOLUTION INTRAMUSCULAR; INTRAVENOUS at 15:41

## 2017-03-31 RX ADMIN — FENTANYL CITRATE 25 MCG: 50 INJECTION, SOLUTION INTRAMUSCULAR; INTRAVENOUS at 15:44

## 2017-03-31 NOTE — H&P (VIEW-ONLY)
[address] [place] [address]  [phone]           Patient: Kevin Schafer                MRN: [id number]       SSN: [id number]  YOB: 1935        AGE: 80 y.o. SEX: female  Body mass index is 37.76 kg/(m^2). PCP: Lady Vazquez MD  03/30/17      This office note has been dictated. REVIEW OF SYSTEMS:  Constitutional: Negative for fever, chills, weight loss and malaise/fatigue. HENT: Negative. Eyes: Negative. Respiratory: Negative. Cardiovascular: Negative. Gastrointestinal: No bowel incontinence or constipation. Genitourinary: No bladder incontinence or saddle anesthesia. Skin: Negative. Neurological: Negative. Endo/Heme/Allergies: Negative. Psychiatric/Behavioral: Negative. Musculoskeletal: As per HPI above. Past Medical History:   Diagnosis Date    Arthritis     Arthritis of both hips     Back pain     Balance problem     Chronic back pain     Cough     Easy bruising     Essential hypertension     GERD (gastroesophageal reflux disease)     Hiatal hernia     Hypertension     Irregular heart beat     Left hip pain 10/8/2010    Neck pain     Nervousness     Night sweat     Osteoarthritis, knee bilateral     Pain with urination     Polymyalgia rheumatica (HCC)     Reflux     Spinal stenosis     Trapezius strain     Trochanteric bursitis of left hip     Venous insufficiency          Current Outpatient Prescriptions:     ferrous sulfate 325 mg (65 mg iron) tablet, Take 1 Tab by mouth two (2) times daily (with meals). , Disp: 60 Tab, Rfl: 2    aspirin (ASPIRIN) 325 mg tablet, Take 1 Tab by mouth two (2) times a day., Disp: 60 Tab, Rfl: 0    cholecalciferol (VITAMIN D3) 1,000 unit tablet, Take 1,000 Units by mouth daily. , Disp: , Rfl:     potassium 99 mg tablet, Take 99 mg by mouth daily. , Disp: , Rfl:     omeprazole (PRILOSEC) 40 mg capsule, Take 40 mg by mouth daily., Disp: , Rfl:     acetaminophen (TYLENOL) 650 mg CR tablet, Take 1,300 mg by mouth two (2) times a day., Disp: , Rfl:     aMILoride-hydrochlorothiazide (MODURETIC) 5-50 mg tab, Take 1 Tab by mouth daily. , Disp: , Rfl:     amLODIPine (NORVASC) 10 mg tablet, 10 mg daily. , Disp: , Rfl: 3    losartan (COZAAR) 100 mg tablet, Take 100 mg by mouth daily. , Disp: , Rfl:     metoclopramide HCl (REGLAN) 5 mg tablet, Take 5 mg by mouth two (2) times a day., Disp: , Rfl:     cetirizine (ZYRTEC) 10 mg tablet, Take  by mouth daily. , Disp: , Rfl:     HYDROcodone-acetaminophen (NORCO)  mg tablet, Take 1-2 Tabs by mouth every six (6) hours as needed for Pain. Max Daily Amount: 8 Tabs., Disp: 60 Tab, Rfl: 0    HYDROcodone-acetaminophen (NORCO)  mg tablet, Take 1-2 Tabs by mouth every six (6) hours as needed for Pain. Max Daily Amount: 8 Tabs., Disp: 60 Tab, Rfl: 0    levoFLOXacin (LEVAQUIN) 500 mg tablet, 1 po q day x 7 days, Disp: 7 Tab, Rfl: 0    HYDROcodone-acetaminophen (NORCO) 7.5-325 mg per tablet, Take 1-2 Tabs by mouth every four (4) hours as needed. Max Daily Amount: 12 Tabs., Disp: 60 Tab, Rfl: 0    ciprofloxacin HCl (CIPRO) 500 mg tablet, Take 1 Tab by mouth two (2) times a day., Disp: 14 Tab, Rfl: 0    Allergies   Allergen Reactions    Citric Acid Unknown (comments)    Crinone [Progesterone Micronized] Unknown (comments)    Iodine Unknown (comments)    Percocet [Oxycodone-Acetaminophen] Other (comments)     Gets rebound headaches after taking Percocet       Social History     Social History    Marital status:      Spouse name: N/A    Number of children: N/A    Years of education: N/A     Occupational History    Not on file.      Social History Main Topics    Smoking status: Never Smoker    Smokeless tobacco: Never Used    Alcohol use No    Drug use: No    Sexual activity: No     Other Topics Concern    Not on file     Social History Narrative       Past Surgical History: Procedure Laterality Date    COLONOSCOPY N/A 7/27/2016    COLONOSCOPY w/polypectomy performed by Jordana Morley MD at [facility]    HX [facility]      HX CHOLECYSTECTOMY      HX HEENT  06/2011    left cornea transplant    HX HYSTERECTOMY      HX ORTHOPAEDIC      right foot and ankle           * Patient was identified by name and date of birth   * Agreement on procedure being performed was verified  * Risks and Benefits explained to the patient  * Procedure site verified and marked as necessary  * Patient was positioned for comfort  * Consent was signed and verified  10:14 AM    The patient was instructed on post injection care. We did see Ms. Haven Brooke for follow-up in regards to her bilateral knees. She is status post right knee replacement and had been doing quite well up until this past Friday. She states she was sitting down into a chair and felt a pop in her knee. She had some increased discomfort at that point. She states she has had some stiffness in her knee since that time. She was using a cane and is now back to a walker. She is also having trouble with the left knee. She has known advanced osteoarthritis of the left knee. She does have discomfort, which is affecting her ability to ambulate. PHYSICAL EXAMINATION: In general the patient is alert and oriented x 3 and is in no acute distress. The patient is well-developed and well-nourished with a normal affect. The patient is afebrile. HEENT:  Head is normocephalic and atraumatic. Pupils are equally round and reactive to light and accommodation. Extraocular eye movements are intact. Neck is supple. Trachea is midline. No JVD is present. Breathing is nonlabored. Examination of the lower extremities reveals pain free range of motion of the hips. There is no pain with palpation to the trochanteric bursae. There is negative straight leg raise. There is negative calf tenderness and swelling.   There is negative Homans. There is no evidence of DVT noted. Examination of the right knee reveals the skin to be intact. There is no erythema or ecchymosis. There is no warmth or signs for infection or cellulitis. She does have some tenderness to palpation to the anterior knee. No defects are noted in the extensor mechanism, however, does have approximately 6° to 7° extensor lag. Stability is good. She is missing approximately 4° or 5° on extension. Left knee skin is intact. There is no erythema or ecchymosis. There is no warmth or signs for infection or cellulitis. There is pain with palpation to the medial joint line, as well as patellofemoral grind and crepitus anteriorly with range of motion activities. RADIOGRAPHS:  Review of radiographs including AP, lateral, skyline, and obliques of the right knee reveals there is what appears to be a lateral inferior pole patellar fracture. The button appears to be in place. The knee replacement looks good. The films were reviewed with Dr. Mikki ePrez. ASSESSMENT:     1. Status post right knee replacement. 2. Patellar fracture right knee. 3. Left knee advanced osteoarthritis. PLAN:  At this point we discussed treatment options in regards to the left knee. We will move forward with a Cortisone injection for the left knee today. After informed and written consent the left knee was prepped with Betadine and 6 mg of Celestone was injected without complications. The patient tolerated the injection well. She was instructed on post injection care. In regards to the right knee we are going to order a STAT CT scan with 3-D reconstruction of the patella to evaluate the patellar fracture a bit further. We are going to get her scheduled for surgery next week for ORIF right patellar fracture, which was discussed with the patient today in the office.   We will see her back after her patellar fracture ORIF.                    Souleymane LUCERO, SENTHIL, ATC

## 2017-03-31 NOTE — INTERVAL H&P NOTE
H&P Update:  Bobbi Epstein was seen and examined. History and physical has been reviewed. The patient has been examined.  There have been no significant clinical changes since the completion of the originally dated History and Physical.    Signed By: Rory Gonsales MD     March 31, 2017 1:02 PM

## 2017-03-31 NOTE — ANESTHESIA PREPROCEDURE EVALUATION
Anesthetic History   No history of anesthetic complications            Review of Systems / Medical History  Patient summary reviewed, nursing notes reviewed and pertinent labs reviewed    Pulmonary  Within defined limits                 Neuro/Psych   Within defined limits           Cardiovascular    Hypertension: well controlled              Exercise tolerance: >4 METS     GI/Hepatic/Renal     GERD: well controlled           Endo/Other        Morbid obesity     Other Findings              Physical Exam    Airway  Mallampati: III  TM Distance: 4 - 6 cm  Neck ROM: short neck   Mouth opening: Diminished (comment)     Cardiovascular  Regular rate and rhythm,  S1 and S2 normal,  no murmur, click, rub, or gallop             Dental    Dentition: Poor dentition     Pulmonary  Breath sounds clear to auscultation               Abdominal  GI exam deferred       Other Findings            Anesthetic Plan    ASA: 3  Anesthesia type: general      Post-op pain plan if not by surgeon: peripheral nerve block single    Induction: Intravenous  Anesthetic plan and risks discussed with: Patient and Family

## 2017-03-31 NOTE — ANESTHESIA POSTPROCEDURE EVALUATION
Post-Anesthesia Evaluation and Assessment    Patient: Lazaro Keane MRN: [id number]  SSN: [id number]    YOB: 1935  Age: 80 y.o. Sex: female       Cardiovascular Function/Vital Signs  Visit Vitals    /44    Pulse 80    Temp 37.3 °C (99.1 °F)    Resp 12    Ht 5' 4\" (1.626 m)    Wt 99.6 kg (219 lb 9.6 oz)    SpO2 100%    BMI 37.69 kg/m2       Patient is status post general anesthesia for Procedure(s):  RIGHT PATELLA OPEN REDUCTION INTERNAL FIXATION. Nausea/Vomiting: None    Postoperative hydration reviewed and adequate. Pain:  Pain Scale 1: Visual (03/31/17 1601)  Pain Intensity 1: 0 (03/31/17 1601)   Managed    Neurological Status:   Neuro (WDL): Within Defined Limits (03/31/17 1601)   At baseline    Mental Status and Level of Consciousness: Arousable    Pulmonary Status:   O2 Device: Oxygen mask (03/31/17 1622)   Adequate oxygenation and airway patent    Complications related to anesthesia: None    Post-anesthesia assessment completed.  No concerns    Signed By: Moe Grayson MD     March 31, 2017

## 2017-03-31 NOTE — BRIEF OP NOTE
BRIEF OPERATIVE NOTE    Date of Procedure: 3/31/2017   Preoperative Diagnosis: Closed displaced fracture of right patella, unspecified fracture morphology, initial encounter [S82.001A]  Postoperative Diagnosis: Closed displaced fracture of right patella, unspecified fracture morphology, initial encounter [S82.001A]    Procedure(s):  RIGHT PATELLA OPEN REDUCTION INTERNAL FIXATION  Surgeon(s) and Role:     * Ramy Cueva MD - Primary            Surgical Staff:  Circ-1: Chapo Yoo RN  Scrub Tech-1: Hiren Bradford  Scrub Tech-2: Meryle Schaumann  Surg Asst-1: Brittany Atkins  Event Time In   Incision Start 1439   Incision Close      Anesthesia: General   Estimated Blood Loss: 50  Specimens: * No specimens in log *   Findings: same   Complications: none  Implants:   Implant Name Type Inv.  Item Serial No.  Lot No. LRB No. Used Action   PIN STEINMAN[employer] RVRS STL THRD TP --  - [id number]   PIN [employer]IN[employer] RVRS STL THRD TP --    [employer] N/A Right 2 Implanted

## 2017-03-31 NOTE — IP AVS SNAPSHOT
Katerina Lasso 
 
 
 [address] Patient: Perla Ibrahim MRN: [id number] AZT:3/2/3512 You are allergic to the following Allergen Reactions Citric Acid Unknown (comments) Crinone (Progesterone Micronized) Unknown (comments) Iodine Unknown (comments) Percocet (Oxycodone-Acetaminophen) Other (comments) Gets rebound headaches after taking Percocet Recent Documentation Height Weight BMI OB Status Smoking Status 1.626 m 99.6 kg 37.69 kg/m2 Hysterectomy Never Smoker Emergency Contacts Name Discharge Info Relation Home Work Mobile [address] [facility] CAREGIVER [3] Spouse [3] [phone] Layla Viveros  Daughter [21] [phone] About your hospitalization You were admitted on:  March 31, 2017 You last received care in the:  [facility] 1978 You were discharged on:  April 5, 2017 Unit phone number:  [phone] Why you were hospitalized Your primary diagnosis was:  Not on File Your diagnoses also included:  Patella Fracture Providers Seen During Your Hospitalizations Provider Role Specialty Primary office phone Chetna Chase MD Attending Provider Orthopedic Surgery [phone] Your Primary Care Physician (PCP) Primary Care Physician Office Phone Office Fax Killian Hooker 72 [phone] Follow-up Information Follow up With Details Comments Contact Info Angela Aranda PA-C On 4/19/2017 April 19, 2017 @ 3:00 pm with Bri HOBBS [facility] [address] [facility] [address] 
[phone] Rivka Page MD On 4/12/2017 April 12, 2017 @ 1:15 pm with Dr. Yeimy Dai. [address] [place] [address] [address] [facility] [address] [phone] Your Appointments Wednesday April 19, 2017  9:45 AM EDT HISTORY AND PHYSICAL with Angela Aranda PA-C  
 [address] [facility], [address] and [facility] [place] ([address]) [address] [facility], [address] [place] [address]  
[phone] Current Discharge Medication List  
  
START taking these medications Dose & Instructions Dispensing Information Comments Morning Noon Evening Bedtime  
 buffered aspirin 325 mg tablet Commonly known as:  BUFFERIN Your last dose was: Your next dose is:    
   
   
 Dose:  325 mg Take 1 Tab by mouth daily. Quantity:  30 Tab Refills:  2  
     
   
   
   
  
 celecoxib 200 mg capsule Commonly known as:  CELEBREX Your last dose was: Your next dose is:    
   
   
 Dose:  200 mg Take 1 Cap by mouth two (2) times a day for 90 days. Quantity:  60 Cap Refills:  2 CONTINUE these medications which have CHANGED Dose & Instructions Dispensing Information Comments Morning Noon Evening Bedtime HYDROcodone-acetaminophen  mg tablet Commonly known as:  Lynnda Levo What changed:   
- when to take this - Another medication with the same name was removed. Continue taking this medication, and follow the directions you see here. Your last dose was: Your next dose is:    
   
   
 Dose:  1-2 Tab Take 1-2 Tabs by mouth every four (4) hours as needed for Pain. Max Daily Amount: 12 Tabs. Quantity:  60 Tab Refills:  0 CONTINUE these medications which have NOT CHANGED Dose & Instructions Dispensing Information Comments Morning Noon Evening Bedtime  
 acetaminophen 650 mg CR tablet Commonly known as:  TYLENOL Your last dose was: Your next dose is:    
   
   
 Dose:  1300 mg Take 1,300 mg by mouth two (2) times a day. Refills:  0  
     
   
   
   
  
 aMILoride-hydroCHLOROthiazide 5-50 mg Tab Commonly known as:  MODURETIC Your last dose was: Your next dose is:    
   
   
 Dose:  1 Tab Take 1 Tab by mouth daily. Refills:  0  
     
   
   
   
  
 amLODIPine 10 mg tablet Commonly known as:  Barabara Puls Your last dose was: Your next dose is:    
   
   
 Dose:  10 mg  
10 mg daily. Refills:  3  
     
   
   
   
  
 ferrous sulfate 325 mg (65 mg iron) tablet Your last dose was: Your next dose is:    
   
   
 Dose:  325 mg Take 1 Tab by mouth two (2) times daily (with meals). Quantity:  60 Tab Refills:  2  
     
   
   
   
  
 losartan 100 mg tablet Commonly known as:  COZAAR Your last dose was: Your next dose is:    
   
   
 Dose:  100 mg Take 100 mg by mouth daily. Refills:  0  
     
   
   
   
  
 metoclopramide HCl 5 mg tablet Commonly known as:  REGLAN Your last dose was: Your next dose is:    
   
   
 Dose:  5 mg Take 5 mg by mouth two (2) times a day. Refills:  0  
     
   
   
   
  
 omeprazole 40 mg capsule Commonly known as:  PRILOSEC Your last dose was: Your next dose is:    
   
   
 Dose:  40 mg Take 40 mg by mouth daily. Refills:  0  
     
   
   
   
  
 potassium 99 mg tablet Your last dose was: Your next dose is:    
   
   
 Dose:  99 mg Take 99 mg by mouth daily. Refills:  0  
     
   
   
   
  
 VITAMIN D3 1,000 unit tablet Generic drug:  cholecalciferol Your last dose was: Your next dose is:    
   
   
 Dose:  1000 Units Take 1,000 Units by mouth daily. Refills:  0 ZyrTEC 10 mg tablet Generic drug:  cetirizine Your last dose was: Your next dose is: Take  by mouth daily. Refills:  0 STOP taking these medications   
 aspirin 325 mg tablet Commonly known as:  ASPIRIN  
   
  
 betamethasone 6 mg/mL injection Commonly known as:  CELESTONE SOLUSPAN  
   
  
 ciprofloxacin HCl 500 mg tablet Commonly known as:  CIPRO levoFLOXacin 500 mg tablet Commonly known as:  Omkar Meade Where to Get Your Medications Information on where to get these meds will be given to you by the nurse or doctor. ! Ask your nurse or doctor about these medications  
  buffered aspirin 325 mg tablet  
 celecoxib 200 mg capsule  
 ferrous sulfate 325 mg (65 mg iron) tablet HYDROcodone-acetaminophen  mg tablet Discharge Instructions DISCHARGE SUMMARY from Nurse The following personal items are in your possession at time of discharge: 
 
Dental Appliances: None Jewelry: Ring (With Randy Putt) Clothing: Shirt, Pants, Jacket/Coat, Undergarments, Footwear, Socks (With Randy Putt) Other Valuables: Cell Phone, Walker (With Randy Putt) PATIENT INSTRUCTIONS: 
 
After general anesthesia or intravenous sedation, for 24 hours or while taking prescription Narcotics: · Limit your activities · Do not drive and operate hazardous machinery · Do not make important personal or business decisions · Do  not drink alcoholic beverages · If you have not urinated within 8 hours after discharge, please contact your surgeon on call. Report the following to your surgeon: 
· Excessive pain, swelling, redness or odor of or around the surgical area · Temperature over 100.5 · Nausea and vomiting lasting longer than 4 hours or if unable to take medications · Any signs of decreased circulation or nerve impairment to extremity: change in color, persistent  numbness, tingling, coldness or increase pain · Any questions What to do at Home: 
Recommended activity: direction given by physical therapy If you experience any of the following symptoms swelling, numbness and tingling, please follow up with DR. Alarcon. *  Please give a list of your current medications to your Primary Care Provider.  
 
*  Please update this list whenever your medications are discontinued, doses are 
 changed, or new medications (including over-the-counter products) are added. *  Please carry medication information at all times in case of emergency situations. These are general instructions for a healthy lifestyle: No smoking/ No tobacco products/ Avoid exposure to second hand smoke Surgeon General's Warning:  Quitting smoking now greatly reduces serious risk to your health. Obesity, smoking, and sedentary lifestyle greatly increases your risk for illness A healthy diet, regular physical exercise & weight monitoring are important for maintaining a healthy lifestyle You may be retaining fluid if you have a history of heart failure or if you experience any of the following symptoms:  Weight gain of 3 pounds or more overnight or 5 pounds in a week, increased swelling in our hands or feet or shortness of breath while lying flat in bed. Please call your doctor as soon as you notice any of these symptoms; do not wait until your next office visit. Recognize signs and symptoms of STROKE: 
 
F-face looks uneven A-arms unable to move or move unevenly S-speech slurred or non-existent T-time-call 911 as soon as signs and symptoms begin-DO NOT go Back to bed or wait to see if you get better-TIME IS BRAIN. Warning Signs of HEART ATTACK Call 911 if you have these symptoms: 
? Chest discomfort. Most heart attacks involve discomfort in the center of the chest that lasts more than a few minutes, or that goes away and comes back. It can feel like uncomfortable pressure, squeezing, fullness, or pain. ? Discomfort in other areas of the upper body. Symptoms can include pain or discomfort in one or both arms, the back, neck, jaw, or stomach. ? Shortness of breath with or without chest discomfort. ? Other signs may include breaking out in a cold sweat, nausea, or lightheadedness. Don't wait more than five minutes to call [address]!  Fast action can save your life. Calling 911 is almost always the fastest way to get lifesaving treatment. Emergency Medical Services staff can begin treatment when they arrive  up to an hour sooner than if someone gets to the hospital by car. The discharge information has been reviewed with the patient and spouse. The patient and spouse verbalized understanding. Discharge medications reviewed with the patient and spouse and appropriate educational materials and side effects teaching were provided. Patient armband removed and shredded [employer] Activation Thank you for requesting access to [employer]. Please follow the instructions below to securely access and download your online medical record. [employer] allows you to send messages to your doctor, view your test results, renew your prescriptions, schedule appointments, and more. How Do I Sign Up? 1. In your internet browser, go to [email or web address] 
2. Click on the First Time User? Click Here link in the Sign In box. You will be redirect to the New Member Sign Up page. 3. Enter your [employer] Access Code exactly as it appears below. You will not need to use this code after youve completed the sign-up process. If you do not sign up before the expiration date, you must request a new code. [employer] Access Code: Activation code not generated Current [employer] Status: Patient Declined (This is the date your [employer] access code will ) 4. Enter the last four digits of your Social Security Number (xxxx) and Date of Birth (mm/dd/yyyy) as indicated and click Submit. You will be taken to the next sign-up page. 5. Create a [employer] ID. This will be your [employer] login ID and cannot be changed, so think of one that is secure and easy to remember. 6. Create a [employer] password. You can change your password at any time. 7. Enter your Password Reset Question and Answer. This can be used at a later time if you forget your password. 8. Enter your e-mail address. You will receive e-mail notification when new information is available in [address]. 9. Click Sign Up. You can now view and download portions of your medical record. 10. Click the Download Summary menu link to download a portable copy of your medical information. Additional Information If you have questions, please visit the Frequently Asked Questions section of the [employer] website at [email or web address] Remember, [employer] is NOT to be used for urgent needs. For medical emergencies, dial 911. Discharge Orders Procedure Order Date Status Priority Quantity Spec Type Associated Dx REFERRAL TO HOME HEALTH 04/05/17 1230 Normal Routine 1  Closed displaced transverse fracture of right patella with routine healing, subsequent encounter [[id number]] Comments:  wbat Knee immobilizer on at all times, no flexion 
aquacel ag dressing pod 7 and prn Aspirin therapy DRESSING, CHANGE SPECIFY 04/03/17 1000 Normal Routine 1  Closed displaced transverse fracture of right patella with routine healing, subsequent encounter [[id number]] Comments:  aquacel ag dressing pod 7 and prn WALKER STANDARD 04/03/17 1000 Normal Routine 1  Closed displaced transverse fracture of right patella with routine healing, subsequent encounter [[id number]] WHEELCHAIR 04/03/17 1000 Normal Routine 1  Closed displaced transverse fracture of right patella with routine healing, subsequent encounter [[id number]] SHOWER CHAIR 04/03/17 1000 Normal Routine 1  Closed displaced transverse fracture of right patella with routine healing, subsequent encounter [[id number]] COMMODE CHAIR 04/03/17 1000 Normal Routine 1  Closed displaced transverse fracture of right patella with routine healing, subsequent encounter [[id number]] REFERRAL TO PHYSICAL THERAPY 04/03/17 1000 Normal Routine 1  Closed displaced transverse fracture of right patella with routine healing, subsequent encounter [[id number]] Comments:  wbat Knee brace on at all times- no flexion of knee sp OIRF patellar fx MyChart Announcement We are excited to announce that we are making your provider's discharge notes available to you in [employer]. You will see these notes when they are completed and signed by the physician that discharged you from your recent hospital stay. If you have any questions or concerns about any information you see in [employer], please call the Health Information Department where you were seen or reach out to your Primary Care Provider for more information about your plan of care. General Information Please provide this summary of care documentation to your next provider. Patient Signature:  ____________________________________________________________ Date:  ____________________________________________________________  
  
[facility] Provider Signature:  ____________________________________________________________ Date:  ____________________________________________________________

## 2017-03-31 NOTE — ANESTHESIA PROCEDURE NOTES
Peripheral Block    Start time: 3/31/2017 11:59 AM  End time: 3/31/2017 12:20 PM  Performed by: Sue Machuca  Authorized by: Sue Machuca       Pre-procedure:    Indications: at surgeon's request and post-op pain management    Preanesthetic Checklist: patient identified, risks and benefits discussed, site marked, timeout performed, anesthesia consent given and patient being monitored    Timeout Time: 11:59          Block Type:   Block Type:  Femoral single shot  Laterality:  Right and femoral  Monitoring:  Standard ASA monitoring, continuous pulse ox, responsive to questions, oxygen, frequent vital sign checks and heart rate  Injection Technique:  Single shot  Procedures: ultrasound guided    Patient Position: supine  Prep: chlorhexidine    Location:  Upper thigh  Needle Type:  Stimuplex  Needle Gauge:  22 G  Needle Localization:  Ultrasound guidance  Medication Injected:  0.2%  ropivacaine    Assessment:  Number of attempts:  1  Injection Assessment:  Incremental injection every 5 mL, negative aspiration for CSF, no paresthesia and ultrasound image on chart  Patient tolerance:  Patient tolerated the procedure well with no immediate complications  Pt is sedated with 2mg Midazolam and 100 mcg Fentanyl

## 2017-03-31 NOTE — BRIEF OP NOTE
BRIEF OPERATIVE NOTE    Date of Procedure: 3/31/2017   Preoperative Diagnosis: Closed nondisplaced fracture of right patella, unspecified fracture morphology, initial encounter [S82.001A]  Postoperative Diagnosis: Closed nondisplaced fracture of right patella, unspecified fracture morphology, initial encounter [S82.001A]    Procedure(s):  RIGHT PATELLA OPEN REDUCTION INTERNAL FIXATION  Surgeon(s) and Role:     * Lore Wadsworth MD - Primary            Surgical Staff:  Circ-1: Rosina Arango RN  Scrub Tech-1: Maxwell Rivas  Scrub Tech-2: Jazmine Neumann  Surg Asst-1: Llanos Force  Event Time In   Incision Start 1439   Incision Close      Anesthesia: General   Estimated Blood Loss: 50  Specimens: * No specimens in log *   Findings: same   Complications: none  Implants:   Implant Name Type Inv.  Item Serial No.  Lot No. LRB No. Used Action   PIN STEINMANN RVRS STL THRD TP --  - [id number]   PIN STEINMANN RVRS STL THRD TP --    [employer] N/A Right 2 Implanted

## 2017-04-01 LAB
ANION GAP BLD CALC-SCNC: 8 MMOL/L (ref 3–18)
BASOPHILS # BLD AUTO: 0 K/UL (ref 0–0.06)
BASOPHILS # BLD: 0 % (ref 0–2)
BUN SERPL-MCNC: 34 MG/DL (ref 7–18)
BUN/CREAT SERPL: 22 (ref 12–20)
CALCIUM SERPL-MCNC: 8.6 MG/DL (ref 8.5–10.1)
CHLORIDE SERPL-SCNC: 106 MMOL/L (ref 100–108)
CO2 SERPL-SCNC: 28 MMOL/L (ref 21–32)
CREAT SERPL-MCNC: 1.54 MG/DL (ref 0.6–1.3)
DIFFERENTIAL METHOD BLD: ABNORMAL
EOSINOPHIL # BLD: 0 K/UL (ref 0–0.4)
EOSINOPHIL NFR BLD: 0 % (ref 0–5)
ERYTHROCYTE [DISTWIDTH] IN BLOOD BY AUTOMATED COUNT: 13.3 % (ref 11.6–14.5)
GLUCOSE SERPL-MCNC: 130 MG/DL (ref 74–99)
HCT VFR BLD AUTO: 26.4 % (ref 35–45)
HGB BLD-MCNC: 8.3 G/DL (ref 12–16)
LYMPHOCYTES # BLD AUTO: 17 % (ref 21–52)
LYMPHOCYTES # BLD: 1.7 K/UL (ref 0.9–3.6)
MCH RBC QN AUTO: 25.8 PG (ref 24–34)
MCHC RBC AUTO-ENTMCNC: 31.4 G/DL (ref 31–37)
MCV RBC AUTO: 82 FL (ref 74–97)
MONOCYTES # BLD: 0.7 K/UL (ref 0.05–1.2)
MONOCYTES NFR BLD AUTO: 7 % (ref 3–10)
NEUTS SEG # BLD: 7.3 K/UL (ref 1.8–8)
NEUTS SEG NFR BLD AUTO: 76 % (ref 40–73)
PLATELET # BLD AUTO: 356 K/UL (ref 135–420)
PMV BLD AUTO: 9.9 FL (ref 9.2–11.8)
POTASSIUM SERPL-SCNC: 3.9 MMOL/L (ref 3.5–5.5)
RBC # BLD AUTO: 3.22 M/UL (ref 4.2–5.3)
SODIUM SERPL-SCNC: 142 MMOL/L (ref 136–145)
WBC # BLD AUTO: 9.8 K/UL (ref 4.6–13.2)

## 2017-04-01 PROCEDURE — 74011250636 HC RX REV CODE- 250/636: Performed by: PHYSICIAN ASSISTANT

## 2017-04-01 PROCEDURE — 65270000029 HC RM PRIVATE

## 2017-04-01 PROCEDURE — 97165 OT EVAL LOW COMPLEX 30 MIN: CPT

## 2017-04-01 PROCEDURE — 36415 COLL VENOUS BLD VENIPUNCTURE: CPT | Performed by: ORTHOPAEDIC SURGERY

## 2017-04-01 PROCEDURE — 97530 THERAPEUTIC ACTIVITIES: CPT

## 2017-04-01 PROCEDURE — 80048 BASIC METABOLIC PNL TOTAL CA: CPT | Performed by: ORTHOPAEDIC SURGERY

## 2017-04-01 PROCEDURE — 74011250636 HC RX REV CODE- 250/636: Performed by: ORTHOPAEDIC SURGERY

## 2017-04-01 PROCEDURE — 85025 COMPLETE CBC W/AUTO DIFF WBC: CPT | Performed by: ORTHOPAEDIC SURGERY

## 2017-04-01 PROCEDURE — 74011250637 HC RX REV CODE- 250/637: Performed by: ORTHOPAEDIC SURGERY

## 2017-04-01 RX ORDER — SODIUM CHLORIDE 9 MG/ML
500 INJECTION, SOLUTION INTRAVENOUS ONCE
Status: COMPLETED | OUTPATIENT
Start: 2017-04-01 | End: 2017-04-01

## 2017-04-01 RX ADMIN — METOCLOPRAMIDE HYDROCHLORIDE 5 MG: 5 TABLET ORAL at 17:05

## 2017-04-01 RX ADMIN — METOCLOPRAMIDE HYDROCHLORIDE 5 MG: 5 TABLET ORAL at 08:22

## 2017-04-01 RX ADMIN — HYDROCODONE BITARTRATE AND ACETAMINOPHEN 1 TABLET: 10; 325 TABLET ORAL at 13:34

## 2017-04-01 RX ADMIN — ASPIRIN 325 MG: 325 TABLET, FILM COATED ORAL at 08:22

## 2017-04-01 RX ADMIN — CELECOXIB 200 MG: 100 CAPSULE ORAL at 17:05

## 2017-04-01 RX ADMIN — CEFAZOLIN SODIUM 2 G: 2 SOLUTION INTRAVENOUS at 06:11

## 2017-04-01 RX ADMIN — Medication 10 ML: at 13:36

## 2017-04-01 RX ADMIN — Medication 10 ML: at 22:17

## 2017-04-01 RX ADMIN — HYDROCODONE BITARTRATE AND ACETAMINOPHEN 2 TABLET: 10; 325 TABLET ORAL at 20:36

## 2017-04-01 RX ADMIN — HYDROCHLOROTHIAZIDE 50 MG: 25 TABLET ORAL at 08:22

## 2017-04-01 RX ADMIN — Medication 10 ML: at 06:11

## 2017-04-01 RX ADMIN — FERROUS SULFATE TAB 325 MG (65 MG ELEMENTAL FE) 325 MG: 325 (65 FE) TAB at 17:05

## 2017-04-01 RX ADMIN — AMILORIDE HYDROCHLORIDE 5 MG: 5 TABLET ORAL at 08:22

## 2017-04-01 RX ADMIN — LOSARTAN POTASSIUM 100 MG: 50 TABLET ORAL at 08:22

## 2017-04-01 RX ADMIN — FERROUS SULFATE TAB 325 MG (65 MG ELEMENTAL FE) 325 MG: 325 (65 FE) TAB at 08:22

## 2017-04-01 RX ADMIN — HYDROCODONE BITARTRATE AND ACETAMINOPHEN 1 TABLET: 10; 325 TABLET ORAL at 08:22

## 2017-04-01 RX ADMIN — AMLODIPINE BESYLATE 10 MG: 10 TABLET ORAL at 08:22

## 2017-04-01 RX ADMIN — CELECOXIB 200 MG: 100 CAPSULE ORAL at 08:22

## 2017-04-01 RX ADMIN — MAGNESIUM HYDROXIDE 30 ML: 400 SUSPENSION ORAL at 17:11

## 2017-04-01 RX ADMIN — SODIUM CHLORIDE 500 ML: 900 INJECTION, SOLUTION INTRAVENOUS at 11:03

## 2017-04-01 RX ADMIN — PANTOPRAZOLE SODIUM 40 MG: 40 TABLET, DELAYED RELEASE ORAL at 08:22

## 2017-04-01 RX ADMIN — HYDROCODONE BITARTRATE AND ACETAMINOPHEN 1 TABLET: 10; 325 TABLET ORAL at 00:23

## 2017-04-01 NOTE — ROUTINE PROCESS
Bedside and Verbal shift change report given to 4[address] Keithberto Spence (oncoming nurse) by Fariba Bojorquez (offgoing nurse). Report included the following information SBAR, Kardex, MAR and Recent Results.     SITUATION:    Code Status: No Order   Reason for Admission: Closed nondisplaced fracture of right patella, unspecified fracture morphology, initial encounter [facility] day: 1   Problem List:       Hospital Problems  Date Reviewed: 3/30/2017          Codes Class Noted POA    Patella fracture ICD-10-CM: S82.009A  ICD-9-CM: 822.0  3/31/2017 Unknown              BACKGROUND:    Past Medical History:   Past Medical History:   Diagnosis Date    Arthritis     Arthritis of both hips     Back pain     Balance problem     Chronic back pain     Cough     Easy bruising     Essential hypertension     GERD (gastroesophageal reflux disease)     Hiatal hernia     Hypertension     Irregular heart beat     Left hip pain 10/8/2010    Neck pain     Nervousness     Night sweat     Osteoarthritis, knee bilateral     Pain with urination     Polymyalgia rheumatica (HCC)     Reflux     Spinal stenosis     Trapezius strain     Trochanteric bursitis of left hip     Venous insufficiency          Patient taking anticoagulants no     ASSESSMENT:    Changes in Assessment Throughout Shift: no     Patient has Central Line: no   Patient has Krishna Cath: no       Last Vitals:     Vitals:    04/01/17 0345 04/01/17 0756 04/01/17 1127 04/01/17 1538   BP: 112/63 124/63 113/58 143/69   Pulse: 74 72 74 82   Resp: 15 16 16 16   Temp: 97.5 °F (36.4 °C) 97 °F (36.1 °C) 96.5 °F (35.8 °C) 96.5 °F (35.8 °C)   SpO2: 99% 99% 100% 100%   Weight:       Height:            IV and DRAINS (will only show if present)   Peripheral IV 03/31/17 Right Wrist-Site Assessment: Clean, dry, & intact     WOUND (if present)   Wound Type:  Right leg with bulky dressing   Dressing present Dressing Present : No   Wound Concerns/Notes: none     PAIN    Pain Assessment    Pain Intensity 1: 2 (04/01/17 1403)    Pain Location 1: Leg    Pain Intervention(s) 1: Medication (see MAR)    Patient Stated Pain Goal: 2  o Interventions for Pain:  Norco  o Intervention effective: yes  o Time of last intervention: 13:34   o Reassessment Completed: yes      Last 3 Weights:  Last 3 Recorded Weights in this Encounter    03/31/17 1053   Weight: 99.6 kg (219 lb 9.6 oz)     Weight change:      INTAKE/OUPUT    Current Shift: 04/01 0701 - 04/01 1900  In: 987.5 [P.O.:120; I.V.:867.5]  Out: -     Last three shifts: 03/30 1901 - 04/01 0700  In: 1526.3 [P.O.:100; I.V.:1426.3]  Out: -      LAB RESULTS     Recent Labs      04/01/17   0208  03/30/17   1441   WBC  9.8  9.7   HGB  8.3*  9.7*   HCT  26.4*  30.1*   PLT  356  422*        Recent Labs      04/01/17   0208  03/30/17   1441   NA  142  139   K  3.9  4.0   GLU  130*  155*   BUN  34*  36*   CREA  1.54*  1.39*   CA  8.6  9.9   INR   --   1.1       RECOMMENDATIONS AND DISCHARGE PLANNING     1. Pending tests/procedures/ Plan of Care or Other Needs: no     2. Discharge plan for patient and Needs/Barriers: no    3. Estimated Discharge Date: n/a Posted on Whiteboard in Patients Room: no      4. The patient's care plan was reviewed with the oncoming nurse. \"HEALS\" SAFETY CHECK      Fall Risk    Total Score: 3    Safety Measures: Safety Measures: Bed/Chair-Wheels locked, Bed in low position, Call light within reach, Fall prevention (comment), Gripper socks, Side rails X2    A safety check occurred in the patient's room between off going nurse and oncoming nurse listed above.     The safety check included the below items  Area Items   H  High Alert Medications - Verify all high alert medication drips (heparin, PCA, etc.)   E  Equipment - Suction is set up for ALL patients (with yanker)  - Red plugs utilized for all equipment (IV pumps, etc.)  - WOWs wiped down at end of shift.  - Room stocked with oxygen, suction, and other unit-specific supplies   A  Alarms - Bed alarm is set for fall risk patients  - Ensure chair alarm is in place and activated if patient is up in a chair   L  Lines - Check IV for any infiltration  - Krishna bag is empty if patient has a Krishna   - Tubing and IV bags are labeled   S  Safety   - Room is clean, patient is clean, and equipment is clean. - Hallways are clear from equipment besides carts. - Fall bracelet on for fall risk patients  - Ensure room is clear and free of clutter  - Suction is set up for ALL patients (with hermilo)  - Hallways are clear from equipment besides carts.    - Isolation precautions followed, supplies available outside room, sign posted     Verna Osuna

## 2017-04-01 NOTE — PROGRESS NOTES
2148  Received pt in stable condition,   General: lying in bed in supine, not apparent distress  Neuro: AOx4, denies numbness, 0 tingling, able to wiggle toes to bilateral extremities  Cardio: pedal pulses palpable, denies chest pain  Respiratory:  denies shortness of breath  Skin: Dressing to right knee clean, dry and intact  GI: has not void yet  : denies nausea and vomiting  Mus: knee immobilizer to RLE  Bed in low position and oriented to room and use of call bell. Will monitor. 0023  Patient AOx4, no apparent disstress,  pain 7/10, med given and tolerated well, dressing to RLE clean, dry and intact. Bilateral lower extremities: pedal pulses present and palpable, denies numbness, able to wiggle toes. No changes in status, in stable condition. 9327  Patient AOx4, no apparent disstress, voiced no c/o at this time, voided, dressing to RLE clean, dry and intact. Bilateral lower extremities: pedal pulses present and palpable, denies numbness, able to wiggle toes. No changes in status, in stable condition.

## 2017-04-01 NOTE — ROUTINE PROCESS
Pt is in stable condition, AOx4, + pulses and sensation, denies numbness and tingling,able to move toes, Right lower heel elevated, No distress noted, HOB elevated, Right Knee clean dry and intact with knee immobilizer, bed in lowest position, call bell in reach, instructed to call for help. Hourly round continued, will continue to monitor.

## 2017-04-01 NOTE — PROGRESS NOTES
Ortho    Pt. Seen and evaluated. Doing well, pain well controlled  Denies cp, sob, abd pain    Blood pressure 124/63, pulse 72, temperature 97 °F (36.1 °C), resp. rate 16, height 5' 4\" (1.626 m), weight 219 lb 9.6 oz (99.6 kg), SpO2 99 %, unknown if currently breastfeeding. rightKnee woundclean, dry, no drainage  Sensory intact to LT  Motor intact  nv intact  Neg calf tenderness    Labs:  CBC  @  CBC:   Lab Results   Component Value Date/Time    WBC 9.8 04/01/2017 02:08 AM    RBC 3.22 04/01/2017 02:08 AM    HGB 8.3 04/01/2017 02:08 AM    HCT 26.4 04/01/2017 02:08 AM    PLATELET 259 32/92/2402 02:08 AM     BMP:   Lab Results   Component Value Date/Time    Glucose 130 04/01/2017 02:08 AM    Sodium 142 04/01/2017 02:08 AM    Potassium 3.9 04/01/2017 02:08 AM    Chloride 106 04/01/2017 02:08 AM    CO2 28 04/01/2017 02:08 AM    BUN 34 04/01/2017 02:08 AM    Creatinine 1.54 04/01/2017 02:08 AM    Calcium 8.6 04/01/2017 02:08 AM   @  Coagulation  Lab Results   Component Value Date    INR 1.1 03/30/2017    APTT 39.8 (H) 03/30/2017      Basic Metabolic Profile  Lab Results   Component Value Date     04/01/2017    CO2 28 04/01/2017    BUN 34 (H) 04/01/2017       Assesment: right Orthopedic / Rheumatologic: Total Knee Replacement, ORIF patellar fx    Plan: aspirin, PT- knee immobilizer at all times, no flexion of the knee- wbat. IV fluids.

## 2017-04-01 NOTE — PROGRESS NOTES
2150  TRANSFER - IN REPORT:    Verbal report received from ASHU boothe(name) on [facility]  being received from PACU(unit) for routine post - op      Report consisted of patients Situation, Background, Assessment and   Recommendations(SBAR). Information from the following report(s) SBAR, Kardex, OR Summary, Intake/Output and MAR was reviewed with the receiving nurse. Opportunity for questions and clarification was provided. Assessment completed upon patients arrival to unit and care assumed.

## 2017-04-01 NOTE — OP NOTES
[address]    Name:  Adrianna Mccauley  MR#:  [id number]  :  1935  Account #:  [de-identified]  Date of Adm:  2017  Date of Surgery:  2017      PREOPERATIVE DIAGNOSIS: Somewhat comminuted distal third  patella fracture, right periprosthetic. POSTOPERATIVE DIAGNOSIS: Somewhat comminuted distal third  patella fracture, right periprosthetic. PROCEDURES PERFORMED: Open reduction internal fixation using  tension band wiring, K-wires, right patella. COMPLICATIONS: No complications. SPECIMENS REMOVED: No specimens. ESTIMATED BLOOD LOSS: 50 mL. FIRST ASSISTANT: Rao Molina PA-C    SECOND ASSISTANT: Bianca Campuzano    ANESTHESIA: Preoperative femoral nerve block with general, Dr. Bryson Sadler. CLINICAL NOTE: The patient was doing well with physical therapy and  at night she slipped on the floor and sustained the injury to her  kneecap. Risks and benefits described and the patient elected to  proceed. DESCRIPTION OF PROCEDURE: Antibiotics confirmed given. The  time-out was performed. Previous incision utilized midline, delineated  the extensor mechanism. Just opened up the medial arthrotomy a  couple of inches, aspirated the knee. There was just a little bit of  hematoma. We cleaned and washed out and delineated the fracture  fragments. The distal medial fragment was comminuted also in the  coronal plane as well and it was essentially in 3 to 4 pieces. With the aid of the image intensifier and a bump underneath the knee,  we were able to massage the fracture into an anatomic reduction  pattern and held it with a couple of large towel bone reduction forceps  and placed 2 K-wires away from the patellar button into the bone and  get a nice reduction with this.     We then did a tension band figure-of-eight double twist and had a nice  result with this, bent the wires and advanced them down around the  patella and confirmed on AP and lateral images with the C-arm that we  were very nicely reduced. Fracture was stable. Washed out. Placed some vancomycin powder  and a routine closure. Tourniquet time about 40 minutes. No  complications.         Kathi Montelongo MD AM / Ernestine Call  D:  03/31/2017   16:12  T:  03/31/2017   19:56  Job #:  [id number]

## 2017-04-01 NOTE — ROUTINE PROCESS
Pt in stable condition, Aox4, + pulses and sensation, Denies numbness and tingling, No distress noted, cap refill <3 sec. Miiran Galindo HOB elevated, family at the bedside, Knee immobilizer on R knee, bed in low position, call bell in reach, instructed to call for assistance, pain med Prn given as needed, will continue to monitor.

## 2017-04-01 NOTE — ROUTINE PROCESS
TRANSFER - OUT REPORT:    Verbal report given to Kaitlin Saucedo RN(name) on Damir Win  being transferred to [address] [place] [address](unit) for routine progression of care       Report consisted of patients Situation, Background, Assessment and   Recommendations(SBAR). Information from the following report(s) SBAR, Kardex, Procedure Summary, Intake/Output and MAR was reviewed with the receiving nurse. Lines:   Peripheral IV 03/31/17 Right Wrist (Active)   Site Assessment Clean, dry, & intact 3/31/2017  4:01 PM   Phlebitis Assessment 0 3/31/2017  4:01 PM   Infiltration Assessment 0 3/31/2017  4:01 PM   Dressing Status Clean, dry, & intact 3/31/2017  4:01 PM   Dressing Type Trach dressing; Tape 3/31/2017  4:01 PM   Hub Color/Line Status Infusing;Pink 3/31/2017  4:01 PM        Opportunity for questions and clarification was provided.       Patient transported with:   O2 @ 2 liters  Registered Nurse

## 2017-04-01 NOTE — PROGRESS NOTES
Problem: Self Care Deficits Care Plan (Adult)  Goal: *Acute Goals and Plan of Care (Insert Text)  Occupational Therapy Goals  Initiated 4/1/2017 within 7 day(s). 1. Patient will perform lower body dressing with modified independence   2. Patient will perform LB clothes management with modified independence. 3. Patient will perform maneuvering in bed in preparation for her self care participation with modified independence. 4. Patient will perform toilet transfers with modified independence. OCCUPATIONAL THERAPY EVALUATION     Patient: Ghazal Quiñones (13 y.o. female)  Date: 4/1/2017  Primary Diagnosis: Closed nondisplaced fracture of right patella, unspecified fracture morphology, initial encounter [S82.001A]  Procedure(s) (LRB):  RIGHT PATELLA OPEN REDUCTION INTERNAL FIXATION (Right) 1 Day Post-Op   Precautions: knee immobilizer on at all times, WBAT         ASSESSMENT :  Based on the objective data described below, the patient presents with decreased strength, ability to reacher her LE's and functional mobility that limits her independence at this time. She was able to stand with min to mod assist on the third trial following training on technique and hand placement. Additionally, she increased from requiring mod assist to min assist to stand with repetition and training o hand placement and cues for technique. Patient will benefit from skilled intervention to address the above impairments.   Patients rehabilitation potential is considered to be Good  Factors which may influence rehabilitation potential include:   [X]             None noted  [ ]             Mental ability/status  [ ]             Medical condition  [ ]             Home/family situation and support systems  [ ]             Safety awareness  [ ]             Pain tolerance/management  [ ]             Other:        PLAN :Recommendations and Planned Interventions:  [X]               Self Care Training                  [X]        Therapeutic Activities  [ ]               Functional Mobility Training    [ ]        Cognitive Retraining  [ ]               Therapeutic Exercises           [ ]        Endurance Activities  [ ]               Balance Training                   [ ]        Neuromuscular Re-Education  [ ]               Visual/Perceptual Training     [X]   Home Safety Training  [X]               Patient Education                 [X]        Family Training/Education  [ ]               Other (comment):     Frequency/Duration: Patient will be followed by occupational therapy 1-2 times per day/4-7 days per week to address goals. Discharge Recommendations: [address] depending on her progress and her 's ability to assist her  Further Equipment Recommendations for Discharge: N/A       PATIENT COMPLEXITY      Eval Complexity: History: LOW Complexity : Brief history review ; Examination: LOW Complexity : 1-3 performance deficits relating to physical, cognitive , or psychosocial skils that result in activity limitations and / or participation restrictions ; Decision Making:LOW Complexity : No comorbidities that affect functional and no verbal or physical assistance needed to complete eval tasks  Assessment: LOW Complexity       G-CODES:      Self Care  Current  CK= 40-59%   Goal  CI= 1-19%. The severity rating is based on the Level of Assistance required for Functional Mobility and ADLs. SUBJECTIVE:   Patient stated I was doing really well in therapy at home before this happened.       OBJECTIVE DATA SUMMARY:       Past Medical History:   Diagnosis Date    Arthritis      Arthritis of both hips      Back pain      Balance problem      Chronic back pain      Cough      Easy bruising      Essential hypertension      GERD (gastroesophageal reflux disease)      Hiatal hernia      Hypertension      Irregular heart beat      Left hip pain 10/8/2010    Neck pain      Nervousness      Night sweat  Osteoarthritis, knee bilateral      Pain with urination      Polymyalgia rheumatica (HCC)      Reflux      Spinal stenosis      Trapezius strain      Trochanteric bursitis of left hip      Venous insufficiency       Past Surgical History:   Procedure Laterality Date    COLONOSCOPY N/A 7/27/2016     COLONOSCOPY w/polypectomy performed by Kellen Soliz MD at [facility]    HX [facility]        HX CHOLECYSTECTOMY        HX HEENT   06/2011     left cornea transplant    HX HYSTERECTOMY        HX ORTHOPAEDIC         right foot and ankle     Prior Level of Function/Home Situation: she reports she does not go upstairs  Home Situation  Home Environment: Private residence  One/Two Story Residence: Two story  Living Alone: No  Support Systems: Family member(s), Friends \ neighbors  Patient Expects to be Discharged to[de-identified] Private residence  Current DME Used/Available at Home: [employer] Young, rolling  [X]  Right hand dominant          [ ]  Left hand dominant  Cognitive/Behavioral Status:    she is alert, oriented X 4   Skin: surgical site not observed; no skin integrity issues noted  Edema: no extremity edema noted  Vision/Perceptual:      tracking is WFL    Coordination:   BUE's WFL   Balance:   min assist/CG standing at walker level in preparation for completing LE clothes management  Strength:  BUE's: 4 to 4+/5   Tone & Sensation:  BUE's: WFL   Range of Motion:  BUE's AROM is [facility]   Functional Mobility and Transfers for ADLs:  Bed Mobility:   supine to sit and return to supine requires min to mod assist   Transfers:   min to mod assist to come to a stand and CG/ Min assist to side step in preparation for transfers   ADL Assessment:   self feeding: independent (simulated)  Grooming: set up secondary to decreased ability to stand without using both hands on the walker at this time   UB bathing/dressing: independent (simulated)  LB bathing/dressing: mod assist (she cannot reach her right foot secondary to needing to wear the knee extension brace)   ADL Intervention:    as noted above in preparation for her participation and completion of her self care rotuine  Pain:  6/10 pain in RLE prior to OT session  8/10 pain in RLE following OT session  Activity Tolerance:   No SOB noted  Please refer to the flowsheet for vital signs taken during this treatment. After treatment:   [ ] Patient left in no apparent distress sitting up in chair  [X] Patient left in no apparent distress in bed  [X] Call bell left within reach  [ ] Nursing notified  [ ] Caregiver present  [ ] Bed alarm activated      COMMUNICATION/EDUCATION:   [ ] Home safety education was provided and the patient/caregiver indicated understanding. [ ] Patient/family have participated as able in goal setting and plan of care. [X] Patient/family agree to work toward stated goals and plan of care. [ ] Patient understands intent and goals of therapy, but is neutral about his/her participation. [ ] Patient is unable to participate in goal setting and plan of care.      Thank you for this referral.  Elieen Cespedes OTR/L  Time Calculation: 38 mins

## 2017-04-01 NOTE — PROGRESS NOTES
Bedside shift change report given to Savannah Morales (oncoming nurse) by Lauryn Palomo RN (offgoing nurse). Report included the following information SBAR, Kardex, OR Summary, Intake/Output and MAR.

## 2017-04-02 LAB
ANION GAP BLD CALC-SCNC: 7 MMOL/L (ref 3–18)
BUN SERPL-MCNC: 30 MG/DL (ref 7–18)
BUN/CREAT SERPL: 23 (ref 12–20)
CALCIUM SERPL-MCNC: 8.4 MG/DL (ref 8.5–10.1)
CHLORIDE SERPL-SCNC: 107 MMOL/L (ref 100–108)
CO2 SERPL-SCNC: 26 MMOL/L (ref 21–32)
CREAT SERPL-MCNC: 1.3 MG/DL (ref 0.6–1.3)
GLUCOSE SERPL-MCNC: 126 MG/DL (ref 74–99)
POTASSIUM SERPL-SCNC: 3.7 MMOL/L (ref 3.5–5.5)
SODIUM SERPL-SCNC: 140 MMOL/L (ref 136–145)

## 2017-04-02 PROCEDURE — 65270000029 HC RM PRIVATE

## 2017-04-02 PROCEDURE — 97530 THERAPEUTIC ACTIVITIES: CPT

## 2017-04-02 PROCEDURE — 97162 PT EVAL MOD COMPLEX 30 MIN: CPT

## 2017-04-02 PROCEDURE — 36415 COLL VENOUS BLD VENIPUNCTURE: CPT | Performed by: ORTHOPAEDIC SURGERY

## 2017-04-02 PROCEDURE — 80048 BASIC METABOLIC PNL TOTAL CA: CPT | Performed by: ORTHOPAEDIC SURGERY

## 2017-04-02 PROCEDURE — 74011250637 HC RX REV CODE- 250/637: Performed by: ORTHOPAEDIC SURGERY

## 2017-04-02 RX ORDER — LORATADINE 10 MG/1
10 TABLET ORAL DAILY
Status: DISCONTINUED | OUTPATIENT
Start: 2017-04-02 | End: 2017-04-05 | Stop reason: HOSPADM

## 2017-04-02 RX ADMIN — Medication 10 ML: at 14:00

## 2017-04-02 RX ADMIN — HYDROCODONE BITARTRATE AND ACETAMINOPHEN 2 TABLET: 10; 325 TABLET ORAL at 21:13

## 2017-04-02 RX ADMIN — CELECOXIB 200 MG: 100 CAPSULE ORAL at 08:15

## 2017-04-02 RX ADMIN — AMILORIDE HYDROCHLORIDE 5 MG: 5 TABLET ORAL at 08:15

## 2017-04-02 RX ADMIN — METOCLOPRAMIDE HYDROCHLORIDE 5 MG: 5 TABLET ORAL at 08:14

## 2017-04-02 RX ADMIN — FERROUS SULFATE TAB 325 MG (65 MG ELEMENTAL FE) 325 MG: 325 (65 FE) TAB at 08:15

## 2017-04-02 RX ADMIN — HYDROCHLOROTHIAZIDE 50 MG: 25 TABLET ORAL at 08:15

## 2017-04-02 RX ADMIN — Medication 10 ML: at 06:03

## 2017-04-02 RX ADMIN — AMLODIPINE BESYLATE 10 MG: 10 TABLET ORAL at 08:15

## 2017-04-02 RX ADMIN — ASPIRIN 325 MG: 325 TABLET, FILM COATED ORAL at 08:15

## 2017-04-02 RX ADMIN — CELECOXIB 200 MG: 100 CAPSULE ORAL at 17:07

## 2017-04-02 RX ADMIN — METOCLOPRAMIDE HYDROCHLORIDE 5 MG: 5 TABLET ORAL at 17:07

## 2017-04-02 RX ADMIN — LOSARTAN POTASSIUM 100 MG: 50 TABLET ORAL at 08:15

## 2017-04-02 RX ADMIN — HYDROCODONE BITARTRATE AND ACETAMINOPHEN 2 TABLET: 10; 325 TABLET ORAL at 04:20

## 2017-04-02 RX ADMIN — FERROUS SULFATE TAB 325 MG (65 MG ELEMENTAL FE) 325 MG: 325 (65 FE) TAB at 17:07

## 2017-04-02 RX ADMIN — PANTOPRAZOLE SODIUM 40 MG: 40 TABLET, DELAYED RELEASE ORAL at 06:40

## 2017-04-02 RX ADMIN — LORATADINE 10 MG: 10 TABLET ORAL at 18:41

## 2017-04-02 RX ADMIN — HYDROCODONE BITARTRATE AND ACETAMINOPHEN 2 TABLET: 10; 325 TABLET ORAL at 14:12

## 2017-04-02 NOTE — ROUTINE PROCESS
Bedside and Verbal shift change report given to [address] (oncoming nurse) by Elaine Ruiz (offgoing nurse). Report included the following information SBAR, Kardex, MAR and Recent Results.     SITUATION:    Code Status: No Order   Reason for Admission: Closed nondisplaced fracture of right patella, unspecified fracture morphology, initial encounter [facility] day: 2   Problem List:       Hospital Problems  Date Reviewed: 3/30/2017          Codes Class Noted POA    Patella fracture ICD-10-CM: S82.009A  ICD-9-CM: 822.0  3/31/2017 Unknown              BACKGROUND:    Past Medical History:   Past Medical History:   Diagnosis Date    Arthritis     Arthritis of both hips     Back pain     Balance problem     Chronic back pain     Cough     Easy bruising     Essential hypertension     GERD (gastroesophageal reflux disease)     Hiatal hernia     Hypertension     Irregular heart beat     Left hip pain 10/8/2010    Neck pain     Nervousness     Night sweat     Osteoarthritis, knee bilateral     Pain with urination     Polymyalgia rheumatica (HCC)     Reflux     Spinal stenosis     Trapezius strain     Trochanteric bursitis of left hip     Venous insufficiency          Patient taking anticoagulants no    ASSESSMENT:    Changes in Assessment Throughout Shift: no     Patient has Central Line: no    Patient has Krishna Cath: no      Last Vitals:     Vitals:    04/02/17 0425 04/02/17 0611 04/02/17 0738 04/02/17 1531   BP: 160/78 123/69 112/62 126/68   Pulse: 81 74 79 80   Resp: 17  16 16   Temp: 97 °F (36.1 °C)  98.2 °F (36.8 °C) 97.9 °F (36.6 °C)   SpO2: 97% 100% 100% 99%   Weight:       Height:            IV and DRAINS (will only show if present)   Peripheral IV 03/31/17 Right Wrist-Site Assessment: Clean, dry, & intact     WOUND (if present)   Wound Type:  R Patella    Dressing present Dressing Present : No   Wound Concerns/Notes:  none     PAIN    Pain Assessment    Pain Intensity 1: 0 (04/02/17 0514)    Pain Location 1: Leg    Pain Intervention(s) 1: Medication (see MAR)    Patient Stated Pain Goal: 0  o Interventions for Pain:  Norco  o Intervention effective: yes  o Time of last intervention: 14:12   o Reassessment Completed: yes      Last 3 Weights:  Last 3 Recorded Weights in this Encounter    03/31/17 1053   Weight: 99.6 kg (219 lb 9.6 oz)     Weight change:      INTAKE/OUPUT    Current Shift: 04/02 0701 - 04/02 1900  In: 520 [P.O.:520]  Out: -     Last three shifts: 03/31 1901 - 04/02 0700  In: 1813.8 [P.O.:220; I.V.:1593.8]  Out: 1050 [Urine:1050]     LAB RESULTS     Recent Labs      04/01/17   0208   WBC  9.8   HGB  8.3*   HCT  26.4*   PLT  356        Recent Labs      04/02/17   0211  04/01/17   0208   NA  140  142   K  3.7  3.9   GLU  126*  130*   BUN  30*  34*   CREA  1.30  1.54*   CA  8.4*  8.6       RECOMMENDATIONS AND DISCHARGE PLANNING     1. Pending tests/procedures/ Plan of Care or Other Needs: no     2. Discharge plan for patient and Needs/Barriers: no    3. Estimated Discharge Date: n/a Posted on Whiteboard in Patients Room: no      4. The patient's care plan was reviewed with the oncoming nurse. \"HEALS\" SAFETY CHECK      Fall Risk    Total Score: 3    Safety Measures: Safety Measures: Bed/Chair-Wheels locked, Bed in low position, Call light within reach, Fall prevention (comment), Gripper socks, Side rails X2    A safety check occurred in the patient's room between off going nurse and oncoming nurse listed above.     The safety check included the below items  Area Items   H  High Alert Medications - Verify all high alert medication drips (heparin, PCA, etc.)   E  Equipment - Suction is set up for ALL patients (with yanker)  - Red plugs utilized for all equipment (IV pumps, etc.)  - WOWs wiped down at end of shift.  - Room stocked with oxygen, suction, and other unit-specific supplies   A  Alarms - Bed alarm is set for fall risk patients  - Ensure chair alarm is in place and activated if patient is up in a chair   L  Lines - Check IV for any infiltration  - Krishna bag is empty if patient has a Krishna   - Tubing and IV bags are labeled   S  Safety   - Room is clean, patient is clean, and equipment is clean. - Hallways are clear from equipment besides carts. - Fall bracelet on for fall risk patients  - Ensure room is clear and free of clutter  - Suction is set up for ALL patients (with hermilo)  - Hallways are clear from equipment besides carts.    - Isolation precautions followed, supplies available outside room, sign posted     Blaine Kenney

## 2017-04-02 NOTE — ROUTINE PROCESS
Bedside and Verbal shift change report given to [address] [place] [address] (oncoming nurse) by Hunter Hollins RN (offgoing nurse). Report included the following information SBAR, Kardex, MAR and Recent Results.     SITUATION:    Code Status: No Order   Reason for Admission: Closed nondisplaced fracture of right patella, unspecified fracture morphology, initial encounter [facility] day: 2   Problem List:       Hospital Problems  Date Reviewed: 3/30/2017          Codes Class Noted POA    Patella fracture ICD-10-CM: S82.009A  ICD-9-CM: 822.0  3/31/2017 Unknown              BACKGROUND:    Past Medical History:   Past Medical History:   Diagnosis Date    Arthritis     Arthritis of both hips     Back pain     Balance problem     Chronic back pain     Cough     Easy bruising     Essential hypertension     GERD (gastroesophageal reflux disease)     Hiatal hernia     Hypertension     Irregular heart beat     Left hip pain 10/8/2010    Neck pain     Nervousness     Night sweat     Osteoarthritis, knee bilateral     Pain with urination     Polymyalgia rheumatica (HCC)     Reflux     Spinal stenosis     Trapezius strain     Trochanteric bursitis of left hip     Venous insufficiency          Patient taking anticoagulants no     ASSESSMENT:    Changes in Assessment Throughout Shift: No     Patient has Central Line: no Reasons if yes: No   Patient has Krishna Cath: no Reasons if yes: N/A      Last Vitals:     Vitals:    04/01/17 0756 04/01/17 1127 04/01/17 1538 04/01/17 1930   BP: 124/63 113/58 143/69 143/74   Pulse: 72 74 82 82   Resp: 16 16 16 17   Temp: 97 °F (36.1 °C) 96.5 °F (35.8 °C) 96.5 °F (35.8 °C) 97.5 °F (36.4 °C)   SpO2: 99% 100% 100% 99%   Weight:       Height:            IV and DRAINS (will only show if present)   Peripheral IV 03/31/17 Right Wrist-Site Assessment: Clean, dry, & intact     WOUND (if present)   Wound Type:  none   Dressing present Dressing Present : No   Wound Concerns/Notes:  none     PAIN    Pain Assessment    Pain Intensity 1: 6 (04/01/17 2036)    Pain Location 1: Leg    Pain Intervention(s) 1: Medication (see MAR)    Patient Stated Pain Goal: 0  o Interventions for Pain:  none  o Intervention effective: no  o Time of last intervention: 0520   o Reassessment Completed: no      Last 3 Weights:  Last 3 Recorded Weights in this Encounter    03/31/17 1053   Weight: 99.6 kg (219 lb 9.6 oz)     Weight change:      INTAKE/OUPUT    Current Shift: 04/01 1901 - 04/02 0700  In: -   Out: 700 [Urine:700]    Last three shifts: 03/31 0701 - 04/01 1900  In: 2513.8 [P.O.:220; I.V.:2293.8]  Out: 200 [Urine:200]     LAB RESULTS     Recent Labs      04/01/17   0208  03/30/17   1441   WBC  9.8  9.7   HGB  8.3*  9.7*   HCT  26.4*  30.1*   PLT  356  422*        Recent Labs      04/02/17   0211 04/01/17   0208  03/30/17   1441   NA  140  142  139   K  3.7  3.9  4.0   GLU  126*  130*  155*   BUN  30*  34*  36*   CREA  1.30  1.54*  1.39*   CA  8.4*  8.6  9.9   INR   --    --   1.1       RECOMMENDATIONS AND DISCHARGE PLANNING     1. Pending tests/procedures/ Plan of Care or Other Needs: TBD     2. Discharge plan for patient and Needs/Barriers: TBD    3. Estimated Discharge Date: TBD Posted on Whiteboard in Patients Room: no      4. The patient's care plan was reviewed with the oncoming nurse. \"HEALS\" SAFETY CHECK      Fall Risk    Total Score: 3    Safety Measures: Safety Measures: Bed/Chair alarm on, Bed/Chair-Wheels locked, Bed in low position, Call light within reach, Side rails X 3, Fall prevention (comment)    A safety check occurred in the patient's room between off going nurse and oncoming nurse listed above.     The safety check included the below items  Area Items   H  High Alert Medications - Verify all high alert medication drips (heparin, PCA, etc.)   E  Equipment - Suction is set up for ALL patients (with mengker)  - Red plugs utilized for all equipment (IV pumps, etc.)  - WOWs wiped down at end of shift.  - Room stocked with oxygen, suction, and other unit-specific supplies   A  Alarms - Bed alarm is set for fall risk patients  - Ensure chair alarm is in place and activated if patient is up in a chair   L  Lines - Check IV for any infiltration  - Krishna bag is empty if patient has a Krishna   - Tubing and IV bags are labeled   S  Safety   - Room is clean, patient is clean, and equipment is clean. - Hallways are clear from equipment besides carts. - Fall bracelet on for fall risk patients  - Ensure room is clear and free of clutter  - Suction is set up for ALL patients (with yanker)  - Hallways are clear from equipment besides carts.    - Isolation precautions followed, supplies available outside room, sign posted     Ligia Redd RN

## 2017-04-02 NOTE — ROUTINE PROCESS
Pt condition is stable, awake, + pulses and sensation, denies numbness and tingling, No distress noted, Cap refill <3 sec, HOB elevated, Knee immobilizer on R knee, bed in low position, call bell in reach, instructed to call for assistance, Bowel movement today, Up in chair, Prn pain meds given as needed, denies pain this AM, will continue to monitor

## 2017-04-02 NOTE — ROUTINE PROCESS
Pt in stable condition and alert up in chair. + pulses and sensation, denies numbness and tingling. No distress noted, cap refill <3 sec. Knee immobilizer on R knee, call bell in reach, instructed to call for assistance,  Prn pain meds given as needed, will continue to monitor.

## 2017-04-02 NOTE — ROUTINE PROCESS
Pt in stable condition, AOX4 , family at the bedside, + pulses and sensation, Denies numbness and tingling, No distress noted, Cap refill <3 sec. Knee immobilizer on R knee, call bell in reach, instructed to call for assistance, Prn pain meds given as needed, will continue to monitor.

## 2017-04-02 NOTE — PROGRESS NOTES
Problem: Mobility Impaired (Adult and Pediatric)  Goal: *Acute Goals and Plan of Care (Insert Text)  Physical Therapy Goals  Initiated 4/2/2017 and to be accomplished within 7 day(s)  1. Patient will move from supine to sit and sit to supine in bed with modified independence. 2. Patient will transfer from bed to chair and chair to bed with supervision/set-up using the least restrictive device. 3. Patient will perform sit to stand with contact guard assist/SBA. 4. Patient will ambulate with supervision/set-up for 100 feet with the least restrictive device. 5. Patient will ascend/descend 3 stairs with 1 handrail(s) with minimal assistance/contact guard assist.  PHYSICAL THERAPY EVALUATION     Patient: Robyn Cristobal (38 y.o. female)  Date: 4/2/2017  Primary Diagnosis: Closed nondisplaced fracture of right patella, unspecified fracture morphology, initial encounter [S82.001A]  Procedure(s) (LRB):  RIGHT PATELLA OPEN REDUCTION INTERNAL FIXATION (Right) 2 Days Post-Op   Precautions: fall, Immobilizer on R knee at all times         ASSESSMENT :  Based on the objective data described below, the patient presents with decreased strength, balance and activity tolerance resulting in decreased independence with functional mobility. Pt found in bed with knee immobilizer in place on R LE she performed supine to sit with supervision and additional time. Pt required mod A for sit to stand and 3 attempts to complete the transfer. Once standing patient needed CGA to ambulate 15 feet with RW. Pt was left sitting up in recliner with leg rest elevated. Patient will benefit from skilled intervention to address the above impairments.   Patients rehabilitation potential is considered to be Good  Factors which may influence rehabilitation potential include:   [X]         None noted  [ ]         Mental ability/status  [ ]         Medical condition  [ ]         Home/family situation and support systems  [ ]         Safety awareness  [ ]         Pain tolerance/management  [ ]         Other:        PLAN :  Recommendations and Planned Interventions:  [X]           Bed Mobility Training             [X]    Neuromuscular Re-Education  [X]           Transfer Training                   [X]    Orthotic/Prosthetic Training  [X]           Gait Training                          [X]    Modalities  [X]           Therapeutic Exercises          [ ]    Edema Management/Control  [X]           Therapeutic Activities            [X]    Patient and Family Training/Education  [ ]           Other (comment):     Frequency/Duration: Patient will be followed by physical therapy 1-2 times per day/4-7 days per week to address goals. Discharge Recommendations: Ok Leonidas  Further Equipment Recommendations for Discharge: N/A       G-CODES       Mobility  Current  CK= 40-59%   Goal  CJ= 20-39%. The severity rating is based on the Level of Assistance required for Functional Mobility and ADLs. Eval Complexity: History: MEDIUM  Complexity : 1-2 comorbidities / personal factors will impact the outcome/ POC Exam:MEDIUM Complexity : 3 Standardized tests and measures addressing body structure, function, activity limitation and / or participation in recreation  Presentation: MEDIUM Complexity : Evolving with changing characteristics  Clinical Decision Making:Medium Complexity , Overall Complexity:MEDIUM      SUBJECTIVE:   Patient stated I'm ready to get moving.       OBJECTIVE DATA SUMMARY:       Past Medical History:   Diagnosis Date    Arthritis      Arthritis of both hips      Back pain      Balance problem      Chronic back pain      Cough      Easy bruising      Essential hypertension      GERD (gastroesophageal reflux disease)      Hiatal hernia      Hypertension      Irregular heart beat      Left hip pain 10/8/2010    Neck pain      Nervousness      Night sweat      Osteoarthritis, knee bilateral      Pain with urination      Polymyalgia rheumatica (HCC)      Reflux      Spinal stenosis      Trapezius strain      Trochanteric bursitis of left hip      Venous insufficiency       Past Surgical History:   Procedure Laterality Date    COLONOSCOPY N/A 7/27/2016     COLONOSCOPY w/polypectomy performed by Brad Scott MD at [facility]    HX [facility]        HX CHOLECYSTECTOMY        HX HEENT   06/2011     left cornea transplant    HX HYSTERECTOMY        HX ORTHOPAEDIC         right foot and ankle     Prior Level of Function/Home Situation: ambulating with RW, Pt had R knee replacement in February 2017, several steps with one rail to enter home  Home Situation  Home Environment: Private residence  One/Two Story Residence: Two story  Living Alone: No  Support Systems: Family member(s), Friends \ neighbors  Patient Expects to be Discharged to[de-identified] Private residence  Current DME Used/Available at Home: Walker, rolling  Critical Behavior:   calm and cooperative   Strength:    Strength: Generally decreased, functional   Tone & Sensation:   Tone: Normal       Range Of Motion:  AROM: Within functional limits (R knee not assessed, in immobilizer)   Functional Mobility:  Bed Mobility:  Supine to Sit: Supervision; Additional time     Transfers:  Sit to Stand: Moderate assistance  Stand to Sit: Contact guard assistance  Bed to Chair: Moderate assistance     Balance:   Sitting: Intact  Sitting - Static: Fair (occasional)  Sitting - Dynamic:  (fair-)  Standing: With support  Ambulation/Gait Training:  Distance (ft): 15 Feet (ft)  Assistive Device: Walker, rolling  Ambulation - Level of Assistance: Contact guard assistance  Gait Abnormalities: Decreased step clearance; Step to gait  Right Side Weight Bearing: As tolerated  Therapeutic Exercises: Ankle pumps  Pain:  Pt reports 3/10 pain or discomfort prior to treatment. Pt reports 4/10 pain or discomfort post treatment.       Activity Tolerance:   Fair-  Please refer to the flowsheet for vital signs taken during this treatment. After treatment:   [X]         Patient left in no apparent distress sitting up in chair  [ ]         Patient left in no apparent distress in bed  [X]         Call bell left within reach  [X]         Nursing notified  [ ]         Caregiver present  [ ]         Bed alarm activated      COMMUNICATION/EDUCATION:   [X]         Fall prevention education was provided and the patient/caregiver indicated understanding. [X]         Patient/family have participated as able in goal setting and plan of care. [X]         Patient/family agree to work toward stated goals and plan of care. [ ]         Patient understands intent and goals of therapy, but is neutral about his/her participation. [ ]         Patient is unable to participate in goal setting and plan of care.   Educated patient on wearing knee immobilizer at all times and calling for assistance to get up     Thank you for this referral.  Ketan Dumont, PT

## 2017-04-02 NOTE — ROUTINE PROCESS
6367: Pt stable. AOx4. Has external urinal catheter. Draining well. Knee immobilizer in place and knee dressing CDI. Mild pain well managed with po pain medication. Denies nausea or vomiting. Pulse and capillary refill WNL. Vital signs stable. Neuro checks on the affected limb WNL. Call light within reach and bed in low position. Pt has  Not voiced other concern. Will continue to monitor.

## 2017-04-02 NOTE — PROGRESS NOTES
Blood pressure 112/62, pulse 79, temperature 98.2 °F (36.8 °C), resp. rate 16, height 5' 4\" (1.626 m), weight 99.6 kg (219 lb 9.6 oz), SpO2 100 %, unknown if currently breastfeeding.       Lab Results   Component Value Date/Time    WBC 9.8 04/01/2017 02:08 AM    Hemoglobin (POC) 10.5 07/27/2016 10:06 AM    HGB 8.3 04/01/2017 02:08 AM    Hematocrit (POC) 31 07/27/2016 10:06 AM    HCT 26.4 04/01/2017 02:08 AM    PLATELET 930 74/41/4040 02:08 AM    MCV 82.0 04/01/2017 02:08 AM     No cp or sob  Comfortable  Brace in place  On asa   p-likely snf monday

## 2017-04-03 ENCOUNTER — APPOINTMENT (OUTPATIENT)
Dept: GENERAL RADIOLOGY | Age: 82
DRG: 516 | End: 2017-04-03
Attending: PHYSICIAN ASSISTANT
Payer: MEDICARE

## 2017-04-03 LAB
HCT VFR BLD AUTO: 28.5 % (ref 35–45)
HGB BLD-MCNC: 9 G/DL (ref 12–16)

## 2017-04-03 PROCEDURE — 36415 COLL VENOUS BLD VENIPUNCTURE: CPT

## 2017-04-03 PROCEDURE — 97116 GAIT TRAINING THERAPY: CPT

## 2017-04-03 PROCEDURE — 85018 HEMOGLOBIN: CPT

## 2017-04-03 PROCEDURE — 73560 X-RAY EXAM OF KNEE 1 OR 2: CPT

## 2017-04-03 PROCEDURE — 94760 N-INVAS EAR/PLS OXIMETRY 1: CPT

## 2017-04-03 PROCEDURE — 65270000029 HC RM PRIVATE

## 2017-04-03 PROCEDURE — 74011250637 HC RX REV CODE- 250/637: Performed by: ORTHOPAEDIC SURGERY

## 2017-04-03 RX ORDER — ASPIRIN 325 MG/1
325 TABLET, FILM COATED ORAL DAILY
Qty: 30 TAB | Refills: 2 | Status: SHIPPED | OUTPATIENT
Start: 2017-04-03 | End: 2017-07-05 | Stop reason: SDUPTHER

## 2017-04-03 RX ORDER — LANOLIN ALCOHOL/MO/W.PET/CERES
325 CREAM (GRAM) TOPICAL 2 TIMES DAILY WITH MEALS
Qty: 60 TAB | Refills: 2 | Status: SHIPPED | OUTPATIENT
Start: 2017-04-03 | End: 2017-06-02 | Stop reason: SDUPTHER

## 2017-04-03 RX ORDER — CELECOXIB 200 MG/1
200 CAPSULE ORAL 2 TIMES DAILY
Qty: 60 CAP | Refills: 2 | Status: SHIPPED | OUTPATIENT
Start: 2017-04-03 | End: 2017-07-01 | Stop reason: SDUPTHER

## 2017-04-03 RX ORDER — HYDROCODONE BITARTRATE AND ACETAMINOPHEN 10; 325 MG/1; MG/1
1-2 TABLET ORAL
Qty: 60 TAB | Refills: 0 | Status: SHIPPED | OUTPATIENT
Start: 2017-04-03 | End: 2017-05-05 | Stop reason: SDUPTHER

## 2017-04-03 RX ADMIN — CELECOXIB 200 MG: 100 CAPSULE ORAL at 17:19

## 2017-04-03 RX ADMIN — Medication 10 ML: at 00:50

## 2017-04-03 RX ADMIN — FERROUS SULFATE TAB 325 MG (65 MG ELEMENTAL FE) 325 MG: 325 (65 FE) TAB at 09:08

## 2017-04-03 RX ADMIN — MAGNESIUM HYDROXIDE 30 ML: 400 SUSPENSION ORAL at 11:56

## 2017-04-03 RX ADMIN — LORATADINE 10 MG: 10 TABLET ORAL at 09:07

## 2017-04-03 RX ADMIN — HYDROCODONE BITARTRATE AND ACETAMINOPHEN 2 TABLET: 10; 325 TABLET ORAL at 04:51

## 2017-04-03 RX ADMIN — HYDROCODONE BITARTRATE AND ACETAMINOPHEN 2 TABLET: 10; 325 TABLET ORAL at 11:55

## 2017-04-03 RX ADMIN — LOSARTAN POTASSIUM 100 MG: 50 TABLET ORAL at 09:07

## 2017-04-03 RX ADMIN — METOCLOPRAMIDE HYDROCHLORIDE 5 MG: 5 TABLET ORAL at 09:07

## 2017-04-03 RX ADMIN — ASPIRIN 325 MG: 325 TABLET, FILM COATED ORAL at 09:07

## 2017-04-03 RX ADMIN — CELECOXIB 200 MG: 100 CAPSULE ORAL at 09:07

## 2017-04-03 RX ADMIN — HYDROCODONE BITARTRATE AND ACETAMINOPHEN 2 TABLET: 10; 325 TABLET ORAL at 21:14

## 2017-04-03 RX ADMIN — AMILORIDE HYDROCHLORIDE 5 MG: 5 TABLET ORAL at 09:07

## 2017-04-03 RX ADMIN — Medication 10 ML: at 20:55

## 2017-04-03 RX ADMIN — FERROUS SULFATE TAB 325 MG (65 MG ELEMENTAL FE) 325 MG: 325 (65 FE) TAB at 17:19

## 2017-04-03 RX ADMIN — AMLODIPINE BESYLATE 10 MG: 10 TABLET ORAL at 09:08

## 2017-04-03 RX ADMIN — HYDROCHLOROTHIAZIDE 50 MG: 25 TABLET ORAL at 09:07

## 2017-04-03 RX ADMIN — Medication 10 ML: at 06:36

## 2017-04-03 RX ADMIN — METOCLOPRAMIDE HYDROCHLORIDE 5 MG: 5 TABLET ORAL at 17:19

## 2017-04-03 RX ADMIN — PANTOPRAZOLE SODIUM 40 MG: 40 TABLET, DELAYED RELEASE ORAL at 06:33

## 2017-04-03 NOTE — PROGRESS NOTES
Care Management Interventions  PCP Verified by CM: Yes  Mode of Transport at Discharge: BLS  Transition of Care Consult (CM Consult): SNF  Partner SNF: No  Reason Why Partner SNF Not Chosen: Friend/family recommendation  [place] Signup: No  Discharge Durable Medical Equipment: No  Physical Therapy Consult: Yes  Occupational Therapy Consult: Yes  Speech Therapy Consult: No  Current Support Network: Lives with Spouse  Confirm Follow Up Transport: Other (see comment)  Plan discussed with Pt/Family/Caregiver: Yes  Freedom of Choice Offered: Yes  Discharge Location  Discharge Placement: Skilled nursing facility     81 y/o female admitted with closed nondisplaced fracture of right patella. Pt states she has a straight cane and a rolling walker. She states she has had HH in the past but can't remember the name of the agency. She states she is  and plans on going to a SNF at discharge. FOC given and signed by pt and she chose [address] Placed into e-discharge. CM to follow.

## 2017-04-03 NOTE — CONSULTS
[facility]   Pulmonary, Critical Care, and Sleep Medicine  [address] [place] [address]  [phone]    Initial Patient Consult      Name: Damir Win MRN: [id number]   : 1935 Hospital: [address] [place] [address]    Date: 4/3/2017          IMPRESSION:   · POD #4, s/p ORIF of R patella, s/p Rknee patellar Fx  · TKA - R knee  · Osteoarthritis - knees, bilateral  · HTN  · GERD  · Venous Insufficiency  · Hx of Polymyalgia rheumatica  · Hx of Irregular heartbeat  · Hx of Spinal stenosis      RECOMMENDATIONS:     · Per Ortho - Ready for D/C to SNIF today; per RN, no placement yet. Per RN, pt not going today 2/2 increased R knee pain - X-ray ordered per ortho,   · FiO2 to keep SpO2 >92%, currently resting comfortably on RA  · Aspiration precautions - HOB >=30 degree; aggressive pulmonary toileting, encourage ICS  · PT/OT eval and treat, mobilization as tolerated  · R knee X-Ray ordered per Dr. Alana Bello for increased R knee pain - Ortho to manage  · Continue home meds - Prilosec;ASA, Norvasc, Cozaar, Reglan, Claritin  · Monitor hemodynamics - Hgb dropped slightly today (8.3 from 9.7) -H/H now; CBC in AM; Pt otherwise asymptomatic. Will transfuse if Hgb >7.   · Monitor renal fcn - BMP tomorrow   · Glycemic Control Goal B.S. <140. · Pain Rx's per Ortho   · Prophylaxis: DVT: Prophylaxis per primary team / PUD: Low risk for stress ulcer. Subjective:     HPI:  Patient is a 80y/o A. A female with PMH of HTN, OA, GERD, Venous insufficiency and Polymyalgia rheumatica who presented s/p ORIF of R patella after sustaining a R patellar fx 2/ \"sitting down in a chair when she felt a pop in her knee\". Per chart, Ms. Jacquiline Frankel initially presented to Dr. Geovanny Nieves office for a for f/u in regards to her bilateral knees on 17. She is s/p R knee replacement and had been doing quite well up until Friday (17). She states she was sitting down into a chair and felt a pop in her knee.  She had some increased discomfort at that point. She states she has had some stiffness in her knee since that time. She was using a cane and is now back to a walker.          04/03/17:  - Alert, pleasant, resting comfortably on RA  - C/O increased pain to R knee after ambulating with assistance to chair from bed today  - Denies any H/A, SOB, KAYCEE, SUERO, CP, palpitations, abd pain, N/V/D, dysuria, fever/chills, abnormal bleeding/burising or any other pain or complaint. - Awaiting SNIF placement. Past Medical History:   Diagnosis Date    Arthritis     Arthritis of both hips     Back pain     Balance problem     Chronic back pain     Cough     Easy bruising     Essential hypertension     GERD (gastroesophageal reflux disease)     Hiatal hernia     Hypertension     Irregular heart beat     Left hip pain 10/8/2010    Neck pain     Nervousness     Night sweat     Osteoarthritis, knee bilateral     Pain with urination     Polymyalgia rheumatica (HCC)     Reflux     Spinal stenosis     Trapezius strain     Trochanteric bursitis of left hip     Venous insufficiency       Past Surgical History:   Procedure Laterality Date    COLONOSCOPY N/A 7/27/2016    COLONOSCOPY w/polypectomy performed by Harinder Galvez MD at [facility]    HX [facility] [facility]      HX CHOLECYSTECTOMY      HX HEENT  06/2011    left cornea transplant    HX HYSTERECTOMY      HX ORTHOPAEDIC      right foot and ankle      Prior to Admission medications    Medication Sig Start Date End Date Taking? Authorizing Provider   celecoxib (CELEBREX) 200 mg capsule Take 1 Cap by mouth two (2) times a day for 90 days. 4/3/17 7/2/17 Yes Drake Franco PA-C   buffered aspirin (BUFFERIN) 325 mg tablet Take 1 Tab by mouth daily. 4/3/17  Yes Drake Franco PA-C   ferrous sulfate 325 mg (65 mg iron) tablet Take 1 Tab by mouth two (2) times daily (with meals).  4/3/17  Yes Drake Franco PA-C   HYDROcodone-acetaminophen (NORCO)  mg tablet Take 1-2 Tabs by mouth every four (4) hours as needed for Pain. Max Daily Amount: 12 Tabs. 4/3/17  Yes Jovanna Marlow PA-C   HYDROcodone-acetaminophen [facility])  mg tablet Take 1-2 Tabs by mouth every six (6) hours as needed for Pain. Max Daily Amount: 8 Tabs. 3/9/17  Yes Jovanna Marlow PA-C   HYDROcodone-acetaminophen [facility])  mg tablet Take 1-2 Tabs by mouth every six (6) hours as needed for Pain. Max Daily Amount: 8 Tabs. 2/23/17  Yes Jovanna Marlow PA-C   levoFLOXacin (LEVAQUIN) 500 mg tablet 1 po q day x 7 days 2/10/17  Yes Jovanna Marlow PA-C   ferrous sulfate 325 mg (65 mg iron) tablet Take 1 Tab by mouth two (2) times daily (with meals). 2/7/17  Yes Jovanna Marlow PA-C   HYDROcodone-acetaminophen (NORCO) 7.5-325 mg per tablet Take 1-2 Tabs by mouth every four (4) hours as needed. Max Daily Amount: 12 Tabs. 2/7/17  Yes Jovanna Marlow PA-C   aspirin (ASPIRIN) 325 mg tablet Take 1 Tab by mouth two (2) times a day. 2/7/17  Yes Jovanna Marlow PA-C   cholecalciferol (VITAMIN D3) 1,000 unit tablet Take 1,000 Units by mouth daily. Yes Historical Provider   ciprofloxacin HCl (CIPRO) 500 mg tablet Take 1 Tab by mouth two (2) times a day. 1/26/17  Yes Jenn Romero MD   potassium 99 mg tablet Take 99 mg by mouth daily. Yes Historical Provider   omeprazole (PRILOSEC) 40 mg capsule Take 40 mg by mouth daily. Yes Historical Provider   acetaminophen (TYLENOL) 650 mg CR tablet Take 1,300 mg by mouth two (2) times a day. Yes Historical Provider   aMILoride-hydrochlorothiazide (MODURETIC) 5-50 mg tab Take 1 Tab by mouth daily. Yes Historical Provider   amLODIPine (NORVASC) 10 mg tablet 10 mg daily. 9/10/15  Yes Historical Provider   losartan (COZAAR) 100 mg tablet Take 100 mg by mouth daily. Yes Historical Provider   metoclopramide HCl (REGLAN) 5 mg tablet Take 5 mg by mouth two (2) times a day. Yes Historical Provider   cetirizine (ZYRTEC) 10 mg tablet Take  by mouth daily.    Yes Historical Provider     Allergies   Allergen Reactions    Citric Acid Unknown (comments)    Crinone [Progesterone Micronized] Unknown (comments)    Iodine Unknown (comments)    Percocet [Oxycodone-Acetaminophen] Other (comments)     Gets rebound headaches after taking Percocet      Social History   Substance Use Topics    Smoking status: Never Smoker    Smokeless tobacco: Never Used    Alcohol use No      Family History   Problem Relation Age of Onset    Arthritis-rheumatoid Other     Diabetes Other     Hypertension Other     Arthritis-osteo Other         Current Facility-Administered Medications   Medication Dose Route Frequency    loratadine (CLARITIN) tablet 10 mg  10 mg Oral DAILY    amLODIPine (NORVASC) tablet 10 mg  10 mg Oral DAILY    losartan (COZAAR) tablet 100 mg  100 mg Oral DAILY    metoclopramide HCl (REGLAN) tablet 5 mg  5 mg Oral BID    pantoprazole (PROTONIX) tablet 40 mg  40 mg Oral ACB    sodium chloride (NS) flush 5-10 mL  5-10 mL IntraVENous Q8H    ferrous sulfate tablet 325 mg  1 Tab Oral BID WITH MEALS    celecoxib (CELEBREX) capsule 200 mg  200 mg Oral BID    buffered aspirin (BUFFERIN) tablet 325 mg  325 mg Oral DAILY    aMILoride (MIDAMOR) tablet 5 mg  5 mg Oral DAILY    And    hydroCHLOROthiazide (HYDRODIURIL) tablet 50 mg  50 mg Oral DAILY       ROS: Negative except that which is noted in HPI.        Objective:   Vital Signs:    Visit Vitals    /72 (BP 1 Location: Left arm, BP Patient Position: At rest;Supine)    Pulse 80    Temp 97.6 °F (36.4 °C)    Resp 16    Ht 5' 4\" (1.626 m)    Wt 99.6 kg (219 lb 9.6 oz)    SpO2 99%    BMI 37.69 kg/m2       O2 Device: Nasal cannula   O2 Flow Rate (L/min): 2 l/min   Temp (24hrs), Av.6 °F (36.4 °C), Min:97.4 °F (36.3 °C), Max:97.9 °F (36.6 °C)       Intake/Output:   Last shift:      701 - 0  In: 280 [P.O.:280]  Out: 525 [Urine:525]  Last 3 shifts: 1901 -  0700  In: 520 [P.O.:520]  Out: 850 [Urine:850]    Intake/Output Summary (Last 24 hours) at 04/03/17 1530  Last data filed at 04/03/17 1102   Gross per 24 hour   Intake              480 ml   Output              525 ml   Net              -45 ml        Physical Exam:   General: AOx3; Resting comfortably on RA, NAD. HEENT: pupils reactive, sclera anicteric, EOM intact  Neck: No adenopathy or thyroid swelling, no lymphadenopathy or JVD, supple  CVS: S1S2 normal, No m/r/g   RS: Symmetrical chest rise; Mod AE bilaterally, no tactile fremitus or egophony, no accessory muscle use; CTAB; no wheezes/rhonchi/rales noted  Abd: Abdomen soft, Non-tender; + BS x4; No hepatosplenomegaly  Neuro: non focal, awake, alert  Lymph node: No obvious palpable lymph node appreciated. Skin: no rash or abnormal lesions noted. Extremities: Normal, atraumatic, no cyanosis; scant pedal edema. Dressings examined. right leg (knee) Hemovac    Prophylaxis- DVT: Appropriate       Surgical site: Appropiate    Labs: Results:       Chemistry Recent Labs      04/02/17   0211  04/01/17   0208   GLU  126*  130*   NA  140  142   K  3.7  3.9   CL  107  106   CO2  26  28   BUN  30*  34*   CREA  1.30  1.54*   CA  8.4*  8.6   AGAP  7  8   BUCR  23*  22*      CBC w/Diff Recent Labs      04/01/17   0208   WBC  9.8   RBC  3.22*   HGB  8.3*   HCT  26.4*   PLT  356   GRANS  76*   LYMPH  17*   EOS  0      Cardiac Enzymes No results for input(s): CPK, CKND1, JOSETTE in the last 72 hours. No lab exists for component: CKRMB, TROIP   Coagulation No results for input(s): PTP, INR, APTT in the last 72 hours. No lab exists for component: INREXT    BNP No results for input(s): BNPP in the last 72 hours. Liver Enzymes No results for input(s): TP, ALB, TBIL, AP, SGOT, GPT in the last 72 hours.     No lab exists for component: DBIL   Thyroid Studies No results found for: T4, T3U, TSH, TSHEXT     Troponins troponins       Arterial Blood Gas:     No results found for: PH, PHI, PCO2, PCO2I, PO2, PO2I, HCO3, HCO3I, FIO2, FIO2I       XR (most recent)       Results from Hospital Encounter encounter on 03/31/17   XR KNEE RT 1 V   Narrative Right knee intraoperative exam    HISTORY: Patellar fracture. FINDINGS: Lateral view the right knee was obtained. 2 metal pins were inserted through the patella through the longitudinal axis. Wire loop was also applied. Patella fracture is immobilized. Patient also has a right knee prosthesis. Impression IMPRESSION: Internal fixation of patellar fracture.            CT Chest CT-scan of the chest            Juan Jose Manning PA-C   4/3/2017

## 2017-04-03 NOTE — PROGRESS NOTES
Problem: Mobility Impaired (Adult and Pediatric)  Goal: *Acute Goals and Plan of Care (Insert Text)  Physical Therapy Goals  Initiated 4/2/2017 and to be accomplished within 7 day(s)  1. Patient will move from supine to sit and sit to supine in bed with modified independence. 2. Patient will transfer from bed to chair and chair to bed with supervision/set-up using the least restrictive device. 3. Patient will perform sit to stand with contact guard assist/SBA. 4. Patient will ambulate with supervision/set-up for 100 feet with the least restrictive device. 5. Patient will ascend/descend 3 stairs with 1 handrail(s) with minimal assistance/contact guard assist.   PHYSICAL THERAPY TREATMENT     Patient: Irina Montgomery (24 y.o. female)  Date: 4/3/2017  Diagnosis: Closed nondisplaced fracture of right patella, unspecified fracture morphology, initial encounter [S82.001A] <principal problem not specified>  Procedure(s) (LRB):  RIGHT PATELLA OPEN REDUCTION INTERNAL FIXATION (Right) 3 Days Post-Op  Precautions:  Knee immobilizer at all times (no flexion), WBAT RLE  Chart, physical therapy assessment, plan of care and goals were reviewed. ASSESSMENT:  Pt presents alert and agreeable to therapy today. Pt had knee immobilizer donned in bed and transferred OOB with supervision and then stood with bed height elevated and minimum assistance. Pt then stood several mins prior to ambulating to locked recliner where she was left resting with feet elevated and call bell by her side. Pt instructed in TE (see below) and demonstrated understanding. Pt denied further need for assistance at this time and will continue to benefit from therapy.     Progression toward goals:  [X]      Improving appropriately and progressing toward goals  [ ]      Improving slowly and progressing toward goals  [ ]      Not making progress toward goals and plan of care will be adjusted       PLAN:  Patient continues to benefit from skilled intervention to address the above impairments. Continue treatment per established plan of care. Discharge Recommendations:  Ok Lauren  Further Equipment Recommendations for Discharge:  N/A       G-CODES:      Mobility  Current  CJ= 20-39%   Goal  CI= 1-19%. The severity rating is based on the Level of Assistance required for Functional Mobility and ADLs. SUBJECTIVE:   Patient stated I feel pretty good today.       OBJECTIVE DATA SUMMARY:   Critical Behavior:    Pleasant, cooperative, A&Ox4  Functional Mobility Training:  Bed Mobility:   Supine to Sit: Supervision; Additional time   Scooting: Supervision   Transfers:  Sit to Stand: Minimum assistance; Additional time (bed height elevated)  Stand to Sit: Contact guard assistance       Balance:  Sitting: Intact  Standing: Impaired; With support  Standing - Static: Fair  Standing - Dynamic : Fair  Ambulation/Gait Training:  Distance (ft): 15 Feet (ft)  Assistive Device: Walker, rolling  Ambulation - Level of Assistance: Contact guard assistance   Gait Abnormalities: Decreased step clearance; Step to gait;Trunk sway increased  Right Side Weight Bearing: As tolerated   Therapeutic Exercises:   Pt instructed in L side ONLY knee flexion(pt RLE in immobilizer at all times), bilateral glute sets, SLR,& AP's  10x2, 3x/day each  Pain:  Pt reports 4/10 pain or discomfort prior to treatment. Pt reports 5/10 pain or discomfort post treatment. Activity Tolerance:   Pt tolerated activity well and denied chest pain, SOB, or dizziness with activity. Please refer to the flowsheet for vital signs taken during this treatment.   After treatment:   [X] Patient left in no apparent distress sitting up in chair  [X] Patient left in no apparent distress in bed  [X] Call bell left within reach  [ ] Nursing notified  [ ] Caregiver present  [ ] Bed alarm activated      Earle Oscar PT   Time Calculation: 13 mins

## 2017-04-03 NOTE — DISCHARGE SUMMARY
3/31/2017  9:06 AM    4/3/2017, 10:01 AM    Primary Dx:right Orthopedic / Rheumatologic: Total Knee Replacement, patellar fx  Secondary Dx: Etiological Diagnoses: none    HPI:  Pt had a TKA done and was doing extremely well until she felt a pop in her knee when she slipped. She was found to have a patellar fx. Due to the current findings and affected activity of daily living surgical intervention is indicated.   The alternatives, risks, complications as well as expected outcome were discussed, the patient understands and wishes to proceed with surgery    Past Medical History:   Diagnosis Date    Arthritis     Arthritis of both hips     Back pain     Balance problem     Chronic back pain     Cough     Easy bruising     Essential hypertension     GERD (gastroesophageal reflux disease)     Hiatal hernia     Hypertension     Irregular heart beat     Left hip pain 10/8/2010    Neck pain     Nervousness     Night sweat     Osteoarthritis, knee bilateral     Pain with urination     Polymyalgia rheumatica (HCC)     Reflux     Spinal stenosis     Trapezius strain     Trochanteric bursitis of left hip     Venous insufficiency          Current Facility-Administered Medications:     loratadine (CLARITIN) tablet 10 mg, 10 mg, Oral, DAILY, Hussein Doe MD, 10 mg at 04/03/17 0907    amLODIPine (NORVASC) tablet 10 mg, 10 mg, Oral, DAILY, Hussein Doe MD, 10 mg at 04/03/17 0908    HYDROcodone-acetaminophen (NORCO)  mg tablet 1-2 Tab, 1-2 Tab, Oral, Q4H PRN, Hussein Doe MD, 2 Tab at 04/03/17 0451    losartan (COZAAR) tablet 100 mg, 100 mg, Oral, DAILY, Hussein Doe MD, 100 mg at 04/03/17 0907    metoclopramide HCl (REGLAN) tablet 5 mg, 5 mg, Oral, BID, Hussein Doe MD, 5 mg at 04/03/17 0907    pantoprazole (PROTONIX) tablet 40 mg, 40 mg, Oral, ACB, Hussein Doe MD, 40 mg at 04/03/17 0813    sodium chloride (NS) flush 5-10 mL, 5-10 mL, IntraVENous, Q8H, Hussein Doe MD, 10 mL at 04/03/17 0636    sodium chloride (NS) flush 5-10 mL, 5-10 mL, IntraVENous, PRN, Lore Wadsworth MD    naloxone [facility]) injection 0.4 mg, 0.4 mg, IntraVENous, PRN, Lore Wadsworth MD    ferrous sulfate tablet 325 mg, 1 Tab, Oral, BID WITH MEALS, Lore Wadsworth MD, 325 mg at 04/03/17 0908    diphenhydrAMINE (BENADRYL) injection 12.5 mg, 12.5 mg, IntraVENous, Q6H PRN, Lore Wadsworth MD    zolpidem (AMBIEN) tablet 5 mg, 5 mg, Oral, QHS PRN, Lore Wadsworth MD    celecoxib (CELEBREX) capsule 200 mg, 200 mg, Oral, BID, Lore Wadsworth MD, 200 mg at 04/03/17 0907    morphine injection 1-2 mg, 1-2 mg, IntraVENous, Q4H PRN, Lore Wadsworth MD    ondansetron [facility]) injection 4 mg, 4 mg, IntraVENous, Q4H PRN, Lore Wadsworth MD    magnesium hydroxide (MILK OF MAGNESIA) 400 mg/5 mL oral suspension 30 mL, 30 mL, Oral, DAILY PRN, Lore Wadsworth MD, 30 mL at 04/01/17 1711    buffered aspirin (BUFFERIN) tablet 325 mg, 325 mg, Oral, DAILY, Lore Wadsworth MD, 325 mg at 04/03/17 0907    aMILoride (MIDAMOR) tablet 5 mg, 5 mg, Oral, DAILY, 5 mg at 04/03/17 0907 **AND** hydroCHLOROthiazide (HYDRODIURIL) tablet 50 mg, 50 mg, Oral, DAILY, Lore Wadsworth MD, 50 mg at 04/03/17 4852      Citric acid; Crinone [progesterone micronized]; Iodine; and Percocet [oxycodone-acetaminophen]    Physical Exam:  General A&O x3 NAD, well developed, well nourished, normal affect  Heart: S1-S2, RRR  Lungs: CTA Bilat  Abd: soft NT, ND  Ext: n/v intact    Hospital Course:    Pt. Had rightOrthopedic / Rheumatologic: Total Knee Replacement, ORIF patellar fx    Post -op Course: The patient tolerated the procedure well. They were followed by internal medicine for help with medical management. Pt. Was place on Abx pre and post-op for prophylaxis against infection as well as aspirin post-op for prophylaxis against DVT. Vitals signs remained stable, remained af. The wound wasclean, dry, no drainage. Pain was well controlled. Pt.  Had negative calf tenderness or swelling, no evidence for DVT. Patient had PT/OT consult for evaluation and treatment. CBC  Lab Results   Component Value Date/Time    WBC 9.8 04/01/2017 02:08 AM    RBC 3.22 (L) 04/01/2017 02:08 AM    HCT 26.4 (L) 04/01/2017 02:08 AM    MCV 82.0 04/01/2017 02:08 AM    MCH 25.8 04/01/2017 02:08 AM    MCHC 31.4 04/01/2017 02:08 AM    RDW 13.3 04/01/2017 02:08 AM     Coagulation  Lab Results   Component Value Date    INR 1.1 03/30/2017    APTT 39.8 (H) 03/30/2017      Basic Metabolic Profile  Lab Results   Component Value Date     04/02/2017    CO2 26 04/02/2017    BUN 30 (H) 04/02/2017       Discharge Plan:  The patient will be d/c'd to snf, PT/OT eval and treat, }WBAT, knee immobilizer on at all times. NO FLEXION of the knee. aquacel ag dressing pod 7 and prn. Aspirin therapy for dvt prevention. A walker, bedside commode, and shower chair will be utilized for ADL's. Follow up with Dr. Joann Montana in 10-12 days. Call with any questions or concerns.

## 2017-04-03 NOTE — ROUTINE PROCESS
5136: Stable, alert and oriented x4. Voiding. Mild pain well managed with meds. No nausea or vomiting. Active bowel sounds. No concerns.

## 2017-04-03 NOTE — PROGRESS NOTES
Ortho    Pt. Seen and evaluated. Doing well, pain well controlled  Progressing slow with PT  Denies cp, sob, abd pain    Blood pressure 122/70, pulse 79, temperature 97.5 °F (36.4 °C), resp. rate 17, height 5' 4\" (1.626 m), weight 219 lb 9.6 oz (99.6 kg), SpO2 100 %, unknown if currently breastfeeding. rightKnee woundclean, dry, no drainage  Sensory intact to LT  Motor intact  nv intact  Neg calf tenderness    Labs:  CBC  @  CBC:   Lab Results   Component Value Date/Time    WBC 9.8 04/01/2017 02:08 AM    RBC 3.22 04/01/2017 02:08 AM    HGB 8.3 04/01/2017 02:08 AM    HCT 26.4 04/01/2017 02:08 AM    PLATELET 968 30/61/1569 02:08 AM     BMP:   Lab Results   Component Value Date/Time    Glucose 126 04/02/2017 02:11 AM    Sodium 140 04/02/2017 02:11 AM    Potassium 3.7 04/02/2017 02:11 AM    Chloride 107 04/02/2017 02:11 AM    CO2 26 04/02/2017 02:11 AM    BUN 30 04/02/2017 02:11 AM    Creatinine 1.30 04/02/2017 02:11 AM    Calcium 8.4 04/02/2017 02:11 AM   @  Coagulation  Lab Results   Component Value Date    INR 1.1 03/30/2017    APTT 39.8 (H) 03/30/2017      Basic Metabolic Profile  Lab Results   Component Value Date     04/02/2017    CO2 26 04/02/2017    BUN 30 (H) 04/02/2017       Assesment: right Orthopedic / Rheumatologic: Total Knee Replacement, patellar fx- orif    Plan: aspirin, PT- wbat, brace on at all times.   snf placement

## 2017-04-03 NOTE — PROGRESS NOTES
Bonita Gardner from [address] [place] [address] states pt was recently there and she is working on pt's co-pay status. CM to follow.

## 2017-04-03 NOTE — ROUTINE PROCESS
Bedside and Verbal shift change report given to Alla Porter (oncoming nurse) by Anca Mathias RN (offgoing nurse). Report included the following information SBAR, Kardex, MAR and Recent Results.     SITUATION:    Code Status: No Order   Reason for Admission: Closed nondisplaced fracture of right patella, unspecified fracture morphology, initial encounter [facility] day: 3   Problem List:       Hospital Problems  Date Reviewed: 3/30/2017          Codes Class Noted POA    Patella fracture ICD-10-CM: S82.009A  ICD-9-CM: 822.0  3/31/2017 Unknown              BACKGROUND:    Past Medical History:   Past Medical History:   Diagnosis Date    Arthritis     Arthritis of both hips     Back pain     Balance problem     Chronic back pain     Cough     Easy bruising     Essential hypertension     GERD (gastroesophageal reflux disease)     Hiatal hernia     Hypertension     Irregular heart beat     Left hip pain 10/8/2010    Neck pain     Nervousness     Night sweat     Osteoarthritis, knee bilateral     Pain with urination     Polymyalgia rheumatica (HCC)     Reflux     Spinal stenosis     Trapezius strain     Trochanteric bursitis of left hip     Venous insufficiency          Patient taking anticoagulants no     ASSESSMENT:    Changes in Assessment Throughout Shift: No     Patient has Central Line: no Reasons if yes: N/A   Patient has Krishna Cath: no Reasons if yes: No      Last Vitals:     Vitals:    04/02/17 1531 04/02/17 2002 04/03/17 0446 04/03/17 0721   BP: 126/68 143/73 155/83 122/70   Pulse: 80 85 83 79   Resp: 16 18 18 17   Temp: 97.9 °F (36.6 °C) 97.4 °F (36.3 °C) 97.5 °F (36.4 °C) 97.5 °F (36.4 °C)   SpO2: 99% 98% 100% 100%   Weight:       Height:            IV and DRAINS (will only show if present)   Peripheral IV 03/31/17 Right Wrist-Site Assessment: Clean, dry, & intact     WOUND (if present)   Wound Type:  none   Dressing present Dressing Present : No   Wound Concerns/Notes:  none     PAIN    Pain Assessment    Pain Intensity 1: 4 (04/02/17 2113)    Pain Location 1: Leg    Pain Intervention(s) 1: Medication (see MAR)    Patient Stated Pain Goal: 0  o Interventions for Pain:  none  o Intervention effective: no  o Time of last intervention: 0600   o Reassessment Completed: no      Last 3 Weights:  Last 3 Recorded Weights in this Encounter    03/31/17 1053   Weight: 99.6 kg (219 lb 9.6 oz)     Weight change:      INTAKE/OUPUT    Current Shift: 04/03 0701 - 04/03 1900  In: 300 [P.O.:300]  Out: 400 [Urine:400]    Last three shifts: 04/01 1901 - 04/03 0700  In: 520 [P.O.:520]  Out: 850 [Urine:850]     LAB RESULTS     Recent Labs      04/01/17   0208   WBC  9.8   HGB  8.3*   HCT  26.4*   PLT  356        Recent Labs      04/02/17   0211  04/01/17   0208   NA  140  142   K  3.7  3.9   GLU  126*  130*   BUN  30*  34*   CREA  1.30  1.54*   CA  8.4*  8.6       RECOMMENDATIONS AND DISCHARGE PLANNING     1. Pending tests/procedures/ Plan of Care or Other Needs: TbD     2. Discharge plan for patient and Needs/Barriers: TBD    3. Estimated Discharge Date: TBD Posted on Whiteboard in Patients Room: no      4. The patient's care plan was reviewed with the oncoming nurse. \"HEALS\" SAFETY CHECK      Fall Risk    Total Score: 3    Safety Measures: Safety Measures: Bed/Chair alarm on, Call light within reach, Fall prevention (comment), Side rails X 3    A safety check occurred in the patient's room between off going nurse and oncoming nurse listed above.     The safety check included the below items  Area Items   H  High Alert Medications - Verify all high alert medication drips (heparin, PCA, etc.)   E  Equipment - Suction is set up for ALL patients (with yanker)  - Red plugs utilized for all equipment (IV pumps, etc.)  - WOWs wiped down at end of shift.  - Room stocked with oxygen, suction, and other unit-specific supplies   A  Alarms - Bed alarm is set for fall risk patients  - Ensure chair alarm is in place and activated if patient is up in a chair   L  Lines - Check IV for any infiltration  - Krishna bag is empty if patient has a Krishna   - Tubing and IV bags are labeled   S  Safety   - Room is clean, patient is clean, and equipment is clean. - Hallways are clear from equipment besides carts. - Fall bracelet on for fall risk patients  - Ensure room is clear and free of clutter  - Suction is set up for ALL patients (with mengker)  - Hallways are clear from equipment besides carts.    - Isolation precautions followed, supplies available outside room, sign posted     Gonzalez Ruiz RN

## 2017-04-04 LAB
ANION GAP BLD CALC-SCNC: 3 MMOL/L (ref 3–18)
BASOPHILS # BLD AUTO: 0 K/UL (ref 0–0.06)
BASOPHILS # BLD: 0 % (ref 0–2)
BUN SERPL-MCNC: 30 MG/DL (ref 7–18)
BUN/CREAT SERPL: 25 (ref 12–20)
CALCIUM SERPL-MCNC: 9 MG/DL (ref 8.5–10.1)
CHLORIDE SERPL-SCNC: 104 MMOL/L (ref 100–108)
CO2 SERPL-SCNC: 32 MMOL/L (ref 21–32)
CREAT SERPL-MCNC: 1.19 MG/DL (ref 0.6–1.3)
DIFFERENTIAL METHOD BLD: ABNORMAL
EOSINOPHIL # BLD: 0.5 K/UL (ref 0–0.4)
EOSINOPHIL NFR BLD: 6 % (ref 0–5)
ERYTHROCYTE [DISTWIDTH] IN BLOOD BY AUTOMATED COUNT: 13.5 % (ref 11.6–14.5)
GLUCOSE SERPL-MCNC: 98 MG/DL (ref 74–99)
HCT VFR BLD AUTO: 27.6 % (ref 35–45)
HGB BLD-MCNC: 8.8 G/DL (ref 12–16)
LYMPHOCYTES # BLD AUTO: 21 % (ref 21–52)
LYMPHOCYTES # BLD: 2.1 K/UL (ref 0.9–3.6)
MCH RBC QN AUTO: 26.6 PG (ref 24–34)
MCHC RBC AUTO-ENTMCNC: 31.9 G/DL (ref 31–37)
MCV RBC AUTO: 83.4 FL (ref 74–97)
MONOCYTES # BLD: 0.6 K/UL (ref 0.05–1.2)
MONOCYTES NFR BLD AUTO: 6 % (ref 3–10)
NEUTS SEG # BLD: 6.5 K/UL (ref 1.8–8)
NEUTS SEG NFR BLD AUTO: 67 % (ref 40–73)
PLATELET # BLD AUTO: 349 K/UL (ref 135–420)
PMV BLD AUTO: 9.8 FL (ref 9.2–11.8)
POTASSIUM SERPL-SCNC: 4.3 MMOL/L (ref 3.5–5.5)
RBC # BLD AUTO: 3.31 M/UL (ref 4.2–5.3)
SODIUM SERPL-SCNC: 139 MMOL/L (ref 136–145)
WBC # BLD AUTO: 9.7 K/UL (ref 4.6–13.2)

## 2017-04-04 PROCEDURE — 65270000029 HC RM PRIVATE

## 2017-04-04 PROCEDURE — 80048 BASIC METABOLIC PNL TOTAL CA: CPT

## 2017-04-04 PROCEDURE — 74011250637 HC RX REV CODE- 250/637: Performed by: ORTHOPAEDIC SURGERY

## 2017-04-04 PROCEDURE — 36415 COLL VENOUS BLD VENIPUNCTURE: CPT

## 2017-04-04 PROCEDURE — 97116 GAIT TRAINING THERAPY: CPT

## 2017-04-04 PROCEDURE — 97110 THERAPEUTIC EXERCISES: CPT

## 2017-04-04 PROCEDURE — 85025 COMPLETE CBC W/AUTO DIFF WBC: CPT

## 2017-04-04 PROCEDURE — 97535 SELF CARE MNGMENT TRAINING: CPT

## 2017-04-04 PROCEDURE — 97530 THERAPEUTIC ACTIVITIES: CPT

## 2017-04-04 PROCEDURE — 77030012935 HC DRSG AQUACEL BMS -B

## 2017-04-04 RX ADMIN — Medication 10 ML: at 04:28

## 2017-04-04 RX ADMIN — AMLODIPINE BESYLATE 10 MG: 10 TABLET ORAL at 09:22

## 2017-04-04 RX ADMIN — LOSARTAN POTASSIUM 100 MG: 50 TABLET ORAL at 09:25

## 2017-04-04 RX ADMIN — ASPIRIN 325 MG: 325 TABLET, FILM COATED ORAL at 09:24

## 2017-04-04 RX ADMIN — CELECOXIB 200 MG: 100 CAPSULE ORAL at 17:46

## 2017-04-04 RX ADMIN — CELECOXIB 200 MG: 100 CAPSULE ORAL at 09:24

## 2017-04-04 RX ADMIN — FERROUS SULFATE TAB 325 MG (65 MG ELEMENTAL FE) 325 MG: 325 (65 FE) TAB at 09:24

## 2017-04-04 RX ADMIN — HYDROCHLOROTHIAZIDE 50 MG: 25 TABLET ORAL at 09:23

## 2017-04-04 RX ADMIN — Medication 10 ML: at 14:00

## 2017-04-04 RX ADMIN — HYDROCODONE BITARTRATE AND ACETAMINOPHEN 2 TABLET: 10; 325 TABLET ORAL at 07:00

## 2017-04-04 RX ADMIN — METOCLOPRAMIDE HYDROCHLORIDE 5 MG: 5 TABLET ORAL at 17:46

## 2017-04-04 RX ADMIN — PANTOPRAZOLE SODIUM 40 MG: 40 TABLET, DELAYED RELEASE ORAL at 09:25

## 2017-04-04 RX ADMIN — HYDROCODONE BITARTRATE AND ACETAMINOPHEN 2 TABLET: 10; 325 TABLET ORAL at 20:34

## 2017-04-04 RX ADMIN — AMILORIDE HYDROCHLORIDE 5 MG: 5 TABLET ORAL at 09:23

## 2017-04-04 RX ADMIN — LORATADINE 10 MG: 10 TABLET ORAL at 09:25

## 2017-04-04 RX ADMIN — FERROUS SULFATE TAB 325 MG (65 MG ELEMENTAL FE) 325 MG: 325 (65 FE) TAB at 17:47

## 2017-04-04 RX ADMIN — Medication 10 ML: at 20:34

## 2017-04-04 RX ADMIN — METOCLOPRAMIDE HYDROCHLORIDE 5 MG: 5 TABLET ORAL at 09:25

## 2017-04-04 NOTE — PROGRESS NOTES
Problem: Self Care Deficits Care Plan (Adult)  Goal: *Acute Goals and Plan of Care (Insert Text)  Occupational Therapy Goals  Initiated 4/1/2017 within 7 day(s). 1. Patient will perform lower body dressing with modified independence   2. Patient will perform LB clothes management with modified independence. 3. Patient will perform maneuvering in bed in preparation for her self care participation with modified independence. 4. Patient will perform toilet transfers with modified independence. Outcome: Progressing Towards Goal  OCCUPATIONAL THERAPY TREATMENT     Patient: Bobbi Epstein (84 y.o. female)  Date: 4/4/2017  Diagnosis: Closed nondisplaced fracture of right patella, unspecified fracture morphology, initial encounter [S82.001A] <principal problem not specified>  Procedure(s) (LRB):  RIGHT PATELLA OPEN REDUCTION INTERNAL FIXATION (Right) 4 Days Post-Op  Precautions:    Chart, occupational therapy assessment, plan of care, and goals were reviewed. ASSESSMENT:  Pt is finishing up breakfast upon entry, agreeable to OOB activity. Pt demonstrates improvement w/maneuvering in bed, however requires additional time and Supervised A for safety. Pt participated in LB dressing training seated EOB, required Min A to don sock to RLE, and SBA to don sock to LLE. Pt then participated in simulated donning of underwear, requiring CGA for std aspects 2/2 decreased std balance. Pt performed functional txfr to the chair (simulating txfr to [facility]) w/FWW and CGA for safety w/VCs for hands placement to the chair. Pt left in the chair upon completion of the session w/call bell within reach. Pt is educated on the importance of calling for assistance when needed to get up. Pt verbalized understanding. Pt's nurse Heriberto Arteaga RN notified of the session.   Progression toward goals:  [X]          Improving appropriately and progressing toward goals  [ ]          Improving slowly and progressing toward goals  [ ]          Not making progress toward goals and plan of care will be adjusted       PLAN:  Patient continues to benefit from skilled intervention to address the above impairments. Continue treatment per established plan of care. Discharge Recommendations:  [address] depending on the amount of assistance available at home  Further Equipment Recommendations for Discharge:  N/A      G-CODES:      Self Care  Current  CJ= 20-39%. The severity rating is based on the Other Levels of Assistance for ADLs and functional mobility      SUBJECTIVE:   Patient stated I would love to get up. Thank you.       OBJECTIVE DATA SUMMARY:   Cognitive/Behavioral Status:  Neurologic State: Alert  Orientation Level: Oriented X4  Cognition: Follows commands, Appropriate decision making, Appropriate for age attention/concentration, Appropriate safety awareness     Functional Mobility and Transfers for ADLs:              Bed Mobility:  Supine to Sit: Supervision; Additional time              Transfers:  Sit to Stand: Contact guard assistance  Bed to Chair: Contact guard assistance              Toilet Transfer : Contact guard assistance               Balance:  Sitting: Intact  Standing: Impaired; With support  Standing - Static: Fair (plus)  Standing - Dynamic : Fair  ADL Intervention:   Lower Body Bathing  Perineal  : Contact guard assistance (in std)  Lower Body Dressing Assistance  Underpants: Contact guard assistance (simulating)  Socks: Minimum assistance;Supervision/set-up (Min A for RLE, Supervision LLE)   Pain:  Pt reports 0/10 pain or discomfort prior to treatment. Pt reports 0/10 pain or discomfort post treatment. Activity Tolerance:    Fair  Please refer to the flowsheet for vital signs taken during this treatment.   After treatment:   [X]  Patient left in no apparent distress sitting up in chair  [ ]  Patient left in no apparent distress in bed  [X]  Call bell left within reach  [X]  Nursing notified  [ ] Caregiver present  [ ]  Bed alarm activated     ALIX Self/L  Time Calculation: 23 mins

## 2017-04-04 NOTE — PROGRESS NOTES
Problem: Mobility Impaired (Adult and Pediatric)  Goal: *Acute Goals and Plan of Care (Insert Text)  Physical Therapy Goals  Initiated 4/2/2017 and to be accomplished within 7 day(s)  1. Patient will move from supine to sit and sit to supine in bed with modified independence. 2. Patient will transfer from bed to chair and chair to bed with supervision/set-up using the least restrictive device. 3. Patient will perform sit to stand with contact guard assist/SBA. 4. Patient will ambulate with supervision/set-up for 100 feet with the least restrictive device. 5. Patient will ascend/descend 3 stairs with 1 handrail(s) with minimal assistance/contact guard assist.   Outcome: Progressing Towards Goal  PHYSICAL THERAPY TREATMENT     Patient: Tyree Chi (83 y.o. female)  Date: 4/4/2017  Diagnosis: Closed nondisplaced fracture of right patella, unspecified fracture morphology, initial encounter [S82.001A] <principal problem not specified>  Procedure(s) (LRB):  RIGHT PATELLA OPEN REDUCTION INTERNAL FIXATION (Right) 4 Days Post-Op  Precautions:    Chart, physical therapy assessment, plan of care and goals were reviewed. ASSESSMENT:  Pt very motivated today demonstrating good improvement with activity tolerance, transfers and gait. Instructed in LE exercises for strengthening with knee immobilizer in place. Progression toward goals:  [X]      Improving appropriately and progressing toward goals  [ ]      Improving slowly and progressing toward goals  [ ]      Not making progress toward goals and plan of care will be adjusted       PLAN:  Patient continues to benefit from skilled intervention to address the above impairments. Continue treatment per established plan of care. Discharge Recommendations:  Home Health and SNF  Further Equipment Recommendations for Discharge:  rolling walker       SUBJECTIVE:   Patient stated This is a blessing today.   OBJECTIVE DATA SUMMARY:   Critical Behavior:  Neurologic State: Alert  Orientation Level: Oriented X4  Cognition: Follows commands, Appropriate decision making, Appropriate for age attention/concentration, Appropriate safety awareness  Functional Mobility Training:  Bed Mobility:  Supine to Sit: Supervision; Additional time  Transfers:  Sit to Stand: Supervision  Stand to Sit: Supervision  Bed to Chair: Contact guard assistance  Balance:  Sitting: Intact  Standing: Impaired; With support  Standing - Static: Fair (plus)  Standing - Dynamic : Fair  Ambulation/Gait Training:  Distance (ft): 30 Feet (ft)  Assistive Device: Walker, rolling;Brace/Splint  Ambulation - Level of Assistance: Supervision  Gait Abnormalities: Antalgic;Decreased step clearance  Therapeutic Exercises: Ankle pumps, quad set, glute suqeeze, hip abd  Pain:  Pt pain was reported as  0 pre-treatment. Pt pain was reported as 0 post-treatment. Intervention: n/a     Activity Tolerance:   Good   Please refer to the flowsheet for vital signs taken during this treatment.   After treatment:   [ ] Patient left in no apparent distress sitting up in chair  [ ] Patient left in no apparent distress in bed  [ ] Call bell left within reach  [ ] Nursing notified  [ ] Caregiver present  [ ] Bed alarm activated      Jose Orellana PTA   Time Calculation: 26 mins

## 2017-04-04 NOTE — PROGRESS NOTES
Ortho    Pt. Seen and evaluated. Doing well, progressing slow, pain well controlled  Denies cp, sob, abd pain    Blood pressure 146/78, pulse 81, temperature 98.7 °F (37.1 °C), resp. rate 17, height 5' 4\" (1.626 m), weight 219 lb 9.6 oz (99.6 kg), SpO2 100 %, unknown if currently breastfeeding.    rightKnee woundclean, dry, no drainage  Sensory intact to LT  Motor intact  nv intact  Neg calf tenderness    Labs:  CBC  @  CBC:   Lab Results   Component Value Date/Time    WBC 9.7 04/04/2017 02:35 AM    RBC 3.31 04/04/2017 02:35 AM    HGB 8.8 04/04/2017 02:35 AM    HCT 27.6 04/04/2017 02:35 AM    PLATELET 113 21/28/8304 02:35 AM     BMP:   Lab Results   Component Value Date/Time    Glucose 98 04/04/2017 02:35 AM    Sodium 139 04/04/2017 02:35 AM    Potassium 4.3 04/04/2017 02:35 AM    Chloride 104 04/04/2017 02:35 AM    CO2 32 04/04/2017 02:35 AM    BUN 30 04/04/2017 02:35 AM    Creatinine 1.19 04/04/2017 02:35 AM    Calcium 9.0 04/04/2017 02:35 AM   @  Coagulation  Lab Results   Component Value Date    INR 1.1 03/30/2017    APTT 39.8 (H) 03/30/2017      Basic Metabolic Profile  Lab Results   Component Value Date     04/04/2017    CO2 32 04/04/2017    BUN 30 (H) 04/04/2017       Assesment: right Orthopedic / Rheumatologic: Total Knee Replacement, ORIF right patellar fx    Plan: aspirin, PT, snf placement- not safe for home at this time- 15ft ambulation with PT

## 2017-04-04 NOTE — PROGRESS NOTES
Bedside and Verbal shift change report given to Prakash Starr (oncoming nurse) by Kenneth Ferrell RN (offgoing nurse). Report included the following information SBAR, Kardex, MAR and Recent Results.     SITUATION:    Code Status: No Order   Reason for Admission: Closed nondisplaced fracture of right patella, unspecified fracture morphology, initial encounter [facility] day: 4   Problem List:       Hospital Problems  Date Reviewed: 3/30/2017          Codes Class Noted POA    Patella fracture ICD-10-CM: S82.009A  ICD-9-CM: 822.0  3/31/2017 Unknown              BACKGROUND:    Past Medical History:   Past Medical History:   Diagnosis Date    Arthritis     Arthritis of both hips     Back pain     Balance problem     Chronic back pain     Cough     Easy bruising     Essential hypertension     GERD (gastroesophageal reflux disease)     Hiatal hernia     Hypertension     Irregular heart beat     Left hip pain 10/8/2010    Neck pain     Nervousness     Night sweat     Osteoarthritis, knee bilateral     Pain with urination     Polymyalgia rheumatica (HCC)     Reflux     Spinal stenosis     Trapezius strain     Trochanteric bursitis of left hip     Venous insufficiency          Patient taking anticoagulants yes     ASSESSMENT:    Changes in Assessment Throughout Shift: none     Patient has Central Line: no Reasons if yes:      Patient has Krishna Cath: no Reasons if yes:          Last Vitals:     Vitals:    04/03/17 1207 04/03/17 1609 04/03/17 1946 04/04/17 0400   BP: 133/72 164/70 142/76 144/75   Pulse: 80 83 81 78   Resp: 16 17 18 18   Temp: 97.6 °F (36.4 °C) 98.2 °F (36.8 °C) 98 °F (36.7 °C) 97.8 °F (36.6 °C)   SpO2: 99% 97% 97% 100%   Weight:       Height:            IV and DRAINS (will only show if present)   Peripheral IV 03/31/17 Right Wrist-Site Assessment: Clean, dry, & intact     WOUND (if present)   Wound Type:  surgical   Dressing present yes   Wound Concerns/Notes: none     PAIN    Pain Assessment    Pain Intensity 1: 0 (04/04/17 0414)    Pain Location 1: Knee    Pain Intervention(s) 1: Rest, Repositioned    Patient Stated Pain Goal: 0  o Interventions for Pain:  medication  o Intervention effective: yes  o Time of last intervention: see mar   o Reassessment Completed: yes      Last 3 Weights:  Last 3 Recorded Weights in this Encounter    03/31/17 1053   Weight: 99.6 kg (219 lb 9.6 oz)     Weight change:      INTAKE/OUPUT    Current Shift: 04/03 1901 - 04/04 0700  In: 100 [P.O.:100]  Out: 600 [Urine:600]    Last three shifts: 04/02 0701 - 04/03 1900  In: 1080 [P.O.:1080]  Out: 825 [Urine:825]     LAB RESULTS     Recent Labs      04/04/17   0235  04/03/17   1647   WBC  9.7   --    HGB  8.8*  9.0*   HCT  27.6*  28.5*   PLT  349   --         Recent Labs      04/04/17   0235  04/02/17   0211   NA  139  140   K  4.3  3.7   GLU  98  126*   BUN  30*  30*   CREA  1.19  1.30   CA  9.0  8.4*       RECOMMENDATIONS AND DISCHARGE PLANNING     1. Pending tests/procedures/ Plan of Care or Other Needs: PT/OT, pain management    2. Discharge plan for patient and Needs/Barriers:     3. Estimated Discharge Date: 4/4/17 Posted on Whiteboard in [facility] Room: yes     4. The patient's care plan was reviewed with the oncoming nurse. \"HEALS\" SAFETY CHECK      Fall Risk    Total Score: 3    Safety Measures: Safety Measures: Bed/Chair-Wheels locked, Bed in low position, Call light within reach, Fall prevention (comment), Gripper socks    A safety check occurred in the patient's room between off going nurse and oncoming nurse listed above.     The safety check included the below items  Area Items   H  High Alert Medications - Verify all high alert medication drips (heparin, PCA, etc.)   E  Equipment - Suction is set up for ALL patients (with yanker)  - Red plugs utilized for all equipment (IV pumps, etc.)  - WOWs wiped down at end of shift.  - Room stocked with oxygen, suction, and other unit-specific supplies   A  Alarms - Bed alarm is set for fall risk patients  - Ensure chair alarm is in place and activated if patient is up in a chair   L  Lines - Check IV for any infiltration  - Krishna bag is empty if patient has a Krishna   - Tubing and IV bags are labeled   S  Safety   - Room is clean, patient is clean, and equipment is clean. - Hallways are clear from equipment besides carts. - Fall bracelet on for fall risk patients  - Ensure room is clear and free of clutter  - Suction is set up for ALL patients (with yanker)  - Hallways are clear from equipment besides carts.    - Isolation precautions followed, supplies available outside room, sign posted     Campos Santillan RN

## 2017-04-04 NOTE — PROGRESS NOTES
Spoke with , patient will be going home in the am with home health. Patient is stable and ambulating with walker to the restroom.  Spouse is at bedside

## 2017-04-04 NOTE — PROGRESS NOTES
Spoke with [facility] [facility] due to a previous bill at another facility not paid this pt is now in her co-payment days of 164.00 per day. This payment will need be agreed upon for payment from family members in order for her to go to this facility. This information was provided to this pt's treating facility. Return call from [facility] [facility] reports that they were able to reach pt's daughter Carliss Phoenix. Who was informed of the co-payment, daughter states she will speak with her brothers to make a decision whether or not they want to accept this placement at this facility. Daughter was inform that this will be the co-payment at any facility. This writer called [facility]'s department , who states that family has called back but did not address the  Readiness to provide the requested co-payment requested earlier. Pt's daughter only wanted to discuss applying for medicaid on behalf of her mother the pt. This writer called this pt's daughter and left a message for a return call. This writer spoke with this pt's spouse who was at bedside and pt's daughter, with an agreement to return home with home health care services(please write an order for Home Health). Daughter and other siblings were provided personal care agencies to provide assistance in the home of this pt to assist with a safe transition home, which pt and spouse are agreeable to this plan. Nurse was informed that this pt is ready for discharge.

## 2017-04-04 NOTE — PROGRESS NOTES
2114  Received pt in stable condition,   General: lying in bed in supine, not apparent distress  Neuro: AOx4, denies numbness, 0 tingling, able to wiggle toes to bilateral extremities  Cardio: pedal pulses palpable, denies chest pain  Respiratory: denies shortness of breath  Skin: Dressing to right knee clean, dry and intact  GI: voiding  : denies nausea and vomiting  Mus: knee immobilizer to RLE  Bed in low position and oriented to room and use of call lobo. Will monitor.     0020  Patient AOx4, no apparent disstress, dressing to RLE clean, dry and intact. Bilateral lower extremities: pedal pulses present and palpable, denies numbness, able to wiggle toes. No changes in status, in stable condition.      0414  Patient AOx4, no apparent disstress, voiced no c/o at this time, dressing to RLE clean, dry and intact. Bilateral lower extremities: pedal pulses present and palpable, denies numbness, able to wiggle toes. No changes in status, in stable condition.

## 2017-04-04 NOTE — PROGRESS NOTES
conducted an initial consultation and Spiritual Assessment for Shirlene Huffman, who is a 80 y.o.,female. Patients Primary Language is: [place]. According to the patients EMR Confucianism Affiliation is: [facility].     The reason the Patient came to the hospital is:   Patient Active Problem List    Diagnosis Date Noted    Patella fracture 03/31/2017    Arthritis of knee 02/06/2017    Functional gait abnormality 10/27/2016    Lumbar facet arthropathy ([address] 06/28/2016    Muscle spasm of back 06/28/2016    Cervical pain 02/18/2016    Myofascial pain 02/18/2016    Bilateral shoulder pain 02/18/2016    Myalgia 02/18/2016    Back pain     Neck pain     Lumbar spondylosis 10/14/2015    Lumbar stenosis 10/14/2015    DDD (degenerative disc disease), lumbar 10/14/2015    Spinal stenosis     Left hip pain     Osteoarthritis, knee bilateral         The  provided the following Interventions:  Initiated a relationship of care and support. Listened empathically. Provided chaplaincy education. Provided information about Spiritual Care Services. Offered prayer and assurance of continued prayers on patient's behalf. Chart reviewed. The following outcomes where achieved:  Patient shared limited information about her medical narrative. Patient processed feeling about current hospitalization. Patient expressed gratitude for 's visit. Plan:  Chaplains will continue to follow and will provide pastoral care on an as needed/requested basis.  recommends bedside caregivers page  on duty if patient shows signs of acute spiritual or emotional distress.       Dieter Kuo  [address] [facility]  [phone]

## 2017-04-04 NOTE — PROGRESS NOTES
[facility]  ICU Progress Note      Name: Dean Mcclure   : 1935   MRN: [id number]   Date: 2017 2:55 PM     [x]I have reviewed the flowsheet and previous days notes. Events overnight reviewed and discussed with nursing staff. Vital signs and records reviewed. Subjective:  82y/o A. A female with PMH of HTN, OA, GERD, Venous insufficiency and Polymyalgia rheumatic, s/p ORIF for R patellar fracture. No new events overnight. Able to walk to the restroom during PT. Had experienced some indigestion earlier however has resolved. Denies any chest pain, sob, dyspnea, abdominal pain, nausea, vomiting. Awaiting disposition to SNF.       Vital Signs:    Visit Vitals    /56 (BP 1 Location: Right arm, BP Patient Position: At rest)    Pulse 97    Temp 97.3 °F (36.3 °C)    Resp 16    Ht 5' 4\" (1.626 m)    Wt 99.6 kg (219 lb 9.6 oz)    SpO2 97%    BMI 37.69 kg/m2       O2 Device: Nasal cannula   O2 Flow Rate (L/min): 2 l/min   Temp (24hrs), Av °F (36.7 °C), Min:97.3 °F (36.3 °C), Max:98.7 °F (37.1 °C)       Intake/Output:   Last shift:      701 - 1900  In: 440 [P.O.:440]  Out: 125 [Urine:125]  Last 3 shifts:  190 -  0700  In: 660 [P.O.:660]  Out: 1625 [Urine:1625]    Intake/Output Summary (Last 24 hours) at 17 1455  Last data filed at 17 1246   Gross per 24 hour   Intake              820 ml   Output             1225 ml   Net             -405 ml       Physical Exam:    General: awake, alert, oriented; comfortable on room air  HEENT:  Anicteric sclerae; pink palpebral conjunctivae; oral mucosa moist; neck supple; trachea midline  Resp:  Symmetrical chest expansion, no accessory muscle use; good airway entry; no rales/ wheezing/ rhonchi noted  CV:  S1, S2 present; regular rate and rhythm  GI:  Abdomen soft, non-tender; (+) active bowel sounds  Extremities:  +2 pulses on all extremities; no cyanosis/ clubbing noted; + immobilizer to RLE - able to move right foot spontaneously  Skin:  Warm; no rashes/ lesions noted  Neurologic:  Non-focal  Devices:  Immobilizer to RLE      DATA:     Current Facility-Administered Medications   Medication Dose Route Frequency    loratadine (CLARITIN) tablet 10 mg  10 mg Oral DAILY    amLODIPine (NORVASC) tablet 10 mg  10 mg Oral DAILY    HYDROcodone-acetaminophen (NORCO)  mg tablet 1-2 Tab  1-2 Tab Oral Q4H PRN    losartan (COZAAR) tablet 100 mg  100 mg Oral DAILY    metoclopramide HCl (REGLAN) tablet 5 mg  5 mg Oral BID    pantoprazole (PROTONIX) tablet 40 mg  40 mg Oral ACB    sodium chloride (NS) flush 5-10 mL  5-10 mL IntraVENous Q8H    sodium chloride (NS) flush 5-10 mL  5-10 mL IntraVENous PRN    naloxone (NARCAN) injection 0.4 mg  0.4 mg IntraVENous PRN    ferrous sulfate tablet 325 mg  1 Tab Oral BID WITH MEALS    diphenhydrAMINE (BENADRYL) injection 12.5 mg  12.5 mg IntraVENous Q6H PRN    zolpidem (AMBIEN) tablet 5 mg  5 mg Oral QHS PRN    celecoxib (CELEBREX) capsule 200 mg  200 mg Oral BID    morphine injection 1-2 mg  1-2 mg IntraVENous Q4H PRN    ondansetron (ZOFRAN) injection 4 mg  4 mg IntraVENous Q4H PRN    magnesium hydroxide (MILK OF MAGNESIA) 400 mg/5 mL oral suspension 30 mL  30 mL Oral DAILY PRN    buffered aspirin (BUFFERIN) tablet 325 mg  325 mg Oral DAILY    aMILoride (MIDAMOR) tablet 5 mg  5 mg Oral DAILY    And    hydroCHLOROthiazide (HYDRODIURIL) tablet 50 mg  50 mg Oral DAILY         Labs: Results:       Chemistry Recent Labs      04/04/17   0235  04/02/17   0211   GLU  98  126*   NA  139  140   K  4.3  3.7   CL  104  107   CO2  32  26   BUN  30*  30*   CREA  1.19  1.30   CA  9.0  8.4*   AGAP  3  7   BUCR  25*  23*      CBC w/Diff Recent Labs      04/04/17   0235  04/03/17   1647   WBC  9.7   --    RBC  3.31*   --    HGB  8.8*  9.0*   HCT  27.6*  28.5*   PLT  349   --    GRANS  67   --    LYMPH  21   --    EOS  6*   --       Coagulation No results for input(s): PTP, INR, APTT in the last 72 hours. No lab exists for component: INREXT    Liver Enzymes No results for input(s): TP, ALB, TBIL, AP, SGOT, GPT in the last 72 hours. No lab exists for component: DBIL   ABG No results found for: PH, PHI, PCO2, PCO2I, PO2, PO2I, HCO3, HCO3I, FIO2, FIO2I   Microbiology No results for input(s): CULT in the last 72 hours. Telemetry: [x]Sinus []A-flutter []Paced    []A-fib []Multiple PVCs                        IMPRESSION:   · Right patellar fracture, s/p ORIF, POD #5  · Bilateral knee osteoarthritis  · HTN  · GERD  · Venous insufficiency; polymyalgia rheumatica  · Hx spinal stenosis        PLAN:   · Encourage incentive spirometry  · Mobilize - PT/OT  · Pain control - on scheduled celecoxib, prn norco and morphine  · BP control - amiloride/HCTZ, cozaar, amlodipine - monitor hemodynamics closely  · Prophylaxis - GI: protonix.  Defer DVT prophylaxis to surgery - if renal function continues to normalize, may consider lovenox SQ 40 mg daily however if with renal insufficiency, then consider heparin SQ TID  · Wound management per ortho surgery  · Awaiting disposition to SNF per           January-Ann Schuster PA-C

## 2017-04-04 NOTE — PROGRESS NOTES
Received pt in stable condition, lying in bed, no apparent distress. AOx4, denies numbness, no tingling, able to wiggle toes to bilateral extremities. Pedal pulses palpable and present, denies chest pain, denies shortness of breath. Dressing to right knee clean, dry and intact. Denies nausea and vomiting, knee immobilizer to E  Bed in low position, recliner locked call bell is in reach. PT ambulated patient to recliner. Tolerated well. Encouragement provided on use of IS. Will continue to monitor.

## 2017-04-05 ENCOUNTER — HOME HEALTH ADMISSION (OUTPATIENT)
Dept: HOME HEALTH SERVICES | Facility: HOME HEALTH | Age: 82
End: 2017-04-05
Payer: MEDICARE

## 2017-04-05 VITALS
RESPIRATION RATE: 18 BRPM | SYSTOLIC BLOOD PRESSURE: 107 MMHG | WEIGHT: 219.6 LBS | HEART RATE: 83 BPM | HEIGHT: 64 IN | TEMPERATURE: 97.1 F | DIASTOLIC BLOOD PRESSURE: 61 MMHG | OXYGEN SATURATION: 93 % | BODY MASS INDEX: 37.49 KG/M2

## 2017-04-05 LAB
ANION GAP BLD CALC-SCNC: 6 MMOL/L (ref 3–18)
BUN SERPL-MCNC: 26 MG/DL (ref 7–18)
BUN/CREAT SERPL: 23 (ref 12–20)
CALCIUM SERPL-MCNC: 8.9 MG/DL (ref 8.5–10.1)
CHLORIDE SERPL-SCNC: 104 MMOL/L (ref 100–108)
CO2 SERPL-SCNC: 29 MMOL/L (ref 21–32)
CREAT SERPL-MCNC: 1.12 MG/DL (ref 0.6–1.3)
ERYTHROCYTE [DISTWIDTH] IN BLOOD BY AUTOMATED COUNT: 13.6 % (ref 11.6–14.5)
GLUCOSE SERPL-MCNC: 119 MG/DL (ref 74–99)
HCT VFR BLD AUTO: 27.6 % (ref 35–45)
HGB BLD-MCNC: 8.7 G/DL (ref 12–16)
MAGNESIUM SERPL-MCNC: 1.8 MG/DL (ref 1.6–2.6)
MCH RBC QN AUTO: 26.3 PG (ref 24–34)
MCHC RBC AUTO-ENTMCNC: 31.5 G/DL (ref 31–37)
MCV RBC AUTO: 83.4 FL (ref 74–97)
PLATELET # BLD AUTO: 393 K/UL (ref 135–420)
PMV BLD AUTO: 9.9 FL (ref 9.2–11.8)
POTASSIUM SERPL-SCNC: 4 MMOL/L (ref 3.5–5.5)
RBC # BLD AUTO: 3.31 M/UL (ref 4.2–5.3)
SODIUM SERPL-SCNC: 139 MMOL/L (ref 136–145)
WBC # BLD AUTO: 10.2 K/UL (ref 4.6–13.2)

## 2017-04-05 PROCEDURE — 97535 SELF CARE MNGMENT TRAINING: CPT

## 2017-04-05 PROCEDURE — 74011250637 HC RX REV CODE- 250/637: Performed by: ORTHOPAEDIC SURGERY

## 2017-04-05 PROCEDURE — 97530 THERAPEUTIC ACTIVITIES: CPT

## 2017-04-05 PROCEDURE — 85027 COMPLETE CBC AUTOMATED: CPT | Performed by: PHYSICIAN ASSISTANT

## 2017-04-05 PROCEDURE — 36415 COLL VENOUS BLD VENIPUNCTURE: CPT | Performed by: PHYSICIAN ASSISTANT

## 2017-04-05 PROCEDURE — 80048 BASIC METABOLIC PNL TOTAL CA: CPT | Performed by: PHYSICIAN ASSISTANT

## 2017-04-05 PROCEDURE — 83735 ASSAY OF MAGNESIUM: CPT | Performed by: PHYSICIAN ASSISTANT

## 2017-04-05 RX ADMIN — METOCLOPRAMIDE HYDROCHLORIDE 5 MG: 5 TABLET ORAL at 08:43

## 2017-04-05 RX ADMIN — Medication 10 ML: at 05:33

## 2017-04-05 RX ADMIN — PANTOPRAZOLE SODIUM 40 MG: 40 TABLET, DELAYED RELEASE ORAL at 08:42

## 2017-04-05 RX ADMIN — HYDROCODONE BITARTRATE AND ACETAMINOPHEN 2 TABLET: 10; 325 TABLET ORAL at 00:46

## 2017-04-05 RX ADMIN — HYDROCODONE BITARTRATE AND ACETAMINOPHEN 2 TABLET: 10; 325 TABLET ORAL at 08:43

## 2017-04-05 RX ADMIN — LORATADINE 10 MG: 10 TABLET ORAL at 08:43

## 2017-04-05 RX ADMIN — AMLODIPINE BESYLATE 10 MG: 10 TABLET ORAL at 08:43

## 2017-04-05 RX ADMIN — HYDROCHLOROTHIAZIDE 50 MG: 25 TABLET ORAL at 08:43

## 2017-04-05 RX ADMIN — CELECOXIB 200 MG: 100 CAPSULE ORAL at 08:42

## 2017-04-05 RX ADMIN — FERROUS SULFATE TAB 325 MG (65 MG ELEMENTAL FE) 325 MG: 325 (65 FE) TAB at 08:42

## 2017-04-05 RX ADMIN — ASPIRIN 325 MG: 325 TABLET, FILM COATED ORAL at 08:42

## 2017-04-05 RX ADMIN — AMILORIDE HYDROCHLORIDE 5 MG: 5 TABLET ORAL at 08:43

## 2017-04-05 RX ADMIN — LOSARTAN POTASSIUM 100 MG: 50 TABLET ORAL at 08:42

## 2017-04-05 NOTE — PROGRESS NOTES
[facility]  ICU Progress Note      Name: Caryle Coss   : 1935   MRN: [id number]   Date: 2017 2:55 PM     [x]I have reviewed the flowsheet and previous days notes. Events overnight reviewed and discussed with nursing staff. Vital signs and records reviewed. HPI:  80y/o A. A female with PMH of HTN, OA, GERD, Venous insufficiency and Polymyalgia rheumatic, s/p ORIF for R patellar fracture. Subjective:  No new events overnight. Patient is sitting up in chair at this time. Comfortable on RA. Patient is currently waiting for discharge to SNF. Denies any shortness of breath, chest pain, sob, dyspnea, abdominal pain, nausea, vomiting. Awaiting disposition to SNF.       Vital Signs:    Visit Vitals    /74 (BP 1 Location: Right arm, BP Patient Position: At rest)    Pulse 85    Temp 98.1 °F (36.7 °C)    Resp 18    Ht 5' 4\" (1.626 m)    Wt 99.6 kg (219 lb 9.6 oz)    SpO2 92%    BMI 37.69 kg/m2       O2 Device: Nasal cannula   O2 Flow Rate (L/min): 2 l/min   Temp (24hrs), Av.1 °F (36.7 °C), Min:97.4 °F (36.3 °C), Max:98.8 °F (37.1 °C)       Intake/Output:   Last shift:         Last 3 shifts:  1901 -  0700  In: 840 [P.O.:840]  Out: 1225 [Urine:1225]    Intake/Output Summary (Last 24 hours) at 17 1305  Last data filed at 17 0217   Gross per 24 hour   Intake              300 ml   Output              300 ml   Net                0 ml       Physical Exam:    General: awake, alert, oriented; comfortable on room air  HEENT:  Anicteric sclerae; pink palpebral conjunctivae; oral mucosa moist; neck supple; trachea midline  Resp:  Symmetrical chest expansion, no accessory muscle use; good airway entry; no rales/ wheezing/ rhonchi noted  CV:  S1, S2 present; regular rate and rhythm  GI:  Abdomen soft, non-tender; (+) active bowel sounds  Extremities:  +2 pulses on all extremities; no cyanosis/ clubbing noted; + immobilizer to RLE - able to move right foot spontaneously  Skin:  Warm; no rashes/ lesions noted  Neurologic:  Non-focal  Devices:  Immobilizer to RLE      DATA:     Current Facility-Administered Medications   Medication Dose Route Frequency    loratadine (CLARITIN) tablet 10 mg  10 mg Oral DAILY    amLODIPine (NORVASC) tablet 10 mg  10 mg Oral DAILY    HYDROcodone-acetaminophen (NORCO)  mg tablet 1-2 Tab  1-2 Tab Oral Q4H PRN    losartan (COZAAR) tablet 100 mg  100 mg Oral DAILY    metoclopramide HCl (REGLAN) tablet 5 mg  5 mg Oral BID    pantoprazole (PROTONIX) tablet 40 mg  40 mg Oral ACB    sodium chloride (NS) flush 5-10 mL  5-10 mL IntraVENous Q8H    sodium chloride (NS) flush 5-10 mL  5-10 mL IntraVENous PRN    naloxone (NARCAN) injection 0.4 mg  0.4 mg IntraVENous PRN    ferrous sulfate tablet 325 mg  1 Tab Oral BID WITH MEALS    diphenhydrAMINE (BENADRYL) injection 12.5 mg  12.5 mg IntraVENous Q6H PRN    zolpidem (AMBIEN) tablet 5 mg  5 mg Oral QHS PRN    celecoxib (CELEBREX) capsule 200 mg  200 mg Oral BID    morphine injection 1-2 mg  1-2 mg IntraVENous Q4H PRN    ondansetron (ZOFRAN) injection 4 mg  4 mg IntraVENous Q4H PRN    magnesium hydroxide (MILK OF MAGNESIA) 400 mg/5 mL oral suspension 30 mL  30 mL Oral DAILY PRN    buffered aspirin (BUFFERIN) tablet 325 mg  325 mg Oral DAILY    aMILoride (MIDAMOR) tablet 5 mg  5 mg Oral DAILY    And    hydroCHLOROthiazide (HYDRODIURIL) tablet 50 mg  50 mg Oral DAILY         Labs: Results:       Chemistry Recent Labs      04/05/17 0222 04/04/17   0235   GLU  119*  98   NA  139  139   K  4.0  4.3   CL  104  104   CO2  29  32   BUN  26*  30*   CREA  1.12  1.19   CA  8.9  9.0   AGAP  6  3   BUCR  23*  25*      CBC w/Diff Recent Labs      04/05/17 0222  04/04/17   0235  04/03/17   1647   WBC  10.2  9.7   --    RBC  3.31*  3.31*   --    HGB  8.7*  8.8*  9.0*   HCT  27.6*  27.6*  28.5*   PLT  393  349   --    GRANS   --   67   --    LYMPH   --   21   --    EOS   --   6* --       Coagulation No results for input(s): PTP, INR, APTT in the last 72 hours. No lab exists for component: INREXT, INREXT    Liver Enzymes No results for input(s): TP, ALB, TBIL, AP, SGOT, GPT in the last 72 hours. No lab exists for component: DBIL   ABG No results found for: PH, PHI, PCO2, PCO2I, PO2, PO2I, HCO3, HCO3I, FIO2, FIO2I   Microbiology No results for input(s): CULT in the last 72 hours. Telemetry: []Sinus []A-flutter []Paced    []A-fib []Multiple PVCs                        IMPRESSION:   · Right patellar fracture, s/p ORIF, POD #6  · Post op anemia  · Bilateral knee osteoarthritis  · HTN  · GERD  · Venous insufficiency; polymyalgia rheumatica  · Hx spinal stenosis      PLAN:   · Encourage incentive spirometry- demonstrated well. · Mobilize - PT/OT  · Pain control - on scheduled celecoxib, prn norco and morphine  · BP control - amiloride/HCTZ, cozaar, amlodipine - monitor hemodynamics closely  · Prophylaxis - GI: protonix. Defer DVT prophylaxis to surgery   · Wound management per ortho surgery  · Awaiting disposition to SNF per CM today- agree with discharge. · Will be available as needed. Please call for any questions.           Herman Genao NP

## 2017-04-05 NOTE — PROGRESS NOTES
Bedside and Verbal shift change report given to Leora Reyes RN (oncoming nurse) by Jimmy Hughes RN (offgoing nurse). Report included the following information SBAR, Kardex, MAR and Recent Results.     SITUATION:    Code Status: No Order   Reason for Admission: Closed nondisplaced fracture of right patella, unspecified fracture morphology, initial encounter [facility] day: 4   Problem List:       Hospital Problems  Date Reviewed: 3/30/2017          Codes Class Noted POA    Patella fracture ICD-10-CM: S82.009A  ICD-9-CM: 822.0  3/31/2017 Unknown              BACKGROUND:    Past Medical History:   Past Medical History:   Diagnosis Date    Arthritis     Arthritis of both hips     Back pain     Balance problem     Chronic back pain     Cough     Easy bruising     Essential hypertension     GERD (gastroesophageal reflux disease)     Hiatal hernia     Hypertension     Irregular heart beat     Left hip pain 10/8/2010    Neck pain     Nervousness     Night sweat     Osteoarthritis, knee bilateral     Pain with urination     Polymyalgia rheumatica (HCC)     Reflux     Spinal stenosis     Trapezius strain     Trochanteric bursitis of left hip     Venous insufficiency          Patient taking anticoagulants yes     ASSESSMENT:    Changes in Assessment Throughout Shift:      Patient has Central Line: no Reasons if yes:    Patient has Krishna Cath: no Reasons if yes:       Last Vitals:     Vitals:    04/04/17 0400 04/04/17 0718 04/04/17 1240 04/04/17 1503   BP: 144/75 146/78 115/56 122/55   Pulse: 78 81 97 86   Resp: 18 17 16 17   Temp: 97.8 °F (36.6 °C) 98.7 °F (37.1 °C) 97.3 °F (36.3 °C) 98.8 °F (37.1 °C)   SpO2: 100% 100% 97% 98%   Weight:       Height:            IV and DRAINS (will only show if present)   Peripheral IV 03/31/17 Right Wrist-Site Assessment: Clean, dry, & intact     WOUND (if present)   Wound Type:     Dressing present Dressing Present : No   Wound Concerns/Notes: none     PAIN    Pain Assessment    Pain Intensity 1: 6 (04/04/17 0701)    Pain Location 1: Knee    Pain Intervention(s) 1: Medication (see MAR)    Patient Stated Pain Goal: 0  o Interventions for Pain:  norco  o Intervention effective: yes  o Time of last intervention: 1900   o Reassessment Completed: yes      Last 3 Weights:  Last 3 Recorded Weights in this Encounter    03/31/17 1053   Weight: 99.6 kg (219 lb 9.6 oz)     Weight change:      INTAKE/OUPUT    Current Shift:      Last three shifts: 04/03 0701 - 04/04 1900  In: 1100 [P.O.:1100]  Out: 1750 [Urine:1750]     LAB RESULTS     Recent Labs      04/04/17   0235  04/03/17   1647   WBC  9.7   --    HGB  8.8*  9.0*   HCT  27.6*  28.5*   PLT  349   --         Recent Labs      04/04/17   0235  04/02/17   0211   NA  139  140   K  4.3  3.7   GLU  98  126*   BUN  30*  30*   CREA  1.19  1.30   CA  9.0  8.4*       RECOMMENDATIONS AND DISCHARGE PLANNING     1. Pending tests/procedures/ Plan of Care or Other Needs:      2. Discharge plan for patient and Needs/Barriers:     3. Estimated Discharge Date: TBD Posted on Whiteboard in Patients Room: no      4. The patient's care plan was reviewed with the oncoming nurse. \"HEALS\" SAFETY CHECK      Fall Risk    Total Score: 3    Safety Measures: Safety Measures: Bed in low position, Bed/Chair-Wheels locked, Caregiver at bedside, Call light within reach    A safety check occurred in the patient's room between off going nurse and oncoming nurse listed above.     The safety check included the below items  Area Items   H  High Alert Medications - Verify all high alert medication drips (heparin, PCA, etc.)   E  Equipment - Suction is set up for ALL patients (with yanker)  - Red plugs utilized for all equipment (IV pumps, etc.)  - WOWs wiped down at end of shift.  - Room stocked with oxygen, suction, and other unit-specific supplies   A  Alarms - Bed alarm is set for fall risk patients  - Ensure chair alarm is in place and activated if patient is up in a chair   L  Lines - Check IV for any infiltration  - Krishna bag is empty if patient has a Krishna   - Tubing and IV bags are labeled   S  Safety   - Room is clean, patient is clean, and equipment is clean. - Hallways are clear from equipment besides carts. - Fall bracelet on for fall risk patients  - Ensure room is clear and free of clutter  - Suction is set up for ALL patients (with yanker)  - Hallways are clear from equipment besides carts.    - Isolation precautions followed, supplies available outside room, sign posted     Ko Sanchez RN

## 2017-04-05 NOTE — DISCHARGE SUMMARY
Addendum:  Pt stayed for a couple extra days to increase her mobility. She continued with PT and progressed well. We will dc to home with [facility] PT and nursing.

## 2017-04-05 NOTE — PROGRESS NOTES
Bedside and Verbal shift change report given to Navi Valladares RN (oncoming nurse) by Brenda Lu RN (offgoing nurse). Report included the following information SBAR, Kardex, MAR and Recent Results.     SITUATION:    Code Status: No Order   Reason for Admission: Closed nondisplaced fracture of right patella, unspecified fracture morphology, initial encounter [facility] day: 5   Problem List:       Hospital Problems  Date Reviewed: 3/30/2017          Codes Class Noted POA    Patella fracture ICD-10-CM: S82.009A  ICD-9-CM: 822.0  3/31/2017 Unknown              BACKGROUND:    Past Medical History:   Past Medical History:   Diagnosis Date    Arthritis     Arthritis of both hips     Back pain     Balance problem     Chronic back pain     Cough     Easy bruising     Essential hypertension     GERD (gastroesophageal reflux disease)     Hiatal hernia     Hypertension     Irregular heart beat     Left hip pain 10/8/2010    Neck pain     Nervousness     Night sweat     Osteoarthritis, knee bilateral     Pain with urination     Polymyalgia rheumatica (HCC)     Reflux     Spinal stenosis     Trapezius strain     Trochanteric bursitis of left hip     Venous insufficiency          Patient taking anticoagulants yes     ASSESSMENT:    Changes in Assessment Throughout Shift: none     Patient has Central Line: no Reasons if yes:      Patient has Krishna Cath: no Reasons if yes:          Last Vitals:     Vitals:    04/04/17 1240 04/04/17 1503 04/04/17 1945 04/05/17 0422   BP: 115/56 122/55 168/76 130/73   Pulse: 97 86 84 74   Resp: 16 17 16 16   Temp: 97.3 °F (36.3 °C) 98.8 °F (37.1 °C) 97.4 °F (36.3 °C) 98 °F (36.7 °C)   SpO2: 97% 98% 98% 100%   Weight:       Height:            IV and DRAINS (will only show if present)   Peripheral IV 03/31/17 Right Wrist-Site Assessment: Clean, dry, & intact     WOUND (if present)   Wound Type:  surgical   Dressing present yes   Wound Concerns/Notes: none     PAIN    Pain Assessment    Pain Intensity 1: 0 (04/05/17 0422)    Pain Location 1: Knee    Pain Intervention(s) 1: Rest    Patient Stated Pain Goal: 0  o Interventions for Pain:  medication  o Intervention effective: yes  o Time of last intervention: see mar   o Reassessment Completed: yes      Last 3 Weights:  Last 3 Recorded Weights in this Encounter    03/31/17 1053   Weight: 99.6 kg (219 lb 9.6 oz)     Weight change:      INTAKE/OUPUT    Current Shift: 04/04 1901 - 04/05 0700  In: 300 [P.O.:300]  Out: 300 [Urine:300]    Last three shifts: 04/03 0701 - 04/04 1900  In: 1100 [P.O.:1100]  Out: 1750 [Urine:1750]     LAB RESULTS     Recent Labs      04/05/17 0222 04/04/17   0235  04/03/17   1647   WBC  10.2  9.7   --    HGB  8.7*  8.8*  9.0*   HCT  27.6*  27.6*  28.5*   PLT  393  349   --         Recent Labs      04/05/17 0222 04/04/17   0235   NA  139  139   K  4.0  4.3   GLU  119*  98   BUN  26*  30*   CREA  1.12  1.19   CA  8.9  9.0   MG  1.8   --        RECOMMENDATIONS AND DISCHARGE PLANNING     1. Pending tests/procedures/ Plan of Care or Other Needs: PT/OT, pain management    2. Discharge plan for patient and Needs/Barriers: home    3. Estimated Discharge Date: 4/5/17 Posted on Whiteboard in [address] [place] [address] Room: yes     4. The patient's care plan was reviewed with the oncoming nurse. \"HEALS\" SAFETY CHECK      Fall Risk    Total Score: 3    Safety Measures: Safety Measures: Bed/Chair-Wheels locked, Bed in low position, Call light within reach, Fall prevention (comment), Gripper socks    A safety check occurred in the patient's room between off going nurse and oncoming nurse listed above.     The safety check included the below items  Area Items   H  High Alert Medications - Verify all high alert medication drips (heparin, PCA, etc.)   E  Equipment - Suction is set up for ALL patients (with yanker)  - Red plugs utilized for all equipment (IV pumps, etc.)  - WOWs wiped down at end of shift.  - Room stocked with oxygen, suction, and other unit-specific supplies   A  Alarms - Bed alarm is set for fall risk patients  - Ensure chair alarm is in place and activated if patient is up in a chair   L  Lines - Check IV for any infiltration  - Krishna bag is empty if patient has a Krishna   - Tubing and IV bags are labeled   S  Safety   - Room is clean, patient is clean, and equipment is clean. - Hallways are clear from equipment besides carts. - Fall bracelet on for fall risk patients  - Ensure room is clear and free of clutter  - Suction is set up for ALL patients (with yanker)  - Hallways are clear from equipment besides carts.    - Isolation precautions followed, supplies available outside room, sign posted     Claudette Noun, RN

## 2017-04-05 NOTE — PROGRESS NOTES
Problem: Self Care Deficits Care Plan (Adult)  Goal: *Acute Goals and Plan of Care (Insert Text)  Occupational Therapy Goals  Initiated 4/1/2017 within 7 day(s). 1. Patient will perform lower body dressing with modified independence   2. Patient will perform LB clothes management with modified independence. 3. Patient will perform maneuvering in bed in preparation for her self care participation with modified independence. 4. Patient will perform toilet transfers with modified independence. Outcome: Progressing Towards Goal  OCCUPATIONAL THERAPY TREATMENT     Patient: Tyree Chi (74 y.o. female)  Date: 4/5/2017  Diagnosis: Closed nondisplaced fracture of right patella, unspecified fracture morphology, initial encounter [S82.001A] <principal problem not specified>  Procedure(s) (LRB):  RIGHT PATELLA OPEN REDUCTION INTERNAL FIXATION (Right) 5 Days Post-Op  Precautions:    Chart, occupational therapy assessment, plan of care, and goals were reviewed. ASSESSMENT:  Pt has been seen for follow up session for LB dressing training w/AE. Pt and pt's spouse were educated on the adaptive equipment for LB dressing (issued reacher, sock aid), pt required min VCs for donning underwear w/reacher and pants w/elastic waist, and was Mod I for donning socks w/sock aid. Progression toward goals:  [X]          Improving appropriately and progressing toward goals  [ ]          Improving slowly and progressing toward goals  [ ]          Not making progress toward goals and plan of care will be adjusted       PLAN:  Patient continues to benefit from skilled intervention to address the above impairments. Continue treatment per established plan of care. Discharge Recommendations:  Home Health  Further Equipment Recommendations for Discharge:  N/A      G-CODES:      Self Care  Current  CI= 1-19%.   The severity rating is based on the Other Levels of Assistance for ADLs and functional mobility      SUBJECTIVE: Patient stated I want to get dressed.       OBJECTIVE DATA SUMMARY:   Cognitive/Behavioral Status:  Neurologic State: Alert  Orientation Level: Oriented X4  Cognition: Follows commands, Appropriate decision making, Appropriate for age attention/concentration, Appropriate safety awareness     Functional Mobility and Transfers for ADLs:              Bed Mobility:  Rolling: Modified independent  Supine to Sit: Modified independent              Transfers:  Sit to Stand: Supervision              Toilet Transfer : Modified independent               Balance:  Sitting: Intact  Standing: Intact; With support  Standing - Static: Good  Standing - Dynamic : Fair  ADL Intervention:   Lower Body Dressing Assistance  Underpants: Supervision/set-up  Socks: Modified independent  Position Performed: Seated edge of bed  Adaptive Equipment Used: Reacher;Sock aid  Pain:  Pt reports 0/10 pain or discomfort prior to treatment. Pt reports 0/10 pain or discomfort post treatment. Activity Tolerance:    Fair  Please refer to the flowsheet for vital signs taken during this treatment.   After treatment:   [X]  Patient left in no apparent distress sitting up in chair  [ ]  Patient left in no apparent distress in bed  [X]  Call bell left within reach  [X]  Nursing notified  [X]  Caregiver present  [ ]  Bed alarm activated     Naty ALIX Elena/L  Time Calculation: 29 mins

## 2017-04-05 NOTE — PROGRESS NOTES
Mobility Intervention:       [] Pt dangled at edge of bed    [] Pt assisted OOB to bedside commode    [] Pt assisted OOB to chair    [x] Pt ambulated to bathroom    [] Patient was ambulated in room/hallway    Assistive Device Utilized (check all that apply):       [x] Rolling walker   [] Crutches   [] Straight Cane   [x] Knee immobilizer   [] IV pole    After Mobilization:     [] Patient left in no apparent distress sitting up in chair  [x] Patient left in no apparent distress in bed  [x] Call bell left within reach  Assistive Device:        [] RN notified  [] Caregiver present  [] Bed alarm activated    Comments:

## 2017-04-05 NOTE — PROGRESS NOTES
2034  Received pt in stable condition,   General: lying in bed in supine, not apparent distress  Neuro: AOx4, denies numbness, 0 tingling, able to wiggle toes to bilateral extremities  Cardio: pedal pulses palpable, denies chest pain  Respiratory: denies shortness of breath  Skin: Dressing to right knee clean, dry and intact  GI: voiding  : denies nausea and vomiting  Mus: knee immobilizer to RLE  Bed in low position and oriented to room and use of call lobo. Will monitor.      0047   Patient AOx4, no apparent disstress, dressing to RLE clean, dry and intact. Bilateral lower extremities: pedal pulses present and palpable, denies numbness, able to wiggle toes. No changes in status, in stable condition.      0217  AOx4, NAD, stable, assisted to bathroom, tolerated well, voided, voiced no c/o at this time. In stable condition. 0424  Patient AOx4, no apparent disstress, voiced no c/o at this time, dressing to RLE clean, dry and intact, knee immobilizer, intact and in placed. Bilateral lower extremities: pedal pulses present and palpable, denies numbness, able to wiggle toes. No changes in status, in stable condition.

## 2017-04-05 NOTE — ROUTINE PROCESS
Pt is stable and awake, Up in chair, + pulse and sensation, denies numbness and tingling, Knee immobilizer in place, No distress noted, clean lungs, + Bs, will continue to monitor.

## 2017-04-05 NOTE — PROGRESS NOTES
Ortho    Pt. Seen and evaluated. Doing well, up in chair, pain well controlled  Progressing well with PT  Denies cp, sob, abd pain    Blood pressure 111/74, pulse 85, temperature 98.1 °F (36.7 °C), resp. rate 18, height 5' 4\" (1.626 m), weight 219 lb 9.6 oz (99.6 kg), SpO2 92 %, unknown if currently breastfeeding.    rightKnee woundclean, dry, no drainage  Sensory intact to LT  Motor intact  nv intact  Neg calf tenderness    Labs:  CBC  @  CBC:   Lab Results   Component Value Date/Time    WBC 10.2 04/05/2017 02:22 AM    RBC 3.31 04/05/2017 02:22 AM    HGB 8.7 04/05/2017 02:22 AM    HCT 27.6 04/05/2017 02:22 AM    PLATELET 776 97/01/8865 02:22 AM     BMP:   Lab Results   Component Value Date/Time    Glucose 119 04/05/2017 02:22 AM    Sodium 139 04/05/2017 02:22 AM    Potassium 4.0 04/05/2017 02:22 AM    Chloride 104 04/05/2017 02:22 AM    CO2 29 04/05/2017 02:22 AM    BUN 26 04/05/2017 02:22 AM    Creatinine 1.12 04/05/2017 02:22 AM    Calcium 8.9 04/05/2017 02:22 AM   @  Coagulation  Lab Results   Component Value Date    INR 1.1 03/30/2017    APTT 39.8 (H) 03/30/2017      Basic Metabolic Profile  Lab Results   Component Value Date     04/05/2017    CO2 29 04/05/2017    BUN 26 (H) 04/05/2017       Assesment: right Orthopedic / Rheumatologic: Total Knee Replacement, ORIF patella    Plan: aspirin, PT, home today

## 2017-04-05 NOTE — PROGRESS NOTES
Pt signed [facility] for home Health with [facility], pt's spouse at bedside will transport this pt home when ready for discharge.   [facility] Liaison notified of this referral.

## 2017-04-05 NOTE — HOME CARE
[facility] received referral for SN/PT/wbat/knee immobilizer on at all times, no flexion/aquacel ag dressing pod 7 and prn/aspirin therapy - this nurse verified address and contact numbers - lives with spouse Marycarmen Corral - DME: has shower chair, front wheeled walker, straight cane and bedside commode. Noted patient discharged - referral processed - BRIGIDA Lugo LPN

## 2017-04-05 NOTE — ROUTINE PROCESS
Pt is stable and awake, Up in chair, + pulse and sensation, denies numbness and tingling, Knee immobilizer in place, No distress noted, clean lungs, + Bs, will continue to monitor

## 2017-04-06 ENCOUNTER — HOME CARE VISIT (OUTPATIENT)
Dept: SCHEDULING | Facility: HOME HEALTH | Age: 82
End: 2017-04-06
Payer: MEDICARE

## 2017-04-06 ENCOUNTER — HOME CARE VISIT (OUTPATIENT)
Dept: HOME HEALTH SERVICES | Facility: HOME HEALTH | Age: 82
End: 2017-04-06

## 2017-04-06 VITALS
HEART RATE: 81 BPM | SYSTOLIC BLOOD PRESSURE: 138 MMHG | RESPIRATION RATE: 18 BRPM | WEIGHT: 219 LBS | BODY MASS INDEX: 37.39 KG/M2 | TEMPERATURE: 98.6 F | HEIGHT: 64 IN | DIASTOLIC BLOOD PRESSURE: 64 MMHG | OXYGEN SATURATION: 98 %

## 2017-04-06 PROCEDURE — 3331090001 HH PPS REVENUE CREDIT

## 2017-04-06 PROCEDURE — 400013 HH SOC

## 2017-04-06 PROCEDURE — A6255 ABSORPT DRG >16<=48 IN W/BDR: HCPCS

## 2017-04-06 PROCEDURE — G0151 HHCP-SERV OF PT,EA 15 MIN: HCPCS

## 2017-04-06 PROCEDURE — 3331090002 HH PPS REVENUE DEBIT

## 2017-04-06 PROCEDURE — G0299 HHS/HOSPICE OF RN EA 15 MIN: HCPCS

## 2017-04-06 NOTE — PROGRESS NOTES
[facility] Marlin Ledesma  [address] [facility]  [phone] fax    Physical Therapy Discharge Summary    Patient name: Velma Graff Start of Care:  17   Referral source: Emmy Crump MD : [id number]   Medical/Treatment Diagnosis: Knee pain, right [M25.561]  Presence of right artificial knee joint [Z96.651] Onset Date: 17     Prior Hospitalization: see medical history Provider#: [id number]   Medications: Verified on Patient Summary List    Comorbidities: HTN, arthritis  Prior Level of Function: ambulation with cane, Ind with ADLs    Visits from Start of Care: 12    Missed Visits: 2    Reporting Period :  3/23/17 to 3/27/17    Summary of Care:  Goal: Patient will improve FOTO score by 27 points in order to demonstrate a significant improvement in function. Status at last note/certification: 30  Status at discharge: not met    Goal: Patient will improve R knee MMT 0-120 degrees in order to increase ease of ambulation. Status at last note/certification: 8-582 degrees  Status at discharge: not met    Goal: Patient will perform sit to stand transfer without UE support in order to increase ease of transfers at home. Status at last note/certification: able from height of 19.25 inches  Status at discharge: not met    Pt. Was progressing with physical therapy but then had an episode of increased pain after feeling a pop when going to sit down. She returned to physician and imaging showed fractured patella. Pt. Underwent ORIF and is now receiving home health.      ASSESSMENT/RECOMMENDATIONS:  [x]Discontinue therapy: []Patient has reached or is progressing toward set goals      []Patient is non-compliant or has abdicated      []Due to lack of appreciable progress towards set goals      X: ORIF    Taylor Ibarra, PT 2017 4:11 PM

## 2017-04-07 ENCOUNTER — HOME CARE VISIT (OUTPATIENT)
Dept: HOME HEALTH SERVICES | Facility: HOME HEALTH | Age: 82
End: 2017-04-07
Payer: MEDICARE

## 2017-04-07 VITALS
DIASTOLIC BLOOD PRESSURE: 82 MMHG | TEMPERATURE: 98 F | OXYGEN SATURATION: 97 % | SYSTOLIC BLOOD PRESSURE: 142 MMHG | HEART RATE: 76 BPM

## 2017-04-07 PROCEDURE — 3331090001 HH PPS REVENUE CREDIT

## 2017-04-07 PROCEDURE — 3331090002 HH PPS REVENUE DEBIT

## 2017-04-08 ENCOUNTER — HOME CARE VISIT (OUTPATIENT)
Dept: SCHEDULING | Facility: HOME HEALTH | Age: 82
End: 2017-04-08
Payer: MEDICARE

## 2017-04-08 VITALS — DIASTOLIC BLOOD PRESSURE: 82 MMHG | OXYGEN SATURATION: 98 % | SYSTOLIC BLOOD PRESSURE: 140 MMHG | HEART RATE: 82 BPM

## 2017-04-08 PROCEDURE — G0157 HHC PT ASSISTANT EA 15: HCPCS

## 2017-04-08 PROCEDURE — 3331090001 HH PPS REVENUE CREDIT

## 2017-04-08 PROCEDURE — 3331090002 HH PPS REVENUE DEBIT

## 2017-04-09 PROCEDURE — 3331090002 HH PPS REVENUE DEBIT

## 2017-04-09 PROCEDURE — 3331090001 HH PPS REVENUE CREDIT

## 2017-04-10 ENCOUNTER — HOME CARE VISIT (OUTPATIENT)
Dept: SCHEDULING | Facility: HOME HEALTH | Age: 82
End: 2017-04-10
Payer: MEDICARE

## 2017-04-10 PROCEDURE — 3331090001 HH PPS REVENUE CREDIT

## 2017-04-10 PROCEDURE — G0299 HHS/HOSPICE OF RN EA 15 MIN: HCPCS

## 2017-04-10 PROCEDURE — 3331090002 HH PPS REVENUE DEBIT

## 2017-04-11 ENCOUNTER — HOME CARE VISIT (OUTPATIENT)
Dept: SCHEDULING | Facility: HOME HEALTH | Age: 82
End: 2017-04-11
Payer: MEDICARE

## 2017-04-11 PROCEDURE — G0151 HHCP-SERV OF PT,EA 15 MIN: HCPCS

## 2017-04-11 PROCEDURE — 3331090002 HH PPS REVENUE DEBIT

## 2017-04-11 PROCEDURE — 3331090001 HH PPS REVENUE CREDIT

## 2017-04-12 PROCEDURE — 3331090002 HH PPS REVENUE DEBIT

## 2017-04-12 PROCEDURE — 3331090001 HH PPS REVENUE CREDIT

## 2017-04-13 ENCOUNTER — HOME CARE VISIT (OUTPATIENT)
Dept: SCHEDULING | Facility: HOME HEALTH | Age: 82
End: 2017-04-13
Payer: MEDICARE

## 2017-04-13 VITALS — SYSTOLIC BLOOD PRESSURE: 150 MMHG | DIASTOLIC BLOOD PRESSURE: 80 MMHG | HEART RATE: 99 BPM | OXYGEN SATURATION: 96 %

## 2017-04-13 PROCEDURE — G0151 HHCP-SERV OF PT,EA 15 MIN: HCPCS

## 2017-04-13 PROCEDURE — 3331090001 HH PPS REVENUE CREDIT

## 2017-04-13 PROCEDURE — 3331090002 HH PPS REVENUE DEBIT

## 2017-04-14 ENCOUNTER — HOME CARE VISIT (OUTPATIENT)
Dept: HOME HEALTH SERVICES | Facility: HOME HEALTH | Age: 82
End: 2017-04-14
Payer: MEDICARE

## 2017-04-14 ENCOUNTER — HOME CARE VISIT (OUTPATIENT)
Dept: SCHEDULING | Facility: HOME HEALTH | Age: 82
End: 2017-04-14
Payer: MEDICARE

## 2017-04-14 VITALS — OXYGEN SATURATION: 99 % | SYSTOLIC BLOOD PRESSURE: 150 MMHG | HEART RATE: 75 BPM | DIASTOLIC BLOOD PRESSURE: 80 MMHG

## 2017-04-14 PROCEDURE — 3331090001 HH PPS REVENUE CREDIT

## 2017-04-14 PROCEDURE — 3331090002 HH PPS REVENUE DEBIT

## 2017-04-14 PROCEDURE — G0299 HHS/HOSPICE OF RN EA 15 MIN: HCPCS

## 2017-04-15 PROCEDURE — 3331090002 HH PPS REVENUE DEBIT

## 2017-04-15 PROCEDURE — 3331090001 HH PPS REVENUE CREDIT

## 2017-04-16 VITALS
TEMPERATURE: 97 F | HEART RATE: 72 BPM | OXYGEN SATURATION: 98 % | SYSTOLIC BLOOD PRESSURE: 154 MMHG | DIASTOLIC BLOOD PRESSURE: 78 MMHG

## 2017-04-16 PROCEDURE — 3331090001 HH PPS REVENUE CREDIT

## 2017-04-16 PROCEDURE — 3331090002 HH PPS REVENUE DEBIT

## 2017-04-17 PROCEDURE — 3331090002 HH PPS REVENUE DEBIT

## 2017-04-17 PROCEDURE — 3331090001 HH PPS REVENUE CREDIT

## 2017-04-18 ENCOUNTER — HOME CARE VISIT (OUTPATIENT)
Dept: SCHEDULING | Facility: HOME HEALTH | Age: 82
End: 2017-04-18
Payer: MEDICARE

## 2017-04-18 ENCOUNTER — HOME CARE VISIT (OUTPATIENT)
Dept: HOME HEALTH SERVICES | Facility: HOME HEALTH | Age: 82
End: 2017-04-18
Payer: MEDICARE

## 2017-04-18 PROCEDURE — 3331090001 HH PPS REVENUE CREDIT

## 2017-04-18 PROCEDURE — 3331090002 HH PPS REVENUE DEBIT

## 2017-04-18 PROCEDURE — G0151 HHCP-SERV OF PT,EA 15 MIN: HCPCS

## 2017-04-19 ENCOUNTER — HOME CARE VISIT (OUTPATIENT)
Dept: HOME HEALTH SERVICES | Facility: HOME HEALTH | Age: 82
End: 2017-04-19
Payer: MEDICARE

## 2017-04-19 ENCOUNTER — OFFICE VISIT (OUTPATIENT)
Dept: ORTHOPEDIC SURGERY | Facility: CLINIC | Age: 82
End: 2017-04-19

## 2017-04-19 VITALS
TEMPERATURE: 97.2 F | HEART RATE: 88 BPM | SYSTOLIC BLOOD PRESSURE: 156 MMHG | HEIGHT: 64 IN | DIASTOLIC BLOOD PRESSURE: 69 MMHG | BODY MASS INDEX: 37.39 KG/M2 | WEIGHT: 219 LBS

## 2017-04-19 VITALS
DIASTOLIC BLOOD PRESSURE: 80 MMHG | TEMPERATURE: 97 F | RESPIRATION RATE: 16 BRPM | OXYGEN SATURATION: 98 % | HEART RATE: 80 BPM | SYSTOLIC BLOOD PRESSURE: 148 MMHG

## 2017-04-19 DIAGNOSIS — S82.009A: Primary | ICD-10-CM

## 2017-04-19 PROCEDURE — 3331090002 HH PPS REVENUE DEBIT

## 2017-04-19 PROCEDURE — 3331090001 HH PPS REVENUE CREDIT

## 2017-04-19 NOTE — PROGRESS NOTES
[address] [place] [address]  [phone]           Patient: Tyree Chi                MRN: [id number]       SSN: [id number]  YOB: 1935        AGE: 80 y.o. SEX: female  Body mass index is 37.59 kg/(m^2). PCP: Terry Herrera MD  04/19/17      This office note has been dictated. REVIEW OF SYSTEMS:  Constitutional: Negative for fever, chills, weight loss and malaise/fatigue. HENT: Negative. Eyes: Negative. Respiratory: Negative. Cardiovascular: Negative. Gastrointestinal: No bowel incontinence or constipation. Genitourinary: No bladder incontinence or saddle anesthesia. Skin: Negative. Neurological: Negative. Endo/Heme/Allergies: Negative. Psychiatric/Behavioral: Negative. Musculoskeletal: As per HPI above. Past Medical History:   Diagnosis Date    Arthritis     Arthritis of both hips     Back pain     Balance problem     Chronic back pain     Cough     Easy bruising     Essential hypertension     GERD (gastroesophageal reflux disease)     Hiatal hernia     Hypertension     Irregular heart beat     Left hip pain 10/8/2010    Neck pain     Nervousness     Night sweat     Osteoarthritis, knee bilateral     Pain with urination     Polymyalgia rheumatica (HCC)     Reflux     Spinal stenosis     Trapezius strain     Trochanteric bursitis of left hip     Venous insufficiency          Current Outpatient Prescriptions:     tropicamide (MYDRIACYL) 1 % ophthalmic solution, Administer 2 Drops to right eye as needed. 45 minutes prior to schedule appointments, Disp: , Rfl:     celecoxib (CELEBREX) 200 mg capsule, Take 1 Cap by mouth two (2) times a day for 90 days. , Disp: 60 Cap, Rfl: 2    buffered aspirin (BUFFERIN) 325 mg tablet, Take 1 Tab by mouth daily. , Disp: 30 Tab, Rfl: 2    ferrous sulfate 325 mg (65 mg iron) tablet, Take 1 Tab by mouth two (2) times daily (with meals). , Disp: 60 Tab, Rfl: 2    HYDROcodone-acetaminophen (NORCO)  mg tablet, Take 1-2 Tabs by mouth every four (4) hours as needed for Pain. Max Daily Amount: 12 Tabs. (Patient taking differently: Take 1-2 Tabs by mouth every six (6) hours as needed for Pain.), Disp: 60 Tab, Rfl: 0    cholecalciferol (VITAMIN D3) 1,000 unit tablet, Take 1,000 Units by mouth daily. 1 tab day, Disp: , Rfl:     omeprazole (PRILOSEC) 40 mg capsule, Take 40 mg by mouth daily. 1 tab, Disp: , Rfl:     acetaminophen (TYLENOL) 650 mg CR tablet, Take 1,300 mg by mouth two (2) times a day. PRN for pain, Disp: , Rfl:     aMILoride-hydrochlorothiazide (MODURETIC) 5-50 mg tab, Take 1 Tab by mouth daily. , Disp: , Rfl:     amLODIPine (NORVASC) 10 mg tablet, Take 10 mg by mouth daily. 1 tab, Disp: , Rfl: 3    losartan (COZAAR) 100 mg tablet, Take 100 mg by mouth daily. 1 tab, Disp: , Rfl:     metoclopramide HCl (REGLAN) 5 mg tablet, Take 5 mg by mouth two (2) times a day. 1 tab 2x day, Disp: , Rfl:     cetirizine (ZYRTEC) 10 mg tablet, Take 10 mg by mouth daily. 1 tab daily, Disp: , Rfl:     Allergies   Allergen Reactions    Citric Acid Unknown (comments)    Crinone [Progesterone Micronized] Unknown (comments)    Iodine Unknown (comments)    Percocet [Oxycodone-Acetaminophen] Other (comments)     Gets rebound headaches after taking Percocet       Social History     Social History    Marital status:      Spouse name: N/A    Number of children: N/A    Years of education: N/A     Occupational History    Not on file.      Social History Main Topics    Smoking status: Never Smoker    Smokeless tobacco: Never Used    Alcohol use No    Drug use: No    Sexual activity: No     Other Topics Concern    Not on file     Social History Narrative       Past Surgical History:   Procedure Laterality Date    COLONOSCOPY N/A 7/27/2016    COLONOSCOPY w/polypectomy performed by Lulu Ybarra MD at [address] [place]      HX CHOLECYSTECTOMY      HX HEENT  06/2011    left cornea transplant    HX HYSTERECTOMY      HX ORTHOPAEDIC      right foot and ankle             We did see Michelle Peacock for followup in regards to right knee. She is status post right knee replacement. Unfortunately, she had an anterior pole patellar fracture. She is now 15 days status post ORIF. She is doing well. She is in a knee immobilizer. She has been very compliant. She has had no troubles with the wound. She has had no fevers, chills, systemic changes, and no injuries to report. PHYSICAL EXAMINATION: In general, the patient is alert and oriented x 3 and is in no acute distress. The patient is well-developed and well-nourished with a normal affect. The patient is afebrile. Examination of right knee reveals the skin to be intact. The surgical incision is healing up nicely. The staples are in place. There is no ecchymosis, no warmth, or signs of infection or cellulitis present. RADIOGRAPHS:  AP and lateral of the right knee, show total knee components are well-fixed. The inferior fragment has moved slightly posteriorly. However, good bony contact is noted. The films were reviewed with Dr. Annalise Seymour. ASSESSMENT:      1. Status post right knee replacement. 2. Status post ORIF of patella fracture. PLAN:  At this point, the staples are removed and replaced with Steri-Strips without complications. She is instructed to remain in the knee immobilizer with no flexion. We will hold off on physical therapy at this point.                JR Brian LUCERO, SENTHIL, ATC

## 2017-04-20 ENCOUNTER — HOSPITAL ENCOUNTER (OUTPATIENT)
Dept: LAB | Age: 82
Discharge: HOME | End: 2017-04-20
Payer: MEDICARE

## 2017-04-20 DIAGNOSIS — Z00.01 ENCOUNTER FOR GENERAL ADULT MEDICAL EXAMINATION WITH ABNORMAL FINDINGS: ICD-10-CM

## 2017-04-20 DIAGNOSIS — E78.1 PURE HYPERGLYCERIDEMIA: ICD-10-CM

## 2017-04-20 DIAGNOSIS — K21.9 ESOPHAGEAL REFLUX: ICD-10-CM

## 2017-04-20 DIAGNOSIS — M17.0 PRIMARY OSTEOARTHRITIS OF BOTH KNEES: ICD-10-CM

## 2017-04-20 DIAGNOSIS — I10 ESSENTIAL HYPERTENSION, BENIGN: ICD-10-CM

## 2017-04-20 DIAGNOSIS — D64.9 ANEMIA, UNSPECIFIED: ICD-10-CM

## 2017-04-20 LAB
25(OH)D3 SERPL-MCNC: 27.9 NG/ML (ref 30–100)
ALBUMIN SERPL BCP-MCNC: 3.3 G/DL (ref 3.4–5)
ALBUMIN/GLOB SERPL: 0.8 {RATIO} (ref 0.8–1.7)
ALP SERPL-CCNC: 212 U/L (ref 45–117)
ALT SERPL-CCNC: 37 U/L (ref 13–56)
ANION GAP BLD CALC-SCNC: 5 MMOL/L (ref 3–18)
AST SERPL W P-5'-P-CCNC: 32 U/L (ref 15–37)
BASOPHILS # BLD AUTO: 0 K/UL (ref 0–0.1)
BASOPHILS # BLD: 0 % (ref 0–2)
BILIRUB SERPL-MCNC: 0.5 MG/DL (ref 0.2–1)
BUN SERPL-MCNC: 24 MG/DL (ref 7–18)
BUN/CREAT SERPL: 21 (ref 12–20)
CALCIUM SERPL-MCNC: 9.7 MG/DL (ref 8.5–10.1)
CHLORIDE SERPL-SCNC: 103 MMOL/L (ref 100–108)
CHOLEST SERPL-MCNC: 195 MG/DL
CO2 SERPL-SCNC: 32 MMOL/L (ref 21–32)
CREAT SERPL-MCNC: 1.12 MG/DL (ref 0.6–1.3)
DIFFERENTIAL METHOD BLD: ABNORMAL
EOSINOPHIL # BLD: 0.2 K/UL (ref 0–0.4)
EOSINOPHIL NFR BLD: 2 % (ref 0–5)
ERYTHROCYTE [DISTWIDTH] IN BLOOD BY AUTOMATED COUNT: 14.3 % (ref 11.6–14.5)
FERRITIN SERPL-MCNC: 179 NG/ML (ref 8–388)
GLOBULIN SER CALC-MCNC: 4.2 G/DL (ref 2–4)
GLUCOSE SERPL-MCNC: 101 MG/DL (ref 74–99)
HCT VFR BLD AUTO: 31 % (ref 35–45)
HDLC SERPL-MCNC: 52 MG/DL (ref 40–60)
HDLC SERPL: 3.8 {RATIO} (ref 0–5)
HGB BLD-MCNC: 9.9 G/DL (ref 12–16)
IRON SATN MFR SERPL: 35 %
IRON SERPL-MCNC: 99 UG/DL (ref 50–175)
LDLC SERPL CALC-MCNC: 120.6 MG/DL (ref 0–100)
LIPID PROFILE,FLP: ABNORMAL
LYMPHOCYTES # BLD AUTO: 17 % (ref 21–52)
LYMPHOCYTES # BLD: 1.4 K/UL (ref 0.9–3.6)
MCH RBC QN AUTO: 26.1 PG (ref 24–34)
MCHC RBC AUTO-ENTMCNC: 31.9 G/DL (ref 31–37)
MCV RBC AUTO: 81.8 FL (ref 74–97)
MONOCYTES # BLD: 0.5 K/UL (ref 0.05–1.2)
MONOCYTES NFR BLD AUTO: 7 % (ref 3–10)
NEUTS SEG # BLD: 5.9 K/UL (ref 1.8–8)
NEUTS SEG NFR BLD AUTO: 74 % (ref 40–73)
PLATELET # BLD AUTO: 381 K/UL (ref 135–420)
PMV BLD AUTO: 10.2 FL (ref 9.2–11.8)
POTASSIUM SERPL-SCNC: 4.1 MMOL/L (ref 3.5–5.5)
PROT SERPL-MCNC: 7.5 G/DL (ref 6.4–8.2)
RBC # BLD AUTO: 3.79 M/UL (ref 4.2–5.3)
SODIUM SERPL-SCNC: 140 MMOL/L (ref 136–145)
T4 SERPL-MCNC: 11.5 UG/DL (ref 4.5–10.9)
TIBC SERPL-MCNC: 285 UG/DL (ref 250–450)
TRIGL SERPL-MCNC: 112 MG/DL (ref ?–150)
TSH SERPL DL<=0.05 MIU/L-ACNC: 0.47 UIU/ML (ref 0.36–3.74)
VLDLC SERPL CALC-MCNC: 22.4 MG/DL
WBC # BLD AUTO: 8 K/UL (ref 4.6–13.2)

## 2017-04-20 PROCEDURE — 3331090001 HH PPS REVENUE CREDIT

## 2017-04-20 PROCEDURE — 83540 ASSAY OF IRON: CPT | Performed by: INTERNAL MEDICINE

## 2017-04-20 PROCEDURE — 80053 COMPREHEN METABOLIC PANEL: CPT | Performed by: INTERNAL MEDICINE

## 2017-04-20 PROCEDURE — 84436 ASSAY OF TOTAL THYROXINE: CPT | Performed by: INTERNAL MEDICINE

## 2017-04-20 PROCEDURE — 82728 ASSAY OF FERRITIN: CPT | Performed by: INTERNAL MEDICINE

## 2017-04-20 PROCEDURE — 80061 LIPID PANEL: CPT | Performed by: INTERNAL MEDICINE

## 2017-04-20 PROCEDURE — 82306 VITAMIN D 25 HYDROXY: CPT | Performed by: INTERNAL MEDICINE

## 2017-04-20 PROCEDURE — 36415 COLL VENOUS BLD VENIPUNCTURE: CPT | Performed by: INTERNAL MEDICINE

## 2017-04-20 PROCEDURE — 84443 ASSAY THYROID STIM HORMONE: CPT | Performed by: INTERNAL MEDICINE

## 2017-04-20 PROCEDURE — 3331090002 HH PPS REVENUE DEBIT

## 2017-04-20 PROCEDURE — 85025 COMPLETE CBC W/AUTO DIFF WBC: CPT | Performed by: INTERNAL MEDICINE

## 2017-04-21 ENCOUNTER — HOME CARE VISIT (OUTPATIENT)
Dept: SCHEDULING | Facility: HOME HEALTH | Age: 82
End: 2017-04-21
Payer: MEDICARE

## 2017-04-21 PROCEDURE — 3331090002 HH PPS REVENUE DEBIT

## 2017-04-21 PROCEDURE — G0151 HHCP-SERV OF PT,EA 15 MIN: HCPCS

## 2017-04-21 PROCEDURE — 3331090001 HH PPS REVENUE CREDIT

## 2017-04-22 VITALS
OXYGEN SATURATION: 98 % | DIASTOLIC BLOOD PRESSURE: 72 MMHG | HEART RATE: 80 BPM | TEMPERATURE: 97.7 F | SYSTOLIC BLOOD PRESSURE: 132 MMHG

## 2017-04-22 PROCEDURE — 3331090001 HH PPS REVENUE CREDIT

## 2017-04-22 PROCEDURE — 3331090002 HH PPS REVENUE DEBIT

## 2017-04-23 PROCEDURE — 3331090001 HH PPS REVENUE CREDIT

## 2017-04-23 PROCEDURE — 3331090002 HH PPS REVENUE DEBIT

## 2017-04-24 PROCEDURE — 3331090001 HH PPS REVENUE CREDIT

## 2017-04-24 PROCEDURE — 3331090002 HH PPS REVENUE DEBIT

## 2017-04-25 PROCEDURE — 3331090001 HH PPS REVENUE CREDIT

## 2017-04-25 PROCEDURE — 3331090002 HH PPS REVENUE DEBIT

## 2017-04-26 PROCEDURE — 3331090001 HH PPS REVENUE CREDIT

## 2017-04-26 PROCEDURE — 3331090002 HH PPS REVENUE DEBIT

## 2017-04-27 PROCEDURE — 3331090002 HH PPS REVENUE DEBIT

## 2017-04-27 PROCEDURE — 3331090001 HH PPS REVENUE CREDIT

## 2017-04-28 PROCEDURE — 3331090002 HH PPS REVENUE DEBIT

## 2017-04-28 PROCEDURE — 3331090001 HH PPS REVENUE CREDIT

## 2017-04-29 PROCEDURE — 3331090001 HH PPS REVENUE CREDIT

## 2017-04-29 PROCEDURE — 3331090002 HH PPS REVENUE DEBIT

## 2017-04-30 PROCEDURE — 3331090002 HH PPS REVENUE DEBIT

## 2017-04-30 PROCEDURE — 3331090001 HH PPS REVENUE CREDIT

## 2017-05-01 PROCEDURE — 3331090001 HH PPS REVENUE CREDIT

## 2017-05-01 PROCEDURE — 3331090002 HH PPS REVENUE DEBIT

## 2017-05-02 PROCEDURE — 3331090002 HH PPS REVENUE DEBIT

## 2017-05-02 PROCEDURE — 3331090001 HH PPS REVENUE CREDIT

## 2017-05-03 PROCEDURE — 3331090001 HH PPS REVENUE CREDIT

## 2017-05-03 PROCEDURE — 3331090002 HH PPS REVENUE DEBIT

## 2017-05-04 PROCEDURE — 3331090001 HH PPS REVENUE CREDIT

## 2017-05-04 PROCEDURE — 3331090002 HH PPS REVENUE DEBIT

## 2017-05-05 ENCOUNTER — OFFICE VISIT (OUTPATIENT)
Dept: ORTHOPEDIC SURGERY | Age: 82
End: 2017-05-05

## 2017-05-05 VITALS
HEART RATE: 79 BPM | SYSTOLIC BLOOD PRESSURE: 155 MMHG | HEIGHT: 64 IN | DIASTOLIC BLOOD PRESSURE: 61 MMHG | BODY MASS INDEX: 38.07 KG/M2 | TEMPERATURE: 96.8 F | WEIGHT: 223 LBS

## 2017-05-05 DIAGNOSIS — S82.031D CLOSED DISPLACED TRANSVERSE FRACTURE OF RIGHT PATELLA WITH ROUTINE HEALING, SUBSEQUENT ENCOUNTER: Primary | ICD-10-CM

## 2017-05-05 PROCEDURE — 3331090002 HH PPS REVENUE DEBIT

## 2017-05-05 PROCEDURE — 3331090001 HH PPS REVENUE CREDIT

## 2017-05-05 RX ORDER — HYDROCODONE BITARTRATE AND ACETAMINOPHEN 10; 325 MG/1; MG/1
1-2 TABLET ORAL
Qty: 60 TAB | Refills: 0 | Status: SHIPPED | OUTPATIENT
Start: 2017-05-05 | End: 2018-01-11

## 2017-05-05 NOTE — PROGRESS NOTES
[address] [place] [address]  [phone]           Patient: Jaquelin Shelton                MRN: [id number]       SSN: [id number]  YOB: 1935        AGE: 80 y.o. SEX: female  Body mass index is 38.28 kg/(m^2). PCP: Bryon Kumari MD  05/05/17      This office note has been dictated. REVIEW OF SYSTEMS:  Constitutional: Negative for fever, chills, weight loss and malaise/fatigue. HENT: Negative. Eyes: Negative. Respiratory: Negative. Cardiovascular: Negative. Gastrointestinal: No bowel incontinence or constipation. Genitourinary: No bladder incontinence or saddle anesthesia. Skin: Negative. Neurological: Negative. Endo/Heme/Allergies: Negative. Psychiatric/Behavioral: Negative. Musculoskeletal: As per HPI above. Past Medical History:   Diagnosis Date    Arthritis     Arthritis of both hips     Back pain     Balance problem     Chronic back pain     Cough     Easy bruising     Essential hypertension     GERD (gastroesophageal reflux disease)     Hiatal hernia     Hypertension     Irregular heart beat     Left hip pain 10/8/2010    Neck pain     Nervousness     Night sweat     Osteoarthritis, knee bilateral     Pain with urination     Polymyalgia rheumatica (HCC)     Reflux     Spinal stenosis     Trapezius strain     Trochanteric bursitis of left hip     Venous insufficiency          Current Outpatient Prescriptions:     HYDROcodone-acetaminophen (NORCO)  mg tablet, Take 1-2 Tabs by mouth every six (6) hours as needed for Pain. Max Daily Amount: 8 Tabs., Disp: 60 Tab, Rfl: 0    tropicamide (MYDRIACYL) 1 % ophthalmic solution, Administer 2 Drops to right eye as needed. 45 minutes prior to schedule appointments, Disp: , Rfl:     celecoxib (CELEBREX) 200 mg capsule, Take 1 Cap by mouth two (2) times a day for 90 days. , Disp: 60 Cap, Rfl: 2    buffered aspirin (BUFFERIN) 325 mg tablet, Take 1 Tab by mouth daily. , Disp: 30 Tab, Rfl: 2    ferrous sulfate 325 mg (65 mg iron) tablet, Take 1 Tab by mouth two (2) times daily (with meals). , Disp: 60 Tab, Rfl: 2    cholecalciferol (VITAMIN D3) 1,000 unit tablet, Take 1,000 Units by mouth daily. 1 tab day, Disp: , Rfl:     omeprazole (PRILOSEC) 40 mg capsule, Take 40 mg by mouth daily. 1 tab, Disp: , Rfl:     aMILoride-hydrochlorothiazide (MODURETIC) 5-50 mg tab, Take 1 Tab by mouth daily. , Disp: , Rfl:     amLODIPine (NORVASC) 10 mg tablet, Take 10 mg by mouth daily. 1 tab, Disp: , Rfl: 3    losartan (COZAAR) 100 mg tablet, Take 100 mg by mouth daily. 1 tab, Disp: , Rfl:     metoclopramide HCl (REGLAN) 5 mg tablet, Take 5 mg by mouth two (2) times a day. 1 tab 2x day, Disp: , Rfl:     cetirizine (ZYRTEC) 10 mg tablet, Take 10 mg by mouth daily. 1 tab daily, Disp: , Rfl:     acetaminophen (TYLENOL) 650 mg CR tablet, Take 1,300 mg by mouth two (2) times a day. PRN for pain, Disp: , Rfl:     Allergies   Allergen Reactions    Citric Acid Unknown (comments)    Crinone [Progesterone Micronized] Unknown (comments)    Iodine Unknown (comments)    Percocet [Oxycodone-Acetaminophen] Other (comments)     Gets rebound headaches after taking Percocet       Social History     Social History    Marital status:      Spouse name: N/A    Number of children: N/A    Years of education: N/A     Occupational History    Not on file.      Social History Main Topics    Smoking status: Never Smoker    Smokeless tobacco: Never Used    Alcohol use No    Drug use: No    Sexual activity: No     Other Topics Concern    Not on file     Social History Narrative       Past Surgical History:   Procedure Laterality Date    COLONOSCOPY N/A 7/27/2016    COLONOSCOPY w/polypectomy performed by Brad Scott MD at [facility]  [facility] HX CHOLECYSTECTOMY      HX HEENT  06/2011    left cornea transplant    HX HYSTERECTOMY      HX ORTHOPAEDIC      right foot and ankle             We did see Ms. Marah Márquez for followup in regards to her right total knee. The patient is doing extremely well with her knee replacement. She is now about three months out from the knee replacement. She unfortunately developed a patellar fracture when she slipped. She is now about four weeks status post ORIF of the patellar fracture. She is doing well. She does continue to be compliant in her knee immobilizer. No fevers, chills, systemic changes or new injuries. No chest pain or shortness of breath. PHYSICAL EXAMINATION: In general, the patient is alert and oriented x 3 and is in no acute distress. The patient is well-developed and well-nourished with a normal affect. The patient is afebrile. HEENT:  Head is normocephalic and atraumatic. Pupils are equally round and reactive to light and accommodation. Extraocular eye movements are intact. Neck is supple. Trachea is midline. No JVD is present. Breathing is nonlabored. Examination of right knee reveals the skin to be intact. The surgical wound is healed very nicely. There is no erythema or ecchymosis. There is no warmth or signs for infection or cellulitis. The patient is able to hold straight leg raise without lag without complications. No flexion was done today. Neurovascular status is intact. Negative calf tenderness. No Homans. No signs of DVT present. RADIOGRAPHS:  X-rays of the right knee, including AP and lateral show the fracture is maintained in good alignment and position. There has been a slight shift of the small distal fragment. It is maintained in the same position as previous x-ray. ASSESSMENT:   1. Status post right knee replacement. 2. Status post ORIF inferior pole patella fracture. PLAN: At this point, the patient will continue in the knee immobilizer. A refill of her pain medicine was given.  We will see her back in two weeks' time for evaluation and repeat x-ray. We will see if we can get her in a hinged brace at that point and start slowly moving her.                JR Brian LUCERO, PAOdellC, ATC

## 2017-05-06 PROCEDURE — 3331090001 HH PPS REVENUE CREDIT

## 2017-05-06 PROCEDURE — 3331090002 HH PPS REVENUE DEBIT

## 2017-05-07 PROCEDURE — 3331090001 HH PPS REVENUE CREDIT

## 2017-05-07 PROCEDURE — 3331090002 HH PPS REVENUE DEBIT

## 2017-05-08 PROCEDURE — 3331090002 HH PPS REVENUE DEBIT

## 2017-05-08 PROCEDURE — 3331090001 HH PPS REVENUE CREDIT

## 2017-05-09 PROCEDURE — 3331090002 HH PPS REVENUE DEBIT

## 2017-05-09 PROCEDURE — 3331090001 HH PPS REVENUE CREDIT

## 2017-05-10 PROCEDURE — 3331090002 HH PPS REVENUE DEBIT

## 2017-05-10 PROCEDURE — 3331090001 HH PPS REVENUE CREDIT

## 2017-05-11 PROCEDURE — 3331090001 HH PPS REVENUE CREDIT

## 2017-05-11 PROCEDURE — 3331090002 HH PPS REVENUE DEBIT

## 2017-05-12 PROCEDURE — 3331090001 HH PPS REVENUE CREDIT

## 2017-05-12 PROCEDURE — 3331090002 HH PPS REVENUE DEBIT

## 2017-05-13 PROCEDURE — 3331090001 HH PPS REVENUE CREDIT

## 2017-05-13 PROCEDURE — 3331090002 HH PPS REVENUE DEBIT

## 2017-05-14 PROCEDURE — 3331090001 HH PPS REVENUE CREDIT

## 2017-05-14 PROCEDURE — 3331090002 HH PPS REVENUE DEBIT

## 2017-05-15 PROCEDURE — 3331090001 HH PPS REVENUE CREDIT

## 2017-05-15 PROCEDURE — 3331090002 HH PPS REVENUE DEBIT

## 2017-05-16 PROCEDURE — 3331090001 HH PPS REVENUE CREDIT

## 2017-05-16 PROCEDURE — 3331090002 HH PPS REVENUE DEBIT

## 2017-05-17 PROCEDURE — 3331090001 HH PPS REVENUE CREDIT

## 2017-05-17 PROCEDURE — 3331090002 HH PPS REVENUE DEBIT

## 2017-05-18 PROCEDURE — 3331090001 HH PPS REVENUE CREDIT

## 2017-05-18 PROCEDURE — 3331090002 HH PPS REVENUE DEBIT

## 2017-05-19 ENCOUNTER — OFFICE VISIT (OUTPATIENT)
Dept: ORTHOPEDIC SURGERY | Age: 82
End: 2017-05-19

## 2017-05-19 VITALS
HEART RATE: 83 BPM | DIASTOLIC BLOOD PRESSURE: 60 MMHG | SYSTOLIC BLOOD PRESSURE: 130 MMHG | TEMPERATURE: 97.9 F | BODY MASS INDEX: 37.05 KG/M2 | HEIGHT: 64 IN | WEIGHT: 217 LBS

## 2017-05-19 DIAGNOSIS — M25.561 RIGHT KNEE PAIN, UNSPECIFIED CHRONICITY: Primary | ICD-10-CM

## 2017-05-19 DIAGNOSIS — S82.031D CLOSED DISPLACED TRANSVERSE FRACTURE OF RIGHT PATELLA WITH ROUTINE HEALING, SUBSEQUENT ENCOUNTER: ICD-10-CM

## 2017-05-19 PROCEDURE — 3331090002 HH PPS REVENUE DEBIT

## 2017-05-19 PROCEDURE — 3331090001 HH PPS REVENUE CREDIT

## 2017-05-19 NOTE — PROGRESS NOTES
[address] [place] [address]  [phone]           Patient: Ronnie Vick                MRN: [id number]       SSN: [id number]  YOB: 1935        AGE: 80 y.o. SEX: female  Body mass index is 37.25 kg/(m^2). PCP: Jordy Vazquez MD  05/19/17      This office note has been dictated. REVIEW OF SYSTEMS:  Constitutional: Negative for fever, chills, weight loss and malaise/fatigue. HENT: Negative. Eyes: Negative. Respiratory: Negative. Cardiovascular: Negative. Gastrointestinal: No bowel incontinence or constipation. Genitourinary: No bladder incontinence or saddle anesthesia. Skin: Negative. Neurological: Negative. Endo/Heme/Allergies: Negative. Psychiatric/Behavioral: Negative. Musculoskeletal: As per HPI above. Past Medical History:   Diagnosis Date    Arthritis     Arthritis of both hips     Back pain     Balance problem     Chronic back pain     Cough     Easy bruising     Essential hypertension     GERD (gastroesophageal reflux disease)     Hiatal hernia     Hypertension     Irregular heart beat     Left hip pain 10/8/2010    Neck pain     Nervousness     Night sweat     Osteoarthritis, knee bilateral     Pain with urination     Polymyalgia rheumatica (HCC)     Reflux     Spinal stenosis     Trapezius strain     Trochanteric bursitis of left hip     Venous insufficiency          Current Outpatient Prescriptions:     HYDROcodone-acetaminophen (NORCO)  mg tablet, Take 1-2 Tabs by mouth every six (6) hours as needed for Pain. Max Daily Amount: 8 Tabs., Disp: 60 Tab, Rfl: 0    tropicamide (MYDRIACYL) 1 % ophthalmic solution, Administer 2 Drops to right eye as needed. 45 minutes prior to schedule appointments, Disp: , Rfl:     celecoxib (CELEBREX) 200 mg capsule, Take 1 Cap by mouth two (2) times a day for 90 days. , Disp: 60 Cap, Rfl: 2    buffered aspirin (BUFFERIN) 325 mg tablet, Take 1 Tab by mouth daily. , Disp: 30 Tab, Rfl: 2    ferrous sulfate 325 mg (65 mg iron) tablet, Take 1 Tab by mouth two (2) times daily (with meals). , Disp: 60 Tab, Rfl: 2    cholecalciferol (VITAMIN D3) 1,000 unit tablet, Take 1,000 Units by mouth daily. 1 tab day, Disp: , Rfl:     omeprazole (PRILOSEC) 40 mg capsule, Take 40 mg by mouth daily. 1 tab, Disp: , Rfl:     acetaminophen (TYLENOL) 650 mg CR tablet, Take 1,300 mg by mouth two (2) times a day. PRN for pain, Disp: , Rfl:     aMILoride-hydrochlorothiazide (MODURETIC) 5-50 mg tab, Take 1 Tab by mouth daily. , Disp: , Rfl:     amLODIPine (NORVASC) 10 mg tablet, Take 10 mg by mouth daily. 1 tab, Disp: , Rfl: 3    losartan (COZAAR) 100 mg tablet, Take 100 mg by mouth daily. 1 tab, Disp: , Rfl:     metoclopramide HCl (REGLAN) 5 mg tablet, Take 5 mg by mouth two (2) times a day. 1 tab 2x day, Disp: , Rfl:     cetirizine (ZYRTEC) 10 mg tablet, Take 10 mg by mouth daily. 1 tab daily, Disp: , Rfl:     Allergies   Allergen Reactions    Citric Acid Unknown (comments)    Crinone [Progesterone Micronized] Unknown (comments)    Iodine Unknown (comments)    Percocet [Oxycodone-Acetaminophen] Other (comments)     Gets rebound headaches after taking Percocet       Social History     Social History    Marital status:      Spouse name: N/A    Number of children: N/A    Years of education: N/A     Occupational History    Not on file.      Social History Main Topics    Smoking status: Never Smoker    Smokeless tobacco: Never Used    Alcohol use No    Drug use: No    Sexual activity: No     Other Topics Concern    Not on file     Social History Narrative       Past Surgical History:   Procedure Laterality Date    COLONOSCOPY N/A 7/27/2016    [facility] HX CHOLECYSTECTOMY      HX HEENT  06/2011    left cornea transplant    HX HYSTERECTOMY      HX ORTHOPAEDIC      right foot and ankle             We did see Ms. Brenda Murphy for followup with regards to her right knee. The patient is status post right knee replacement. She is now a little over three months status post knee replacement. She did have inferior pole of the patellar fractures, small fragment. She is now about seven weeks status post ORIF. She is doing well. She has been very compliant. She takes occasional pain medicine. She has had no recent fevers, chills, systemic changes, and no injuries to report and no chest pain or shortness of breath. PHYSICAL EXAMINATION: In general, the patient is alert and oriented x 3 and is in no acute distress. The patient is well-developed and well-nourished with a normal affect. The patient is afebrile. HEENT:  Head is normocephalic and atraumatic. Pupils are equally round and reactive to light and accommodation. Extraocular eye movements are intact. Neck is supple. Trachea is midline. No JVD is present. Breathing is nonlabored. Examination of the right knee reveals the skin is intact. There is no ecchymosis, no warmth, and no signs of infection or cellulitis present. The patient is able to hold the leg against gravity without complications. No extensor lag is noted. She flexes to about 90° easily. RADIOGRAPHS:  Radiographs in the office today, including AP and lateral of the right knee, shows the fracture is maintained. There was some initial change in position but is maintained nicely. There seems to be good bony contact. ASSESSMENT:      1. Status post right knee replacement. 2. Status post ORIF right patellar fracture. PLAN:  At this point, we are going to get her into a hinged brace 0-90°. We will plan on seeing her back in the office in about three weeks time for evaluation and open up the range of motion brace at that point. She will call with any questions or concerns that shall arise. JR Torres MPAS, PAPHIL, ATC

## 2017-05-20 PROCEDURE — 3331090002 HH PPS REVENUE DEBIT

## 2017-05-20 PROCEDURE — 3331090001 HH PPS REVENUE CREDIT

## 2017-05-21 PROCEDURE — 3331090001 HH PPS REVENUE CREDIT

## 2017-05-21 PROCEDURE — 3331090002 HH PPS REVENUE DEBIT

## 2017-05-22 ENCOUNTER — TELEPHONE (OUTPATIENT)
Dept: ORTHOPEDIC SURGERY | Age: 82
End: 2017-05-22

## 2017-05-22 PROCEDURE — 3331090001 HH PPS REVENUE CREDIT

## 2017-05-22 PROCEDURE — 3331090002 HH PPS REVENUE DEBIT

## 2017-05-22 NOTE — TELEPHONE ENCOUNTER
PATIENT WAS SEEN ON Friday 5/19/17 AND TOLD TO CALL BACK TODAY AROUND NOON TO SEE IF WE RECEIVED THE HINGED KNEE BRACE. PLEASE CALL    LAST APPT.  5/19/17

## 2017-05-22 NOTE — TELEPHONE ENCOUNTER
Order for hinged knee brace faxed over to Verena Elena at [facility] because patient has Rocky Quinteros. Reach should be contacting patient to be fitted for knee brace.

## 2017-05-23 PROCEDURE — 3331090002 HH PPS REVENUE DEBIT

## 2017-05-23 PROCEDURE — 3331090001 HH PPS REVENUE CREDIT

## 2017-05-24 PROCEDURE — 3331090001 HH PPS REVENUE CREDIT

## 2017-05-24 PROCEDURE — 3331090002 HH PPS REVENUE DEBIT

## 2017-05-25 PROCEDURE — 3331090001 HH PPS REVENUE CREDIT

## 2017-05-25 PROCEDURE — 3331090002 HH PPS REVENUE DEBIT

## 2017-05-26 PROCEDURE — 3331090002 HH PPS REVENUE DEBIT

## 2017-05-26 PROCEDURE — 3331090001 HH PPS REVENUE CREDIT

## 2017-05-27 PROCEDURE — 3331090001 HH PPS REVENUE CREDIT

## 2017-05-27 PROCEDURE — 3331090002 HH PPS REVENUE DEBIT

## 2017-05-28 PROCEDURE — 3331090002 HH PPS REVENUE DEBIT

## 2017-05-28 PROCEDURE — 3331090001 HH PPS REVENUE CREDIT

## 2017-05-29 PROCEDURE — 3331090002 HH PPS REVENUE DEBIT

## 2017-05-29 PROCEDURE — 3331090001 HH PPS REVENUE CREDIT

## 2017-05-30 PROCEDURE — 3331090002 HH PPS REVENUE DEBIT

## 2017-05-30 PROCEDURE — 3331090001 HH PPS REVENUE CREDIT

## 2017-05-31 PROCEDURE — 3331090001 HH PPS REVENUE CREDIT

## 2017-05-31 PROCEDURE — 3331090002 HH PPS REVENUE DEBIT

## 2017-06-01 PROCEDURE — 3331090002 HH PPS REVENUE DEBIT

## 2017-06-01 PROCEDURE — 3331090001 HH PPS REVENUE CREDIT

## 2017-06-02 PROCEDURE — 3331090002 HH PPS REVENUE DEBIT

## 2017-06-02 PROCEDURE — 3331090001 HH PPS REVENUE CREDIT

## 2017-06-02 RX ORDER — LANOLIN ALCOHOL/MO/W.PET/CERES
CREAM (GRAM) TOPICAL
Qty: 60 TAB | Refills: 2 | Status: SHIPPED | OUTPATIENT
Start: 2017-06-02 | End: 2018-01-11

## 2017-06-03 PROCEDURE — 3331090002 HH PPS REVENUE DEBIT

## 2017-06-03 PROCEDURE — 3331090001 HH PPS REVENUE CREDIT

## 2017-06-04 PROCEDURE — 3331090002 HH PPS REVENUE DEBIT

## 2017-06-04 PROCEDURE — 3331090001 HH PPS REVENUE CREDIT

## 2017-06-05 ENCOUNTER — TELEPHONE (OUTPATIENT)
Dept: ORTHOPEDIC SURGERY | Age: 82
End: 2017-06-05

## 2017-06-05 NOTE — TELEPHONE ENCOUNTER
Patient calling as she wants to know if the brace is in and she wants to take the cast off as it is rubbing her leg  Please call her asap [phone].

## 2017-06-06 NOTE — TELEPHONE ENCOUNTER
Pt last seen 5/19/17; right TKA 1/20/17:  Pt very concerned her brace is not going to be ready until 6/13/17. Pt states the cast is irritating her leg, it's slipping around. Pt commented she was only supposed to have it one for 6 weeks and it's been close to 9 weeks. Pt was supposed to have a 3 week f/u and needs to know if she should schedule w/out having the brace.     Please advise pt p# [phone]

## 2017-06-09 ENCOUNTER — OFFICE VISIT (OUTPATIENT)
Dept: ORTHOPEDIC SURGERY | Age: 82
End: 2017-06-09

## 2017-06-09 VITALS
HEART RATE: 83 BPM | DIASTOLIC BLOOD PRESSURE: 61 MMHG | HEIGHT: 64 IN | SYSTOLIC BLOOD PRESSURE: 168 MMHG | BODY MASS INDEX: 37.59 KG/M2 | WEIGHT: 220.2 LBS | TEMPERATURE: 98.5 F

## 2017-06-09 DIAGNOSIS — S82.031D CLOSED DISPLACED TRANSVERSE FRACTURE OF RIGHT PATELLA WITH ROUTINE HEALING, SUBSEQUENT ENCOUNTER: Primary | ICD-10-CM

## 2017-06-09 DIAGNOSIS — Z96.651 STATUS POST TOTAL RIGHT KNEE REPLACEMENT: ICD-10-CM

## 2017-06-09 NOTE — PROGRESS NOTES
[address] [place] [address], [address] [place] [address]  [phone]           Patient: Sindy Morirs                MRN: [id number]       SSN: [id number]  YOB: 1935        AGE: 80 y.o. SEX: female  Body mass index is 37.8 kg/(m^2). PCP: Elsa Wills MD  06/09/17      This office note has been dictated. REVIEW OF SYSTEMS:  Constitutional: Negative for fever, chills, weight loss and malaise/fatigue. HENT: Negative. Eyes: Negative. Respiratory: Negative. Cardiovascular: Negative. Gastrointestinal: No bowel incontinence or constipation. Genitourinary: No bladder incontinence or saddle anesthesia. Skin: Negative. Neurological: Negative. Endo/Heme/Allergies: Negative. Psychiatric/Behavioral: Negative. Musculoskeletal: As per HPI above. Past Medical History:   Diagnosis Date    Arthritis     Arthritis of both hips     Back pain     Balance problem     Chronic back pain     Cough     Easy bruising     Essential hypertension     GERD (gastroesophageal reflux disease)     Hiatal hernia     Hypertension     Irregular heart beat     Left hip pain 10/8/2010    Neck pain     Nervousness     Night sweat     Osteoarthritis, knee bilateral     Pain with urination     Polymyalgia rheumatica (HCC)     Reflux     Spinal stenosis     Trapezius strain     Trochanteric bursitis of left hip     Venous insufficiency          Current Outpatient Prescriptions:     ferrous sulfate 325 mg (65 mg iron) tablet, TAKE 1 TABLET BY MOUTH 2 TIMES DAILY WITH MEALS, Disp: 60 Tab, Rfl: 2    HYDROcodone-acetaminophen (NORCO)  mg tablet, Take 1-2 Tabs by mouth every six (6) hours as needed for Pain. Max Daily Amount: 8 Tabs., Disp: 60 Tab, Rfl: 0    tropicamide (MYDRIACYL) 1 % ophthalmic solution, Administer 2 Drops to right eye as needed.  45 minutes prior to schedule appointments, Disp: , Rfl:     celecoxib (CELEBREX) 200 mg capsule, Take 1 Cap by mouth two (2) times a day for 90 days. , Disp: 60 Cap, Rfl: 2    buffered aspirin (BUFFERIN) 325 mg tablet, Take 1 Tab by mouth daily. , Disp: 30 Tab, Rfl: 2    cholecalciferol (VITAMIN D3) 1,000 unit tablet, Take 1,000 Units by mouth daily. 1 tab day, Disp: , Rfl:     omeprazole (PRILOSEC) 40 mg capsule, Take 40 mg by mouth daily. 1 tab, Disp: , Rfl:     acetaminophen (TYLENOL) 650 mg CR tablet, Take 1,300 mg by mouth two (2) times a day. PRN for pain, Disp: , Rfl:     aMILoride-hydrochlorothiazide (MODURETIC) 5-50 mg tab, Take 1 Tab by mouth daily. , Disp: , Rfl:     amLODIPine (NORVASC) 10 mg tablet, Take 10 mg by mouth daily. 1 tab, Disp: , Rfl: 3    losartan (COZAAR) 100 mg tablet, Take 100 mg by mouth daily. 1 tab, Disp: , Rfl:     metoclopramide HCl (REGLAN) 5 mg tablet, Take 5 mg by mouth two (2) times a day. 1 tab 2x day, Disp: , Rfl:     cetirizine (ZYRTEC) 10 mg tablet, Take 10 mg by mouth daily. 1 tab daily, Disp: , Rfl:     Allergies   Allergen Reactions    Citric Acid Unknown (comments)    Crinone [Progesterone Micronized] Unknown (comments)    Iodine Unknown (comments)    Percocet [Oxycodone-Acetaminophen] Other (comments)     Gets rebound headaches after taking Percocet       Social History     Social History    Marital status:      Spouse name: N/A    Number of children: N/A    Years of education: N/A     Occupational History    Not on file.      Social History Main Topics    Smoking status: Never Smoker    Smokeless tobacco: Never Used    Alcohol use No    Drug use: No    Sexual activity: No     Other Topics Concern    Not on file     Social History Narrative       Past Surgical History:   Procedure Laterality Date    COLONOSCOPY N/A 7/27/2016    COLONOSCOPY w/polypectomy performed by Harinder Galvez MD at [facility]  [facility] HX CHOLECYSTECTOMY      HX HEENT  06/2011    left cornea transplant    HX HYSTERECTOMY      HX ORTHOPAEDIC      right foot and ankle             We did see Ms. Samra Jung for followup with regards to her right knee. She is status post right knee replacement. She did well with the knee replacement until she had an injury fracturing the superior pole of the patella. She is now two months status post ORIF. She is doing well. The last time I saw her, I ordered for her to get a new hinged brace. Unfortunately, [facility] has not had that brace in stock, and she is scheduled to get it this coming Monday. She is frustrated with the knee immobilizer. The patient has had no fevers, chills, systemic changes, and no injuries to report and has had no chest pain or shortness of breath. PHYSICAL EXAMINATION: In general, the patient is alert and oriented x 3 and is in no acute distress. The patient is well-developed and well-nourished with a normal affect. The patient is afebrile. Examination of the right knee reveals the skin is intact. The surgical wound is healed nicely. There is no ecchymosis, no warmth, and no signs of infection or cellulitis present. She can hold the leg against gravity and resistance in extension with no extensor lag and without complications. She flexes to about 85°. The patella is tracking nicely, and stability is quite good. ASSESSMENT:      1. Status post right knee replacement. 2. Status post ORIF right patellar fracture. PLAN:  At this point, she will get into a hinged brace. We will start it at 0-90°. She is instructed on range of motion activities on her own at home. We will see her back in about four weeks time for evaluation and repeat x-ray of the right knee, as well as range of motion check.                 JR Brian LUCERO, PAOdellC, ATC

## 2017-07-03 RX ORDER — CELECOXIB 200 MG/1
CAPSULE ORAL
Qty: 60 CAP | Refills: 2 | Status: SHIPPED | OUTPATIENT
Start: 2017-07-03 | End: 2017-11-01 | Stop reason: SDUPTHER

## 2017-07-05 RX ORDER — ASPIRIN 325 MG/1
TABLET, FILM COATED ORAL
Qty: 30 TAB | Refills: 2 | Status: SHIPPED | OUTPATIENT
Start: 2017-07-05 | End: 2017-09-27 | Stop reason: SDUPTHER

## 2017-07-13 ENCOUNTER — OFFICE VISIT (OUTPATIENT)
Dept: ORTHOPEDIC SURGERY | Facility: CLINIC | Age: 82
End: 2017-07-13

## 2017-07-13 VITALS
RESPIRATION RATE: 15 BRPM | TEMPERATURE: 97.5 F | HEART RATE: 82 BPM | SYSTOLIC BLOOD PRESSURE: 152 MMHG | WEIGHT: 228 LBS | HEIGHT: 64 IN | BODY MASS INDEX: 38.93 KG/M2 | DIASTOLIC BLOOD PRESSURE: 64 MMHG

## 2017-07-13 DIAGNOSIS — M17.12 PRIMARY OSTEOARTHRITIS OF LEFT KNEE: ICD-10-CM

## 2017-07-13 DIAGNOSIS — M25.561 RIGHT KNEE PAIN, UNSPECIFIED CHRONICITY: Primary | ICD-10-CM

## 2017-07-13 DIAGNOSIS — Z96.651 STATUS POST TOTAL RIGHT KNEE REPLACEMENT: ICD-10-CM

## 2017-07-13 RX ORDER — BETAMETHASONE SODIUM PHOSPHATE AND BETAMETHASONE ACETATE 3; 3 MG/ML; MG/ML
6 INJECTION, SUSPENSION INTRA-ARTICULAR; INTRALESIONAL; INTRAMUSCULAR; SOFT TISSUE ONCE
Qty: 1 ML | Refills: 0
Start: 2017-07-13 | End: 2017-07-13

## 2017-07-13 NOTE — PROGRESS NOTES
[address] [place] [address]  [phone]           Patient: Bud Brenner                MRN: [id number]       SSN: [id number]  YOB: 1935        AGE: 80 y.o. SEX: female  Body mass index is 39.14 kg/(m^2). PCP: Jenny Oden MD  07/13/17      This office note has been dictated. REVIEW OF SYSTEMS:  Constitutional: Negative for fever, chills, weight loss and malaise/fatigue. HENT: Negative. Eyes: Negative. Respiratory: Negative. Cardiovascular: Negative. Gastrointestinal: No bowel incontinence or constipation. Genitourinary: No bladder incontinence or saddle anesthesia. Skin: Negative. Neurological: Negative. Endo/Heme/Allergies: Negative. Psychiatric/Behavioral: Negative. Musculoskeletal: As per HPI above. Past Medical History:   Diagnosis Date    Arthritis     Arthritis of both hips     Back pain     Balance problem     Chronic back pain     Cough     Easy bruising     Essential hypertension     GERD (gastroesophageal reflux disease)     Hiatal hernia     Hypertension     Irregular heart beat     Left hip pain 10/8/2010    Neck pain     Nervousness     Night sweat     Osteoarthritis, knee bilateral     Pain with urination     Polymyalgia rheumatica (HCC)     Reflux     Spinal stenosis     Trapezius strain     Trochanteric bursitis of left hip     Venous insufficiency          Current Outpatient Prescriptions:     buffered aspirin (BUFFERIN) 325 mg tablet, TAKE 1 TABLET BY MOUTH DAILY, Disp: 30 Tab, Rfl: 2    celecoxib (CELEBREX) 200 mg capsule, TAKE 1 CAPSULE BY MOUTH 2 TIMES DAILY, Disp: 60 Cap, Rfl: 2    ferrous sulfate 325 mg (65 mg iron) tablet, TAKE 1 TABLET BY MOUTH 2 TIMES DAILY WITH MEALS, Disp: 60 Tab, Rfl: 2    HYDROcodone-acetaminophen (NORCO)  mg tablet, Take 1-2 Tabs by mouth every six (6) hours as needed for Pain.  Max Daily Amount: 8 Tabs., Disp: 60 Tab, Rfl: 0    tropicamide (MYDRIACYL) 1 % ophthalmic solution, Administer 2 Drops to right eye as needed. 45 minutes prior to schedule appointments, Disp: , Rfl:     cholecalciferol (VITAMIN D3) 1,000 unit tablet, Take 1,000 Units by mouth daily. 1 tab day, Disp: , Rfl:     omeprazole (PRILOSEC) 40 mg capsule, Take 40 mg by mouth daily. 1 tab, Disp: , Rfl:     aMILoride-hydrochlorothiazide (MODURETIC) 5-50 mg tab, Take 1 Tab by mouth daily. , Disp: , Rfl:     amLODIPine (NORVASC) 10 mg tablet, Take 10 mg by mouth daily. 1 tab, Disp: , Rfl: 3    losartan (COZAAR) 100 mg tablet, Take 100 mg by mouth daily. 1 tab, Disp: , Rfl:     metoclopramide HCl (REGLAN) 5 mg tablet, Take 5 mg by mouth two (2) times a day. 1 tab 2x day, Disp: , Rfl:     cetirizine (ZYRTEC) 10 mg tablet, Take 10 mg by mouth daily. 1 tab daily, Disp: , Rfl:     acetaminophen (TYLENOL) 650 mg CR tablet, Take 1,300 mg by mouth two (2) times a day. PRN for pain, Disp: , Rfl:     Allergies   Allergen Reactions    Citric Acid Unknown (comments)    Crinone [Progesterone Micronized] Unknown (comments)    Iodine Unknown (comments)    Percocet [Oxycodone-Acetaminophen] Other (comments)     Gets rebound headaches after taking Percocet       Social History     Social History    Marital status:      Spouse name: N/A    Number of children: N/A    Years of education: N/A     Occupational History    Not on file.      Social History Main Topics    Smoking status: Never Smoker    Smokeless tobacco: Never Used    Alcohol use No    Drug use: No    Sexual activity: No     Other Topics Concern    Not on file     Social History Narrative       Past Surgical History:   Procedure Laterality Date    COLONOSCOPY N/A 7/27/2016    COLONOSCOPY w/polypectomy performed by Ean Jacob MD at [address] HX ANKLE FRACTURE TX      HX CHOLECYSTECTOMY      HX HEENT  06/2011    left cornea transplant    HX HYSTERECTOMY      HX ORTHOPAEDIC      right foot and ankle           * Patient was identified by name and date of birth   * Agreement on procedure being performed was verified  * Risks and Benefits explained to the patient  * Procedure site verified and marked as necessary  * Patient was positioned for comfort  * Consent was signed and verified  11:24 AM    The patient was instructed on post injection care. We did see Ms. Jesusita Rodgers for followup with regards to her bilateral knees. She is status post right total knee replacement. She is now about five months status post total knee replacement. She, unfortunately, did have a patellar fracture. She is now about three months status post patellar fracture ORIF. She is doing well, really having no discomfort in her knee with the exception of a little bit of swelling at the end of the day and a little stiffness associated with the swelling. She does have more troubles with the left knee, which does have known advancing arthritis. She is requesting a cortisone injection today. She has occasional night discomfort for the left knee, trouble with ambulation, as well as getting out of a chair. PHYSICAL EXAMINATION: In general, the patient is alert and oriented x 3 and is in no acute distress. The patient is well-developed and well-nourished with a normal affect. The patient is afebrile. HEENT:  Head is normocephalic and atraumatic. Pupils are equally round and reactive to light and accommodation. Extraocular eye movements are intact. Neck is supple. Trachea is midline. No JVD is present. Breathing is nonlabored. Examination of the lower extremities reveals pain-free range of motion of the hips. There is no pain to palpation of the trochanteric bursae. There is a negative straight leg raise, negative calf tenderness, and negative Checos sign. There are no signs of DVT present. Examination of the right knee reveals the skin is intact.   There is no ecchymosis and no warmth. The surgical wounds are healed nicely. She has minimal effusion, negative patellar ballottement. Range of motion:  She has full extension with maybe about 3° extensor lag. There are no defects noted to the extensor mechanism. Flexion is 120°. The patella tracks nicely. Stability is quite good. The left knee reveals the skin is intact. There is no erythema, ecchymosis, no warmth, and no signs of infection or cellulitis present. There is pain with palpation tricompartmentally and crepitus arising from the anterior compartment. Findings are consistent with advanced arthritis of the left knee. RADIOGRAPHS:  Radiographs in the office today, including AP and lateral of the right knee, shows that the patella fracture inferior pole is maintained. It has not really changed since the last x-rays were done. It appears that the lateral K wire has pulled through. It is not in a fragment anymore. ASSESSMENT:      1. Status post right knee replacement. 2. Status post ORIF right patellar fracture. PLAN:  At this point, the patient is very functional with her knee. We did discuss treatment options including revision. I think that she is doing well functionally without loss. We will continue on with strengthening of the quadriceps and conservative treatment. With regards to the left knee, we are going to move forward with a cortisone injection today. After informed and written consent, under aseptic conditions, the left knee was prepped with Betadine and 6 mg of Celestone was injected to without complications. The patient tolerated the injection well. She was instructed on post injection care. We will see her back in the office in about one months time for AP and lateral of the right knee.                     JR Brian LUCERO, SENTHIL, ATC

## 2017-08-01 ENCOUNTER — HOSPITAL ENCOUNTER (OUTPATIENT)
Dept: LAB | Age: 82
Discharge: HOME | End: 2017-08-01
Payer: MEDICARE

## 2017-08-01 DIAGNOSIS — Z96.651 STATUS POST TOTAL RIGHT KNEE REPLACEMENT: ICD-10-CM

## 2017-08-01 LAB
BASOPHILS # BLD AUTO: 0 K/UL (ref 0–0.1)
BASOPHILS # BLD: 0 % (ref 0–2)
CRP SERPL-MCNC: 0.6 MG/DL (ref 0–0.3)
DIFFERENTIAL METHOD BLD: ABNORMAL
EOSINOPHIL # BLD: 0.2 K/UL (ref 0–0.4)
EOSINOPHIL NFR BLD: 2 % (ref 0–5)
ERYTHROCYTE [DISTWIDTH] IN BLOOD BY AUTOMATED COUNT: 14.3 % (ref 11.6–14.5)
ERYTHROCYTE [SEDIMENTATION RATE] IN BLOOD: 58 MM/HR (ref 0–30)
HCT VFR BLD AUTO: 31.3 % (ref 35–45)
HGB BLD-MCNC: 10.2 G/DL (ref 12–16)
LYMPHOCYTES # BLD AUTO: 22 % (ref 21–52)
LYMPHOCYTES # BLD: 1.7 K/UL (ref 0.9–3.6)
MCH RBC QN AUTO: 26.8 PG (ref 24–34)
MCHC RBC AUTO-ENTMCNC: 32.6 G/DL (ref 31–37)
MCV RBC AUTO: 82.4 FL (ref 74–97)
MONOCYTES # BLD: 0.5 K/UL (ref 0.05–1.2)
MONOCYTES NFR BLD AUTO: 6 % (ref 3–10)
NEUTS SEG # BLD: 5.4 K/UL (ref 1.8–8)
NEUTS SEG NFR BLD AUTO: 70 % (ref 40–73)
PLATELET # BLD AUTO: 325 K/UL (ref 135–420)
PMV BLD AUTO: 10.4 FL (ref 9.2–11.8)
RBC # BLD AUTO: 3.8 M/UL (ref 4.2–5.3)
URATE SERPL-MCNC: 6 MG/DL (ref 2.6–7.2)
WBC # BLD AUTO: 7.8 K/UL (ref 4.6–13.2)

## 2017-08-01 PROCEDURE — 83520 IMMUNOASSAY QUANT NOS NONAB: CPT | Performed by: PHYSICIAN ASSISTANT

## 2017-08-01 PROCEDURE — 86140 C-REACTIVE PROTEIN: CPT | Performed by: PHYSICIAN ASSISTANT

## 2017-08-01 PROCEDURE — 84550 ASSAY OF BLOOD/URIC ACID: CPT | Performed by: PHYSICIAN ASSISTANT

## 2017-08-01 PROCEDURE — 36415 COLL VENOUS BLD VENIPUNCTURE: CPT | Performed by: PHYSICIAN ASSISTANT

## 2017-08-01 PROCEDURE — 85652 RBC SED RATE AUTOMATED: CPT | Performed by: PHYSICIAN ASSISTANT

## 2017-08-01 PROCEDURE — 85025 COMPLETE CBC W/AUTO DIFF WBC: CPT | Performed by: PHYSICIAN ASSISTANT

## 2017-08-03 LAB — IL6 SERPL-MCNC: 5.1 PG/ML (ref 0–15.5)

## 2017-08-10 ENCOUNTER — OFFICE VISIT (OUTPATIENT)
Dept: ORTHOPEDIC SURGERY | Facility: CLINIC | Age: 82
End: 2017-08-10

## 2017-08-10 VITALS
TEMPERATURE: 97.9 F | BODY MASS INDEX: 39.2 KG/M2 | OXYGEN SATURATION: 95 % | HEIGHT: 64 IN | WEIGHT: 229.6 LBS | RESPIRATION RATE: 19 BRPM | HEART RATE: 76 BPM | SYSTOLIC BLOOD PRESSURE: 166 MMHG | DIASTOLIC BLOOD PRESSURE: 59 MMHG

## 2017-08-10 DIAGNOSIS — S82.031D CLOSED DISPLACED TRANSVERSE FRACTURE OF RIGHT PATELLA WITH ROUTINE HEALING, SUBSEQUENT ENCOUNTER: Primary | ICD-10-CM

## 2017-08-10 NOTE — PROGRESS NOTES
[address] [place] [address]  [phone]           Patient: Iris Miguel                MRN: [id number]       SSN: [id number]  YOB: 1935        AGE: [de-identified] y.o. SEX: female  Body mass index is 39.41 kg/(m^2). PCP: Mauricio Dickerson MD  08/10/17      This office note has been dictated. REVIEW OF SYSTEMS:  Constitutional: Negative for fever, chills, weight loss and malaise/fatigue. HENT: Negative. Eyes: Negative. Respiratory: Negative. Cardiovascular: Negative. Gastrointestinal: No bowel incontinence or constipation. Genitourinary: No bladder incontinence or saddle anesthesia. Skin: Negative. Neurological: Negative. Endo/Heme/Allergies: Negative. Psychiatric/Behavioral: Negative. Musculoskeletal: As per HPI above. Past Medical History:   Diagnosis Date    Arthritis     Arthritis of both hips     Back pain     Balance problem     Chronic back pain     Cough     Easy bruising     Essential hypertension     GERD (gastroesophageal reflux disease)     Hiatal hernia     Hypertension     Irregular heart beat     Left hip pain 10/8/2010    Neck pain     Nervousness     Night sweat     Osteoarthritis, knee bilateral     Pain with urination     Polymyalgia rheumatica (HCC)     Reflux     Spinal stenosis     Trapezius strain     Trochanteric bursitis of left hip     Venous insufficiency          Current Outpatient Prescriptions:     buffered aspirin (BUFFERIN) 325 mg tablet, TAKE 1 TABLET BY MOUTH DAILY, Disp: 30 Tab, Rfl: 2    celecoxib (CELEBREX) 200 mg capsule, TAKE 1 CAPSULE BY MOUTH 2 TIMES DAILY, Disp: 60 Cap, Rfl: 2    ferrous sulfate 325 mg (65 mg iron) tablet, TAKE 1 TABLET BY MOUTH 2 TIMES DAILY WITH MEALS, Disp: 60 Tab, Rfl: 2    tropicamide (MYDRIACYL) 1 % ophthalmic solution, Administer 2 Drops to right eye as needed.  45 minutes prior to schedule appointments, Disp: , Rfl:     cholecalciferol (VITAMIN D3) 1,000 unit tablet, Take 1,000 Units by mouth daily. 1 tab day, Disp: , Rfl:     omeprazole (PRILOSEC) 40 mg capsule, Take 40 mg by mouth daily. 1 tab, Disp: , Rfl:     aMILoride-hydrochlorothiazide (MODURETIC) 5-50 mg tab, Take 1 Tab by mouth daily. , Disp: , Rfl:     amLODIPine (NORVASC) 10 mg tablet, Take 10 mg by mouth daily. 1 tab, Disp: , Rfl: 3    losartan (COZAAR) 100 mg tablet, Take 100 mg by mouth daily. 1 tab, Disp: , Rfl:     metoclopramide HCl (REGLAN) 5 mg tablet, Take 5 mg by mouth two (2) times a day. 1 tab 2x day, Disp: , Rfl:     cetirizine (ZYRTEC) 10 mg tablet, Take 10 mg by mouth daily. 1 tab daily, Disp: , Rfl:     HYDROcodone-acetaminophen (NORCO)  mg tablet, Take 1-2 Tabs by mouth every six (6) hours as needed for Pain. Max Daily Amount: 8 Tabs., Disp: 60 Tab, Rfl: 0    acetaminophen (TYLENOL) 650 mg CR tablet, Take 1,300 mg by mouth two (2) times a day. PRN for pain, Disp: , Rfl:     Allergies   Allergen Reactions    Citric Acid Unknown (comments)    Crinone [Progesterone Micronized] Unknown (comments)    Iodine Unknown (comments)    Percocet [Oxycodone-Acetaminophen] Other (comments)     Gets rebound headaches after taking Percocet       Social History     Social History    Marital status:      Spouse name: N/A    Number of children: N/A    Years of education: N/A     Occupational History    Not on file.      Social History Main Topics    Smoking status: Never Smoker    Smokeless tobacco: Never Used    Alcohol use No    Drug use: No    Sexual activity: No     Other Topics Concern    Not on file     Social History Narrative       Past Surgical History:   Procedure Laterality Date    COLONOSCOPY N/A 7/27/2016    COLONOSCOPY w/polypectomy performed by Peter Denise MD at [address] HX ANKLE FRACTURE TX      HX CHOLECYSTECTOMY      HX HEENT  06/2011    left cornea transplant    HX HYSTERECTOMY      HX ORTHOPAEDIC      right foot and ankle             We did see Ms. Mack Ricks for followup with regards to her right knee. She is status post right knee replacement. She is now about six months status post knee replacement and four months status post ORIF of the right patella. She is doing well. She is really having no discomfort in her knee. She has a little swelling at the end of the day. She is out doing her normal daily activities without complications. She denies any fevers or chills or systemic changes to report. I sent her for blood work upon her last visit. She returns today for reevaluation and review. PHYSICAL EXAMINATION: In general, the patient is alert and oriented x 3 and is in no acute distress. The patient is well-developed and well-nourished with a normal affect. The patient is afebrile. HEENT:  Head is normocephalic and atraumatic. Pupils are equally round and reactive to light and accommodation. Extraocular eye movements are intact. Neck is supple. Trachea is midline. No JVD is present. Breathing is nonlabored. Examination of the lower extremities reveals pain-free range of motion of the hips. There is no pain to palpation of the trochanteric bursae. There is a negative straight leg raise, negative calf tenderness, and negative Checos sign. There are no signs of DVT present. Examination of the right knee reveals the skin is intact. The surgical wounds are healed nicely. There is no erythema, ecchymosis, no warmth, and no signs of infection or cellulitis present. She is able to extend the knee without defects noted to the extensor mechanism. She does have extension to 115°. The patella tracks nicely. There is a little rub to the lateral patellar facet. It is nontender to palpate. There is minimal effusion and negative patellar ballottement. RADIOGRAPHS:  Radiographs in the office today, including AP and lateral, show the total knee components are well-fixed. There is no evidence of loosening or fracture noted. It does show that the inferior fragment of the patella is unchanged, slightly displaced. There is some callus formation noted. Hardware is in place with one pin, which has escaped anteriorly. Again, this is unchanged as well. LABORATORY REVIEW:  Review of blood work shows CBC white count is 7.8. Sedimentation rate is 58. CRP is 0.6. IL-6 is 5.1. ASSESSMENT:      1. Status post right knee replacement. 2. Status post ORIF right patella. PLAN:  At this point, the patient is functionally doing well. She will continue with her strengthening activities as tolerated and plan on seeing us back in three months time for evaluation. We will certainly try to keep her away from the operating room if possible.                   JR Brian LUCERO, PAOdellC, ATC

## 2017-10-06 ENCOUNTER — OFFICE VISIT (OUTPATIENT)
Dept: ORTHOPEDIC SURGERY | Facility: CLINIC | Age: 82
End: 2017-10-06

## 2017-10-06 VITALS
HEART RATE: 75 BPM | HEIGHT: 64 IN | WEIGHT: 233 LBS | DIASTOLIC BLOOD PRESSURE: 90 MMHG | BODY MASS INDEX: 39.78 KG/M2 | SYSTOLIC BLOOD PRESSURE: 152 MMHG

## 2017-10-06 DIAGNOSIS — M25.561 RIGHT KNEE PAIN, UNSPECIFIED CHRONICITY: Primary | ICD-10-CM

## 2017-10-06 NOTE — PROGRESS NOTES
[address] [place] [address]  [phone]           Patient: Jayne Dickinson                MRN: [id number]       SSN: [id number]  YOB: 1935        AGE: [de-identified] y.o. SEX: female  Body mass index is 39.99 kg/(m^2). PCP: Leon Azar MD  10/06/17      This office note has been dictated. REVIEW OF SYSTEMS:  Constitutional: Negative for fever, chills, weight loss and malaise/fatigue. HENT: Negative. Eyes: Negative. Respiratory: Negative. Cardiovascular: Negative. Gastrointestinal: No bowel incontinence or constipation. Genitourinary: No bladder incontinence or saddle anesthesia. Skin: Negative. Neurological: Negative. Endo/Heme/Allergies: Negative. Psychiatric/Behavioral: Negative. Musculoskeletal: As per HPI above. Past Medical History:   Diagnosis Date    Arthritis     Arthritis of both hips     Back pain     Balance problem     Chronic back pain     Cough     Easy bruising     Essential hypertension     GERD (gastroesophageal reflux disease)     Hiatal hernia     Hypertension     Irregular heart beat     Left hip pain 10/8/2010    Neck pain     Nervousness     Night sweat     Osteoarthritis, knee bilateral     Pain with urination     Polymyalgia rheumatica (HCC)     Reflux     Spinal stenosis     Trapezius strain     Trochanteric bursitis of left hip     Venous insufficiency          Current Outpatient Prescriptions:     buffered aspirin (BUFFERIN) 325 mg tablet, TAKE 1 TABLET BY MOUTH DAILY, Disp: 30 Tab, Rfl: 2    celecoxib (CELEBREX) 200 mg capsule, TAKE 1 CAPSULE BY MOUTH 2 TIMES DAILY, Disp: 60 Cap, Rfl: 2    ferrous sulfate 325 mg (65 mg iron) tablet, TAKE 1 TABLET BY MOUTH 2 TIMES DAILY WITH MEALS, Disp: 60 Tab, Rfl: 2    tropicamide (MYDRIACYL) 1 % ophthalmic solution, Administer 2 Drops to right eye as needed.  45 minutes prior to schedule appointments, Disp: , Rfl:     cholecalciferol (VITAMIN D3) 1,000 unit tablet, Take 1,000 Units by mouth daily. 1 tab day, Disp: , Rfl:     omeprazole (PRILOSEC) 40 mg capsule, Take 40 mg by mouth daily. 1 tab, Disp: , Rfl:     aMILoride-hydrochlorothiazide (MODURETIC) 5-50 mg tab, Take 1 Tab by mouth daily. , Disp: , Rfl:     amLODIPine (NORVASC) 10 mg tablet, Take 10 mg by mouth daily. 1 tab, Disp: , Rfl: 3    losartan (COZAAR) 100 mg tablet, Take 100 mg by mouth daily. 1 tab, Disp: , Rfl:     metoclopramide HCl (REGLAN) 5 mg tablet, Take 5 mg by mouth two (2) times a day. 1 tab 2x day, Disp: , Rfl:     cetirizine (ZYRTEC) 10 mg tablet, Take 10 mg by mouth daily. 1 tab daily, Disp: , Rfl:     HYDROcodone-acetaminophen (NORCO)  mg tablet, Take 1-2 Tabs by mouth every six (6) hours as needed for Pain. Max Daily Amount: 8 Tabs., Disp: 60 Tab, Rfl: 0    acetaminophen (TYLENOL) 650 mg CR tablet, Take 1,300 mg by mouth two (2) times a day. PRN for pain, Disp: , Rfl:     Allergies   Allergen Reactions    Citric Acid Unknown (comments)    Crinone [Progesterone Micronized] Unknown (comments)    Iodine Unknown (comments)    Percocet [Oxycodone-Acetaminophen] Other (comments)     Gets rebound headaches after taking Percocet       Social History     Social History    Marital status:      Spouse name: N/A    Number of children: N/A    Years of education: N/A     Occupational History    Not on file.      Social History Main Topics    Smoking status: Never Smoker    Smokeless tobacco: Never Used    Alcohol use No    Drug use: No    Sexual activity: No     Other Topics Concern    Not on file     Social History Narrative       Past Surgical History:   Procedure Laterality Date    COLONOSCOPY N/A 7/27/2016    COLONOSCOPY w/polypectomy performed by Preet Gonzalez MD at [address] HX ANKLE FRACTURE TX      HX CHOLECYSTECTOMY      HX HEENT  06/2011    left cornea transplant    HX HYSTERECTOMY      HX ORTHOPAEDIC      right foot and ankle           We did see Ms. Vladimir Parrish for followup with regards to her right knee. She is status post right knee replacement. She is about eight months status post knee replacement. She subsequently had a stumble and an inferior pole of the patella fracture fixed ORIF. She is now six months out of this. The patient was having a little bit of anterior knee pain. She returns today for continued evaluation. She has had no fevers or chills and no stumbles and no falls. She has had no chest pain or shortness of breath. She ambulates with a rolling walker. She has been doing her home exercise program and working on her quadriceps strengthening. However, she states that it did bother her back a little bit. PHYSICAL EXAMINATION:  In general, the patient is alert and oriented x 3 in no acute distress. The patient is well-developed, well-nourished, with a normal affect. The patient is afebrile. HEENT:  Head is normocephalic and atraumatic. Pupils are equally round and reactive to light and accommodation. Extraocular eye movements are intact. Neck is supple. Trachea is midline. No JVD is present. Breathing is nonlabored. Examination of the lower extremities reveals pain-free range of motion of the hips. There is no pain to palpation of the greater trochanteric bursae. There is negative straight leg raise. There is negative calf tenderness. There is negative Checos. There is no evidence of DVT present. Examination of the right knee reveals the skin is intact. The surgical wounds are healed nicely. There is no ecchymosis and no warmth. There is negative joint effusion and negative patellar ballottement and no signs for infection or cellulitis present. She does have some tenderness to palpation to the anterior knee in the area of the wire crossing over the patella itself. Range of motion:  She has full extension.   She does have about a 3-4° extensor lag with good flexion and very good stability. The patella tracks nicely. RADIOGRAPHS:  Radiographs in the office today, AP, lateral, and skyline of the right knee, shows total knee components are well-fixed. No evidence of loosening or fracture is noted. The hardware is in place for the patella and, unfortunately, the inferior fragment has escaped. There is some callus formation noted in the gap. ASSESSMENT:      1. Status post right knee replacement. 2. Inferior pole of the patella fracture. 3. Painful hardware, right knee. PLAN:  At this point, we discussed treatment options. I think that we could remove the hardware if she would like or readjust it to see if we can capture the inferior piece. She is very happy with the knee replacement itself. She is having no discomfort. I have asked her to continue to work on her strengthening activities. We will see her back in about one months time for evaluation, at which point, we will consider an injection for the left knee and we will discuss the right knee a bit further.                   JR Brian LUCERO, SENTHIL, ATC

## 2017-11-01 ENCOUNTER — HOSPITAL ENCOUNTER (OUTPATIENT)
Dept: LAB | Age: 82
Discharge: HOME | End: 2017-11-01
Payer: MEDICARE

## 2017-11-01 DIAGNOSIS — R80.9 PROTEINURIA: ICD-10-CM

## 2017-11-01 DIAGNOSIS — N18.30 CHRONIC KIDNEY DISEASE, STAGE III (MODERATE) (HCC): ICD-10-CM

## 2017-11-01 DIAGNOSIS — E21.1 HYPERPARATHYROIDISM DUE TO 1,25(0H)2D3 (HCC): ICD-10-CM

## 2017-11-01 LAB
25(OH)D3 SERPL-MCNC: 29.7 NG/ML (ref 30–100)
ALBUMIN SERPL-MCNC: 3.4 G/DL (ref 3.4–5)
ANION GAP SERPL CALC-SCNC: 7 MMOL/L (ref 3–18)
BASOPHILS # BLD: 0 K/UL (ref 0–0.06)
BASOPHILS NFR BLD: 0 % (ref 0–2)
BUN SERPL-MCNC: 26 MG/DL (ref 7–18)
BUN/CREAT SERPL: 20 (ref 12–20)
CALCIUM SERPL-MCNC: 9.3 MG/DL (ref 8.5–10.1)
CALCIUM SERPL-MCNC: 9.4 MG/DL (ref 8.5–10.1)
CHLORIDE SERPL-SCNC: 106 MMOL/L (ref 100–108)
CO2 SERPL-SCNC: 28 MMOL/L (ref 21–32)
CREAT SERPL-MCNC: 1.29 MG/DL (ref 0.6–1.3)
CREAT UR-MCNC: 185 MG/DL (ref 30–125)
DIFFERENTIAL METHOD BLD: ABNORMAL
EOSINOPHIL # BLD: 0.2 K/UL (ref 0–0.4)
EOSINOPHIL NFR BLD: 3 % (ref 0–5)
ERYTHROCYTE [DISTWIDTH] IN BLOOD BY AUTOMATED COUNT: 13.7 % (ref 11.6–14.5)
GLUCOSE SERPL-MCNC: 90 MG/DL (ref 74–99)
HCT VFR BLD AUTO: 34.7 % (ref 35–45)
HGB BLD-MCNC: 11.3 G/DL (ref 12–16)
LYMPHOCYTES # BLD: 1.8 K/UL (ref 0.9–3.6)
LYMPHOCYTES NFR BLD: 26 % (ref 21–52)
MCH RBC QN AUTO: 26.7 PG (ref 24–34)
MCHC RBC AUTO-ENTMCNC: 32.6 G/DL (ref 31–37)
MCV RBC AUTO: 82 FL (ref 74–97)
MONOCYTES # BLD: 0.5 K/UL (ref 0.05–1.2)
MONOCYTES NFR BLD: 8 % (ref 3–10)
NEUTS SEG # BLD: 4.4 K/UL (ref 1.8–8)
NEUTS SEG NFR BLD: 63 % (ref 40–73)
PHOSPHATE SERPL-MCNC: 3.1 MG/DL (ref 2.5–4.9)
PLATELET # BLD AUTO: 267 K/UL (ref 135–420)
PMV BLD AUTO: 10.4 FL (ref 9.2–11.8)
POTASSIUM SERPL-SCNC: 4 MMOL/L (ref 3.5–5.5)
PROT UR-MCNC: 23 MG/DL
PROT/CREAT UR-RTO: 0.1
PTH-INTACT SERPL-MCNC: 132.2 PG/ML (ref 14–72)
RBC # BLD AUTO: 4.23 M/UL (ref 4.2–5.3)
SODIUM SERPL-SCNC: 141 MMOL/L (ref 136–145)
WBC # BLD AUTO: 6.9 K/UL (ref 4.6–13.2)

## 2017-11-01 PROCEDURE — 82306 VITAMIN D 25 HYDROXY: CPT | Performed by: INTERNAL MEDICINE

## 2017-11-01 PROCEDURE — 80069 RENAL FUNCTION PANEL: CPT | Performed by: INTERNAL MEDICINE

## 2017-11-01 PROCEDURE — 83970 ASSAY OF PARATHORMONE: CPT | Performed by: INTERNAL MEDICINE

## 2017-11-01 PROCEDURE — 85025 COMPLETE CBC W/AUTO DIFF WBC: CPT | Performed by: INTERNAL MEDICINE

## 2017-11-01 PROCEDURE — 84156 ASSAY OF PROTEIN URINE: CPT | Performed by: INTERNAL MEDICINE

## 2017-11-01 PROCEDURE — 36415 COLL VENOUS BLD VENIPUNCTURE: CPT | Performed by: INTERNAL MEDICINE

## 2017-11-01 RX ORDER — CELECOXIB 200 MG/1
CAPSULE ORAL
Qty: 60 CAP | Refills: 2 | Status: SHIPPED | OUTPATIENT
Start: 2017-11-01 | End: 2018-01-11

## 2017-11-16 ENCOUNTER — OFFICE VISIT (OUTPATIENT)
Dept: ORTHOPEDIC SURGERY | Facility: CLINIC | Age: 82
End: 2017-11-16

## 2017-11-16 VITALS
HEIGHT: 64 IN | HEART RATE: 81 BPM | OXYGEN SATURATION: 98 % | RESPIRATION RATE: 16 BRPM | DIASTOLIC BLOOD PRESSURE: 84 MMHG | SYSTOLIC BLOOD PRESSURE: 178 MMHG

## 2017-11-16 DIAGNOSIS — M17.12 PRIMARY OSTEOARTHRITIS OF LEFT KNEE: Primary | ICD-10-CM

## 2017-11-16 RX ORDER — BETAMETHASONE SODIUM PHOSPHATE AND BETAMETHASONE ACETATE 3; 3 MG/ML; MG/ML
6 INJECTION, SUSPENSION INTRA-ARTICULAR; INTRALESIONAL; INTRAMUSCULAR; SOFT TISSUE ONCE
Qty: 1 ML | Refills: 0
Start: 2017-11-16 | End: 2017-11-16

## 2017-11-16 NOTE — PROGRESS NOTES
[address] [place] [address]  [phone]           Patient: Eleuterio Marshall                MRN: [id number]       SSN: [id number]  YOB: 1935        AGE: [de-identified] y.o. SEX: female  There is no height or weight on file to calculate BMI. PCP: Vida Tolentino MD  11/16/17      This office note has been dictated. REVIEW OF SYSTEMS:  Constitutional: Negative for fever, chills, weight loss and malaise/fatigue. HENT: Negative. Eyes: Negative. Respiratory: Negative. Cardiovascular: Negative. Gastrointestinal: No bowel incontinence or constipation. Genitourinary: No bladder incontinence or saddle anesthesia. Skin: Negative. Neurological: Negative. Endo/Heme/Allergies: Negative. Psychiatric/Behavioral: Negative. Musculoskeletal: As per HPI above.      Past Medical History:   Diagnosis Date    Arthritis     Arthritis of both hips     Back pain     Balance problem     Chronic back pain     Cough     Easy bruising     Essential hypertension     GERD (gastroesophageal reflux disease)     Hiatal hernia     Hypertension     Irregular heart beat     Left hip pain 10/8/2010    Neck pain     Nervousness     Night sweat     Osteoarthritis, knee bilateral     Pain with urination     Polymyalgia rheumatica (HCC)     Reflux     Spinal stenosis     Trapezius strain     Trochanteric bursitis of left hip     Venous insufficiency          Current Outpatient Prescriptions:     betamethasone (CELESTONE SOLUSPAN) 6 mg/mL injection, 1 mL by Intra artICUlar route once for 1 dose., Disp: 1 mL, Rfl: 0    celecoxib (CELEBREX) 200 mg capsule, TAKE 1 CAPSULE BY MOUTH 2 TIMES DAILY, Disp: 60 Cap, Rfl: 2    buffered aspirin (BUFFERIN) 325 mg tablet, TAKE 1 TABLET BY MOUTH DAILY, Disp: 30 Tab, Rfl: 2    ferrous sulfate 325 mg (65 mg iron) tablet, TAKE 1 TABLET BY MOUTH 2 TIMES DAILY WITH MEALS, Disp: 60 Tab, Rfl: 2    tropicamide (MYDRIACYL) 1 % ophthalmic solution, Administer 2 Drops to right eye as needed. 45 minutes prior to schedule appointments, Disp: , Rfl:     cholecalciferol (VITAMIN D3) 1,000 unit tablet, Take 1,000 Units by mouth daily. 1 tab day, Disp: , Rfl:     omeprazole (PRILOSEC) 40 mg capsule, Take 40 mg by mouth daily. 1 tab, Disp: , Rfl:     acetaminophen (TYLENOL) 650 mg CR tablet, Take 1,300 mg by mouth two (2) times a day. PRN for pain, Disp: , Rfl:     aMILoride-hydrochlorothiazide (MODURETIC) 5-50 mg tab, Take 1 Tab by mouth daily. , Disp: , Rfl:     amLODIPine (NORVASC) 10 mg tablet, Take 10 mg by mouth daily. 1 tab, Disp: , Rfl: 3    losartan (COZAAR) 100 mg tablet, Take 100 mg by mouth daily. 1 tab, Disp: , Rfl:     metoclopramide HCl (REGLAN) 5 mg tablet, Take 5 mg by mouth two (2) times a day. 1 tab 2x day, Disp: , Rfl:     cetirizine (ZYRTEC) 10 mg tablet, Take 10 mg by mouth daily. 1 tab daily, Disp: , Rfl:     HYDROcodone-acetaminophen (NORCO)  mg tablet, Take 1-2 Tabs by mouth every six (6) hours as needed for Pain. Max Daily Amount: 8 Tabs. (Patient not taking: Reported on 11/16/2017), Disp: 60 Tab, Rfl: 0    Allergies   Allergen Reactions    Citric Acid Unknown (comments)    Crinone [Progesterone Micronized] Unknown (comments)    Iodine Unknown (comments)    Percocet [Oxycodone-Acetaminophen] Other (comments)     Gets rebound headaches after taking Percocet       Social History     Social History    Marital status:      Spouse name: N/A    Number of children: N/A    Years of education: N/A     Occupational History    Not on file.      Social History Main Topics    Smoking status: Never Smoker    Smokeless tobacco: Never Used    Alcohol use No    Drug use: No    Sexual activity: No     Other Topics Concern    Not on file     Social History Narrative       Past Surgical History:   Procedure Laterality Date    COLONOSCOPY N/A 7/27/2016    COLONOSCOPY w/polypectomy performed by Simi Schwartz MD at [facility]    HX [facility]      HX CHOLECYSTECTOMY      HX HEENT  06/2011    left cornea transplant    HX HYSTERECTOMY      HX ORTHOPAEDIC      right foot and ankle           * Patient was identified by name and date of birth   * Agreement on procedure being performed was verified  * Risks and Benefits explained to the patient  * Procedure site verified and marked as necessary  * Patient was positioned for comfort  * Consent was signed and verified  10:36 AM    The patient was instructed on post injection care. We did see Ms. Tiffany Israel for followup with regards to her bilateral knees. The patient is status post right knee replacement done back in February. She did have a patellar fracture fixed in March. She has done well with it. She is having a little bit of discomfort in the right knee, mainly where the hardware is from the ORIF. She denies any skin breakdown. She has had no fevers or chills. Her main problem today is that of her left knee. She does have known advanced arthritis of the left knee and is requesting cortisone injection to the left knee today. PHYSICAL EXAMINATION:  In general, the patient is alert and oriented x 3 in no acute distress. The patient is well-developed, well-nourished, with a normal affect. The patient is afebrile. HEENT:  Head is normocephalic and atraumatic. Pupils are equally round and reactive to light and accommodation. Extraocular eye movements are intact. Neck is supple. Trachea is midline. No JVD is present. Breathing is nonlabored. Examination of the lower extremities reveals pain-free range of motion of the hips. There is no pain to palpation of the greater trochanteric bursae. There is negative straight leg raise. There is negative calf tenderness. There is negative Checos. There is no evidence of DVT present. Examination of the right knee reveals the skin is intact.   There is no ecchymosis, no warmth, and no signs of infection or cellulitis present. I can feel one of the K wires to the medial side of the knee anteriorly. There is no skin breakdown and no erythema noted. There is just slight tenderness in this area to light palpation. She is missing about 2° on extension. She has good flexion. Stability is quite good. Examination of the left knee reveals the skin is intact. There is no ecchymosis, no warmth, and no signs of infection or cellulitis present. She does have pain to palpation tricompartmentally and crepitus arising from the anterior compartment. Findings are consistent with advanced arthritis of the left knee. ASSESSMENT:      1. Status post right knee replacement. 2. Status post ORIF right knee patella. 3. Left knee advanced osteoarthritis. PLAN:  At this point, we discussed the right knee. Certainly, we do not want her to develop ay wound problems. She may have to have a revision of the right knee, whether we just remove the hardware or redo the ORIF. We will get x-rays upon her next visit for further evaluation. Certainly, if it changes from the worse prior to the followup visit, she is to followup sooner. With regards to the left knee today, we are going to move forward with a cortisone injection. Under aseptic conditions, and after informed and written consent, with ultrasound-guided assistance, left knee was prepped with Betadine and 6 mg of Celestone was injected without complications. The patient tolerated the injection well. The patient was instructed on post injection care. We will see her back in the office in one months time for evaluation.                         JR Brian LUCERO, PAOdellC, ATC

## 2017-12-14 ENCOUNTER — OFFICE VISIT (OUTPATIENT)
Dept: ORTHOPEDIC SURGERY | Facility: CLINIC | Age: 82
End: 2017-12-14

## 2017-12-14 VITALS
BODY MASS INDEX: 40.6 KG/M2 | RESPIRATION RATE: 18 BRPM | WEIGHT: 237.8 LBS | OXYGEN SATURATION: 93 % | SYSTOLIC BLOOD PRESSURE: 165 MMHG | HEIGHT: 64 IN | DIASTOLIC BLOOD PRESSURE: 77 MMHG | HEART RATE: 71 BPM | TEMPERATURE: 98.4 F

## 2017-12-14 DIAGNOSIS — M25.561 RIGHT KNEE PAIN, UNSPECIFIED CHRONICITY: Primary | ICD-10-CM

## 2017-12-14 PROBLEM — E66.01 OBESITY, MORBID (HCC): Status: ACTIVE | Noted: 2017-12-14

## 2017-12-14 NOTE — PROGRESS NOTES
[address] [place] [address]  [phone]           Patient: Lazaro Keane                MRN: [id number]       SSN: [id number]  YOB: 1935        AGE: [de-identified] y.o. SEX: female  Body mass index is 40.82 kg/(m^2). PCP: Valentino Sit, MD  12/14/17      This office note has been dictated. REVIEW OF SYSTEMS:  Constitutional: Negative for fever, chills, weight loss and malaise/fatigue. HENT: Negative. Eyes: Negative. Respiratory: Negative. Cardiovascular: Negative. Gastrointestinal: No bowel incontinence or constipation. Genitourinary: No bladder incontinence or saddle anesthesia. Skin: Negative. Neurological: Negative. Endo/Heme/Allergies: Negative. Psychiatric/Behavioral: Negative. Musculoskeletal: As per HPI above. Past Medical History:   Diagnosis Date    Arthritis     Arthritis of both hips     Back pain     Balance problem     Chronic back pain     Cough     Easy bruising     Essential hypertension     GERD (gastroesophageal reflux disease)     Hiatal hernia     Hypertension     Irregular heart beat     Left hip pain 10/8/2010    Neck pain     Nervousness     Night sweat     Osteoarthritis, knee bilateral     Pain with urination     Polymyalgia rheumatica (HCC)     Reflux     Spinal stenosis     Trapezius strain     Trochanteric bursitis of left hip     Venous insufficiency          Current Outpatient Prescriptions:     celecoxib (CELEBREX) 200 mg capsule, TAKE 1 CAPSULE BY MOUTH 2 TIMES DAILY, Disp: 60 Cap, Rfl: 2    buffered aspirin (BUFFERIN) 325 mg tablet, TAKE 1 TABLET BY MOUTH DAILY, Disp: 30 Tab, Rfl: 2    ferrous sulfate 325 mg (65 mg iron) tablet, TAKE 1 TABLET BY MOUTH 2 TIMES DAILY WITH MEALS, Disp: 60 Tab, Rfl: 2    tropicamide (MYDRIACYL) 1 % ophthalmic solution, Administer 2 Drops to right eye as needed.  45 minutes prior to schedule appointments, Disp: , Rfl:     cholecalciferol (VITAMIN D3) 1,000 unit tablet, Take 1,000 Units by mouth daily. 1 tab day, Disp: , Rfl:     omeprazole (PRILOSEC) 40 mg capsule, Take 40 mg by mouth daily. 1 tab, Disp: , Rfl:     aMILoride-hydrochlorothiazide (MODURETIC) 5-50 mg tab, Take 1 Tab by mouth daily. , Disp: , Rfl:     amLODIPine (NORVASC) 10 mg tablet, Take 10 mg by mouth daily. 1 tab, Disp: , Rfl: 3    losartan (COZAAR) 100 mg tablet, Take 100 mg by mouth daily. 1 tab, Disp: , Rfl:     metoclopramide HCl (REGLAN) 5 mg tablet, Take 5 mg by mouth two (2) times a day. 1 tab 2x day, Disp: , Rfl:     cetirizine (ZYRTEC) 10 mg tablet, Take 10 mg by mouth daily. 1 tab daily, Disp: , Rfl:     HYDROcodone-acetaminophen (NORCO)  mg tablet, Take 1-2 Tabs by mouth every six (6) hours as needed for Pain. Max Daily Amount: 8 Tabs. (Patient not taking: Reported on 11/16/2017), Disp: 60 Tab, Rfl: 0    acetaminophen (TYLENOL) 650 mg CR tablet, Take 1,300 mg by mouth two (2) times a day. PRN for pain, Disp: , Rfl:     Allergies   Allergen Reactions    Citric Acid Unknown (comments)    Crinone [Progesterone Micronized] Unknown (comments)    Iodine Unknown (comments)    Percocet [Oxycodone-Acetaminophen] Other (comments)     Gets rebound headaches after taking Percocet       Social History     Social History    Marital status:      Spouse name: N/A    Number of children: N/A    Years of education: N/A     Occupational History    Not on file.      Social History Main Topics    Smoking status: Never Smoker    Smokeless tobacco: Never Used    Alcohol use No    Drug use: No    Sexual activity: No     Other Topics Concern    Not on file     Social History Narrative       Past Surgical History:   Procedure Laterality Date    COLONOSCOPY N/A 7/27/2016    COLONOSCOPY w/polypectomy performed by Mendez Lowery MD at [address] HX ANKLE FRACTURE TX      HX CHOLECYSTECTOMY      HX HEENT  06/2011    left cornea transplant    HX HYSTERECTOMY      HX ORTHOPAEDIC      right foot and ankle             We did see Ms. Jason Urena for followup with regards to her right knee replacement. The patient is about 10 months status post knee replacement surgery. She did well with the knee replacement portion. She did have a patella fracture, which was fixed with ORIF. She has had some displacement of the fracture, which has been going on. However, she has been very functional, and we are trying to avoid any further surgical intervention. We have been keeping on the pins as well, as they had the potential of pushing into the skin. She reports the knee is doing pretty well. She has had no fevers or chills and no injuries. She ambulates with a walker. PHYSICAL EXAMINATION:  In general, the patient is alert and oriented x 3 in no acute distress. The patient is well-developed, well-nourished, with a normal affect. The patient is afebrile. HEENT:  Head is normocephalic and atraumatic. Pupils are equally round and reactive to light and accommodation. Extraocular eye movements are intact. Neck is supple. Trachea is midline. No JVD is present. Breathing is nonlabored. Examination of the lower extremities reveals pain-free range of motion of the hips. There is no pain to palpation of the greater trochanteric bursae. There is negative straight leg raise. There is negative calf tenderness. There is negative Checos. There is no evidence of DVT present. Examination of the right knee reveals the skin is intact. There is no ecchymosis and no warmth. The surgical wounds are healed nicely. The K wire pin is noticeably to the anterior knee. There is no skin breakdown, however. She does have about 4° extensor lag. She comes in to extension with no defects noted to the extensor mechanism, however. Neurovascular status is intact. Stability is quite good.        RADIOGRAPHS:  Radiographs in the office today, including AP, lateral, and skyline of the right knee shows total knee components are well-fixed. She does have some displacement of the inferior pole of the patella fracture. The hardware is in place. It is cut out of the inferior pole. ASSESSMENT:      1. Status post right knee replacement. 2. Patellar fracture, right knee. PLAN:  At this point, I think we need to get the patient back in to surgery to at least remove the hardware, so it does not cause any skin issues. We did discuss the possibility of trying to redo the fracture to see if we can get the fragment to stay any better. The patient does understand the surgery and would like to move forward with it. We will see her back for preop history and physical and discuss this further with Dr. Itzel Garrido as well.                  JR Brian GUSMANS, PA-C, ATC

## 2017-12-18 DIAGNOSIS — Z01.818 PREOP EXAMINATION: Primary | ICD-10-CM

## 2017-12-29 ENCOUNTER — HOSPITAL ENCOUNTER (OUTPATIENT)
Dept: GENERAL RADIOLOGY | Age: 82
Discharge: HOME | End: 2017-12-29
Payer: MEDICARE

## 2017-12-29 ENCOUNTER — HOSPITAL ENCOUNTER (OUTPATIENT)
Dept: PREADMISSION TESTING | Age: 82
Discharge: HOME | End: 2017-12-29
Payer: MEDICARE

## 2017-12-29 DIAGNOSIS — Z01.818 PREOP EXAMINATION: ICD-10-CM

## 2017-12-29 LAB
ABO + RH BLD: NORMAL
ALBUMIN SERPL-MCNC: 3.4 G/DL (ref 3.4–5)
ANION GAP SERPL CALC-SCNC: 8 MMOL/L (ref 3–18)
APPEARANCE UR: CLEAR
APTT PPP: 42.8 SEC (ref 23–36.4)
ATRIAL RATE: 73 BPM
BACTERIA URNS QL MICRO: ABNORMAL /HPF
BASOPHILS # BLD: 0 K/UL (ref 0–0.06)
BASOPHILS NFR BLD: 0 % (ref 0–2)
BILIRUB UR QL: NEGATIVE
BLOOD GROUP ANTIBODIES SERPL: NORMAL
BUN SERPL-MCNC: 26 MG/DL (ref 7–18)
BUN/CREAT SERPL: 22 (ref 12–20)
CALCIUM SERPL-MCNC: 9.4 MG/DL (ref 8.5–10.1)
CALCULATED P AXIS, ECG09: 44 DEGREES
CALCULATED R AXIS, ECG10: 14 DEGREES
CALCULATED T AXIS, ECG11: 48 DEGREES
CHLORIDE SERPL-SCNC: 103 MMOL/L (ref 100–108)
CO2 SERPL-SCNC: 29 MMOL/L (ref 21–32)
COLOR UR: YELLOW
CREAT SERPL-MCNC: 1.19 MG/DL (ref 0.6–1.3)
DIAGNOSIS, 93000: NORMAL
DIFFERENTIAL METHOD BLD: ABNORMAL
EOSINOPHIL # BLD: 0.2 K/UL (ref 0–0.4)
EOSINOPHIL NFR BLD: 2 % (ref 0–5)
EPITH CASTS URNS QL MICRO: ABNORMAL /LPF (ref 0–5)
ERYTHROCYTE [DISTWIDTH] IN BLOOD BY AUTOMATED COUNT: 13.7 % (ref 11.6–14.5)
EST. AVERAGE GLUCOSE BLD GHB EST-MCNC: 108 MG/DL
GLUCOSE SERPL-MCNC: 90 MG/DL (ref 74–99)
GLUCOSE UR STRIP.AUTO-MCNC: NEGATIVE MG/DL
HBA1C MFR BLD: 5.4 % (ref 4.2–5.6)
HCT VFR BLD AUTO: 34.6 % (ref 35–45)
HGB BLD-MCNC: 11.4 G/DL (ref 12–16)
HGB UR QL STRIP: NEGATIVE
INR PPP: 1 (ref 0.8–1.2)
KETONES UR QL STRIP.AUTO: NEGATIVE MG/DL
LEUKOCYTE ESTERASE UR QL STRIP.AUTO: ABNORMAL
LYMPHOCYTES # BLD: 1.3 K/UL (ref 0.9–3.6)
LYMPHOCYTES NFR BLD: 16 % (ref 21–52)
MCH RBC QN AUTO: 27.3 PG (ref 24–34)
MCHC RBC AUTO-ENTMCNC: 32.9 G/DL (ref 31–37)
MCV RBC AUTO: 83 FL (ref 74–97)
MONOCYTES # BLD: 0.6 K/UL (ref 0.05–1.2)
MONOCYTES NFR BLD: 8 % (ref 3–10)
NEUTS SEG # BLD: 6.2 K/UL (ref 1.8–8)
NEUTS SEG NFR BLD: 74 % (ref 40–73)
NITRITE UR QL STRIP.AUTO: NEGATIVE
P-R INTERVAL, ECG05: 166 MS
PH UR STRIP: 6.5 [PH] (ref 5–8)
PLATELET # BLD AUTO: 311 K/UL (ref 135–420)
PMV BLD AUTO: 10.6 FL (ref 9.2–11.8)
POTASSIUM SERPL-SCNC: 4.2 MMOL/L (ref 3.5–5.5)
PROT UR STRIP-MCNC: NEGATIVE MG/DL
PROTHROMBIN TIME: 12.4 SEC (ref 11.5–15.2)
Q-T INTERVAL, ECG07: 378 MS
QRS DURATION, ECG06: 88 MS
QTC CALCULATION (BEZET), ECG08: 416 MS
RBC # BLD AUTO: 4.17 M/UL (ref 4.2–5.3)
SODIUM SERPL-SCNC: 140 MMOL/L (ref 136–145)
SP GR UR REFRACTOMETRY: 1.02 (ref 1–1.03)
SPECIMEN EXP DATE BLD: NORMAL
UROBILINOGEN UR QL STRIP.AUTO: 0.2 EU/DL (ref 0.2–1)
VENTRICULAR RATE, ECG03: 73 BPM
WBC # BLD AUTO: 8.3 K/UL (ref 4.6–13.2)
WBC URNS QL MICRO: ABNORMAL /HPF (ref 0–4)

## 2017-12-29 PROCEDURE — 85025 COMPLETE CBC W/AUTO DIFF WBC: CPT | Performed by: PHYSICIAN ASSISTANT

## 2017-12-29 PROCEDURE — 36415 COLL VENOUS BLD VENIPUNCTURE: CPT | Performed by: PHYSICIAN ASSISTANT

## 2017-12-29 PROCEDURE — 80048 BASIC METABOLIC PNL TOTAL CA: CPT | Performed by: PHYSICIAN ASSISTANT

## 2017-12-29 PROCEDURE — 83036 HEMOGLOBIN GLYCOSYLATED A1C: CPT | Performed by: PHYSICIAN ASSISTANT

## 2017-12-29 PROCEDURE — 82040 ASSAY OF SERUM ALBUMIN: CPT | Performed by: PHYSICIAN ASSISTANT

## 2017-12-29 PROCEDURE — 93005 ELECTROCARDIOGRAM TRACING: CPT

## 2017-12-29 PROCEDURE — 86900 BLOOD TYPING SEROLOGIC ABO: CPT | Performed by: PHYSICIAN ASSISTANT

## 2017-12-29 PROCEDURE — 87186 SC STD MICRODIL/AGAR DIL: CPT | Performed by: PHYSICIAN ASSISTANT

## 2017-12-29 PROCEDURE — 87077 CULTURE AEROBIC IDENTIFY: CPT | Performed by: PHYSICIAN ASSISTANT

## 2017-12-29 PROCEDURE — 71020 XR CHEST PA LAT: CPT

## 2017-12-29 PROCEDURE — 81001 URINALYSIS AUTO W/SCOPE: CPT | Performed by: PHYSICIAN ASSISTANT

## 2017-12-29 PROCEDURE — 85610 PROTHROMBIN TIME: CPT | Performed by: PHYSICIAN ASSISTANT

## 2017-12-29 PROCEDURE — 85730 THROMBOPLASTIN TIME PARTIAL: CPT | Performed by: PHYSICIAN ASSISTANT

## 2017-12-29 PROCEDURE — 87086 URINE CULTURE/COLONY COUNT: CPT | Performed by: PHYSICIAN ASSISTANT

## 2017-12-31 LAB
BACTERIA SPEC CULT: ABNORMAL
SERVICE CMNT-IMP: ABNORMAL

## 2018-01-03 ENCOUNTER — OFFICE VISIT (OUTPATIENT)
Dept: ORTHOPEDIC SURGERY | Facility: CLINIC | Age: 83
End: 2018-01-03

## 2018-01-03 VITALS
BODY MASS INDEX: 40.8 KG/M2 | DIASTOLIC BLOOD PRESSURE: 64 MMHG | HEART RATE: 83 BPM | WEIGHT: 239 LBS | RESPIRATION RATE: 16 BRPM | HEIGHT: 64 IN | SYSTOLIC BLOOD PRESSURE: 152 MMHG | OXYGEN SATURATION: 96 %

## 2018-01-03 DIAGNOSIS — S82.009A: ICD-10-CM

## 2018-01-03 DIAGNOSIS — N39.0 URINARY TRACT INFECTION WITHOUT HEMATURIA, SITE UNSPECIFIED: ICD-10-CM

## 2018-01-03 DIAGNOSIS — M25.561 RIGHT KNEE PAIN, UNSPECIFIED CHRONICITY: Primary | ICD-10-CM

## 2018-01-03 RX ORDER — ACETAMINOPHEN 325 MG/1
1000 TABLET ORAL ONCE
Status: CANCELLED | OUTPATIENT
Start: 2018-01-03 | End: 2018-01-03

## 2018-01-03 RX ORDER — WARFARIN 1 MG/1
10 TABLET ORAL ONCE
Status: CANCELLED | OUTPATIENT
Start: 2018-01-03 | End: 2018-01-03

## 2018-01-03 RX ORDER — PREGABALIN 25 MG/1
50 CAPSULE ORAL ONCE
Status: CANCELLED | OUTPATIENT
Start: 2018-01-03 | End: 2018-01-03

## 2018-01-03 RX ORDER — LEVOFLOXACIN 500 MG/1
TABLET, FILM COATED ORAL
Qty: 10 TAB | Refills: 0 | Status: SHIPPED | OUTPATIENT
Start: 2018-01-03 | End: 2018-01-11

## 2018-01-03 RX ORDER — CELECOXIB 100 MG/1
200 CAPSULE ORAL ONCE
Status: CANCELLED | OUTPATIENT
Start: 2018-01-03 | End: 2018-01-03

## 2018-01-03 NOTE — H&P
[address] [place] [address]  [phone]           HISTORY & PHYSICAL      Patient: Clarence Andrade                MRN: [id number]       SSN: [id number]  YOB: 1935        AGE: [de-identified] y.o. SEX: female  Body mass index is 41.02 kg/(m^2). PCP: Ashley Joya MD  01/03/18      CC: revision patellar fracture right knee  Problem List Items Addressed This Visit     None      Visit Diagnoses     Right knee pain, unspecified chronicity    -  Primary    Relevant Orders    AMB POC XRAY, KNEE; COMPLETE, 4+ VIEW (Completed)    Closed fracture of patella, unspecified fracture morphology, initial encounter        Urinary tract infection without hematuria, site unspecified        Relevant Medications    levoFLOXacin (LEVAQUIN) 500 mg tablet            HPI:  The patient is a pleasant [de-identified] y.o. whom had a TKA of their Right knee and developed a patellar fracture. This was fixed and she has been doing well. Unfortunately the inferior aspect of the patellar fracture has escaped and the hardware is tenting the skin. No skin breakdown. Due to the current findings and affected activities of daily living, surgical intervention is indicated. The alternatives, risks, complications, as well as expected outcome were discussed. These include but are not limited to infection, blood loss, need for blood transfusion, neurovascular damage, DVT, PE,  post-op stiffness and pain, leg length discrepancy, dislocation, anesthetic complications, prothesis longevity, need for more surgery, MI, stroke, and even death. The patient understands and wishes to proceed with surgery.       Past Medical History:   Diagnosis Date    Arthritis     Arthritis of both hips     Back pain     Balance problem     Chronic back pain     Cough     Easy bruising     Essential hypertension     GERD (gastroesophageal reflux disease)     Hiatal hernia     Hypertension  Irregular heart beat     Left hip pain 10/8/2010    Neck pain     Nervousness     Night sweat     Osteoarthritis, knee bilateral     Pain with urination     Polymyalgia rheumatica (HCC)     Reflux     Spinal stenosis     Trapezius strain     Trochanteric bursitis of left hip     Venous insufficiency          Current Outpatient Prescriptions:     levoFLOXacin (LEVAQUIN) 500 mg tablet, 1 po q day x 5 days, Disp: 10 Tab, Rfl: 0    celecoxib (CELEBREX) 200 mg capsule, TAKE 1 CAPSULE BY MOUTH 2 TIMES DAILY, Disp: 60 Cap, Rfl: 2    buffered aspirin (BUFFERIN) 325 mg tablet, TAKE 1 TABLET BY MOUTH DAILY, Disp: 30 Tab, Rfl: 2    ferrous sulfate 325 mg (65 mg iron) tablet, TAKE 1 TABLET BY MOUTH 2 TIMES DAILY WITH MEALS, Disp: 60 Tab, Rfl: 2    tropicamide (MYDRIACYL) 1 % ophthalmic solution, Administer 2 Drops to right eye as needed. 45 minutes prior to schedule appointments, Disp: , Rfl:     cholecalciferol (VITAMIN D3) 1,000 unit tablet, Take 1,000 Units by mouth daily. 1 tab day, Disp: , Rfl:     omeprazole (PRILOSEC) 40 mg capsule, Take 40 mg by mouth daily. 1 tab, Disp: , Rfl:     amLODIPine (NORVASC) 10 mg tablet, Take 10 mg by mouth daily. 1 tab, Disp: , Rfl: 3    losartan (COZAAR) 100 mg tablet, Take 100 mg by mouth daily. 1 tab, Disp: , Rfl:     metoclopramide HCl (REGLAN) 5 mg tablet, Take 5 mg by mouth two (2) times a day. 1 tab 2x day, Disp: , Rfl:     cetirizine (ZYRTEC) 10 mg tablet, Take 10 mg by mouth daily. 1 tab daily, Disp: , Rfl:     HYDROcodone-acetaminophen (NORCO)  mg tablet, Take 1-2 Tabs by mouth every six (6) hours as needed for Pain. Max Daily Amount: 8 Tabs. (Patient not taking: Reported on 11/16/2017), Disp: 60 Tab, Rfl: 0    acetaminophen (TYLENOL) 650 mg CR tablet, Take 1,300 mg by mouth two (2) times a day. PRN for pain, Disp: , Rfl:     aMILoride-hydrochlorothiazide (MODURETIC) 5-50 mg tab, Take 1 Tab by mouth daily. , Disp: , Rfl:     Allergies   Allergen Reactions    Citric Acid Unknown (comments)    Crinone [Progesterone Micronized] Unknown (comments)    Iodine Unknown (comments)    Percocet [Oxycodone-Acetaminophen] Other (comments)     Gets rebound headaches after taking Percocet       Social History     Social History    Marital status:      Spouse name: N/A    Number of children: N/A    Years of education: N/A     Occupational History    Not on file. Social History Main Topics    Smoking status: Never Smoker    Smokeless tobacco: Never Used    Alcohol use No    Drug use: No    Sexual activity: No     Other Topics Concern    Not on file     Social History Narrative       Past Surgical History:   Procedure Laterality Date    COLONOSCOPY N/A 7/27/2016    COLONOSCOPY w/polypectomy performed by Koki Aranda MD at [address] [facility]      HX CHOLECYSTECTOMY      HX HEENT  06/2011    left cornea transplant    HX HYSTERECTOMY      HX ORTHOPAEDIC      right foot and ankle       Family History:  Non-contributory. PE:  Visit Vitals    /64 (BP 1 Location: Left arm, BP Patient Position: Sitting)    Pulse 83    Resp 16    Ht 5' 4\" (1.626 m)    Wt 239 lb (108.4 kg)    SpO2 96%    BMI 41.02 kg/m2     A&O X3, NAD, well develop, well nourished  Heart: S1-S2, rrr  Lungs: CTA bilat  Abd: soft, nt, nt, + bs in all quadrants  Ext:  Pos distal pulses to DP, PT  Right knee. Wounds cdi. Hardware palpable to anterior knee. 3-4 degree extensor lag noted    X-ray: right knee shows implants well fixed. Inferior pole of patellar fracture escaped. Labs: labs were reviewed and wnl.  ua pos, treated with levaquin    A:  Right  Knee, sp TKA, revision patellar fracture. P:  At this point we will move forward with surgery. Again, the alternatives, risks, complications, as well as expected outcome were discussed and the patient wishes to proceed with surgery.   Pt has been instructed to stop aspirin, nsaids, rheumatologic medications and blood thinners. They have also been instructed to continue on any heart and bp meds and to take them the morning of surgery with sips of water.          Giselle Kat

## 2018-01-08 ENCOUNTER — ANESTHESIA EVENT (OUTPATIENT)
Dept: SURGERY | Age: 83
DRG: 496 | End: 2018-01-08
Payer: MEDICARE

## 2018-01-09 ENCOUNTER — HOSPITAL ENCOUNTER (INPATIENT)
Age: 83
LOS: 2 days | Discharge: HOME HEALTH | DRG: 496 | End: 2018-01-11
Attending: ORTHOPAEDIC SURGERY | Admitting: ORTHOPAEDIC SURGERY
Payer: MEDICARE

## 2018-01-09 ENCOUNTER — APPOINTMENT (OUTPATIENT)
Dept: GENERAL RADIOLOGY | Age: 83
DRG: 496 | End: 2018-01-09
Attending: PHYSICIAN ASSISTANT
Payer: MEDICARE

## 2018-01-09 ENCOUNTER — ANESTHESIA (OUTPATIENT)
Dept: SURGERY | Age: 83
DRG: 496 | End: 2018-01-09
Payer: MEDICARE

## 2018-01-09 DIAGNOSIS — Z87.81 HISTORY OF PATELLAR FRACTURE: Primary | ICD-10-CM

## 2018-01-09 PROCEDURE — 74011250636 HC RX REV CODE- 250/636

## 2018-01-09 PROCEDURE — 77030013708 HC HNDPC SUC IRR PULS STRY –B: Performed by: ORTHOPAEDIC SURGERY

## 2018-01-09 PROCEDURE — 74011250637 HC RX REV CODE- 250/637: Performed by: PHYSICIAN ASSISTANT

## 2018-01-09 PROCEDURE — 77030003029 HC SUT VCRL J&J -B: Performed by: ORTHOPAEDIC SURGERY

## 2018-01-09 PROCEDURE — 77030002922 HC SUT FBRWRE ARTH -B: Performed by: ORTHOPAEDIC SURGERY

## 2018-01-09 PROCEDURE — 77030008190 HC RTVR SUT ARTH S&N -B: Performed by: ORTHOPAEDIC SURGERY

## 2018-01-09 PROCEDURE — 76010000153 HC OR TIME 1.5 TO 2 HR: Performed by: ORTHOPAEDIC SURGERY

## 2018-01-09 PROCEDURE — 77030012935 HC DRSG AQUACEL BMS -B: Performed by: ORTHOPAEDIC SURGERY

## 2018-01-09 PROCEDURE — L1830 KO IMMOB CANVAS LONG PRE OTS: HCPCS | Performed by: ORTHOPAEDIC SURGERY

## 2018-01-09 PROCEDURE — 0QPD04Z REMOVAL OF INTERNAL FIXATION DEVICE FROM RIGHT PATELLA, OPEN APPROACH: ICD-10-PCS | Performed by: ORTHOPAEDIC SURGERY

## 2018-01-09 PROCEDURE — 74011250636 HC RX REV CODE- 250/636: Performed by: ORTHOPAEDIC SURGERY

## 2018-01-09 PROCEDURE — 74011000250 HC RX REV CODE- 250: Performed by: PHYSICIAN ASSISTANT

## 2018-01-09 PROCEDURE — 77030019557 HC ELECTRD VES SEAL MEDT -F: Performed by: ORTHOPAEDIC SURGERY

## 2018-01-09 PROCEDURE — 76060000034 HC ANESTHESIA 1.5 TO 2 HR: Performed by: ORTHOPAEDIC SURGERY

## 2018-01-09 PROCEDURE — 76210000016 HC OR PH I REC 1 TO 1.5 HR: Performed by: ORTHOPAEDIC SURGERY

## 2018-01-09 PROCEDURE — 77030008467 HC STPLR SKN COVD -B: Performed by: ORTHOPAEDIC SURGERY

## 2018-01-09 PROCEDURE — 74011000250 HC RX REV CODE- 250: Performed by: ORTHOPAEDIC SURGERY

## 2018-01-09 PROCEDURE — C9290 INJ, BUPIVACAINE LIPOSOME: HCPCS | Performed by: PHYSICIAN ASSISTANT

## 2018-01-09 PROCEDURE — 74011250637 HC RX REV CODE- 250/637: Performed by: ORTHOPAEDIC SURGERY

## 2018-01-09 PROCEDURE — 97162 PT EVAL MOD COMPLEX 30 MIN: CPT

## 2018-01-09 PROCEDURE — 73560 X-RAY EXAM OF KNEE 1 OR 2: CPT

## 2018-01-09 PROCEDURE — 74011250636 HC RX REV CODE- 250/636: Performed by: PHYSICIAN ASSISTANT

## 2018-01-09 PROCEDURE — 74011000258 HC RX REV CODE- 258

## 2018-01-09 PROCEDURE — 77030003601 HC NDL NRV BLK BBMI -A: Performed by: ORTHOPAEDIC SURGERY

## 2018-01-09 PROCEDURE — 74011250636 HC RX REV CODE- 250/636: Performed by: NURSE ANESTHETIST, CERTIFIED REGISTERED

## 2018-01-09 PROCEDURE — 77030018836 HC SOL IRR NACL ICUM -A: Performed by: ORTHOPAEDIC SURGERY

## 2018-01-09 PROCEDURE — 77030019605: Performed by: ORTHOPAEDIC SURGERY

## 2018-01-09 PROCEDURE — 0QSD0ZZ REPOSITION RIGHT PATELLA, OPEN APPROACH: ICD-10-PCS | Performed by: ORTHOPAEDIC SURGERY

## 2018-01-09 PROCEDURE — 74011250637 HC RX REV CODE- 250/637: Performed by: NURSE ANESTHETIST, CERTIFIED REGISTERED

## 2018-01-09 PROCEDURE — 76942 ECHO GUIDE FOR BIOPSY: CPT | Performed by: ANESTHESIOLOGY

## 2018-01-09 PROCEDURE — 65270000029 HC RM PRIVATE

## 2018-01-09 PROCEDURE — 97530 THERAPEUTIC ACTIVITIES: CPT

## 2018-01-09 PROCEDURE — 74011000258 HC RX REV CODE- 258: Performed by: PHYSICIAN ASSISTANT

## 2018-01-09 PROCEDURE — 74011000250 HC RX REV CODE- 250

## 2018-01-09 PROCEDURE — 77030020782 HC GWN BAIR PAWS FLX 3M -B: Performed by: ORTHOPAEDIC SURGERY

## 2018-01-09 PROCEDURE — 74011000258 HC RX REV CODE- 258: Performed by: ORTHOPAEDIC SURGERY

## 2018-01-09 PROCEDURE — 77030011640 HC PAD GRND REM COVD -A: Performed by: ORTHOPAEDIC SURGERY

## 2018-01-09 PROCEDURE — 64447 NJX AA&/STRD FEMORAL NRV IMG: CPT | Performed by: ANESTHESIOLOGY

## 2018-01-09 PROCEDURE — 97116 GAIT TRAINING THERAPY: CPT

## 2018-01-09 PROCEDURE — 3E0T3BZ INTRODUCTION OF ANESTHETIC AGENT INTO PERIPHERAL NERVES AND PLEXI, PERCUTANEOUS APPROACH: ICD-10-PCS | Performed by: ANESTHESIOLOGY

## 2018-01-09 PROCEDURE — 77030002933 HC SUT MCRYL J&J -A: Performed by: ORTHOPAEDIC SURGERY

## 2018-01-09 PROCEDURE — 77030031139 HC SUT VCRL2 J&J -A: Performed by: ORTHOPAEDIC SURGERY

## 2018-01-09 RX ORDER — HYDROCHLOROTHIAZIDE 25 MG/1
50 TABLET ORAL DAILY
Status: DISCONTINUED | OUTPATIENT
Start: 2018-01-10 | End: 2018-01-11 | Stop reason: HOSPADM

## 2018-01-09 RX ORDER — BUPIVACAINE HYDROCHLORIDE 5 MG/ML
INJECTION, SOLUTION EPIDURAL; INTRACAUDAL AS NEEDED
Status: DISCONTINUED | OUTPATIENT
Start: 2018-01-09 | End: 2018-01-09 | Stop reason: HOSPADM

## 2018-01-09 RX ORDER — NEOSTIGMINE METHYLSULFATE 5 MG/5 ML
SYRINGE (ML) INTRAVENOUS AS NEEDED
Status: DISCONTINUED | OUTPATIENT
Start: 2018-01-09 | End: 2018-01-09 | Stop reason: HOSPADM

## 2018-01-09 RX ORDER — HYDROCODONE BITARTRATE AND ACETAMINOPHEN 7.5; 325 MG/1; MG/1
1-2 TABLET ORAL
Status: DISCONTINUED | OUTPATIENT
Start: 2018-01-09 | End: 2018-01-11 | Stop reason: HOSPADM

## 2018-01-09 RX ORDER — NALOXONE HYDROCHLORIDE 0.4 MG/ML
0.4 INJECTION, SOLUTION INTRAMUSCULAR; INTRAVENOUS; SUBCUTANEOUS AS NEEDED
Status: DISCONTINUED | OUTPATIENT
Start: 2018-01-09 | End: 2018-01-11 | Stop reason: HOSPADM

## 2018-01-09 RX ORDER — HYDROMORPHONE HYDROCHLORIDE 2 MG/ML
INJECTION, SOLUTION INTRAMUSCULAR; INTRAVENOUS; SUBCUTANEOUS
Status: DISPENSED
Start: 2018-01-09 | End: 2018-01-10

## 2018-01-09 RX ORDER — SODIUM CHLORIDE 0.9 % (FLUSH) 0.9 %
5-10 SYRINGE (ML) INJECTION AS NEEDED
Status: DISCONTINUED | OUTPATIENT
Start: 2018-01-09 | End: 2018-01-09 | Stop reason: HOSPADM

## 2018-01-09 RX ORDER — SODIUM CHLORIDE 0.9 % (FLUSH) 0.9 %
5-10 SYRINGE (ML) INJECTION EVERY 8 HOURS
Status: DISCONTINUED | OUTPATIENT
Start: 2018-01-09 | End: 2018-01-11 | Stop reason: HOSPADM

## 2018-01-09 RX ORDER — SODIUM CHLORIDE 9 MG/ML
100 INJECTION, SOLUTION INTRAVENOUS CONTINUOUS
Status: DISPENSED | OUTPATIENT
Start: 2018-01-09 | End: 2018-01-10

## 2018-01-09 RX ORDER — ONDANSETRON 2 MG/ML
INJECTION INTRAMUSCULAR; INTRAVENOUS AS NEEDED
Status: DISCONTINUED | OUTPATIENT
Start: 2018-01-09 | End: 2018-01-09 | Stop reason: HOSPADM

## 2018-01-09 RX ORDER — CEFAZOLIN SODIUM 2 G/50ML
2 SOLUTION INTRAVENOUS
Status: COMPLETED | OUTPATIENT
Start: 2018-01-09 | End: 2018-01-09

## 2018-01-09 RX ORDER — SODIUM CHLORIDE, SODIUM LACTATE, POTASSIUM CHLORIDE, CALCIUM CHLORIDE 600; 310; 30; 20 MG/100ML; MG/100ML; MG/100ML; MG/100ML
50 INJECTION, SOLUTION INTRAVENOUS CONTINUOUS
Status: DISCONTINUED | OUTPATIENT
Start: 2018-01-09 | End: 2018-01-09 | Stop reason: HOSPADM

## 2018-01-09 RX ORDER — LIDOCAINE HYDROCHLORIDE 20 MG/ML
INJECTION, SOLUTION EPIDURAL; INFILTRATION; INTRACAUDAL; PERINEURAL AS NEEDED
Status: DISCONTINUED | OUTPATIENT
Start: 2018-01-09 | End: 2018-01-09 | Stop reason: HOSPADM

## 2018-01-09 RX ORDER — ONDANSETRON 2 MG/ML
4 INJECTION INTRAMUSCULAR; INTRAVENOUS
Status: DISCONTINUED | OUTPATIENT
Start: 2018-01-09 | End: 2018-01-11 | Stop reason: HOSPADM

## 2018-01-09 RX ORDER — GLYCOPYRROLATE 0.2 MG/ML
INJECTION INTRAMUSCULAR; INTRAVENOUS AS NEEDED
Status: DISCONTINUED | OUTPATIENT
Start: 2018-01-09 | End: 2018-01-09 | Stop reason: HOSPADM

## 2018-01-09 RX ORDER — FENTANYL CITRATE 50 UG/ML
100 INJECTION, SOLUTION INTRAMUSCULAR; INTRAVENOUS
Status: DISCONTINUED | OUTPATIENT
Start: 2018-01-09 | End: 2018-01-09 | Stop reason: HOSPADM

## 2018-01-09 RX ORDER — LANOLIN ALCOHOL/MO/W.PET/CERES
1 CREAM (GRAM) TOPICAL 2 TIMES DAILY WITH MEALS
Status: DISCONTINUED | OUTPATIENT
Start: 2018-01-09 | End: 2018-01-11 | Stop reason: HOSPADM

## 2018-01-09 RX ORDER — AMILORIDE HYDROCHLORIDE 5 MG/1
5 TABLET ORAL DAILY
Status: DISCONTINUED | OUTPATIENT
Start: 2018-01-10 | End: 2018-01-11 | Stop reason: HOSPADM

## 2018-01-09 RX ORDER — LIDOCAINE HYDROCHLORIDE 10 MG/ML
0.1 INJECTION, SOLUTION EPIDURAL; INFILTRATION; INTRACAUDAL; PERINEURAL AS NEEDED
Status: DISCONTINUED | OUTPATIENT
Start: 2018-01-09 | End: 2018-01-09 | Stop reason: HOSPADM

## 2018-01-09 RX ORDER — LOSARTAN POTASSIUM 50 MG/1
100 TABLET ORAL DAILY
Status: DISCONTINUED | OUTPATIENT
Start: 2018-01-10 | End: 2018-01-11 | Stop reason: HOSPADM

## 2018-01-09 RX ORDER — METOCLOPRAMIDE 5 MG/1
5 TABLET ORAL 2 TIMES DAILY
Status: DISCONTINUED | OUTPATIENT
Start: 2018-01-09 | End: 2018-01-11 | Stop reason: HOSPADM

## 2018-01-09 RX ORDER — WARFARIN 10 MG/1
10 TABLET ORAL ONCE
Status: COMPLETED | OUTPATIENT
Start: 2018-01-09 | End: 2018-01-09

## 2018-01-09 RX ORDER — KETOROLAC TROMETHAMINE 30 MG/ML
INJECTION, SOLUTION INTRAMUSCULAR; INTRAVENOUS AS NEEDED
Status: DISCONTINUED | OUTPATIENT
Start: 2018-01-09 | End: 2018-01-09 | Stop reason: HOSPADM

## 2018-01-09 RX ORDER — PROMETHAZINE HYDROCHLORIDE 25 MG/ML
12.5 INJECTION, SOLUTION INTRAMUSCULAR; INTRAVENOUS
Status: DISCONTINUED | OUTPATIENT
Start: 2018-01-09 | End: 2018-01-09 | Stop reason: HOSPADM

## 2018-01-09 RX ORDER — FENTANYL CITRATE 50 UG/ML
INJECTION, SOLUTION INTRAMUSCULAR; INTRAVENOUS AS NEEDED
Status: DISCONTINUED | OUTPATIENT
Start: 2018-01-09 | End: 2018-01-09 | Stop reason: HOSPADM

## 2018-01-09 RX ORDER — MIDAZOLAM HYDROCHLORIDE 1 MG/ML
2 INJECTION, SOLUTION INTRAMUSCULAR; INTRAVENOUS
Status: DISCONTINUED | OUTPATIENT
Start: 2018-01-09 | End: 2018-01-09 | Stop reason: HOSPADM

## 2018-01-09 RX ORDER — EPINEPHRINE 1 MG/ML
INJECTION, SOLUTION, CONCENTRATE INTRAVENOUS AS NEEDED
Status: DISCONTINUED | OUTPATIENT
Start: 2018-01-09 | End: 2018-01-09 | Stop reason: HOSPADM

## 2018-01-09 RX ORDER — ROPIVACAINE HYDROCHLORIDE 2 MG/ML
30 INJECTION, SOLUTION EPIDURAL; INFILTRATION; PERINEURAL
Status: COMPLETED | OUTPATIENT
Start: 2018-01-09 | End: 2018-01-09

## 2018-01-09 RX ORDER — POLYMYXIN B 500000 [USP'U]/1
INJECTION, POWDER, LYOPHILIZED, FOR SOLUTION INTRAMUSCULAR; INTRATHECAL; INTRAVENOUS; OPHTHALMIC AS NEEDED
Status: DISCONTINUED | OUTPATIENT
Start: 2018-01-09 | End: 2018-01-09 | Stop reason: HOSPADM

## 2018-01-09 RX ORDER — SUCCINYLCHOLINE CHLORIDE 20 MG/ML
INJECTION INTRAMUSCULAR; INTRAVENOUS AS NEEDED
Status: DISCONTINUED | OUTPATIENT
Start: 2018-01-09 | End: 2018-01-09 | Stop reason: HOSPADM

## 2018-01-09 RX ORDER — PANTOPRAZOLE SODIUM 40 MG/1
40 TABLET, DELAYED RELEASE ORAL
Status: DISCONTINUED | OUTPATIENT
Start: 2018-01-10 | End: 2018-01-11 | Stop reason: HOSPADM

## 2018-01-09 RX ORDER — TROPICAMIDE 10 MG/ML
2 SOLUTION/ DROPS OPHTHALMIC AS NEEDED
Status: DISCONTINUED | OUTPATIENT
Start: 2018-01-09 | End: 2018-01-10

## 2018-01-09 RX ORDER — CELECOXIB 100 MG/1
200 CAPSULE ORAL 2 TIMES DAILY
Status: DISCONTINUED | OUTPATIENT
Start: 2018-01-09 | End: 2018-01-11 | Stop reason: HOSPADM

## 2018-01-09 RX ORDER — ACETAMINOPHEN 500 MG
1000 TABLET ORAL ONCE
Status: COMPLETED | OUTPATIENT
Start: 2018-01-09 | End: 2018-01-09

## 2018-01-09 RX ORDER — AMLODIPINE BESYLATE 10 MG/1
10 TABLET ORAL DAILY
Status: DISCONTINUED | OUTPATIENT
Start: 2018-01-10 | End: 2018-01-11 | Stop reason: HOSPADM

## 2018-01-09 RX ORDER — HYDROMORPHONE HYDROCHLORIDE 1 MG/ML
0.5 INJECTION, SOLUTION INTRAMUSCULAR; INTRAVENOUS; SUBCUTANEOUS
Status: DISCONTINUED | OUTPATIENT
Start: 2018-01-09 | End: 2018-01-09 | Stop reason: HOSPADM

## 2018-01-09 RX ORDER — CEFAZOLIN SODIUM 2 G/50ML
2 SOLUTION INTRAVENOUS EVERY 8 HOURS
Status: COMPLETED | OUTPATIENT
Start: 2018-01-09 | End: 2018-01-10

## 2018-01-09 RX ORDER — ONDANSETRON 2 MG/ML
4 INJECTION INTRAMUSCULAR; INTRAVENOUS ONCE
Status: DISCONTINUED | OUTPATIENT
Start: 2018-01-09 | End: 2018-01-09 | Stop reason: HOSPADM

## 2018-01-09 RX ORDER — DOCUSATE SODIUM 100 MG/1
100 CAPSULE, LIQUID FILLED ORAL 2 TIMES DAILY
Status: DISCONTINUED | OUTPATIENT
Start: 2018-01-09 | End: 2018-01-11 | Stop reason: HOSPADM

## 2018-01-09 RX ORDER — CELECOXIB 100 MG/1
200 CAPSULE ORAL ONCE
Status: COMPLETED | OUTPATIENT
Start: 2018-01-09 | End: 2018-01-09

## 2018-01-09 RX ORDER — VANCOMYCIN HYDROCHLORIDE 1 G/20ML
INJECTION, POWDER, LYOPHILIZED, FOR SOLUTION INTRAVENOUS AS NEEDED
Status: DISCONTINUED | OUTPATIENT
Start: 2018-01-09 | End: 2018-01-09 | Stop reason: HOSPADM

## 2018-01-09 RX ORDER — ASPIRIN 325 MG
325 TABLET, DELAYED RELEASE (ENTERIC COATED) ORAL DAILY
Status: DISCONTINUED | OUTPATIENT
Start: 2018-01-10 | End: 2018-01-11 | Stop reason: HOSPADM

## 2018-01-09 RX ORDER — HYDROMORPHONE HYDROCHLORIDE 1 MG/ML
INJECTION, SOLUTION INTRAMUSCULAR; INTRAVENOUS; SUBCUTANEOUS AS NEEDED
Status: DISCONTINUED | OUTPATIENT
Start: 2018-01-09 | End: 2018-01-09 | Stop reason: HOSPADM

## 2018-01-09 RX ORDER — PREGABALIN 50 MG/1
50 CAPSULE ORAL ONCE
Status: COMPLETED | OUTPATIENT
Start: 2018-01-09 | End: 2018-01-09

## 2018-01-09 RX ORDER — FAMOTIDINE 20 MG/1
20 TABLET, FILM COATED ORAL ONCE
Status: COMPLETED | OUTPATIENT
Start: 2018-01-09 | End: 2018-01-09

## 2018-01-09 RX ORDER — SODIUM CHLORIDE 0.9 % (FLUSH) 0.9 %
5-10 SYRINGE (ML) INJECTION AS NEEDED
Status: DISCONTINUED | OUTPATIENT
Start: 2018-01-09 | End: 2018-01-11 | Stop reason: HOSPADM

## 2018-01-09 RX ORDER — ROCURONIUM BROMIDE 10 MG/ML
INJECTION, SOLUTION INTRAVENOUS AS NEEDED
Status: DISCONTINUED | OUTPATIENT
Start: 2018-01-09 | End: 2018-01-09 | Stop reason: HOSPADM

## 2018-01-09 RX ORDER — PROPOFOL 10 MG/ML
INJECTION, EMULSION INTRAVENOUS AS NEEDED
Status: DISCONTINUED | OUTPATIENT
Start: 2018-01-09 | End: 2018-01-09 | Stop reason: HOSPADM

## 2018-01-09 RX ADMIN — Medication 10 ML: at 16:00

## 2018-01-09 RX ADMIN — SUCCINYLCHOLINE CHLORIDE 100 MG: 20 INJECTION INTRAMUSCULAR; INTRAVENOUS at 10:27

## 2018-01-09 RX ADMIN — TRANEXAMIC ACID 1 G: 100 INJECTION, SOLUTION INTRAVENOUS at 11:20

## 2018-01-09 RX ADMIN — GLYCOPYRROLATE 0.8 MG: 0.2 INJECTION INTRAMUSCULAR; INTRAVENOUS at 11:26

## 2018-01-09 RX ADMIN — TRANEXAMIC ACID 1 G: 100 INJECTION, SOLUTION INTRAVENOUS at 10:30

## 2018-01-09 RX ADMIN — FENTANYL CITRATE 50 MCG: 50 INJECTION INTRAMUSCULAR; INTRAVENOUS at 09:37

## 2018-01-09 RX ADMIN — CELECOXIB 200 MG: 100 CAPSULE ORAL at 19:01

## 2018-01-09 RX ADMIN — CEFAZOLIN SODIUM 2 G: 2 SOLUTION INTRAVENOUS at 19:01

## 2018-01-09 RX ADMIN — HYDROMORPHONE HYDROCHLORIDE 1 MG: 1 INJECTION, SOLUTION INTRAMUSCULAR; INTRAVENOUS; SUBCUTANEOUS at 10:45

## 2018-01-09 RX ADMIN — Medication 10 ML: at 21:22

## 2018-01-09 RX ADMIN — ACETAMINOPHEN 1000 MG: 500 TABLET ORAL at 09:20

## 2018-01-09 RX ADMIN — DOCUSATE SODIUM 100 MG: 100 CAPSULE, LIQUID FILLED ORAL at 19:01

## 2018-01-09 RX ADMIN — SODIUM CHLORIDE, SODIUM LACTATE, POTASSIUM CHLORIDE, AND CALCIUM CHLORIDE 50 ML/HR: 600; 310; 30; 20 INJECTION, SOLUTION INTRAVENOUS at 09:21

## 2018-01-09 RX ADMIN — HYDROMORPHONE HYDROCHLORIDE 0.5 MG: 1 INJECTION, SOLUTION INTRAMUSCULAR; INTRAVENOUS; SUBCUTANEOUS at 12:03

## 2018-01-09 RX ADMIN — WARFARIN SODIUM 10 MG: 10 TABLET ORAL at 09:20

## 2018-01-09 RX ADMIN — SODIUM CHLORIDE 100 ML/HR: 900 INJECTION, SOLUTION INTRAVENOUS at 16:00

## 2018-01-09 RX ADMIN — MIDAZOLAM HYDROCHLORIDE 2 MG: 1 INJECTION, SOLUTION INTRAMUSCULAR; INTRAVENOUS at 09:37

## 2018-01-09 RX ADMIN — PROPOFOL 150 MG: 10 INJECTION, EMULSION INTRAVENOUS at 10:26

## 2018-01-09 RX ADMIN — LIDOCAINE HYDROCHLORIDE 60 MG: 20 INJECTION, SOLUTION EPIDURAL; INFILTRATION; INTRACAUDAL; PERINEURAL at 10:26

## 2018-01-09 RX ADMIN — PREGABALIN 50 MG: 50 CAPSULE ORAL at 09:20

## 2018-01-09 RX ADMIN — METOCLOPRAMIDE 5 MG: 5 TABLET ORAL at 19:00

## 2018-01-09 RX ADMIN — Medication 4 MG: at 11:26

## 2018-01-09 RX ADMIN — CEFAZOLIN SODIUM 3 G: 2 SOLUTION INTRAVENOUS at 10:23

## 2018-01-09 RX ADMIN — ROCURONIUM BROMIDE 30 MG: 10 INJECTION, SOLUTION INTRAVENOUS at 10:34

## 2018-01-09 RX ADMIN — FAMOTIDINE 20 MG: 20 TABLET, FILM COATED ORAL at 09:20

## 2018-01-09 RX ADMIN — FENTANYL CITRATE 100 MCG: 50 INJECTION, SOLUTION INTRAMUSCULAR; INTRAVENOUS at 10:26

## 2018-01-09 RX ADMIN — CELECOXIB 200 MG: 100 CAPSULE ORAL at 09:21

## 2018-01-09 RX ADMIN — ROPIVACAINE HYDROCHLORIDE 60 MG: 2 INJECTION, SOLUTION EPIDURAL; INFILTRATION at 08:38

## 2018-01-09 RX ADMIN — ONDANSETRON 4 MG: 2 INJECTION INTRAMUSCULAR; INTRAVENOUS at 10:34

## 2018-01-09 RX ADMIN — Medication 325 MG: at 19:06

## 2018-01-09 NOTE — ANESTHESIA PROCEDURE NOTES
Peripheral Block    Start time: 1/9/2018 9:35 AM  End time: 1/9/2018 9:48 AM  Performed by: WILLIAM NOVA  Authorized by: WILLIAM NOVA       Pre-procedure: Indications: at surgeon's request and post-op pain management    Preanesthetic Checklist: patient identified, risks and benefits discussed, site marked, timeout performed, anesthesia consent given and patient being monitored    Timeout Time: 09:35          Block Type:   Block Type:   Adductor canal  Laterality:  Right  Monitoring:  Continuous pulse ox, oxygen, responsive to questions, frequent vital sign checks and heart rate  Injection Technique:  Single shot  Procedures: ultrasound guided and nerve stimulator    Patient Position: supine  Prep: chlorhexidine    Needle Type:  Stimuplex  Needle Gauge:  21 G  Motor Response: minimal motor response >0.4 mA    Medication Injected:  0.2%  ropivacaine  Volume (mL):  30    Assessment:  Number of attempts:  1  Injection Assessment:  Incremental injection every 5 mL, no paresthesia, ultrasound image on chart, local visualized surrounding nerve on ultrasound, negative aspiration for blood and no intravascular symptoms  Patient tolerance:  Patient tolerated the procedure well with no immediate complications

## 2018-01-09 NOTE — ANESTHESIA PREPROCEDURE EVALUATION
Anesthetic History   No history of anesthetic complications            Review of Systems / Medical History  Patient summary reviewed and pertinent labs reviewed    Pulmonary  Within defined limits                 Neuro/Psych   Within defined limits           Cardiovascular    Hypertension              Exercise tolerance: <4 METS     GI/Hepatic/Renal     GERD           Endo/Other        Morbid obesity and arthritis     Other Findings   Comments:   Risk Factors for Postoperative nausea/vomiting:       History of postoperative nausea/vomiting? NO       Female? YES       Motion sickness? NO       Intended opioid administration for postoperative analgesia? YES      Smoking Abstinence  Current Smoker? NO  Elective Surgery? YES  Seen preoperatively by anesthesiologist or proxy prior to day of surgery? YES  Pt abstained from smoking 24 hours prior to anesthesia?  N/A           Physical Exam    Airway  Mallampati: II  TM Distance: 4 - 6 cm  Neck ROM: normal range of motion   Mouth opening: Normal     Cardiovascular  Regular rate and rhythm,  S1 and S2 normal,  no murmur, click, rub, or gallop             Dental    Dentition: Full upper dentures and Poor dentition     Pulmonary  Breath sounds clear to auscultation               Abdominal  GI exam deferred       Other Findings            Anesthetic Plan    ASA: 3  Anesthesia type: general      Post-op pain plan if not by surgeon: peripheral nerve block single    Induction: Intravenous  Anesthetic plan and risks discussed with: Patient

## 2018-01-09 NOTE — IP AVS SNAPSHOT
[address] Patient: Richard Benavides MRN: [id number] PSX:[id number] About your hospitalization You were admitted on:  January 9, 2018 You last received care in the:  [facility] [address] [facility] You were discharged on:  January 11, 2018 Why you were hospitalized Your primary diagnosis was:  Not on File Your diagnoses also included:  History Of Patellar Fracture Follow-up Information Follow up With Details Comments Contact Info Parul Joseph PA-C On 1/26/2018 Appointment at 10:40 at the [facility] Location [address] [address] 
[phone] MD Nilsa On 1/18/2018 January 18, 2018 @ 10:45 with Dr. Hwang [facility] [address] [place] [address] [address] [facility] [address] 
[phone] Your Scheduled Appointments Friday January 26, 2018 10:40 AM EST  
POST OP with Parul Joseph PA-C  
[address] [place] [address], [address] [facility] ([facility]) [place] [address], [address] [place] [address]  
[phone] Discharge Orders Procedure Order Date Status Priority Quantity Spec Type Associated Dx WALKER STANDARD 01/10/18 1412 Normal Routine 1  History of patellar fracture [[id number]] ELEVATED TOILET SEAT 01/10/18 1412 Normal Routine 1  History of patellar fracture [[id number]] COMMODE CHAIR 01/10/18 1412 Normal Routine 1  History of patellar fracture [[id number]] SHOWER CHAIR 01/10/18 1412 Normal Routine 1  History of patellar fracture [[id number]] REFERRAL TO HOME HEALTH 01/10/18 1412 Normal Routine 1  History of patellar fracture [[id number]] Comments:  Sp TKA, sp patellar fx- ORIF 
wbat Knee immobilizer at all times Aspirin therapy 
aquacel ag dressing pod 7 and prn A check lynn indicates which time of day the medication should be taken. My Medications START taking these medications Instructions Each Dose to Equal  
 Morning Noon Evening Bedtime  
 aspirin 325 mg tablet Commonly known as:  ASPIRIN Your last dose was: Your next dose is: Take 1 Tab by mouth daily. 325 mg HYDROcodone-acetaminophen 7.5-325 mg per tablet Commonly known as:  Vilma Chain Replaces:  HYDROcodone-acetaminophen  mg tablet Your last dose was: Your next dose is: Take 1-2 Tabs by mouth every four (4) hours as needed. Max Daily Amount: 12 Tabs. 1-2 Tab CHANGE how you take these medications Instructions Each Dose to Equal  
 Morning Noon Evening Bedtime  
 ferrous sulfate 325 mg (65 mg iron) tablet What changed:  See the new instructions. Your last dose was: Your next dose is: Take 1 Tab by mouth two (2) times daily (with meals). 325 mg CONTINUE taking these medications Instructions Each Dose to Equal  
 Morning Noon Evening Bedtime  
 acetaminophen 650 mg Tber Commonly known as:  TYLENOL Your last dose was: Your next dose is: Take 1,300 mg by mouth two (2) times a day. PRN for pain 1300 mg  
    
   
   
   
  
 aMILoride-hydroCHLOROthiazide 5-50 mg Tab Commonly known as:  MODURETIC Your last dose was: Your next dose is: Take 1 Tab by mouth daily. 1 Tab  
    
   
   
   
  
 amLODIPine 10 mg tablet Commonly known as:  Deborha Cords Your last dose was: Your next dose is: Take 10 mg by mouth daily. 1 tab  
 10 mg  
    
   
   
   
  
 losartan 100 mg tablet Commonly known as:  COZAAR Your last dose was: Your next dose is: Take 100 mg by mouth daily. 1 tab  
 100 mg  
    
   
   
   
  
 metoclopramide HCl 5 mg tablet Commonly known as:  REGLAN Your last dose was: Your next dose is: Take 5 mg by mouth two (2) times a day. 1 tab 2x day  
 5 mg  
    
   
   
   
  
 omeprazole 40 mg capsule Commonly known as:  PRILOSEC Your last dose was: Your next dose is: Take 40 mg by mouth daily. 1 tab 40 mg  
    
   
   
   
  
 potassium 99 mg tablet Your last dose was: Your next dose is: Take 99 mg by mouth daily. 99 mg  
    
   
   
   
  
 tropicamide 1 % ophthalmic solution Commonly known as:  mydRIACYL Your last dose was: Your next dose is:    
   
   
 Administer 2 Drops to right eye as needed. 45 minutes prior to schedule appointments 2 Drop VITAMIN D3 1,000 unit tablet Generic drug:  cholecalciferol Your last dose was: Your next dose is: Take 1,000 Units by mouth daily. 1 tab day  
 1000 Units ZyrTEC 10 mg tablet Generic drug:  cetirizine Your last dose was: Your next dose is: Take 10 mg by mouth daily. 1 tab daily 10 mg  
    
   
   
   
  
  
STOP taking these medications   
 buffered aspirin 325 mg tablet Commonly known as:  BUFFERIN  
   
  
 celecoxib 200 mg capsule Commonly known as:  CELEBREX HYDROcodone-acetaminophen  mg tablet Commonly known as:  Denis Daniels Replaced by:  HYDROcodone-acetaminophen 7.5-325 mg per tablet  
   
  
 levoFLOXacin 500 mg tablet Commonly known as:  Shayan Hewitt Where to Get Your Medications Information on where to get these meds will be given to you by the nurse or doctor. ! Ask your nurse or doctor about these medications  
  aspirin 325 mg tablet  
 ferrous sulfate 325 mg (65 mg iron) tablet HYDROcodone-acetaminophen 7.5-325 mg per tablet Discharge Instructions Discharge Instructions for Total Hip Replacement Patients · The dressing on your hip will be changed by the Home Health professional at the appropriate time. Keep your incision clean and dry. Do not apply any ointments to the incision. · You may shower as long as you keep you incision dry. When showering, leave your dressing on. The dressing is waterproof as long as the edges are sealed. · Notify your surgeon if: 
· Your temperature is greater than 100.5 · You have pain not controlled by your pain medication · You have increased drainage from your incision · You have increased redness or swelling in your leg · You have chest pain, shortness of breath, or any other problems · Do your exercises as instructed by the home physical therapist. 
 
· Follow your total hip precautions: · Do not cross your legs or feet · Do not turn your toes inward · Do not bed at your waist more than 90 degrees · Keep a firm pillow between your knees when sleeping or lying down. · You may use ice to your hip as needed. Do not apply the ice pack directly to your skin. Use a barrier such as your pant leg or a thin towel. · Walk once an hour during normal walking hours. · If you have GRIFFIN hose (the white support stockings), remove them at bedtime and re-apply the hose in the morning for the next 2 weeks. Best of luck with your new hip and [address] for choosing the [address]! Patient armband removed and shredded DISCHARGE SUMMARY from Nurse PATIENT INSTRUCTIONS: 
 
 
F-face looks uneven A-arms unable to move or move unevenly S-speech slurred or non-existent T-time-call 911 as soon as signs and symptoms begin-DO NOT go  
 Back to bed or wait to see if you get better-TIME IS BRAIN. Warning Signs of HEART ATTACK Call 911 if you have these symptoms: 
? Chest discomfort. Most heart attacks involve discomfort in the center of the chest that lasts more than a few minutes, or that goes away and comes back. It can feel like uncomfortable pressure, squeezing, fullness, or pain. ? Discomfort in other areas of the upper body. Symptoms can include pain or discomfort in one or both arms, the back, neck, jaw, or stomach. ? Shortness of breath with or without chest discomfort. ? Other signs may include breaking out in a cold sweat, nausea, or lightheadedness. Don't wait more than five minutes to call [address]! Fast action can save your life. Calling 911 is almost always the fastest way to get lifesaving treatment. Emergency Medical Services staff can begin treatment when they arrive  up to an hour sooner than if someone gets to the hospital by car. The discharge information has been reviewed with the patient. The patient verbalized understanding. Discharge medications reviewed with the patient and appropriate educational materials and side effects teaching were provided. ___________________________________________________________________________________________________________________________________ ACO Transitions of Care Introducing [employer] [address] offers a voluntary care coordination program to provide high quality service and care to [facility] fee-for-service beneficiaries. Ketan Fong was designed to help you enhance your health and well-being through the following services: ? Transitions of Care  support for individuals who are transitioning from one care setting to another (example: Hospital to home). ?  Chronic and Complex Care Coordination  support for individuals and caregivers of those with serious or chronic illnesses or with more than one chronic (ongoing) condition and those who take a number of different medications. If you meet specific medical criteria, a [facility] [address] may call you directly to coordinate your care with your primary care physician and your other care providers. For questions about the [facility] programs, please, contact your physicians office. For general questions or additional information about Accountable Care Organizations: 
Please visit www.medicare.gov/acos. html or call 1-800-MEDICARE (1-960.735.5811) TTY users should call 2-225.611.1621. Go[employer]iles Announcement We are excited to announce that we are making your provider's discharge notes available to you in GoMiles. You will see these notes when they are completed and signed by the physician that discharged you from your recent hospital stay. If you have any questions or concerns about any information you see in GoMiles, please call the Health Information Department where you were seen or reach out to your Primary Care Provider for more information about your plan of care. Providers Seen During Your Hospitalization Provider Specialty Primary office phone Yun Pond, [address] [facility] Orthopedic Surgery [phone] Your Primary Care Physician (PCP) Primary Care Physician Office Phone Office Fax Saskia Laurent [phone] You are allergic to the following Allergen Reactions Citric Acid Unknown (comments) Crinone (Progesterone Micronized) Unknown (comments) Iodine Unknown (comments) Percocet (Oxycodone-Acetaminophen) Other (comments) Gets rebound headaches after taking Percocet Recent Documentation Height Weight BMI OB Status Smoking Status 1.626 m 106.6 kg 40.34 kg/m2 Hysterectomy Never Smoker Emergency Contacts Name Discharge Info Relation Home Work Mobile [address] [facility] CAREGIVER [3] Spouse [3] [phone] Layla Viveros  Daughter [21] [phone] Patient Belongings The following personal items are in your possession at time of discharge: 
  Dental Appliances: Uppers (will give to  Geovanna Romano )  Visual Aid: None      Home Medications: None   Jewelry: Ring (will give to  abelino )  Clothing: Pants, Shirt, Socks, Footwear, Undergarments, Jacket/Coat (2 jackets )    Other Valuables: Jacob Bejarano (all personal items to go with  abelino) Discharge Instructions Attachments/References ASPIRIN (BY MOUTH) (ENGLISH) IRON SUPPLEMENTS (BY MOUTH) (ENGLISH) HYDROCODONE/ACETAMINOPHEN (BY MOUTH) (ENGLISH) Patient Handouts Aspirin (By mouth) Aspirin (AS-pir-in) Treats pain, fever, and inflammation. May lower risk of heart attack and stroke. Brand Name(s): Ascriptin Regular Strength, Aspergum, Aspir Low, Aspirin Adult Low Dose, Aspirin Low Dose, Madison Aspirin Children's, Madison Aspirin Regimen, Madison Extra Strength, Madison Genuine Aspirin, Madison Low Dose, Bufferin, Bufferin Low Dose, Durlaza, Ecotrin, Ecpirin There may be other brand names for this medicine. When This Medicine Should Not Be Used: This medicine is not right for everyone. Do not use it if you had an allergic reaction to aspirin or other NSAIDs, or if you have a history of asthma with nasal polyps and rhinitis. How to Use This Medicine:  
Delayed Release Capsule, Long Acting Capsule, Gum, Tablet, Chewable Tablet, Fizzy Tablet, Coated Tablet, Long Acting Tablet, 24 Hour Capsule · Your doctor will tell you how much medicine to use. Do not use more than directed. · It is best to take this medicine with food or milk. · Capsule, tablet, or coated tablet: Swallow whole. Do not crush, break, or chew it. · Chewable tablet: You may chew it completely or swallow it whole. · Gum: Chew completely to make sure you get as much medicine as possible. Drink a full glass (8 ounces) of water after chewing the gum. · Swallow the extended-release capsule whole. Do not crush, break, or chew it. Take the capsule with a full glass of water at the same time each day. · Follow the instructions on the medicine label if you are using this medicine without a prescription. · Missed dose: If you miss a dose of Durlaza, skip the missed dose and go back to your regular dosing schedule. Do not take extra medicine to make up for a missed dose. · Store the medicine in a closed container at room temperature, away from heat, moisture, and direct light. Drugs and Foods to Avoid: Ask your doctor or pharmacist before using any other medicine, including over-the-counter medicines, vitamins, and herbal products. · Some foods and medicines can affect how aspirin works. Tell your doctor if you are using any of the following: ¨ Dipyridamole, methotrexate, probenecid, sulfinpyrazone, ticlopidine ¨ Blood thinner (including clopidogrel, prasugrel, ticagrelor, warfarin) ¨ Blood pressure medicine ¨ Medicine to treat seizures (including phenytoin, valproic acid) ¨ NSAID pain or arthritis medicine (including celecoxib, diclofenac, ibuprofen, naproxen) ¨ Steroid medicine (including dexamethasone, hydrocortisone, methylprednisolone, prednisolone, prednisone) · Do not take Durlaza 2 hours before or 1 hour after you drink alcohol or take medicines that contain alcohol. Warnings While Using This Medicine: · Tell your doctor if you are pregnant or breastfeeding. Do not use this medicine during the later part of a pregnancy unless your doctor tells you to. · Tell your doctor if you have kidney disease, liver disease, high blood pressure, heart disease, or a history of stomach bleeding or ulcers. · This medicine may increase your risk for bleeding, including stomach ulcers. · Do not give aspirin to a child or teenager who has chickenpox or flu symptoms, unless the doctor says it is okay. Aspirin can cause a life-threatening reaction called Reye syndrome. · Tell any doctor or dentist who treats you that you are using this medicine. This medicine may affect certain medical test results. · Keep all medicine out of the reach of children. Never share your medicine with anyone. Possible Side Effects While Using This Medicine:  
Call your doctor right away if you notice any of these side effects: · Allergic reaction: Itching or hives, swelling in your face or hands, swelling or tingling in your mouth or throat, chest tightness, trouble breathing · Bloody or black stools, bloody vomit or vomit that looks like coffee grounds · Chest tightness, wheezing · Ringing in the ears · Severe stomach pain · Unusual bleeding, bruising, or weakness If you notice other side effects that you think are caused by this medicine, tell your doctor. Call your doctor for medical advice about side effects. You may report side effects to FDA at 9-670-FDA-4824 © 2017 [facility] Information is for End User's use only and may not be sold, redistributed or otherwise used for commercial purposes. The above information is an  only. It is not intended as medical advice for individual conditions or treatments. Talk to your doctor, nurse or pharmacist before following any medical regimen to see if it is safe and effective for you. Iron Supplements (By mouth) Treats low blood iron or anemia by helping your body make red blood cells. Brand Name(s): Beef/Iron/Wine, Bifera, BiferaRx, Corvite FE, Duofer, EZFE 200, Enfamil Leon-In-Sol, [facility] Iron Tablets, Fe-20, Femcon Fe, Femiron, Feosol, Leon-Iron, Maritza haute, [facility] There may be other brand names for this medicine. When This Medicine Should Not Be Used: You should not use this medicine if you have had an allergic reaction to iron supplements, or if you have a condition called hemachromatosis (iron overload disease) or hemosiderosis (iron in the lungs), or any type of anemia that is not caused by iron deficiency. How to Use This Medicine:  
Liquid Filled Capsule, Coated Tablet, Tablet, Capsule, Chewable Tablet, Liquid, Long Acting Capsule, Long Acting Tablet · Your doctor will tell you how much of this medicine to take and how often. Do not take more medicine or take it more often than your doctor tells you to. Carefully follow your doctor's instructions about any special diet. · It is best to take this medicine on an empty stomach, 1 hour before or 2 hours after a meal. Take the medicine with a full glass or water or fruit juice. If the medicine upsets your stomach, you may take it with food. · The chewable tablet must be chewed or crushed before you swallow it. · Measure the oral liquid medicine with a marked measuring spoon or medicine cup. · The oral liquid may stain your teeth. These stains can be prevented by mixing the medicine with water or other liquids (such as fruit juice, tomato juice), and drinking the medicine with a straw. To remove any iron stains, brush your teeth with baking soda or peroxide. If a dose is missed: · If you miss a dose or forget to take your medicine, take it as soon as you can. If it is almost time for your next dose, wait until then to take the medicine and skip the missed dose. · Do not use extra medicine to make up for a missed dose. How to Store and Dispose of This Medicine: · Store the medicine at room temperature, away from heat, moisture, and direct light. · Keep all medicine away from children, and never share your medicine with anyone. Drugs and Foods to Avoid: Ask your doctor or pharmacist before using any other medicine, including over-the-counter medicines, vitamins, and herbal products. · Do not take iron supplements by mouth if you are also receiving iron injections. · Make sure your doctor knows if you are also using phenytoin (Dilantin®), acetohydroxamic acid (Lithostat®), or antibiotics such as demeclocycline, doxycycline (Vibramycin®), Cipro®, Levaquin®, minocycline, moxifloxacin (Avelox®), Tequin®, or tetracycline. · Tell your doctor if you are using antacids (such as Maalox® or Mylanta®). · Avoid the following foods, or eat them in small amounts at least 1 hour before or 2 hours after taking your iron: eggs, milk, cheese, yogurt, tea or coffee, whole-grain cereals, and breads. Warnings While Using This Medicine: · Make sure your doctor knows if you are pregnant or breastfeeding, or if you have stomach or intestinal problems, an active infection, diabetes, porphyria, or other medical problems. · Make sure any doctor or dentist who treats you knows that you are using this medicine. Iron may affect the results of certain medical tests. · Iron can cause your stools to be darker in color. This is normal and is not a cause for concern. Possible Side Effects While Using This Medicine:  
Call your doctor right away if you notice any of these side effects: · Bloody diarrhea · Bluish-colored lips, hands, or fingernails · Chest pain · Fever · Pale or clammy skin · Severe or continuing stomach cramps, vomiting (with or without blood) · Shallow breathing, weakness, weak but fast heartbeat If you notice these less serious side effects, talk with your doctor: · Constipation, diarrhea, nausea · Dark-colored urine · Leg cramps If you notice other side effects that you think are caused by this medicine, tell your doctor. Call your doctor for medical advice about side effects. You may report side effects to FDA at 3-397-FDA-2110 © 2017 [address] Information is for End User's use only and may not be sold, redistributed or otherwise used for commercial purposes. The above information is an  only. It is not intended as medical advice for individual conditions or treatments. Talk to your doctor, nurse or pharmacist before following any medical regimen to see if it is safe and effective for you. Hydrocodone/Acetaminophen (By mouth) Acetaminophen (n-byhy-q-MIN-oh-fen), Hydrocodone Bitartrate (bqp-pzil-IIU-done bye-TAR-trate) Treats pain. This medicine contains a narcotic pain reliever. Brand Name(s): Hycet, Lorcet, Lorcet HD, Lorcet Plus, Lortab 10/325, Lortab 5/325, Lortab 7.5/325, Lortab Elixir, Norco, Verdrocet, Vicodin, Vicodin ES, Vicodin HP, Xodol, Xodol 5/300 There may be other brand names for this medicine. When This Medicine Should Not Be Used: This medicine is not right for everyone. Do not use it if you had an allergic reaction to acetaminophen, hydrocodone, or other narcotic medicines, or stomach or bowel blockage (including paralytic ileus). How to Use This Medicine:  
Capsule, Liquid, Tablet · Your doctor will tell you how much medicine to use. Do not use more than directed. · An overdose can be dangerous. Follow directions carefully so you do not get too much medicine at one time. · Oral liquid: Measure the oral liquid medicine with a marked measuring spoon, oral syringe, or medicine cup. · Drink plenty of liquids to help avoid constipation. · This medicine should come with a Medication Guide. Ask your pharmacist for a copy if you do not have one. · Missed dose: Take a dose as soon as you remember. If it is almost time for your next dose, wait until then and take a regular dose. Do not take extra medicine to make up for a missed dose. · Store the medicine in a closed container at room temperature, away from heat, moisture, and direct light. Flush any unused Norco® tablets down the toilet. Drugs and Foods to Avoid: Ask your doctor or pharmacist before using any other medicine, including over-the-counter medicines, vitamins, and herbal products. · Do not use this medicine if you are using or have used an MAO inhibitor within the past 14 days. · Some medicines can affect how hydrocodone/acetaminophen works. Tell your doctor if you are using any of the following: ¨ Carbamazepine, erythromycin, ketoconazole, mirtazapine, phenytoin, rifampin, ritonavir, tramadol, trazodone ¨ Diuretic (water pill) ¨ Medicine to treat depression or mental health problems ¨ Medicine to treat migraine headaches ¨ Phenothiazine medicine · Tell your doctor if you use anything else that makes you sleepy. Some examples are allergy medicine, narcotic pain medicine, and alcohol. Tell your doctor if you are using buprenorphine, butorphanol, nalbuphine, pentazocine, or a muscle relaxer. · Do not drink alcohol while you are using this medicine. Acetaminophen can damage your liver, and your risk is higher if you also drink alcohol. Warnings While Using This Medicine: · Tell your doctor if you are pregnant or breastfeeding, or if you have kidney disease, liver disease, lung or breathing problems, gallbladder or pancreas problems, an underactive thyroid, Carlton disease, prostate problems, trouble urinating, stomach problems, or a history of head injury or brain tumor, seizures, alcohol or drug addiction. · This medicine may cause the following problems:  
¨ High risk of overdose, which can lead to death ¨ Respiratory depression (serious breathing problem that can be life-threatening) ¨ Liver problems ¨ Serious skin reactions ¨ Serotonin syndrome (when used with certain medicines) · This medicine can be habit-forming. Do not use more than your prescribed dose. Call your doctor if you think your medicine is not working. · This medicine may make you dizzy or drowsy. Do not drive or doing anything else that could be dangerous until you know how this medicine affects you. · This medicine contains acetaminophen. Read the labels of all other medicines you are using to see if they also contain acetaminophen, or ask your doctor or pharmacist. Nayely Sarmiento not use more than 4 grams (4,000 milligrams) total of acetaminophen in one day. · Tell any doctor or dentist who treats you that you are using this medicine. This medicine may affect certain medical test results. · This medicine may cause constipation, especially with long-term use. Ask your doctor if you should use a laxative to prevent and treat constipation. · This medicine could cause infertility. Talk with your doctor before using this medicine if you plan to have children. · Keep all medicine out of the reach of children. Never share your medicine with anyone. Possible Side Effects While Using This Medicine:  
Call your doctor right away if you notice any of these side effects: · Allergic reaction: Itching or hives, swelling in your face or hands, swelling or tingling in your mouth or throat, chest tightness, trouble breathing · Anxiety, restlessness, fast heartbeat, fever, sweating, muscle spasms, twitching, diarrhea, seeing or hearing things that are not there · Blistering, peeling, red skin rash · Blue lips, fingernails, or skin · Dark urine or pale stools, loss of appetite, nausea or vomiting, stomach pain, yellow skin or eyes · Extreme weakness, shallow breathing, slow heartbeat, sweating, seizures, cold or clammy skin · Lightheadedness, dizziness, fainting If you notice these less serious side effects, talk with your doctor: · Constipation, nausea, vomiting · Tiredness or sleepiness If you notice other side effects that you think are caused by this medicine, tell your doctor. Call your doctor for medical advice about side effects. You may report side effects to FDA at 3-466-FDA-6390 © 2017 [address] Information is for End User's use only and may not be sold, redistributed or otherwise used for commercial purposes. The above information is an  only. It is not intended as medical advice for individual conditions or treatments. Talk to your doctor, nurse or pharmacist before following any medical regimen to see if it is safe and effective for you. Please provide this summary of care documentation to your next provider. Signatures-by signing, you are acknowledging that this After Visit Summary has been reviewed with you and you have received a copy. Patient Signature:  ____________________________________________________________ Date:  ____________________________________________________________  
  
[facility] Provider Signature:  ____________________________________________________________ Date:  ____________________________________________________________

## 2018-01-09 NOTE — PROGRESS NOTES
Problem: Mobility Impaired (Adult and Pediatric)  Goal: *Acute Goals and Plan of Care (Insert Text)  STG's to be addressed within 3 days:  1. Bed mobility:  Supine to sit to supine S with HR for meals. 2. Activity tolerance: Tolerate up in chair 1-2 hrs for ADLs. 3. Transfers:  Sit to stand to chair S with LRAD for ADL's. LTG's to be addressed within 7 days:  1. Standing/Ambulation Balance:  Increase to Good with LRAD for safe transfers and gait. 2. Ambulation:  Ambulate > 200 ft. S with LRAD for home mobility. 3. Patient Education:  Independent with HEP for home safety. 4. Stairs:  Up/Down 3 steps CGA with HR for home entry. Outcome: Progressing Towards Goal  physical Therapy EVALUATION    Patient: Beryl Coughlin (89 y.o. female)  Date: 1/9/2018  Primary Diagnosis: T84.043A PERIPROSTHETIC FX  History of patellar fracture  Procedure(s) (LRB):  RIGHT KNEE REVISION OF PATELLA FRACTURE (Right) Day of Surgery   Precautions: Fall, WBAT (RLE with immobilizer)    ASSESSMENT :  Based on the objective data described below, the patient presents with impaired functional mobility including bed mobility, transfers, ambulation, and general activity tolerance s/p revision of R knee patellar fracture. Patient presents today supine in bed with HOB elevated, knee immobilizer on R knee, alert and agreeable to PT evaluation with  in room. Patient transferred to sitting EOB with Marcos for LE management, transferred to standing with RW and Marcos. She ambulated ~10 ft to restroom, unable to void at this time. She then ambulated back to bed as she reported feeling \"whoozy\" and no recliner available in room. Patient left with HOB elevated, O2 NC in place, towel roll under R heel, and SCDs in place. Patient will benefit from skilled intervention to address the above impairments.   Patients rehabilitation potential is considered to be Good  Factors which may influence rehabilitation potential include:   []         None noted  []         Mental ability/status  []         Medical condition  []         Home/family situation and support systems  []         Safety awareness  []         Pain tolerance/management  []         Other:      PLAN :  Recommendations and Planned Interventions:  [x]           Bed Mobility Training             []    Neuromuscular Re-Education  [x]           Transfer Training                   []    Orthotic/Prosthetic Training  [x]           Gait Training                          []    Modalities  [x]           Therapeutic Exercises          []    Edema Management/Control  [x]           Therapeutic Activities            [x]    Patient and Family Training/Education  []           Other (comment):    Frequency/Duration: Patient will be followed by physical therapy 1-2 times per day to address goals. Discharge Recommendations: Home Health  Further Equipment Recommendations for Discharge: rolling walker     SUBJECTIVE:   Patient stated I wish you could leave all this stuff off my legs because when I need to go to the bathroom, I need to go right away.     OBJECTIVE DATA SUMMARY:     Past Medical History:   Diagnosis Date    Arthritis     Arthritis of both hips     Back pain     Balance problem     Chronic back pain     Cough     Easy bruising     Essential hypertension     GERD (gastroesophageal reflux disease)     Hiatal hernia     Hypertension     Irregular heart beat     Left hip pain 10/8/2010    Neck pain     Nervousness     Night sweat     Osteoarthritis, knee bilateral     Pain with urination     Polymyalgia rheumatica (HCC)     Reflux     Spinal stenosis     Trapezius strain     Trochanteric bursitis of left hip     Venous insufficiency      Past Surgical History:   Procedure Laterality Date    COLONOSCOPY N/A 7/27/2016    COLONOSCOPY w/polypectomy performed by Mary Byrd MD at [address] HX ANKLE FRACTURE TX      HX CHOLECYSTECTOMY      HX HEENT  06/2011    left cornea transplant    HX HYSTERECTOMY      HX KNEE REPLACEMENT Right 02/2017    HX ORTHOPAEDIC      right foot and ankle    HX ORTHOPAEDIC Right 04/2017     Barriers to Learning/Limitations: None  Compensate with: N/A  Prior Level of Function/Home Situation: Patient resides on first floor of 2 story home with . She uses RW for community mobility, does not use assistive device in the home. Home Situation  Home Environment: Private residence  # Steps to Enter: 3  Rails to Enter: Yes  Hand Rails : Bilateral  One/Two Story Residence: Two story, live on 1st floor  Living Alone: No  Support Systems: Spouse/Significant Other/Partner, Family member(s)  Patient Expects to be Discharged to[de-identified] Private residence  Current DME Used/Available at Home: Walker, rolling  Critical Behavior:  Neurologic State: Alert  Psychosocial  Patient Behaviors: Calm; Cooperative  Family  Behaviors: Calm;Supportive  Strength:    Strength: Generally decreased, functional (BLE)  Tone & Sensation:   Tone: Normal  Sensation: Intact (BLE)   Range Of Motion:  AROM: Generally decreased, functional (RLE in immobilizer)  Functional Mobility:  Bed Mobility:  Supine to Sit: Minimum assistance  Sit to Supine: Minimum assistance  Scooting: Minimum assistance  Transfers:  Sit to Stand: Minimum assistance  Stand to Sit: Contact guard assistance  Balance:   Sitting: Intact  Standing: Impaired; With support (RW)  Standing - Static: Good  Standing - Dynamic : Fair  Ambulation/Gait Training:  Distance (ft): 20 Feet (ft)  Assistive Device: Walker, rolling; Other (comment) (R knee immobilizer)  Ambulation - Level of Assistance: Contact guard assistance  Base of Support: Widened  Speed/Nikole: Pace decreased (<100 feet/min)  Pain:  Pain Scale 1: Visual  Pain Intensity 1: 0  Pain Location 1: Knee  Pain Orientation 1: Right  Pain Description 1: Sore  Pain Intervention(s) 1: Medication (see MAR)  Activity Tolerance:   Good  Please refer to the flowsheet for vital signs taken during this treatment. After treatment:   [] Patient left in no apparent distress sitting up in chair  [] Patient left sitting on EOB  [x] Patient left in no apparent distress in bed  [] Patient declined to be OOB at this time due to  [x] Call bell left within reach  [x] Nursing notified (Fe)  [x] Caregiver present  [] Bed alarm activated  [x] SCDs in place    COMMUNICATION/EDUCATION:   [x]         Fall prevention education was provided and the patient/caregiver indicated understanding. [x]         Patient/family have participated as able in goal setting and plan of care. [x]         Patient/family agree to work toward stated goals and plan of care. []         Patient understands intent and goals of therapy, but is neutral about his/her participation. []         Patient is unable to participate in goal setting and plan of care. Thank you for this referral.  Beryl Alberts   Time Calculation: 38 mins      Mobility  Current  CJ= 20-39%   Goal  CI= 1-19%. The severity rating is based on the Level of Assistance required for Functional Mobility and ADLs.     Eval Complexity: History: MEDIUM  Complexity : 1-2 comorbidities / personal factors will impact the outcome/ POC Exam:MEDIUM Complexity : 3 Standardized tests and measures addressing body structure, function, activity limitation and / or participation in recreation  Presentation: MEDIUM Complexity : Evolving with changing characteristics Overall Complexity:MEDIUM

## 2018-01-09 NOTE — PERIOP NOTES
TRANSFER - OUT REPORT:    Verbal report given to Lydia RN(name) on Reshma Sanchez  being transferred to [address] [place] [address](unit) for routine post - op       Report consisted of patients Situation, Background, Assessment and   Recommendations(SBAR). Information from the following report(s) SBAR, Kardex, OR Summary, Procedure Summary, Intake/Output, MAR and Recent Results was reviewed with the receiving nurse. Lines:   Peripheral IV 01/09/18 Left Hand (Active)   Site Assessment Clean, dry, & intact 1/9/2018 10:55 AM   Phlebitis Assessment 0 1/9/2018 10:55 AM   Infiltration Assessment 0 1/9/2018 10:55 AM   Dressing Status Clean, dry, & intact 1/9/2018 10:55 AM   Dressing Type Tape;Transparent 1/9/2018 10:55 AM   Hub Color/Line Status Pink; Infusing 1/9/2018 10:55 AM        Opportunity for questions and clarification was provided.       Patient transported with:   O2 @ 2 liters   Registered Nurse

## 2018-01-09 NOTE — PROGRESS NOTES
Report given by Arielle Posadas RN from PACU on patients currents medical situations for continuation of care.

## 2018-01-09 NOTE — PROGRESS NOTES
Patients arrives in room 557 alert awake and oriented, area to R knee with immobilizer on dressings dry and intact. CMS+ family members will bring polar ice from home. No s/s of distress or sob.

## 2018-01-09 NOTE — OP NOTES
[facility] REPORT    Allie Mcknight.  MR#: [id number]  : 1935  ACCOUNT #: [de-identified]   DATE OF SERVICE: 2018    PREOPERATIVE DIAGNOSES:  Previous right patella fracture with a prominent hardware irritating the skin and a fibrous nonunion. 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 PROCEDURE PERFORMED:    1. Removal of deep buried painful hardware. 2.  Patellar ligament repair including fragment of the patella using #2 FiberWire and Hewson wire passer. COMPLICATIONS:  No complications. SPECIMENS REMOVED:  No specimen. ESTIMATED BLOOD LOSS:  10 mL. SURGEON:  Haley Feliciano MD     FIRST ASSISTANT:  Lesley Smith. SECOND ASSISTANT:  Rachele Alarcon. ANESTHETIST:  Dr. Reginaldo Atkins. ANESTHESIA:  Nerve block and general.      DESCRIPTION OF PROCEDURE:  After anesthetic was successfully induced, it was confirmed that patient did receive her preoperative antibiotics and timeout was performed. Previous skin incision utilized about 2/3. Her skin was very thin and atrophic. We were very careful to keep undermining to a minimum, maintain full thickness flaps and delineated the extensor mechanism with sharp dissection. We were able to localize all of the hardware, both pins and the wires and untwisted each wire. We marked the path of each previous pin and removed all of the hardware. I then transversely exposed the fracture and freshened up the edges of both sides of bone with some bleeding bone on both sides. The distal fragment was very small and we drilled a couple of holes through it and did a running [place] #2 FiberWire through this with a 4 strand technique as I wanted to do more, but the distal fragment was very small and would only accommodate the four and did the [place].   We then used the Hewson wire passer to retrograde pass the sutures to the superior pole of the patella. Then, with the leg in full extension and properly reduced, we tied each suture to itself and then each strand to each the other strand with excellent repair. I was very pleased with this. Liberally irrigated out. Routine closure and a careful skin closure as well. There were no complications. Instrument, sponge, needle count being correct. Patient brought to recovery room in stable condition.       Nile Song MD AM / NIDA  D: 01/09/2018 11:41     T: 01/09/2018 12:13  JOB #: [id number]

## 2018-01-09 NOTE — BRIEF OP NOTE
BRIEF OPERATIVE NOTE    Date of Procedure: 1/9/2018   Preoperative Diagnosis: T84.043A PERIPROSTHETIC FX  Postoperative Diagnosis: T84.043A PERIPROSTHETIC FX    Procedure(s):  RIGHT KNEE REVISION OF PATELLA FRACTURE, removal deep buried hardware   Surgeon(s) and Role:     * Olivia Antonio MD - Primary         Assistant Staff:  Physician Assistant: Marie Gaviria PA-C    Surgical Staff:  Circ-1: Anuj Stallings RN  Physician Assistant: Marie Gaviria PA-C  Scrub Tech-1: Yulissa Macias  Surg Asst-1: Kristin Toth  Event Time In   Incision Start 1045   Incision Close      Anesthesia: General   Estimated Blood Loss: 25ml  Specimens: * No specimens in log *   Findings: same   Complications: none  Implants: * No implants in log *

## 2018-01-09 NOTE — ANESTHESIA POSTPROCEDURE EVALUATION
Post-Anesthesia Evaluation and Assessment    Patient: Tristin Bunch MRN: [id number]  SSN: [id number]    YOB: 1935  Age: [de-identified] y.o. Sex: female       Cardiovascular Function/Vital Signs  Visit Vitals    /62    Pulse 83    Temp 36.5 °C (97.7 °F)    Resp 12    Ht 5' 4\" (1.626 m)    Wt 106.6 kg (235 lb)    SpO2 100%    BMI 40.34 kg/m2       Patient is status post general anesthesia for Procedure(s):  RIGHT KNEE REVISION OF PATELLA FRACTURE. Nausea/Vomiting: None    Postoperative hydration reviewed and adequate. Pain:  Pain Scale 1: Visual (01/09/18 1309)  Pain Intensity 1: 0 (01/09/18 1309)   Managed    Neurological Status:   Neuro (WDL): Within Defined Limits (01/09/18 1155)   At baseline    Mental Status and Level of Consciousness: Arousable    Pulmonary Status:   O2 Device: Nasal cannula (01/09/18 1214)   Adequate oxygenation and airway patent    Complications related to anesthesia: None    Post-anesthesia assessment completed.  No concerns    Signed By: Gianna Johns MD     January 9, 2018

## 2018-01-10 ENCOUNTER — HOME HEALTH ADMISSION (OUTPATIENT)
Dept: HOME HEALTH SERVICES | Facility: HOME HEALTH | Age: 83
End: 2018-01-10
Payer: MEDICARE

## 2018-01-10 LAB
INR PPP: 1.3 (ref 0.8–1.2)
PROTHROMBIN TIME: 15.4 SEC (ref 11.5–15.2)

## 2018-01-10 PROCEDURE — 65270000029 HC RM PRIVATE

## 2018-01-10 PROCEDURE — 85610 PROTHROMBIN TIME: CPT | Performed by: ORTHOPAEDIC SURGERY

## 2018-01-10 PROCEDURE — 97166 OT EVAL MOD COMPLEX 45 MIN: CPT

## 2018-01-10 PROCEDURE — 36415 COLL VENOUS BLD VENIPUNCTURE: CPT | Performed by: ORTHOPAEDIC SURGERY

## 2018-01-10 PROCEDURE — 97535 SELF CARE MNGMENT TRAINING: CPT

## 2018-01-10 PROCEDURE — 97116 GAIT TRAINING THERAPY: CPT

## 2018-01-10 PROCEDURE — 97530 THERAPEUTIC ACTIVITIES: CPT

## 2018-01-10 PROCEDURE — 74011250637 HC RX REV CODE- 250/637: Performed by: ORTHOPAEDIC SURGERY

## 2018-01-10 PROCEDURE — 74011250636 HC RX REV CODE- 250/636: Performed by: ORTHOPAEDIC SURGERY

## 2018-01-10 RX ORDER — LANOLIN ALCOHOL/MO/W.PET/CERES
325 CREAM (GRAM) TOPICAL 2 TIMES DAILY WITH MEALS
Qty: 60 TAB | Refills: 2 | Status: SHIPPED | OUTPATIENT
Start: 2018-01-10

## 2018-01-10 RX ORDER — ASPIRIN 325 MG
325 TABLET ORAL DAILY
Qty: 30 TAB | Refills: 0 | Status: SHIPPED | OUTPATIENT
Start: 2018-01-10

## 2018-01-10 RX ORDER — HYDROCODONE BITARTRATE AND ACETAMINOPHEN 7.5; 325 MG/1; MG/1
1-2 TABLET ORAL
Qty: 60 TAB | Refills: 0 | Status: ON HOLD | OUTPATIENT
Start: 2018-01-10 | End: 2018-06-20

## 2018-01-10 RX ADMIN — CELECOXIB 200 MG: 100 CAPSULE ORAL at 18:39

## 2018-01-10 RX ADMIN — CELECOXIB 200 MG: 100 CAPSULE ORAL at 08:18

## 2018-01-10 RX ADMIN — HYDROCHLOROTHIAZIDE 50 MG: 25 TABLET ORAL at 08:21

## 2018-01-10 RX ADMIN — Medication 10 ML: at 14:00

## 2018-01-10 RX ADMIN — DOCUSATE SODIUM 100 MG: 100 CAPSULE, LIQUID FILLED ORAL at 18:39

## 2018-01-10 RX ADMIN — AMLODIPINE BESYLATE 10 MG: 10 TABLET ORAL at 08:22

## 2018-01-10 RX ADMIN — Medication 325 MG: at 08:16

## 2018-01-10 RX ADMIN — HYDROCODONE BITARTRATE AND ACETAMINOPHEN 1 TABLET: 7.5; 325 TABLET ORAL at 13:58

## 2018-01-10 RX ADMIN — PANTOPRAZOLE SODIUM 40 MG: 40 TABLET, DELAYED RELEASE ORAL at 08:18

## 2018-01-10 RX ADMIN — LOSARTAN POTASSIUM 100 MG: 50 TABLET ORAL at 08:21

## 2018-01-10 RX ADMIN — CEFAZOLIN SODIUM 2 G: 2 SOLUTION INTRAVENOUS at 10:25

## 2018-01-10 RX ADMIN — Medication 10 ML: at 06:00

## 2018-01-10 RX ADMIN — CEFAZOLIN SODIUM 2 G: 2 SOLUTION INTRAVENOUS at 02:01

## 2018-01-10 RX ADMIN — HYDROCODONE BITARTRATE AND ACETAMINOPHEN 2 TABLET: 7.5; 325 TABLET ORAL at 18:39

## 2018-01-10 RX ADMIN — METOCLOPRAMIDE 5 MG: 5 TABLET ORAL at 08:18

## 2018-01-10 RX ADMIN — AMILORIDE HYDROCHLORIDE 5 MG: 5 TABLET ORAL at 08:22

## 2018-01-10 RX ADMIN — REGULAR STRENGTH 325 MG: 325 TABLET ORAL at 08:21

## 2018-01-10 RX ADMIN — SODIUM CHLORIDE 100 ML/HR: 900 INJECTION, SOLUTION INTRAVENOUS at 02:01

## 2018-01-10 RX ADMIN — DOCUSATE SODIUM 100 MG: 100 CAPSULE, LIQUID FILLED ORAL at 08:16

## 2018-01-10 RX ADMIN — Medication 325 MG: at 18:39

## 2018-01-10 RX ADMIN — METOCLOPRAMIDE 5 MG: 5 TABLET ORAL at 18:39

## 2018-01-10 NOTE — HOME CARE
[facility] received referral for SN/PT - Dorian Knee - noted patient to discharge on 1/11/18 - liaison nurses will continue to follow for home care needs thru discharge - BRIGIDA Beck LPN

## 2018-01-10 NOTE — PROGRESS NOTES
Ortho    Pt. Seen and evaluated. Doing well, pain well controlled  Denies cp, sob, abd pain    Blood pressure 137/65, pulse 74, temperature 97.3 °F (36.3 °C), resp. rate 18, height 5' 4\" (1.626 m), weight 235 lb (106.6 kg), SpO2 97 %, unknown if currently breastfeeding.    rightKnee woundclean, dry, no drainage  Sensory intact to LT  Motor intact  nv intact  Neg calf tenderness    Labs:  CBC  @  CBC:   Lab Results   Component Value Date/Time    WBC 8.3 12/29/2017 11:39 AM    RBC 4.17 12/29/2017 11:39 AM    HGB 11.4 12/29/2017 11:39 AM    HCT 34.6 12/29/2017 11:39 AM    PLATELET 342 35/60/1879 11:39 AM     BMP:   Lab Results   Component Value Date/Time    Glucose 90 12/29/2017 11:39 AM    Sodium 140 12/29/2017 11:39 AM    Potassium 4.2 12/29/2017 11:39 AM    Chloride 103 12/29/2017 11:39 AM    CO2 29 12/29/2017 11:39 AM    BUN 26 12/29/2017 11:39 AM    Creatinine 1.19 12/29/2017 11:39 AM    Calcium 9.4 12/29/2017 11:39 AM   @  Coagulation  Lab Results   Component Value Date    INR 1.3 (H) 01/10/2018    APTT 42.8 (H) 12/29/2017      Basic Metabolic Profile  Lab Results   Component Value Date     12/29/2017    CO2 29 12/29/2017    BUN 26 (H) 12/29/2017       Assesment: rightOrthopedic / Rheumatologic: Total Knee Replacement, patellar fx repair  Past Medical History:   Diagnosis Date    Arthritis     Arthritis of both hips     Back pain     Balance problem     Chronic back pain     Cough     Easy bruising     Essential hypertension     GERD (gastroesophageal reflux disease)     Hiatal hernia     Hypertension     Irregular heart beat     Left hip pain 10/8/2010    Neck pain     Nervousness     Night sweat     Osteoarthritis, knee bilateral     Pain with urination     Polymyalgia rheumatica (HCC)     Reflux     Spinal stenosis     Trapezius strain     Trochanteric bursitis of left hip     Venous insufficiency      ASA: 3    Status post joint replacement pt with risk of bleeding, blood clots, and infection. Plan: aspirin, PT- wbat- knee immobilizer on at all times.

## 2018-01-10 NOTE — DISCHARGE SUMMARY
1/9/2018  8:12 AM    1/11/2018, 2:14 PM    Primary Dx:right Orthopedic / Rheumatologic: Total Knee Replacement, revision patellar fx  Secondary Dx: Etiological Diagnoses: none    HPI:  Pt had a previous TKA and did well with it until she had a patellar fx. This was repaired and she was progressing well. Unfortunately the fracture fragment escaped and the hardware was pushing on the skin. Due to the current findings and affected activity of daily living surgical intervention is indicated.   The alternatives, risks, complications as well as expected outcome were discussed, the patient understands and wishes to proceed with surgery    Past Medical History:   Diagnosis Date    Arthritis     Arthritis of both hips     Back pain     Balance problem     Chronic back pain     Cough     Easy bruising     Essential hypertension     GERD (gastroesophageal reflux disease)     Hiatal hernia     Hypertension     Irregular heart beat     Left hip pain 10/8/2010    Neck pain     Nervousness     Night sweat     Osteoarthritis, knee bilateral     Pain with urination     Polymyalgia rheumatica (HCC)     Reflux     Spinal stenosis     Trapezius strain     Trochanteric bursitis of left hip     Venous insufficiency          Current Facility-Administered Medications:     WARFARIN INFORMATION NOTE (COUMADIN), , Other, Q24H, Joseph Molina MD    amLODIPine (NORVASC) tablet 10 mg, 10 mg, Oral, DAILY, Joseph Molina MD, 10 mg at 01/10/18 7567    aspirin delayed-release tablet 325 mg, 325 mg, Oral, DAILY, Joseph Molina MD, 325 mg at 01/10/18 3901    celecoxib (CELEBREX) capsule 200 mg, 200 mg, Oral, BID, Joseph Molina MD, 200 mg at 01/10/18 0818    losartan (COZAAR) tablet 100 mg, 100 mg, Oral, DAILY, Joseph Molina MD, 100 mg at 01/10/18 9489    metoclopramide HCl (REGLAN) tablet 5 mg, 5 mg, Oral, BID, Joseph Molina MD, 5 mg at 01/10/18 0818    pantoprazole (PROTONIX) tablet 40 mg, 40 mg, Oral, ACB, Suzie Mariano MD, 40 mg at 01/10/18 0818    . PHARMACY TO SUBSTITUTE PER PROTOCOL, , , Per Protocol, Suzie Mariano MD    0.9% sodium chloride infusion, 100 mL/hr, IntraVENous, CONTINUOUS, Suzie Mariano MD, Last Rate: 100 mL/hr at 01/10/18 0201, 100 mL/hr at 01/10/18 0201    sodium chloride (NS) flush 5-10 mL, 5-10 mL, IntraVENous, Q8H, Suzie Mariano MD, 10 mL at 01/10/18 0600    sodium chloride (NS) flush 5-10 mL, 5-10 mL, IntraVENous, PRN, Suzie Mariano MD    naloxone [facility]) injection 0.4 mg, 0.4 mg, IntraVENous, PRN, Suzie Mariano MD    ferrous sulfate tablet 325 mg, 1 Tab, Oral, BID WITH MEALS, Suzie Mariano MD, 325 mg at 01/10/18 0816    HYDROcodone-acetaminophen (NORCO) 7.5-325 mg per tablet 1-2 Tab, 1-2 Tab, Oral, Q4H PRN, Suzie Mariano MD, 1 Tab at 01/10/18 1358    ondansetron (ZOFRAN) injection 4 mg, 4 mg, IntraVENous, Q4H PRN, Suzie Mariano MD    docusate sodium (COLACE) capsule 100 mg, 100 mg, Oral, BID, Suzie Mariano MD, 100 mg at 01/10/18 0816    aMILoride (MIDAMOR) tablet 5 mg, 5 mg, Oral, DAILY, 5 mg at 01/10/18 2527 **AND** hydroCHLOROthiazide (HYDRODIURIL) tablet 50 mg, 50 mg, Oral, DAILY, Suzie Mariano MD, 50 mg at 01/10/18 6799      Citric acid; Crinone [progesterone micronized]; Iodine; and Percocet [oxycodone-acetaminophen]    Physical Exam:  General A&O x3 NAD, well developed, well nourished, normal affect  Heart: S1-S2, RRR  Lungs: CTA Bilat  Abd: soft NT, ND  Ext: n/v intact    Hospital Course:    Pt. Had rightOrthopedic / Rheumatologic: Total Knee Replacement, patellar fx revision    Post -op Course: The patient tolerated the procedure well. They were followed by internal medicine for help with medical management. Pt. Was place on Abx pre and post-op for prophylaxis against infection as well as coumadin pre and post-op for prophylaxis against DVT. Vitals signs remained stable, remained af. The wound wasclean, dry, no drainage. Pain was well controlled. Pt. Had negative calf tenderness or swelling, no evidence for DVT. Patient had PT/OT consult for evaluation and treatment. CBC  Lab Results   Component Value Date/Time    WBC 8.3 12/29/2017 11:39 AM    RBC 4.17 (L) 12/29/2017 11:39 AM    HCT 34.6 (L) 12/29/2017 11:39 AM    MCV 83.0 12/29/2017 11:39 AM    MCH 27.3 12/29/2017 11:39 AM    MCHC 32.9 12/29/2017 11:39 AM    RDW 13.7 12/29/2017 11:39 AM     Coagulation  Lab Results   Component Value Date    INR 1.3 (H) 01/10/2018    APTT 42.8 (H) 12/29/2017      Basic Metabolic Profile  Lab Results   Component Value Date     12/29/2017    CO2 29 12/29/2017    BUN 26 (H) 12/29/2017       Discharge Plan:  The patient will be d/c'd to home, total knee protocol, WBAT. Knee immobilizer at all times. No flexion. [facility]. Pt safe for homebound transfer, sp Total joint replacement. A walker, bedside commode, and shower chair will be utilized for ADL's. Follow up with Dr. Gisele Navas in 10-12 days. Call with any questions or concerns.

## 2018-01-10 NOTE — PROGRESS NOTES
Problem: Mobility Impaired (Adult and Pediatric)  Goal: *Acute Goals and Plan of Care (Insert Text)  STG's to be addressed within 3 days:  1. Bed mobility:  Supine to sit to supine S with HR for meals. 2. Activity tolerance: Tolerate up in chair 1-2 hrs for ADLs. 3. Transfers:  Sit to stand to chair S with LRAD for ADL's. LTG's to be addressed within 7 days:  1. Standing/Ambulation Balance:  Increase to Good with LRAD for safe transfers and gait. 2. Ambulation:  Ambulate > 200 ft. S with LRAD for home mobility. 3. Patient Education:  Independent with HEP for home safety. 4. Stairs:  Up/Down 3 steps CGA with HR for home entry. physical Therapy TREATMENT    Patient: Austin Carlson (11 y.o. female)  Date: 1/10/2018  Diagnosis: T84.043A PERIPROSTHETIC FX  History of patellar fracture <principal problem not specified>  Procedure(s) (LRB):  RIGHT KNEE REVISION OF PATELLA FRACTURE (Right) 1 Day Post-Op  Precautions: Fall, WBAT (RLE knee immobilizer)   Chart, physical therapy assessment, plan of care and goals were reviewed. ASSESSMENT:  Pt found supine in bed, awake and alert. Pt agreed to participate in therapy. Pt found with KI on and remained in immobilizer for mobility. Pt performed bed mobility with Supervision and sit to stand with SBA to RW. Pt amb ~200 ft in hallway with SBA without difficulty, limited by KI. Pt returned to bed mobility with KI and Ice reapplied. Pt left with all needs met and call bell in reach. Progression toward goals:  [x]      Improving appropriately and progressing toward goals  []      Improving slowly and progressing toward goals  []      Not making progress toward goals and plan of care will be adjusted     PLAN:  Patient continues to benefit from skilled intervention to address the above impairments. Continue treatment per established plan of care.   Discharge Recommendations:  Home Health  Further Equipment Recommendations for Discharge:  rolling walker and N/A SUBJECTIVE:   Patient stated I'm alright today.     OBJECTIVE DATA SUMMARY:   Critical Behavior:  Neurologic State: Alert  Orientation Level: Oriented X4  Cognition: Appropriate decision making, Follows commands  Safety/Judgement: Awareness of environment, Fall prevention  Functional Mobility Training:  Bed Mobility:  Rolling: Supervision  Supine to Sit: Supervision  Sit to Supine: Supervision  Transfers:  Sit to Stand: Stand-by asssistance  Stand to Sit: Stand-by asssistance  Balance:  Sitting: Intact  Standing: With support  Ambulation/Gait Training:  Distance (ft): 200 Feet (ft)  Assistive Device: Walker, rolling  Ambulation - Level of Assistance: Stand-by asssistance  Gait Description (WDL): Exceptions to WDL  Gait Abnormalities: Decreased step clearance;Hip Hike  Right Side Weight Bearing: As tolerated (with KI)  Base of Support: Widened  Speed/Nikole: Accelerated  Step Length: Left shortened  Pain:  Pain Scale 1: Numeric (0 - 10)  Pain Intensity 1: 4  Pain Location 1: Incisional (L thigh mass removal)  Pain Orientation 1: Inner  Pain Description 1: Throbbing  Pain Intervention(s) 1: Medication (see MAR)  Activity Tolerance:   Fair+  Please refer to the flowsheet for vital signs taken during this treatment. After treatment:   [] Patient left in no apparent distress sitting up in chair  [x] Patient left in no apparent distress in bed  [x] Call bell left within reach  [x] Nursing notified  [] Caregiver present  [] Bed alarm activated      Shayla Ibarra   Time Calculation: 24 mins    Mobility  Current  CI= 1-19%   Goal  CI= 1-19%  D/C  CI= 1-19%. The severity rating is based on the Level of Assistance required for Functional Mobility and ADLs.

## 2018-01-10 NOTE — PROGRESS NOTES
Problem: Self Care Deficits Care Plan (Adult)  Goal: *Acute Goals and Plan of Care (Insert Text)  Occupational Therapy Goals  Initiated 1/10/2018 within 7 day(s). 1.  Patient will perform lower body dressing with supervision/set-up using AE. 2.  Patient will perform toilet transfers with supervision/set-up. 3.  Patient will perform all aspects of toileting with supervision/set-up. Outcome: Progressing Towards Goal  Occupational Therapy EVALUATION    Patient: Daniel Lange (12 y.o. female)  Date: 1/10/2018  Primary Diagnosis: T84.043A PERIPROSTHETIC FX  History of patellar fracture  Procedure(s) (LRB):  RIGHT KNEE REVISION OF PATELLA FRACTURE (Right) 1 Day Post-Op   Precautions:   Fall, WBAT (RLE knee immobilizer)    ASSESSMENT :  Based on the objective data described below, the patient presents with decreased ADLs and decreased functional mobility after knee surgery, complicated by knee immobilizer. Patient unable to reach her right foot for self care secondary to immobilizer and will benefit from use of AE for increased independence. She has a reacher and sock aid at home but given a long handled sponge for bathing after demonstration. Patient attempted to don socks and simulate donning pants but unable to safely perform task with RLE. Review of use needed. Patient has a supportive  at home to assist her prn and all needed DME (tub bench, BSC) for bathroom safety. Patient will benefit from skilled intervention to address the above impairments.   Patients rehabilitation potential is considered to be Good  Factors which may influence rehabilitation potential include:   []             None noted  []             Mental ability/status  [x]             Medical condition  []             Home/family situation and support systems  []             Safety awareness  []             Pain tolerance/management  []             Other:      PLAN :  Recommendations and Planned Interventions:  [x] Self Care Training                  [x]        Therapeutic Activities  [x]               Functional Mobility Training    []        Cognitive Retraining  [x]               Therapeutic Exercises           [x]        Endurance Activities  [x]               Balance Training                   []        Neuromuscular Re-Education  []               Visual/Perceptual Training     [x]   Home Safety Training  [x]               Patient Education                 [x]        Family Training/Education  []               Other (comment):    Frequency/Duration: Patient will be followed by occupational therapy 1-2 times per day/4-7 days per week to address goals. Discharge Recommendations: Home Health  Further Equipment Recommendations for Discharge: N/A     Barriers to Learning/Limitations: None  Compensate with: visual, verbal, tactile, kinesthetic cues/model     PATIENT COMPLEXITY      Eval Complexity: History: MEDIUM Complexity : Expanded review of history including physical, cognitive and psychosocial  history ; Examination: MEDIUM Complexity : 3-5 performance deficits relating to physical, cognitive , or psychosocial skils that result in activity limitations and / or participation restrictions; Decision Making:MEDIUM Complexity : Patient may present with comorbidities that affect occupational performnce. Miniml to moderate modification of tasks or assistance (eg, physical or verbal ) with assesment(s) is necessary to enable patient to complete evaluation  Assessment: Moderate Complexity     G-CODES:     Self Care  Current  CK= 40-59%   Goal  CI= 1-19%. The severity rating is based on the Level of Assistance required for Functional Mobility and ADLs. SUBJECTIVE:   Patient stated The water just comes out of me.     OBJECTIVE DATA SUMMARY:     Past Medical History:   Diagnosis Date    Arthritis     Arthritis of both hips     Back pain     Balance problem     Chronic back pain     Cough     Easy bruising  Essential hypertension     GERD (gastroesophageal reflux disease)     Hiatal hernia     Hypertension     Irregular heart beat     Left hip pain 10/8/2010    Neck pain     Nervousness     Night sweat     Osteoarthritis, knee bilateral     Pain with urination     Polymyalgia rheumatica (HCC)     Reflux     Spinal stenosis     Trapezius strain     Trochanteric bursitis of left hip     Venous insufficiency      Past Surgical History:   Procedure Laterality Date    COLONOSCOPY N/A 7/27/2016    COLONOSCOPY w/polypectomy performed by Jordana Robles MD at [address] [facility] HX CHOLECYSTECTOMY      HX HEENT  06/2011    left cornea transplant    HX HYSTERECTOMY      HX KNEE REPLACEMENT Right 02/2017    HX ORTHOPAEDIC      right foot and ankle    HX ORTHOPAEDIC Right 04/2017     Prior Level of Function/Home Situation: Pt was independent with basic self care tasks and used a RW for functional mobility PTA. Home Situation  Home Environment: Private residence  # Steps to Enter: 3  Rails to Enter: Yes  Hand Rails : Bilateral  One/Two Story Residence: Two story, live on 1st floor  Living Alone: No  Support Systems: Spouse/Significant Other/Partner, Family member(s)  Patient Expects to be Discharged to[de-identified] Private residence  Current DME Used/Available at Home: Walker, rolling  Tub or Shower Type: Tub/Shower combination (with bench)  [x]  Right hand dominant   []  Left hand dominant  Cognitive/Behavioral Status:  Neurologic State: Alert  Orientation Level: Oriented X4  Cognition: Appropriate decision making; Follows commands  Safety/Judgement: Awareness of environment; Fall prevention    Skin: Intact on UEs    Edema: None noted in UEs    Vision/Perceptual:    Acuity: Within Defined Limits      Coordination:  Fine Motor Skills-Upper: Left Intact; Right Intact    Gross Motor Skills-Upper: Left Intact; Right Intact    Balance:  Sitting: Intact  Standing: With support    Strength:  Strength: Within functional limits (UEs)    Tone & Sensation:  Tone: Normal (UEs)  Sensation: Intact (UEs)    Range of Motion:  AROM: Within functional limits (UEs)    Functional Mobility and Transfers for ADLs:  Bed Mobility:  Supine to Sit: Minimum assistance  Sit to Supine: Minimum assistance  Transfers:  Sit to Stand: Minimum assistance   Toilet Transfer : Minimum assistance    ADL Assessment:  Feeding: Independent    Oral Facial Hygiene/Grooming: Independent    Bathing: Minimum assistance (seated, sponge bath)    Upper Body Dressing: Setup    Lower Body Dressing: Moderate assistance (without AE)    Toileting: Minimum assistance    Pain:  Pt reports 0/10 pain or discomfort prior to treatment.    Pt reports 0/10 pain or discomfort post treatment. Activity Tolerance:   Good    Please refer to the flowsheet for vital signs taken during this treatment. After treatment:   [] Patient left in no apparent distress sitting up in chair  [x] Patient left in no apparent distress in bed  [x] Call bell left within reach  [] Nursing notified  [] Caregiver present  [] Bed alarm activated    COMMUNICATION/EDUCATION:   [x] Home safety education was provided and the patient/caregiver indicated understanding. [x] Patient/family have participated as able in goal setting and plan of care. [x] Patient/family agree to work toward stated goals and plan of care. [] Patient understands intent and goals of therapy, but is neutral about his/her participation. [] Patient is unable to participate in goal setting and plan of care.     Thank you for this referral.  Carter Barney MS OTR/L  Time Calculation: 25 mins

## 2018-01-10 NOTE — PROGRESS NOTES
5124  Received report. Patient resting in bed with right knee immobilizer in place. A/OX4. Denies pain. Dressing to right knee clean,dry and intact. No c/o numbness or tingling sensation. Positive PPP present to BLE. No acute distress noted. Call light within reach. 0827  OT at bedside. 1220  Patient resting in bed. Call light within reach. 1408  No acute distress noted. Bedside and Verbal shift change report given to [address] [place] [address] [address] (oncoming nurse) by Lane Moreland (offgoing nurse). Report included the following information SBAR and Kardex.

## 2018-01-10 NOTE — PROGRESS NOTES
Problem: Mobility Impaired (Adult and Pediatric)  Goal: *Acute Goals and Plan of Care (Insert Text)  STG's to be addressed within 3 days:  1. Bed mobility:  Supine to sit to supine S with HR for meals. 2. Activity tolerance: Tolerate up in chair 1-2 hrs for ADLs. 3. Transfers:  Sit to stand to chair S with LRAD for ADL's. LTG's to be addressed within 7 days:  1. Standing/Ambulation Balance:  Increase to Good with LRAD for safe transfers and gait. 2. Ambulation:  Ambulate > 200 ft. S with LRAD for home mobility. 3. Patient Education:  Independent with HEP for home safety. 4. Stairs:  Up/Down 3 steps CGA with HR for home entry. physical Therapy TREATMENT    Patient: Melanie Martin (89 y.o. female)  Date: 1/10/2018  Diagnosis: T84.043A PERIPROSTHETIC FX  History of patellar fracture <principal problem not specified>  Procedure(s) (LRB):  RIGHT KNEE REVISION OF PATELLA FRACTURE (Right) 1 Day Post-Op  Precautions: Fall, WBAT (RLE knee immobilizer)   Chart, physical therapy assessment, plan of care and goals were reviewed. ASSESSMENT:  Pt found supine in bed, awake and alert. Pt pleasant and willing to participate in therapy. Pt performed supine to sit with Supervision with KI in place. Pt was able to to perform sit to stand to RW with SBA and amb in hallway for ~200 ft. Pt then amb to bathroom and perform sit to stand transfers with SBA. Pt then returned to bed and perform bed mobility with Supervision. Pt left with all needs met and call bell in reach. Progression toward goals:  [x]      Improving appropriately and progressing toward goals  []      Improving slowly and progressing toward goals  []      Not making progress toward goals and plan of care will be adjusted     PLAN:  Patient continues to benefit from skilled intervention to address the above impairments. Continue treatment per established plan of care.   Discharge Recommendations:  Home Health  Further Equipment Recommendations for Discharge:  rolling walker     SUBJECTIVE:   Patient stated I reckon.     OBJECTIVE DATA SUMMARY:   Critical Behavior:  Neurologic State: Alert  Orientation Level: Oriented X4  Cognition: Appropriate decision making, Follows commands  Safety/Judgement: Awareness of environment, Fall prevention  Functional Mobility Training:  Bed Mobility:  Rolling: Supervision  Supine to Sit: Supervision  Sit to Supine: Supervision  Transfers:  Sit to Stand: Stand-by asssistance  Stand to Sit: Stand-by asssistance  Balance:  Sitting: Intact  Standing: With support  Standing - Static: Fair  Standing - Dynamic : Fair  Ambulation/Gait Training:  Distance (ft): 200 Feet (ft)  Assistive Device: Walker, rolling  Ambulation - Level of Assistance: Stand-by asssistance  Gait Description (WDL): Exceptions to WDL  Gait Abnormalities: Decreased step clearance;Hip Hike  Right Side Weight Bearing: As tolerated  Base of Support: Widened  Speed/Nikole: Fluctuations  Step Length: Left shortened  Pain:  Pain Scale 1: Numeric (0 - 10)  Pain Intensity 1: 0  Pain Location 1: Incisional  Pain Orientation 1: Inner  Pain Description 1: Aching  Pain Intervention(s) 1: Medication (see MAR)  Activity Tolerance:   Fair+  Please refer to the flowsheet for vital signs taken during this treatment. After treatment:   [] Patient left in no apparent distress sitting up in chair  [x] Patient left in no apparent distress in bed  [x] Call bell left within reach  [x] Nursing notified  [] Caregiver present  [] Bed alarm activated      Lauren Piedra   Time Calculation: 23 mins    Mobility  Current  CI= 1-19%   Goal  CI= 1-19%  D/C  CI= 1-19%. The severity rating is based on the Level of Assistance required for Functional Mobility and ADLs.

## 2018-01-10 NOTE — PROGRESS NOTES
Care Management Interventions  PCP Verified by CM: Yes (Dr. Cem Vasquez)  Mode of Transport at Discharge: Other (see comment) ()  Transition of Care Consult (CM Consult): Home Health, Discharge [facility]: Yes  Discharge Durable Medical Equipment: No (has RW at home)  Physical Therapy Consult: Yes  Occupational Therapy Consult: Yes  Current Support Network: Lives with Spouse, Own Home (Pt lives with her spouse who assists in her needs, has friends can call if necessary)  Confirm Follow Up Transport: Family  Plan discussed with Pt/Family/Caregiver: Yes  Freedom of Choice Offered: Yes  Discharge Location  Discharge Placement: Home with home health    Patient is a [de-identified] yo female admitted for removal of deep buried painful hardware and patellar ligament repair. Patient reports her spouse will be assisting in her care upon discharge. Prior to admission she was independent. No DME in home and RW for use outside of home. She stated she also has a shower chair. She stated her spouse will be caring for her mostly, but has people she can call on if needed. Patient signed [address] for [facility]. Referral placed in [address] [place] [address] and called into liaison.

## 2018-01-11 VITALS
HEIGHT: 64 IN | WEIGHT: 235 LBS | TEMPERATURE: 97.3 F | SYSTOLIC BLOOD PRESSURE: 130 MMHG | HEART RATE: 83 BPM | DIASTOLIC BLOOD PRESSURE: 65 MMHG | RESPIRATION RATE: 18 BRPM | OXYGEN SATURATION: 94 % | BODY MASS INDEX: 40.12 KG/M2

## 2018-01-11 PROCEDURE — 97116 GAIT TRAINING THERAPY: CPT

## 2018-01-11 PROCEDURE — 74011250637 HC RX REV CODE- 250/637: Performed by: ORTHOPAEDIC SURGERY

## 2018-01-11 PROCEDURE — 97530 THERAPEUTIC ACTIVITIES: CPT

## 2018-01-11 RX ADMIN — PANTOPRAZOLE SODIUM 40 MG: 40 TABLET, DELAYED RELEASE ORAL at 09:06

## 2018-01-11 RX ADMIN — HYDROCODONE BITARTRATE AND ACETAMINOPHEN 1 TABLET: 7.5; 325 TABLET ORAL at 06:59

## 2018-01-11 RX ADMIN — AMILORIDE HYDROCHLORIDE 5 MG: 5 TABLET ORAL at 09:06

## 2018-01-11 RX ADMIN — Medication 10 ML: at 00:56

## 2018-01-11 RX ADMIN — METOCLOPRAMIDE 5 MG: 5 TABLET ORAL at 09:06

## 2018-01-11 RX ADMIN — CELECOXIB 200 MG: 100 CAPSULE ORAL at 09:06

## 2018-01-11 RX ADMIN — REGULAR STRENGTH 325 MG: 325 TABLET ORAL at 09:06

## 2018-01-11 RX ADMIN — LOSARTAN POTASSIUM 100 MG: 50 TABLET ORAL at 09:06

## 2018-01-11 RX ADMIN — AMLODIPINE BESYLATE 10 MG: 10 TABLET ORAL at 09:07

## 2018-01-11 RX ADMIN — HYDROCHLOROTHIAZIDE 50 MG: 25 TABLET ORAL at 09:06

## 2018-01-11 RX ADMIN — Medication 10 ML: at 06:58

## 2018-01-11 RX ADMIN — DOCUSATE SODIUM 100 MG: 100 CAPSULE, LIQUID FILLED ORAL at 09:06

## 2018-01-11 RX ADMIN — HYDROCODONE BITARTRATE AND ACETAMINOPHEN 1 TABLET: 7.5; 325 TABLET ORAL at 00:55

## 2018-01-11 RX ADMIN — Medication 325 MG: at 09:08

## 2018-01-11 NOTE — PROGRESS NOTES
Verified with  regarding no coumadin ordered for pt. Per , coumadin not needed since she is on ASA 325mg daily. Per , PT/INR lab draw can be discontinued.

## 2018-01-11 NOTE — DISCHARGE INSTRUCTIONS
Discharge Instructions for Total Hip Replacement Patients    · The dressing on your hip will be changed by the Home Health professional at the appropriate time. Keep your incision clean and dry. Do not apply any ointments to the incision. · You may shower as long as you keep you incision dry. When showering, leave your dressing on. The dressing is waterproof as long as the edges are sealed. · Notify your surgeon if:  · Your temperature is greater than 100.5  · You have pain not controlled by your pain medication  · You have increased drainage from your incision  · You have increased redness or swelling in your leg  · You have chest pain, shortness of breath, or any other problems    · Do your exercises as instructed by the home physical therapist.    · Follow your total hip precautions:  · Do not cross your legs or feet  · Do not turn your toes inward  · Do not bed at your waist more than 90 degrees    · Keep a firm pillow between your knees when sleeping or lying down. · You may use ice to your hip as needed. Do not apply the ice pack directly to your skin. Use a barrier such as your pant leg or a thin towel. · Walk once an hour during normal walking hours. · If you have GRIFFIN hose (the white support stockings), remove them at bedtime and re-apply the hose in the morning for the next 2 weeks. Best of luck with your new hip and [address] for choosing the [address]! Patient armband removed and shredded      DISCHARGE SUMMARY from Nurse    PATIENT INSTRUCTIONS:    After general anesthesia or intravenous sedation, for 24 hours or while taking prescription Narcotics:  · Limit your activities  · Do not drive and operate hazardous machinery  · Do not make important personal or business decisions  · Do  not drink alcoholic beverages  · If you have not urinated within 8 hours after discharge, please contact your surgeon on call.     Report the following to your surgeon:  · Excessive pain, swelling, redness or odor of or around the surgical area  · Temperature over 100.5  · Nausea and vomiting lasting longer than 4 hours or if unable to take medications  · Any signs of decreased circulation or nerve impairment to extremity: change in color, persistent  numbness, tingling, coldness or increase pain  · Any questions    What to do at Home:  Recommended activity: Activity as tolerated within the restrictions detailed by provider    If you experience any of the following symptoms Nausea, vomiting, diarrhea, fever greater than 100.5, dizziness, severe headache, shortness of breath, chest pain, increased pain, please follow up with PCP. *  Please give a list of your current medications to your Primary Care Provider. *  Please update this list whenever your medications are discontinued, doses are      changed, or new medications (including over-the-counter products) are added. *  Please carry medication information at all times in case of emergency situations. These are general instructions for a healthy lifestyle:    No smoking/ No tobacco products/ Avoid exposure to second hand smoke  Surgeon General's Warning:  Quitting smoking now greatly reduces serious risk to your health. Obesity, smoking, and sedentary lifestyle greatly increases your risk for illness    A healthy diet, regular physical exercise & weight monitoring are important for maintaining a healthy lifestyle    You may be retaining fluid if you have a history of heart failure or if you experience any of the following symptoms:  Weight gain of 3 pounds or more overnight or 5 pounds in a week, increased swelling in our hands or feet or shortness of breath while lying flat in bed. Please call your doctor as soon as you notice any of these symptoms; do not wait until your next office visit.     Recognize signs and symptoms of STROKE:    F-face looks uneven    A-arms unable to move or move unevenly    S-speech slurred or non-existent    T-time-call 911 as soon as signs and symptoms begin-DO NOT go       Back to bed or wait to see if you get better-TIME IS BRAIN. Warning Signs of HEART ATTACK     Call 911 if you have these symptoms:   Chest discomfort. Most heart attacks involve discomfort in the center of the chest that lasts more than a few minutes, or that goes away and comes back. It can feel like uncomfortable pressure, squeezing, fullness, or pain.  Discomfort in other areas of the upper body. Symptoms can include pain or discomfort in one or both arms, the back, neck, jaw, or stomach.  Shortness of breath with or without chest discomfort.  Other signs may include breaking out in a cold sweat, nausea, or lightheadedness. Don't wait more than five minutes to call 911 - MINUTES MATTER! Fast action can save your life. Calling 911 is almost always the fastest way to get lifesaving treatment. Emergency Medical Services staff can begin treatment when they arrive -- up to an hour sooner than if someone gets to the hospital by car. The discharge information has been reviewed with the patient. The patient verbalized understanding. Discharge medications reviewed with the patient and appropriate educational materials and side effects teaching were provided.   ___________________________________________________________________________________________________________________________________

## 2018-01-11 NOTE — ROUTINE PROCESS
Bedside and Verbal shift change report given to Dave Duckworth (oncoming nurse) by Gerardo Gamboa (offgoing nurse). Report included the following information SBAR, Kardex, Intake/Output, MAR and Recent Results.

## 2018-01-11 NOTE — PROGRESS NOTES
met with patient, completed the initial Spiritual Assessment of the patient, and offered Pastoral Care, see flow sheets for interventions. Pastoral support provided. Patient does not have any Congregation/cultural needs that will affect patients preferences in health care. Patient had jsut eaten breakfast and indicated she is doing all right. Chart reviewed. Chaplains will continue to follow and will provide pastoral care on an as needed/as requested basis. Andrew Blakely MDiv.   Board Certified [employer] [phone]

## 2018-01-11 NOTE — ROUTINE PROCESS
Bedside and Verbal shift change report given to Steve Riggs (oncoming nurse) by Francisco Justice (offgoing nurse). Report included the following information SBAR, Kardex and MAR.

## 2018-01-11 NOTE — PROGRESS NOTES
Ortho    Pt. Seen and evaluated. Doing well, progressing slow with PT, pain well controlled  Denies cp, sob, abd pain    Blood pressure 138/72, pulse 82, temperature 97.7 °F (36.5 °C), resp. rate 18, height 5' 4\" (1.626 m), weight 235 lb (106.6 kg), SpO2 96 %, unknown if currently breastfeeding.    rightKnee woundclean, dry, no drainage  Sensory intact to LT  Motor intact  nv intact  Neg calf tenderness    Labs:  CBC  @  CBC:   Lab Results   Component Value Date/Time    WBC 8.3 12/29/2017 11:39 AM    RBC 4.17 12/29/2017 11:39 AM    HGB 11.4 12/29/2017 11:39 AM    HCT 34.6 12/29/2017 11:39 AM    PLATELET 963 18/29/4558 11:39 AM     BMP:   Lab Results   Component Value Date/Time    Glucose 90 12/29/2017 11:39 AM    Sodium 140 12/29/2017 11:39 AM    Potassium 4.2 12/29/2017 11:39 AM    Chloride 103 12/29/2017 11:39 AM    CO2 29 12/29/2017 11:39 AM    BUN 26 12/29/2017 11:39 AM    Creatinine 1.19 12/29/2017 11:39 AM    Calcium 9.4 12/29/2017 11:39 AM   @  Coagulation  Lab Results   Component Value Date    INR 1.3 (H) 01/10/2018    APTT 42.8 (H) 12/29/2017      Basic Metabolic Profile  Lab Results   Component Value Date     12/29/2017    CO2 29 12/29/2017    BUN 26 (H) 12/29/2017       Assesment: rightOrthopedic / Rheumatologic: Total Knee Replacement, revision patellar fx  Past Medical History:   Diagnosis Date    Arthritis     Arthritis of both hips     Back pain     Balance problem     Chronic back pain     Cough     Easy bruising     Essential hypertension     GERD (gastroesophageal reflux disease)     Hiatal hernia     Hypertension     Irregular heart beat     Left hip pain 10/8/2010    Neck pain     Nervousness     Night sweat     Osteoarthritis, knee bilateral     Pain with urination     Polymyalgia rheumatica (HCC)     Reflux     Spinal stenosis     Trapezius strain     Trochanteric bursitis of left hip     Venous insufficiency      ASA: 3    Status post joint replacement pt with risk of bleeding, blood clots, and infection.     Plan: aspirin, PT- knee immobilizer at all times- no flexion at this time, home today if safe with PT

## 2018-01-11 NOTE — PROGRESS NOTES
Problem: Mobility Impaired (Adult and Pediatric)  Goal: *Acute Goals and Plan of Care (Insert Text)  STG's to be addressed within 3 days:  1. Bed mobility:  Supine to sit to supine S with HR for meals. 2. Activity tolerance: Tolerate up in chair 1-2 hrs for ADLs. 3. Transfers:  Sit to stand to chair S with LRAD for ADL's. LTG's to be addressed within 7 days:  1. Standing/Ambulation Balance:  Increase to Good with LRAD for safe transfers and gait. 2. Ambulation:  Ambulate > 200 ft. S with LRAD for home mobility. 3. Patient Education:  Independent with HEP for home safety. 4. Stairs:  Up/Down 3 steps CGA with HR for home entry. Outcome: Resolved/Met Date Met: 01/11/18  physical Therapy TREATMENT/DISCHARGE    Patient: Richard Benavides (60 y.o. female)  Date: 1/11/2018  Diagnosis: T84.043A PERIPROSTHETIC FX  History of patellar fracture <principal problem not specified>  Procedure(s) (LRB):  RIGHT KNEE REVISION OF PATELLA FRACTURE (Right) 2 Days Post-Op  Precautions: Fall, WBAT (RLE knee immobilizer)  Chart, physical therapy assessment, plan of care and goals were reviewed. ASSESSMENT:  Patient has met all goals with PT and is cleared for home discharge. Patient demonstrates supervision with bed mobility and simple transfers with RW/KI. Patient able to ambulate with RW/KI, no LOB. Patient ascended/descend 4 steps SBA with R HR, vc's for sequencing, patient with good return demonstration. Patient returned back to room, requested to return back to bed. Completed instruction with HEP for safe home/community mobility. Patient verbalized comprehension. No further acute PT indicated at present time. Will discharge PT.   Progression toward goals:  [x]      Goals met  []      Improving appropriately and progressing toward goals  []      Improving slowly and progressing toward goals  []      Not making progress toward goals and plan of care will be adjusted     PLAN:  Patient will be discharged from physical therapy at this time. Rationale for discharge:  [x] Goals Achieved  [] [address]  [] Patient not participating in therapy  [] Other:  Discharge Recommendations:  Home Health  Further Equipment Recommendations for Discharge:  rolling walker     G-CODES:     Mobility  Current  CI= 1-19%   Goal  CI= 1-19%  D/C  CI= 1-19%. The severity rating is based on the Level of Assistance required for Functional Mobility and ADLs. SUBJECTIVE:   Patient stated I think I may go home today.     OBJECTIVE DATA SUMMARY:   Critical Behavior:  Neurologic State: Alert, Appropriate for age  Orientation Level: Appropriate for age, Oriented X4  Cognition: Appropriate decision making, Appropriate for age attention/concentration, Appropriate safety awareness, Follows commands  Safety/Judgement: Awareness of environment, Fall prevention  Functional Mobility Training:  Bed Mobility:  Rolling: Supervision  Supine to Sit: Supervision  Sit to Supine: Supervision  Scooting: Supervision  Transfers:  Sit to Stand: Supervision (with RW)  Stand to Sit: Supervision (with RW)  Balance:  Sitting: Intact  Standing: Intact; With support (with RW)  Standing - Static: Good (with RW)  Standing - Dynamic : Good (with RW)  Ambulation/Gait Training:  Distance (ft): 175 Feet (ft)  Assistive Device: Walker, rolling  Ambulation - Level of Assistance: Stand-by asssistance  Gait Abnormalities: Antalgic; Step to gait  Right Side Weight Bearing: As tolerated  Base of Support: Widened  Speed/Nikole: Pace decreased (<100 feet/min)  Step Length: Left shortened;Right shortened  Interventions: Verbal cues  Stairs:  Number of Stairs Trained: 4  Stairs - Level of Assistance: Stand-by asssistance   Rail Use: Right   Pain:  Pt reports 6/10 pain or discomfort prior to treatment.    Pt reports 6/10 pain or discomfort post treatment.        Activity Tolerance:   Good  Please refer to the flowsheet for vital signs taken during this treatment.   After treatment:   [] Patient left in no apparent distress sitting up in chair  [x] Patient left in no apparent distress in bed  [x] Call bell left within reach  [x] Nursing notified  [] Caregiver present  [] Bed alarm activated  Perry Segundo PT   Time Calculation: 23 mins

## 2018-01-11 NOTE — ROUTINE PROCESS
Mobility Intervention:       [] Pt dangled at edge of bed    [] Pt assisted OOB to bedside commode    [] Pt assisted OOB to chair    [x] Pt ambulated to bathroom    [] Patient was ambulated in room/hallway    Assistive Device Utilized:       [x] Rolling walker   [] Crutches   [] Straight Cane   [] Knee immobilizer   [] IV pole    After Mobilization:     [] Patient left in no apparent distress sitting up in chair  [x] Patient left in no apparent distress in bed  [x] Call bell left within reach  [x] SCDs on & machine turned on  [x] Ice applied  [] RN notified  [] Caregiver present  [] Bed alarm activated    Reason patient not mobilized:      [] Patient refused   [] Nausea/vomiting   [] Low blood pressure   [] Drowsy/lethargic    Pain Rating:     [] 0  [] 1  Assistive Device:        [] 2  [] 3  [] 4  [] 5  [] 6  Assistive Device:        [x] 7  [] 8  [] 9  [] 10    Comments:   Pain medication given

## 2018-01-11 NOTE — ANESTHESIA POSTPROCEDURE EVALUATION
Anesthesia:  Lumbar plexus Block Post Operative rounds Note      Patient status post total hip    Post op day # 4    Visit Vitals    /72 (BP 1 Location: Left arm, BP Patient Position: At rest)    Pulse 76    Temp 36.3 °C (97.3 °F)    Resp 18    Ht 5' 4\" (1.626 m)    Wt 106.6 kg (235 lb)    SpO2 100%    BMI 40.34 kg/m2     WNL  Patient rates pain 4 at rest and 7 with movement. Patient is subjectively rated by patient as 5    Location of pain: no surgical pain    Single shot block worn off yes. No more paresthesias present. Sensory and Motor function:intact. No complication from Nerve Block. Ketty Burr CRNAPost-Anesthesia Evaluation and Assessment    Patient: Sue Adams MRN: [id number]  SSN: [id number]    YOB: 1935  Age: [de-identified] y.o. Sex: female       Cardiovascular Function/Vital Signs  Visit Vitals    /72 (BP 1 Location: Left arm, BP Patient Position: At rest)    Pulse 76    Temp 36.3 °C (97.3 °F)    Resp 18    Ht 5' 4\" (1.626 m)    Wt 106.6 kg (235 lb)    SpO2 100%    BMI 40.34 kg/m2       Patient is status post general anesthesia for Procedure(s):  RIGHT KNEE REVISION OF PATELLA FRACTURE. Nausea/Vomiting: None    Postoperative hydration reviewed and adequate. Pain:  Pain Scale 1: Numeric (0 - 10) (01/10/18 1842)  Pain Intensity 1: 8 (01/10/18 1842)   Managed    Neurological Status:   Neuro (WDL): Within Defined Limits (01/09/18 1155)  Neuro  Neurologic State: Alert (01/10/18 0900)  Orientation Level: Oriented X4 (01/10/18 0900)  Cognition: Appropriate decision making; Follows commands (01/10/18 0900)   At baseline    Mental Status and Level of Consciousness: Alert and oriented     Pulmonary Status:   O2 Device: Nasal cannula (01/10/18 9562)   Adequate oxygenation and airway patent    Complications related to anesthesia: None    Post-anesthesia assessment completed.  No concerns    Signed By: Ketty Burr CRNA     January 10, 2018

## 2018-01-12 ENCOUNTER — HOME CARE VISIT (OUTPATIENT)
Dept: HOME HEALTH SERVICES | Facility: HOME HEALTH | Age: 83
End: 2018-01-12

## 2018-01-12 ENCOUNTER — HOME CARE VISIT (OUTPATIENT)
Dept: SCHEDULING | Facility: HOME HEALTH | Age: 83
End: 2018-01-12
Payer: MEDICARE

## 2018-01-12 ENCOUNTER — TELEPHONE (OUTPATIENT)
Dept: CASE MANAGEMENT | Age: 83
End: 2018-01-12

## 2018-01-12 VITALS
WEIGHT: 235 LBS | HEART RATE: 78 BPM | BODY MASS INDEX: 40.12 KG/M2 | DIASTOLIC BLOOD PRESSURE: 76 MMHG | SYSTOLIC BLOOD PRESSURE: 122 MMHG | TEMPERATURE: 97 F | HEIGHT: 64 IN | OXYGEN SATURATION: 98 %

## 2018-01-12 PROCEDURE — 3331090001 HH PPS REVENUE CREDIT

## 2018-01-12 PROCEDURE — 400013 HH SOC

## 2018-01-12 PROCEDURE — A6255 ABSORPT DRG >16<=48 IN W/BDR: HCPCS

## 2018-01-12 PROCEDURE — 3331090002 HH PPS REVENUE DEBIT

## 2018-01-12 PROCEDURE — G0151 HHCP-SERV OF PT,EA 15 MIN: HCPCS

## 2018-01-12 PROCEDURE — G0299 HHS/HOSPICE OF RN EA 15 MIN: HCPCS

## 2018-01-12 NOTE — TELEPHONE ENCOUNTER
Spoke with Ms. Samuel , states she is a little slow today, but feels fine. She was able to get her prescription filled and is aware of her upcoming doctor appointments. She had no further questions for me at this time.

## 2018-01-13 ENCOUNTER — HOME CARE VISIT (OUTPATIENT)
Dept: SCHEDULING | Facility: HOME HEALTH | Age: 83
End: 2018-01-13
Payer: MEDICARE

## 2018-01-13 VITALS
SYSTOLIC BLOOD PRESSURE: 140 MMHG | DIASTOLIC BLOOD PRESSURE: 90 MMHG | HEART RATE: 89 BPM | OXYGEN SATURATION: 98 % | TEMPERATURE: 98.6 F

## 2018-01-13 PROCEDURE — G0157 HHC PT ASSISTANT EA 15: HCPCS

## 2018-01-13 PROCEDURE — 3331090001 HH PPS REVENUE CREDIT

## 2018-01-13 PROCEDURE — 3331090002 HH PPS REVENUE DEBIT

## 2018-01-14 ENCOUNTER — HOME CARE VISIT (OUTPATIENT)
Dept: SCHEDULING | Facility: HOME HEALTH | Age: 83
End: 2018-01-14
Payer: MEDICARE

## 2018-01-14 VITALS
SYSTOLIC BLOOD PRESSURE: 120 MMHG | HEART RATE: 75 BPM | OXYGEN SATURATION: 98 % | DIASTOLIC BLOOD PRESSURE: 70 MMHG | TEMPERATURE: 98.8 F

## 2018-01-14 PROCEDURE — 3331090002 HH PPS REVENUE DEBIT

## 2018-01-14 PROCEDURE — G0157 HHC PT ASSISTANT EA 15: HCPCS

## 2018-01-14 PROCEDURE — 3331090001 HH PPS REVENUE CREDIT

## 2018-01-15 ENCOUNTER — HOME CARE VISIT (OUTPATIENT)
Dept: SCHEDULING | Facility: HOME HEALTH | Age: 83
End: 2018-01-15
Payer: MEDICARE

## 2018-01-15 VITALS
TEMPERATURE: 98.9 F | OXYGEN SATURATION: 98 % | DIASTOLIC BLOOD PRESSURE: 80 MMHG | SYSTOLIC BLOOD PRESSURE: 130 MMHG | HEART RATE: 73 BPM

## 2018-01-15 PROCEDURE — 3331090001 HH PPS REVENUE CREDIT

## 2018-01-15 PROCEDURE — G0157 HHC PT ASSISTANT EA 15: HCPCS

## 2018-01-15 PROCEDURE — 3331090002 HH PPS REVENUE DEBIT

## 2018-01-16 ENCOUNTER — HOME CARE VISIT (OUTPATIENT)
Dept: SCHEDULING | Facility: HOME HEALTH | Age: 83
End: 2018-01-16
Payer: MEDICARE

## 2018-01-16 ENCOUNTER — HOME CARE VISIT (OUTPATIENT)
Dept: HOME HEALTH SERVICES | Facility: HOME HEALTH | Age: 83
End: 2018-01-16
Payer: MEDICARE

## 2018-01-16 VITALS
DIASTOLIC BLOOD PRESSURE: 80 MMHG | OXYGEN SATURATION: 98 % | HEART RATE: 77 BPM | TEMPERATURE: 98.7 F | SYSTOLIC BLOOD PRESSURE: 150 MMHG

## 2018-01-16 PROCEDURE — G0299 HHS/HOSPICE OF RN EA 15 MIN: HCPCS

## 2018-01-16 PROCEDURE — 3331090002 HH PPS REVENUE DEBIT

## 2018-01-16 PROCEDURE — G0157 HHC PT ASSISTANT EA 15: HCPCS

## 2018-01-16 PROCEDURE — 3331090001 HH PPS REVENUE CREDIT

## 2018-01-17 ENCOUNTER — HOME CARE VISIT (OUTPATIENT)
Dept: SCHEDULING | Facility: HOME HEALTH | Age: 83
End: 2018-01-17
Payer: MEDICARE

## 2018-01-17 VITALS
RESPIRATION RATE: 18 BRPM | HEART RATE: 90 BPM | OXYGEN SATURATION: 97 % | DIASTOLIC BLOOD PRESSURE: 80 MMHG | SYSTOLIC BLOOD PRESSURE: 130 MMHG | TEMPERATURE: 97.4 F

## 2018-01-17 PROCEDURE — 3331090001 HH PPS REVENUE CREDIT

## 2018-01-17 PROCEDURE — G0151 HHCP-SERV OF PT,EA 15 MIN: HCPCS

## 2018-01-17 PROCEDURE — 3331090002 HH PPS REVENUE DEBIT

## 2018-01-18 ENCOUNTER — HOME CARE VISIT (OUTPATIENT)
Dept: SCHEDULING | Facility: HOME HEALTH | Age: 83
End: 2018-01-18
Payer: MEDICARE

## 2018-01-18 VITALS
TEMPERATURE: 98.4 F | DIASTOLIC BLOOD PRESSURE: 80 MMHG | OXYGEN SATURATION: 98 % | HEART RATE: 71 BPM | SYSTOLIC BLOOD PRESSURE: 148 MMHG

## 2018-01-18 VITALS
OXYGEN SATURATION: 97 % | HEART RATE: 71 BPM | DIASTOLIC BLOOD PRESSURE: 80 MMHG | TEMPERATURE: 97.9 F | SYSTOLIC BLOOD PRESSURE: 124 MMHG

## 2018-01-18 PROCEDURE — 3331090002 HH PPS REVENUE DEBIT

## 2018-01-18 PROCEDURE — G0151 HHCP-SERV OF PT,EA 15 MIN: HCPCS

## 2018-01-18 PROCEDURE — 3331090001 HH PPS REVENUE CREDIT

## 2018-01-19 ENCOUNTER — HOME CARE VISIT (OUTPATIENT)
Dept: SCHEDULING | Facility: HOME HEALTH | Age: 83
End: 2018-01-19
Payer: MEDICARE

## 2018-01-19 VITALS
RESPIRATION RATE: 14 BRPM | DIASTOLIC BLOOD PRESSURE: 80 MMHG | TEMPERATURE: 98.7 F | SYSTOLIC BLOOD PRESSURE: 130 MMHG | OXYGEN SATURATION: 98 % | HEART RATE: 71 BPM

## 2018-01-19 PROCEDURE — G0151 HHCP-SERV OF PT,EA 15 MIN: HCPCS

## 2018-01-19 PROCEDURE — G0299 HHS/HOSPICE OF RN EA 15 MIN: HCPCS

## 2018-01-19 PROCEDURE — 3331090002 HH PPS REVENUE DEBIT

## 2018-01-19 PROCEDURE — 3331090001 HH PPS REVENUE CREDIT

## 2018-01-20 ENCOUNTER — HOME CARE VISIT (OUTPATIENT)
Dept: SCHEDULING | Facility: HOME HEALTH | Age: 83
End: 2018-01-20
Payer: MEDICARE

## 2018-01-20 VITALS
TEMPERATURE: 98 F | HEART RATE: 72 BPM | SYSTOLIC BLOOD PRESSURE: 134 MMHG | OXYGEN SATURATION: 98 % | DIASTOLIC BLOOD PRESSURE: 78 MMHG

## 2018-01-20 PROCEDURE — 3331090001 HH PPS REVENUE CREDIT

## 2018-01-20 PROCEDURE — 3331090002 HH PPS REVENUE DEBIT

## 2018-01-20 PROCEDURE — G0157 HHC PT ASSISTANT EA 15: HCPCS

## 2018-01-21 ENCOUNTER — HOME CARE VISIT (OUTPATIENT)
Dept: SCHEDULING | Facility: HOME HEALTH | Age: 83
End: 2018-01-21
Payer: MEDICARE

## 2018-01-21 VITALS
SYSTOLIC BLOOD PRESSURE: 138 MMHG | HEART RATE: 70 BPM | OXYGEN SATURATION: 98 % | TEMPERATURE: 98 F | DIASTOLIC BLOOD PRESSURE: 80 MMHG

## 2018-01-21 PROCEDURE — 3331090001 HH PPS REVENUE CREDIT

## 2018-01-21 PROCEDURE — G0157 HHC PT ASSISTANT EA 15: HCPCS

## 2018-01-21 PROCEDURE — 3331090002 HH PPS REVENUE DEBIT

## 2018-01-22 ENCOUNTER — HOME CARE VISIT (OUTPATIENT)
Dept: SCHEDULING | Facility: HOME HEALTH | Age: 83
End: 2018-01-22
Payer: MEDICARE

## 2018-01-22 VITALS
DIASTOLIC BLOOD PRESSURE: 75 MMHG | SYSTOLIC BLOOD PRESSURE: 160 MMHG | OXYGEN SATURATION: 98 % | TEMPERATURE: 98.3 F | HEART RATE: 79 BPM

## 2018-01-22 VITALS
RESPIRATION RATE: 20 BRPM | SYSTOLIC BLOOD PRESSURE: 142 MMHG | HEART RATE: 70 BPM | DIASTOLIC BLOOD PRESSURE: 92 MMHG | TEMPERATURE: 97.2 F | OXYGEN SATURATION: 97 %

## 2018-01-22 PROCEDURE — 3331090001 HH PPS REVENUE CREDIT

## 2018-01-22 PROCEDURE — G0157 HHC PT ASSISTANT EA 15: HCPCS

## 2018-01-22 PROCEDURE — 3331090002 HH PPS REVENUE DEBIT

## 2018-01-22 PROCEDURE — G0300 HHS/HOSPICE OF LPN EA 15 MIN: HCPCS

## 2018-01-23 ENCOUNTER — HOME CARE VISIT (OUTPATIENT)
Dept: SCHEDULING | Facility: HOME HEALTH | Age: 83
End: 2018-01-23
Payer: MEDICARE

## 2018-01-23 VITALS
OXYGEN SATURATION: 98 % | TEMPERATURE: 99.3 F | SYSTOLIC BLOOD PRESSURE: 150 MMHG | HEART RATE: 74 BPM | DIASTOLIC BLOOD PRESSURE: 85 MMHG

## 2018-01-23 PROCEDURE — G0157 HHC PT ASSISTANT EA 15: HCPCS

## 2018-01-23 PROCEDURE — 3331090001 HH PPS REVENUE CREDIT

## 2018-01-23 PROCEDURE — 3331090002 HH PPS REVENUE DEBIT

## 2018-01-24 ENCOUNTER — HOME CARE VISIT (OUTPATIENT)
Dept: SCHEDULING | Facility: HOME HEALTH | Age: 83
End: 2018-01-24
Payer: MEDICARE

## 2018-01-24 VITALS
DIASTOLIC BLOOD PRESSURE: 80 MMHG | HEART RATE: 72 BPM | OXYGEN SATURATION: 98 % | SYSTOLIC BLOOD PRESSURE: 120 MMHG | TEMPERATURE: 98.6 F

## 2018-01-24 VITALS
TEMPERATURE: 97.9 F | OXYGEN SATURATION: 99 % | SYSTOLIC BLOOD PRESSURE: 148 MMHG | DIASTOLIC BLOOD PRESSURE: 70 MMHG | RESPIRATION RATE: 18 BRPM | HEART RATE: 71 BPM

## 2018-01-24 PROCEDURE — 3331090001 HH PPS REVENUE CREDIT

## 2018-01-24 PROCEDURE — 3331090002 HH PPS REVENUE DEBIT

## 2018-01-24 PROCEDURE — G0157 HHC PT ASSISTANT EA 15: HCPCS

## 2018-01-24 PROCEDURE — G0300 HHS/HOSPICE OF LPN EA 15 MIN: HCPCS

## 2018-01-25 PROCEDURE — 3331090001 HH PPS REVENUE CREDIT

## 2018-01-25 PROCEDURE — 3331090002 HH PPS REVENUE DEBIT

## 2018-01-26 ENCOUNTER — HOME CARE VISIT (OUTPATIENT)
Dept: SCHEDULING | Facility: HOME HEALTH | Age: 83
End: 2018-01-26
Payer: MEDICARE

## 2018-01-26 ENCOUNTER — OFFICE VISIT (OUTPATIENT)
Dept: ORTHOPEDIC SURGERY | Age: 83
End: 2018-01-26

## 2018-01-26 VITALS
OXYGEN SATURATION: 99 % | TEMPERATURE: 97.8 F | DIASTOLIC BLOOD PRESSURE: 70 MMHG | WEIGHT: 237 LBS | SYSTOLIC BLOOD PRESSURE: 152 MMHG | HEART RATE: 75 BPM | RESPIRATION RATE: 18 BRPM | BODY MASS INDEX: 40.46 KG/M2 | HEIGHT: 64 IN

## 2018-01-26 VITALS
TEMPERATURE: 98.1 F | HEART RATE: 68 BPM | DIASTOLIC BLOOD PRESSURE: 82 MMHG | SYSTOLIC BLOOD PRESSURE: 124 MMHG | OXYGEN SATURATION: 98 %

## 2018-01-26 DIAGNOSIS — M25.561 RIGHT KNEE PAIN, UNSPECIFIED CHRONICITY: Primary | ICD-10-CM

## 2018-01-26 DIAGNOSIS — S82.009A: ICD-10-CM

## 2018-01-26 PROCEDURE — G0151 HHCP-SERV OF PT,EA 15 MIN: HCPCS

## 2018-01-26 PROCEDURE — 3331090001 HH PPS REVENUE CREDIT

## 2018-01-26 PROCEDURE — 3331090002 HH PPS REVENUE DEBIT

## 2018-01-26 NOTE — PROGRESS NOTES
[address] [place] [address]  [phone]           Patient: James Evans                MRN: [id number]       SSN: [id number]  YOB: 1935        AGE: [de-identified] y.o. SEX: female  Body mass index is 40.68 kg/(m^2). PCP: Brandy Craft MD  01/26/18      This office note has been dictated. REVIEW OF SYSTEMS:  Constitutional: Negative for fever, chills, weight loss and malaise/fatigue. HENT: Negative. Eyes: Negative. Respiratory: Negative. Cardiovascular: Negative. Gastrointestinal: No bowel incontinence or constipation. Genitourinary: No bladder incontinence or saddle anesthesia. Skin: Negative. Neurological: Negative. Endo/Heme/Allergies: Negative. Psychiatric/Behavioral: Negative. Musculoskeletal: As per HPI above. Past Medical History:   Diagnosis Date    Arthritis     Arthritis of both hips     Back pain     Balance problem     Chronic back pain     Cough     Easy bruising     Essential hypertension     GERD (gastroesophageal reflux disease)     Hiatal hernia     Hypertension     Irregular heart beat     Left hip pain 10/8/2010    Neck pain     Nervousness     Night sweat     Osteoarthritis, knee bilateral     Pain with urination     Polymyalgia rheumatica (HCC)     Reflux     Spinal stenosis     Trapezius strain     Trochanteric bursitis of left hip     Venous insufficiency          Current Outpatient Prescriptions:     ferrous sulfate 325 mg (65 mg iron) tablet, Take 1 Tab by mouth two (2) times daily (with meals). , Disp: 60 Tab, Rfl: 2    HYDROcodone-acetaminophen (NORCO) 7.5-325 mg per tablet, Take 1-2 Tabs by mouth every four (4) hours as needed. Max Daily Amount: 12 Tabs. (Patient taking differently: Take 1-2 Tabs by mouth every four (4) hours as needed for Pain.), Disp: 60 Tab, Rfl: 0    aspirin (ASPIRIN) 325 mg tablet, Take 1 Tab by mouth daily. , Disp: 30 Tab, Rfl: 0    potassium 99 mg tablet, Take 99 mg by mouth daily. , Disp: , Rfl:     tropicamide (MYDRIACYL) 1 % ophthalmic solution, Administer 2 Drops to right eye as needed. 45 minutes prior to schedule appointments, Disp: , Rfl:     cholecalciferol (VITAMIN D3) 1,000 unit tablet, Take 1,000 Units by mouth daily. 1 tab day, Disp: , Rfl:     omeprazole (PRILOSEC) 40 mg capsule, Take 40 mg by mouth daily. 1 tab, Disp: , Rfl:     aMILoride-hydrochlorothiazide (MODURETIC) 5-50 mg tab, Take 1 Tab by mouth daily. , Disp: , Rfl:     amLODIPine (NORVASC) 10 mg tablet, Take 10 mg by mouth daily. 1 tab, Disp: , Rfl: 3    losartan (COZAAR) 100 mg tablet, Take 100 mg by mouth daily. 1 tab, Disp: , Rfl:     metoclopramide HCl (REGLAN) 5 mg tablet, Take 5 mg by mouth two (2) times a day. 1 tab 2x day, Disp: , Rfl:     cetirizine (ZYRTEC) 10 mg tablet, Take 10 mg by mouth daily. 1 tab daily, Disp: , Rfl:     Allergies   Allergen Reactions    Citric Acid Unknown (comments)    Crinone [Progesterone Micronized] Unknown (comments)    Iodine Unknown (comments)    Percocet [Oxycodone-Acetaminophen] Other (comments)     Gets rebound headaches after taking Percocet       Social History     Social History    Marital status:      Spouse name: N/A    Number of children: N/A    Years of education: N/A     Occupational History    Not on file.      Social History Main Topics    Smoking status: Never Smoker    Smokeless tobacco: Never Used    Alcohol use No    Drug use: No    Sexual activity: No     Other Topics Concern    Not on file     Social History Narrative       Past Surgical History:   Procedure Laterality Date    COLONOSCOPY N/A 7/27/2016    COLONOSCOPY w/polypectomy performed by Nicko Cole MD at [address] [facility] HX CHOLECYSTECTOMY      HX HEENT  06/2011    left cornea transplant    HX HYSTERECTOMY      HX KNEE REPLACEMENT Right 02/2017    HX ORTHOPAEDIC      right foot and ankle    HX ORTHOPAEDIC Right 04/2017           We did see Ms. Florina Vallejo for followup with regards to her revision right patellar fracture. The patient had hardware removal.  We did corrective tightening of the inferior pole of the patella. She is doing well. She has had no recent fever, chills, systemic changes, or injuries to report. She has had no chest pain or shortness of breath. PHYSICAL EXAMINATION:  In general, the patient is alert and oriented x 3 in no acute distress. The patient is well-developed, well-nourished, with a normal affect. The patient is afebrile. HEENT:  Head is normocephalic and atraumatic. Pupils are equally round and reactive to light and accommodation. Extraocular eye movements are intact. Neck is supple. Trachea is midline. No JVD is present. Breathing is nonlabored. Examination of the right knee reveals the skin is intact. The surgical wounds are healed nicely. The staples are in place. There is no ecchymosis, no warmth. There are no signs for infection or cellulitis present. She has negative calf tenderness, negative Checos, and no signs for DVT present. She has minimal edema distally. She is able to hold the leg against gravity without complications. No flexion is done to the knee today. RADIOGRAPHS:  Radiographs in the office of the right knee, lateral, shows the patellar fragments to be in good alignment with good bony contact noted. No new abnormality is seen. ASSESSMENT:      1. Status post right knee replacement. 2. Status post revision right knee patellar fracture. PLAN:  At this point, the staples are removed and replaced with Steri-Strips without complications. She is instructed to keep the leg in extension. She will continue in the knee immobilizer. We did discuss casting and other bracing options. I think at this point, the best thing for her is to remain in the knee immobilizer. She denies the need for analgesics.   She is going to follow up with us in two weeks time for evaluation.                  JR Brian LUCERO, PAPHIL, ATC

## 2018-01-27 PROCEDURE — 3331090001 HH PPS REVENUE CREDIT

## 2018-01-27 PROCEDURE — 3331090002 HH PPS REVENUE DEBIT

## 2018-01-28 VITALS
SYSTOLIC BLOOD PRESSURE: 134 MMHG | OXYGEN SATURATION: 99 % | TEMPERATURE: 98.1 F | DIASTOLIC BLOOD PRESSURE: 68 MMHG | RESPIRATION RATE: 20 BRPM | HEART RATE: 69 BPM

## 2018-01-28 PROCEDURE — 3331090002 HH PPS REVENUE DEBIT

## 2018-01-28 PROCEDURE — 3331090001 HH PPS REVENUE CREDIT

## 2018-01-29 PROCEDURE — 3331090002 HH PPS REVENUE DEBIT

## 2018-01-29 PROCEDURE — 3331090001 HH PPS REVENUE CREDIT

## 2018-01-30 PROCEDURE — 3331090001 HH PPS REVENUE CREDIT

## 2018-01-30 PROCEDURE — 3331090002 HH PPS REVENUE DEBIT

## 2018-02-09 ENCOUNTER — OFFICE VISIT (OUTPATIENT)
Dept: ORTHOPEDIC SURGERY | Age: 83
End: 2018-02-09

## 2018-02-09 VITALS
TEMPERATURE: 97.3 F | WEIGHT: 237 LBS | RESPIRATION RATE: 16 BRPM | BODY MASS INDEX: 40.46 KG/M2 | SYSTOLIC BLOOD PRESSURE: 167 MMHG | HEART RATE: 81 BPM | OXYGEN SATURATION: 98 % | HEIGHT: 64 IN | DIASTOLIC BLOOD PRESSURE: 72 MMHG

## 2018-02-09 DIAGNOSIS — S82.009A: ICD-10-CM

## 2018-02-09 DIAGNOSIS — Z96.651 STATUS POST TOTAL RIGHT KNEE REPLACEMENT: Primary | ICD-10-CM

## 2018-02-09 NOTE — PROGRESS NOTES
[address] [place] [address]  [phone]           Patient: Jeffrey Benavidez                MRN: [id number]       SSN: [id number]  YOB: 1935        AGE: [de-identified] y.o. SEX: female  Body mass index is 40.68 kg/(m^2). PCP: Peyton Jim MD  02/09/18      This office note has been dictated. REVIEW OF SYSTEMS:  Constitutional: Negative for fever, chills, weight loss and malaise/fatigue. HENT: Negative. Eyes: Negative. Respiratory: Negative. Cardiovascular: Negative. Gastrointestinal: No bowel incontinence or constipation. Genitourinary: No bladder incontinence or saddle anesthesia. Skin: Negative. Neurological: Negative. Endo/Heme/Allergies: Negative. Psychiatric/Behavioral: Negative. Musculoskeletal: As per HPI above. Past Medical History:   Diagnosis Date    Arthritis     Arthritis of both hips     Back pain     Balance problem     Chronic back pain     Cough     Easy bruising     Essential hypertension     GERD (gastroesophageal reflux disease)     Hiatal hernia     Hypertension     Irregular heart beat     Left hip pain 10/8/2010    Neck pain     Nervousness     Night sweat     Osteoarthritis, knee bilateral     Pain with urination     Polymyalgia rheumatica (HCC)     Reflux     Spinal stenosis     Trapezius strain     Trochanteric bursitis of left hip     Venous insufficiency          Current Outpatient Prescriptions:     celecoxib (CELEBREX) 200 mg capsule, Take 200 mg by mouth two (2) times a day., Disp: , Rfl:     ferrous sulfate 325 mg (65 mg iron) tablet, Take 1 Tab by mouth two (2) times daily (with meals). , Disp: 60 Tab, Rfl: 2    aspirin (ASPIRIN) 325 mg tablet, Take 1 Tab by mouth daily. , Disp: 30 Tab, Rfl: 0    potassium 99 mg tablet, Take 99 mg by mouth daily. , Disp: , Rfl:     tropicamide (MYDRIACYL) 1 % ophthalmic solution, Administer 2 Drops to right eye as needed. 45 minutes prior to schedule appointments, Disp: , Rfl:     cholecalciferol (VITAMIN D3) 1,000 unit tablet, Take 1,000 Units by mouth daily. 1 tab day, Disp: , Rfl:     omeprazole (PRILOSEC) 40 mg capsule, Take 40 mg by mouth daily. 1 tab, Disp: , Rfl:     aMILoride-hydrochlorothiazide (MODURETIC) 5-50 mg tab, Take 1 Tab by mouth daily. , Disp: , Rfl:     amLODIPine (NORVASC) 10 mg tablet, Take 10 mg by mouth daily. 1 tab, Disp: , Rfl: 3    losartan (COZAAR) 100 mg tablet, Take 100 mg by mouth daily. 1 tab, Disp: , Rfl:     metoclopramide HCl (REGLAN) 5 mg tablet, Take 5 mg by mouth two (2) times a day. 1 tab 2x day, Disp: , Rfl:     cetirizine (ZYRTEC) 10 mg tablet, Take 10 mg by mouth daily. 1 tab daily, Disp: , Rfl:     HYDROcodone-acetaminophen (NORCO) 7.5-325 mg per tablet, Take 1-2 Tabs by mouth every four (4) hours as needed. Max Daily Amount: 12 Tabs. (Patient not taking: Reported on 1/26/2018), Disp: 60 Tab, Rfl: 0    Allergies   Allergen Reactions    Citric Acid Unknown (comments)    Crinone [Progesterone Micronized] Unknown (comments)    Iodine Unknown (comments)    Percocet [Oxycodone-Acetaminophen] Other (comments)     Gets rebound headaches after taking Percocet       Social History     Social History    Marital status:      Spouse name: N/A    Number of children: N/A    Years of education: N/A     Occupational History    Not on file.      Social History Main Topics    Smoking status: Never Smoker    Smokeless tobacco: Never Used    Alcohol use No    Drug use: No    Sexual activity: No     Other Topics Concern    Not on file     Social History Narrative       Past Surgical History:   Procedure Laterality Date    COLONOSCOPY N/A 7/27/2016    COLONOSCOPY w/polypectomy performed by Jose L Mcpherson MD at [address] HX CHOLECYSTECTOMY      HX HEENT  06/2011    left cornea transplant    HX HYSTERECTOMY      HX KNEE REPLACEMENT Right 02/2017    HX ORTHOPAEDIC      right foot and ankle    HX ORTHOPAEDIC Right 04/2017           We did see Ms. Amira Ball for followup with regards to her revision right knee. She had a patellar revision. She had failed hardware from a patellar fracture. We removed the hardware and fixed it via Krackow stitch. She is doing well. She has been mostly compliant with the brace. However, it does slip down on her, and she has been flexing her knee. She was flexed to about 70° when I walked in the room. She has had no fever, chills, systemic changes, or injuries to report and no chest pain or shortness of breath. PHYSICAL EXAMINATION:  In general, the patient is alert and oriented x 3 in no acute distress. The patient is well-developed, well-nourished, with a normal affect. The patient is afebrile. Examination of the right knee reveals the skin is intact. The surgical wounds are healed nicely. There is no ecchymosis, no warmth, and no signs of infection or cellulitis present. She flexes to 70° easily. She has good extension. No extension deficit is noted with exception of maybe a 2° lag. There is negative calf tenderness. There is a negative Checo's sign. There is no sign of DVT present. Stability of the knee is excellent. ASSESSMENT:      1. Status post right knee replacement. 2. Status post revision right patellar fracture. PLAN:  At this point, we will get her into a new hinged brace at 0-70°. I have asked her not to go past that point and to wear the brace at all times at this point. We will see her back in the office in about three weeks time for evaluation and x-ray of the right knee AP and lateral only, no skyline and most likely increase the range of motion of the brace.                    JR Brian LUCERO, PAOdellC, ATC

## 2018-02-23 ENCOUNTER — OFFICE VISIT (OUTPATIENT)
Dept: ORTHOPEDIC SURGERY | Age: 83
End: 2018-02-23

## 2018-02-23 VITALS
BODY MASS INDEX: 39.85 KG/M2 | TEMPERATURE: 97.5 F | HEIGHT: 64 IN | HEART RATE: 75 BPM | DIASTOLIC BLOOD PRESSURE: 78 MMHG | WEIGHT: 233.4 LBS | OXYGEN SATURATION: 100 % | SYSTOLIC BLOOD PRESSURE: 151 MMHG

## 2018-02-23 DIAGNOSIS — M25.561 RIGHT KNEE PAIN, UNSPECIFIED CHRONICITY: Primary | ICD-10-CM

## 2018-02-23 NOTE — PROGRESS NOTES
[address] [place] [address]  [phone]           Patient: Richard Benavides                MRN: [id number]       SSN: [id number]  YOB: 1935        AGE: [de-identified] y.o. SEX: female  Body mass index is 40.06 kg/(m^2). PCP: Rohan Reagan MD  02/23/18      This office note has been dictated. REVIEW OF SYSTEMS:  Constitutional: Negative for fever, chills, weight loss and malaise/fatigue. HENT: Negative. Eyes: Negative. Respiratory: Negative. Cardiovascular: Negative. Gastrointestinal: No bowel incontinence or constipation. Genitourinary: No bladder incontinence or saddle anesthesia. Skin: Negative. Neurological: Negative. Endo/Heme/Allergies: Negative. Psychiatric/Behavioral: Negative. Musculoskeletal: As per HPI above. Past Medical History:   Diagnosis Date    Arthritis     Arthritis of both hips     Back pain     Balance problem     Chronic back pain     Cough     Easy bruising     Essential hypertension     GERD (gastroesophageal reflux disease)     Hiatal hernia     Hypertension     Irregular heart beat     Left hip pain 10/8/2010    Neck pain     Nervousness     Night sweat     Osteoarthritis, knee bilateral     Pain with urination     Polymyalgia rheumatica (HCC)     Reflux     Spinal stenosis     Trapezius strain     Trochanteric bursitis of left hip     Venous insufficiency          Current Outpatient Prescriptions:     celecoxib (CELEBREX) 200 mg capsule, Take 200 mg by mouth two (2) times a day., Disp: , Rfl:     ferrous sulfate 325 mg (65 mg iron) tablet, Take 1 Tab by mouth two (2) times daily (with meals). , Disp: 60 Tab, Rfl: 2    HYDROcodone-acetaminophen (NORCO) 7.5-325 mg per tablet, Take 1-2 Tabs by mouth every four (4) hours as needed. Max Daily Amount: 12 Tabs., Disp: 60 Tab, Rfl: 0    aspirin (ASPIRIN) 325 mg tablet, Take 1 Tab by mouth daily. , Disp: 30 Tab, Rfl: 0    potassium 99 mg tablet, Take 99 mg by mouth daily. , Disp: , Rfl:     tropicamide (MYDRIACYL) 1 % ophthalmic solution, Administer 2 Drops to right eye as needed. 45 minutes prior to schedule appointments, Disp: , Rfl:     cholecalciferol (VITAMIN D3) 1,000 unit tablet, Take 1,000 Units by mouth daily. 1 tab day, Disp: , Rfl:     omeprazole (PRILOSEC) 40 mg capsule, Take 40 mg by mouth daily. 1 tab, Disp: , Rfl:     aMILoride-hydrochlorothiazide (MODURETIC) 5-50 mg tab, Take 1 Tab by mouth daily. , Disp: , Rfl:     amLODIPine (NORVASC) 10 mg tablet, Take 10 mg by mouth daily. 1 tab, Disp: , Rfl: 3    losartan (COZAAR) 100 mg tablet, Take 100 mg by mouth daily. 1 tab, Disp: , Rfl:     metoclopramide HCl (REGLAN) 5 mg tablet, Take 5 mg by mouth two (2) times a day. 1 tab 2x day, Disp: , Rfl:     cetirizine (ZYRTEC) 10 mg tablet, Take 10 mg by mouth daily. 1 tab daily, Disp: , Rfl:     Allergies   Allergen Reactions    Citric Acid Unknown (comments)    Crinone [Progesterone Micronized] Unknown (comments)    Iodine Unknown (comments)    Percocet [Oxycodone-Acetaminophen] Other (comments)     Gets rebound headaches after taking Percocet       Social History     Social History    Marital status:      Spouse name: N/A    Number of children: N/A    Years of education: N/A     Occupational History    Not on file.      Social History Main Topics    Smoking status: Never Smoker    Smokeless tobacco: Never Used    Alcohol use No    Drug use: No    Sexual activity: No     Other Topics Concern    Not on file     Social History Narrative       Past Surgical History:   Procedure Laterality Date    COLONOSCOPY N/A 7/27/2016    COLONOSCOPY w/polypectomy performed by Bryson Cristobal MD at [address] [facility] HX CHOLECYSTECTOMY      HX HEENT  06/2011    left cornea transplant    HX HYSTERECTOMY      HX KNEE REPLACEMENT Right 02/2017    HX ORTHOPAEDIC      right foot and ankle    HX ORTHOPAEDIC Right 04/2017           We did see Ms. Jacqueline Lopez for followup with regards to her right total knee replacement. She did well with her knee replacement. Unfortunately, she had a small inferior pole patellar fracture, which was fixed with K wire cables. This failed, and it was revised. She had been doing well. She was in a hinged brace. The last time I saw her, I opened her up 0-70°. Unfortunately, she took the brace off, and she has been bending her knee with full flexion. She states she is feeling good with very minimal to no discomfort in her knee, just a little bit of stiffness. She has had no fevers, chills, systemic changes, or injuries to report. PHYSICAL EXAMINATION:  In general, the patient is alert and oriented x 3 in no acute distress. The patient is well-developed, well-nourished, with a normal affect. The patient is afebrile. HEENT:  Head is normocephalic and atraumatic. Pupils are equally round and reactive to light and accommodation. Extraocular eye movements are intact. Neck is supple. Trachea is midline. No JVD is present. Breathing is nonlabored. Examination of the right knee reveals the skin is intact. The surgical wounds are healed nicely. There is no erythema, no ecchymosis, no warmth, and no signs of infection or cellulitis present. She is able to do a straight leg raise. She does have about 3° extensor lag noted. She flexes on her own to 125°. RADIOGRAPHS:  Radiographs in the office today, including AP and lateral, shows the total knee components are well-fixed without evidence of loosening noted. The patellar fracture inferior pole has moved slightly from previous. ASSESSMENT:      1. Status post right knee replacement. 2. Status post redo patellar fracture. PLAN:  At this point, the patient, unfortunately, caused a little shift of the fragment with her noncompliance.   At this point, we discussed no further surgical intervention, as she is quite happy with the results and very functional with her knee. I did place her back in the brace. I would like her to stay at around 0-70° currently. We will see her back in about four to six weeks time for evaluation.                       JR Brian LUCERO, PA-C, ATC

## 2018-03-23 ENCOUNTER — OFFICE VISIT (OUTPATIENT)
Dept: ORTHOPEDIC SURGERY | Age: 83
End: 2018-03-23

## 2018-03-23 VITALS
HEIGHT: 64 IN | WEIGHT: 240 LBS | BODY MASS INDEX: 40.97 KG/M2 | SYSTOLIC BLOOD PRESSURE: 176 MMHG | DIASTOLIC BLOOD PRESSURE: 69 MMHG | TEMPERATURE: 97 F | HEART RATE: 76 BPM | OXYGEN SATURATION: 100 %

## 2018-03-23 DIAGNOSIS — M17.12 PRIMARY OSTEOARTHRITIS OF LEFT KNEE: ICD-10-CM

## 2018-03-23 DIAGNOSIS — S82.001D CLOSED NONDISPLACED FRACTURE OF RIGHT PATELLA WITH ROUTINE HEALING, UNSPECIFIED FRACTURE MORPHOLOGY, SUBSEQUENT ENCOUNTER: ICD-10-CM

## 2018-03-23 DIAGNOSIS — Z96.651 STATUS POST REVISION OF TOTAL REPLACEMENT OF RIGHT KNEE: Primary | ICD-10-CM

## 2018-03-23 RX ORDER — BETAMETHASONE SODIUM PHOSPHATE AND BETAMETHASONE ACETATE 3; 3 MG/ML; MG/ML
6 INJECTION, SUSPENSION INTRA-ARTICULAR; INTRALESIONAL; INTRAMUSCULAR; SOFT TISSUE ONCE
Qty: 1 ML | Refills: 0
Start: 2018-03-23 | End: 2018-03-23

## 2018-03-23 NOTE — PROGRESS NOTES
[address] [place] [address]  [phone]           Patient: Jazzy Stovall                MRN: [id number]       SSN: [id number]  YOB: 1935        AGE: [de-identified] y.o. SEX: female  Body mass index is 41.2 kg/(m^2). PCP: MD Nilsa  03/23/18      This office note has been dictated. REVIEW OF SYSTEMS:  Constitutional: Negative for fever, chills, weight loss and malaise/fatigue. HENT: Negative. Eyes: Negative. Respiratory: Negative. Cardiovascular: Negative. Gastrointestinal: No bowel incontinence or constipation. Genitourinary: No bladder incontinence or saddle anesthesia. Skin: Negative. Neurological: Negative. Endo/Heme/Allergies: Negative. Psychiatric/Behavioral: Negative. Musculoskeletal: As per HPI above. Past Medical History:   Diagnosis Date    Arthritis     Arthritis of both hips     Back pain     Balance problem     Chronic back pain     Cough     Easy bruising     Essential hypertension     GERD (gastroesophageal reflux disease)     Hiatal hernia     Hypertension     Irregular heart beat     Left hip pain 10/8/2010    Neck pain     Nervousness     Night sweat     Osteoarthritis, knee bilateral     Pain with urination     Polymyalgia rheumatica (HCC)     Reflux     Spinal stenosis     Trapezius strain     Trochanteric bursitis of left hip     Venous insufficiency          Current Outpatient Prescriptions:     celecoxib (CELEBREX) 200 mg capsule, Take 200 mg by mouth two (2) times a day., Disp: , Rfl:     ferrous sulfate 325 mg (65 mg iron) tablet, Take 1 Tab by mouth two (2) times daily (with meals). , Disp: 60 Tab, Rfl: 2    aspirin (ASPIRIN) 325 mg tablet, Take 1 Tab by mouth daily. , Disp: 30 Tab, Rfl: 0    potassium 99 mg tablet, Take 99 mg by mouth daily. , Disp: , Rfl:     tropicamide (MYDRIACYL) 1 % ophthalmic solution, Administer 2 Drops to right eye as needed. 45 minutes prior to schedule appointments, Disp: , Rfl:     cholecalciferol (VITAMIN D3) 1,000 unit tablet, Take 1,000 Units by mouth daily. 1 tab day, Disp: , Rfl:     omeprazole (PRILOSEC) 40 mg capsule, Take 40 mg by mouth daily. 1 tab, Disp: , Rfl:     aMILoride-hydrochlorothiazide (MODURETIC) 5-50 mg tab, Take 1 Tab by mouth daily. , Disp: , Rfl:     amLODIPine (NORVASC) 10 mg tablet, Take 10 mg by mouth daily. 1 tab, Disp: , Rfl: 3    losartan (COZAAR) 100 mg tablet, Take 100 mg by mouth daily. 1 tab, Disp: , Rfl:     metoclopramide HCl (REGLAN) 5 mg tablet, Take 5 mg by mouth two (2) times a day. 1 tab 2x day, Disp: , Rfl:     cetirizine (ZYRTEC) 10 mg tablet, Take 10 mg by mouth daily. 1 tab daily, Disp: , Rfl:     HYDROcodone-acetaminophen (NORCO) 7.5-325 mg per tablet, Take 1-2 Tabs by mouth every four (4) hours as needed. Max Daily Amount: 12 Tabs., Disp: 60 Tab, Rfl: 0    Allergies   Allergen Reactions    Citric Acid Unknown (comments)    Crinone [Progesterone Micronized] Unknown (comments)    Iodine Unknown (comments)    Percocet [Oxycodone-Acetaminophen] Other (comments)     Gets rebound headaches after taking Percocet       Social History     Social History    Marital status:      Spouse name: N/A    Number of children: N/A    Years of education: N/A     Occupational History    Not on file.      Social History Main Topics    Smoking status: Never Smoker    Smokeless tobacco: Never Used    Alcohol use No    Drug use: No    Sexual activity: No     Other Topics Concern    Not on file     Social History Narrative       Past Surgical History:   Procedure Laterality Date    COLONOSCOPY N/A 7/27/2016    COLONOSCOPY w/polypectomy performed by Amadou Juarez MD at [address] [facility] HX CHOLECYSTECTOMY      HX HEENT  06/2011    left cornea transplant    HX HYSTERECTOMY      HX KNEE REPLACEMENT Right 02/2017    HX ORTHOPAEDIC      right foot and ankle    HX ORTHOPAEDIC Right 04/2017           * Patient was identified by name and date of birth   * Agreement on procedure being performed was verified  * Risks and Benefits explained to the patient  * Procedure site verified and marked as necessary  * Patient was positioned for comfort  * Consent was signed and verified  11:06 AM    The patient was instructed on post injection care. We did see Ms. Shalonda Espinoza for followup with regards to her bilateral knees. She is status post right knee replacement. She did have a patellar fracture, which was revised. The last time, the hardware was taken out and a Alpine suture was placed. Unfortunately, she was noncompliant with her brace and did some flexing of the knee. She has discontinued the brace altogether on her own at this point. She is comfortable and happy with the results of her knee at this point. She is having discomfort in her in her left knee for which she does have known advanced arthritis. She is requesting a cortisone injection for the left knee today. She has had no fevers, chills, systemic changes, or injuries to report. PHYSICAL EXAMINATION:  In general, the patient is alert and oriented x 3 in no acute distress. The patient is well-developed, well-nourished, with a normal affect. The patient is afebrile. HEENT:  Head is normocephalic and atraumatic. Pupils are equally round and reactive to light and accommodation. Extraocular eye movements are intact. Neck is supple. Trachea is midline. No JVD is present. Breathing is nonlabored. Examination of the lower extremities reveals pain-free range of motion of the hips. There is no pain to palpation of the greater trochanteric bursae. There is negative straight leg raise. There is negative calf tenderness. There is negative Checos. There is no evidence of DVT present. Examination of the right knee reveals the skin is intact.   There no ecchymosis, no warmth, and no signs of infection or cellulitis present. She has full range of motion. She does have about a 40° extensor lag. The patella tracks nicely. Stability is quite good. She has excellent flexion. With regards to the left knee, the skin is intact. There is no erythema, no ecchymosis, no warmth, and no signs of infection or cellulitis present. She does have pain to palpation to the medial joint line as well as patellofemoral grind and crepitus anteriorly with range of motion activities noted. Findings are consistent with advanced arthritis of the left knee. RADIOGRAPHS:  Review of radiographs of the left knee does confirm end-staged arthritis of the left knee. ASSESSMENT:      1. Status post right knee replacement. 2. Patella fracture, right knee revision. 3. Left knee end-stage osteoarthritis. PLAN:  At this point, we discussed the right knee. Again, she is happy with her results. She understands she will always have a little bit of lag due to the previous patellar fracture. With regards to the left knee, we are going to move forward with a cortisone injection today. Under aseptic conditions, after informed and written consent, and appropriate time out performed, with ultrasound-guided assistance, the left knee was prepped with Betadine and 6 mg of Celestone was injected without complications. The patient tolerated the injection well. The patient was instructed on post injection care. We will see her back in the office in three months time for evaluation and repeat injection for the left knee at that point. She will call with any questions or concerns that shall arise.                       JR Brian LUCERO, SENTHIL, ATC

## 2018-04-27 ENCOUNTER — HOSPITAL ENCOUNTER (OUTPATIENT)
Dept: LAB | Age: 83
Discharge: HOME | End: 2018-04-27
Payer: MEDICARE

## 2018-04-27 DIAGNOSIS — I12.9 HYPERTENSIVE KIDNEY DISEASE WITH CHRONIC KIDNEY DISEASE, STAGE 1-4 OR UNSPECIFIED CHRONIC KIDNEY DISEASE: ICD-10-CM

## 2018-04-27 DIAGNOSIS — E21.1 HYPERPARATHYROIDISM DUE TO VITAMIN D DEFICIENCY (HCC): ICD-10-CM

## 2018-04-27 DIAGNOSIS — E55.9 VITAMIN D INSUFFICIENCY: ICD-10-CM

## 2018-04-27 DIAGNOSIS — N18.30 CHRONIC KIDNEY DISEASE, STAGE III (MODERATE) (HCC): ICD-10-CM

## 2018-04-27 DIAGNOSIS — D64.9 ANEMIA: ICD-10-CM

## 2018-04-27 DIAGNOSIS — N25.81 HYPERPARATHYROIDISM, SECONDARY (HCC): ICD-10-CM

## 2018-04-27 DIAGNOSIS — R80.1 PERSISTENT PROTEINURIA, UNSPECIFIED: ICD-10-CM

## 2018-04-27 LAB
25(OH)D3 SERPL-MCNC: 31.5 NG/ML (ref 30–100)
ALBUMIN SERPL-MCNC: 3.6 G/DL (ref 3.4–5)
ANION GAP SERPL CALC-SCNC: 7 MMOL/L (ref 3–18)
BASOPHILS # BLD: 0 K/UL (ref 0–0.06)
BASOPHILS NFR BLD: 0 % (ref 0–2)
BUN SERPL-MCNC: 21 MG/DL (ref 7–18)
BUN/CREAT SERPL: 19 (ref 12–20)
CALCIUM SERPL-MCNC: 9.1 MG/DL (ref 8.5–10.1)
CALCIUM SERPL-MCNC: 9.5 MG/DL (ref 8.5–10.1)
CHLORIDE SERPL-SCNC: 106 MMOL/L (ref 100–108)
CO2 SERPL-SCNC: 28 MMOL/L (ref 21–32)
CREAT SERPL-MCNC: 1.08 MG/DL (ref 0.6–1.3)
CREAT UR-MCNC: 32.3 MG/DL (ref 30–125)
DIFFERENTIAL METHOD BLD: ABNORMAL
EOSINOPHIL # BLD: 0.1 K/UL (ref 0–0.4)
EOSINOPHIL NFR BLD: 2 % (ref 0–5)
ERYTHROCYTE [DISTWIDTH] IN BLOOD BY AUTOMATED COUNT: 13.7 % (ref 11.6–14.5)
GLUCOSE SERPL-MCNC: 89 MG/DL (ref 74–99)
HCT VFR BLD AUTO: 33.1 % (ref 35–45)
HGB BLD-MCNC: 11.3 G/DL (ref 12–16)
LYMPHOCYTES # BLD: 1.6 K/UL (ref 0.9–3.6)
LYMPHOCYTES NFR BLD: 23 % (ref 21–52)
MCH RBC QN AUTO: 27.5 PG (ref 24–34)
MCHC RBC AUTO-ENTMCNC: 34.1 G/DL (ref 31–37)
MCV RBC AUTO: 80.5 FL (ref 74–97)
MONOCYTES # BLD: 0.5 K/UL (ref 0.05–1.2)
MONOCYTES NFR BLD: 7 % (ref 3–10)
NEUTS SEG # BLD: 4.9 K/UL (ref 1.8–8)
NEUTS SEG NFR BLD: 68 % (ref 40–73)
PHOSPHATE SERPL-MCNC: 2.9 MG/DL (ref 2.5–4.9)
PLATELET # BLD AUTO: 274 K/UL (ref 135–420)
PMV BLD AUTO: 10.7 FL (ref 9.2–11.8)
POTASSIUM SERPL-SCNC: 4.3 MMOL/L (ref 3.5–5.5)
PROT UR-MCNC: 7 MG/DL
PROT/CREAT UR-RTO: 0.2
PTH-INTACT SERPL-MCNC: 168.8 PG/ML (ref 18.4–88)
RBC # BLD AUTO: 4.11 M/UL (ref 4.2–5.3)
SODIUM SERPL-SCNC: 141 MMOL/L (ref 136–145)
WBC # BLD AUTO: 7.2 K/UL (ref 4.6–13.2)

## 2018-04-27 PROCEDURE — 85025 COMPLETE CBC W/AUTO DIFF WBC: CPT | Performed by: INTERNAL MEDICINE

## 2018-04-27 PROCEDURE — 83970 ASSAY OF PARATHORMONE: CPT | Performed by: INTERNAL MEDICINE

## 2018-04-27 PROCEDURE — 84156 ASSAY OF PROTEIN URINE: CPT | Performed by: INTERNAL MEDICINE

## 2018-04-27 PROCEDURE — 80069 RENAL FUNCTION PANEL: CPT | Performed by: INTERNAL MEDICINE

## 2018-04-27 PROCEDURE — 82306 VITAMIN D 25 HYDROXY: CPT | Performed by: INTERNAL MEDICINE

## 2018-04-27 PROCEDURE — 36415 COLL VENOUS BLD VENIPUNCTURE: CPT | Performed by: INTERNAL MEDICINE

## 2018-06-19 ENCOUNTER — ANESTHESIA EVENT (OUTPATIENT)
Dept: ENDOSCOPY | Age: 83
End: 2018-06-19
Payer: MEDICARE

## 2018-06-20 ENCOUNTER — HOSPITAL ENCOUNTER (OUTPATIENT)
Age: 83
Setting detail: OUTPATIENT SURGERY
Discharge: HOME | End: 2018-06-20
Attending: INTERNAL MEDICINE | Admitting: INTERNAL MEDICINE
Payer: MEDICARE

## 2018-06-20 ENCOUNTER — ANESTHESIA (OUTPATIENT)
Dept: ENDOSCOPY | Age: 83
End: 2018-06-20
Payer: MEDICARE

## 2018-06-20 VITALS
OXYGEN SATURATION: 100 % | SYSTOLIC BLOOD PRESSURE: 134 MMHG | WEIGHT: 234.2 LBS | TEMPERATURE: 96.6 F | HEART RATE: 64 BPM | HEIGHT: 64 IN | DIASTOLIC BLOOD PRESSURE: 71 MMHG | RESPIRATION RATE: 15 BRPM | BODY MASS INDEX: 39.98 KG/M2

## 2018-06-20 PROCEDURE — 77030008565 HC TBNG SUC IRR ERBE -B: Performed by: INTERNAL MEDICINE

## 2018-06-20 PROCEDURE — 74011250636 HC RX REV CODE- 250/636

## 2018-06-20 PROCEDURE — 76040000019: Performed by: INTERNAL MEDICINE

## 2018-06-20 PROCEDURE — 74011000250 HC RX REV CODE- 250: Performed by: ANESTHESIOLOGY

## 2018-06-20 PROCEDURE — 77030018846 HC SOL IRR STRL H20 ICUM -A: Performed by: INTERNAL MEDICINE

## 2018-06-20 PROCEDURE — 76060000031 HC ANESTHESIA FIRST 0.5 HR: Performed by: INTERNAL MEDICINE

## 2018-06-20 PROCEDURE — 74011250636 HC RX REV CODE- 250/636: Performed by: ANESTHESIOLOGY

## 2018-06-20 RX ORDER — MIDAZOLAM HYDROCHLORIDE 1 MG/ML
INJECTION, SOLUTION INTRAMUSCULAR; INTRAVENOUS AS NEEDED
Status: DISCONTINUED | OUTPATIENT
Start: 2018-06-20 | End: 2018-06-20 | Stop reason: HOSPADM

## 2018-06-20 RX ORDER — PROPOFOL 10 MG/ML
INJECTION, EMULSION INTRAVENOUS AS NEEDED
Status: DISCONTINUED | OUTPATIENT
Start: 2018-06-20 | End: 2018-06-20 | Stop reason: HOSPADM

## 2018-06-20 RX ORDER — SODIUM CHLORIDE, SODIUM LACTATE, POTASSIUM CHLORIDE, CALCIUM CHLORIDE 600; 310; 30; 20 MG/100ML; MG/100ML; MG/100ML; MG/100ML
INJECTION, SOLUTION INTRAVENOUS
Status: DISCONTINUED | OUTPATIENT
Start: 2018-06-20 | End: 2018-06-20 | Stop reason: HOSPADM

## 2018-06-20 RX ORDER — SODIUM CHLORIDE, SODIUM LACTATE, POTASSIUM CHLORIDE, CALCIUM CHLORIDE 600; 310; 30; 20 MG/100ML; MG/100ML; MG/100ML; MG/100ML
75 INJECTION, SOLUTION INTRAVENOUS CONTINUOUS
Status: DISCONTINUED | OUTPATIENT
Start: 2018-06-20 | End: 2018-06-20 | Stop reason: HOSPADM

## 2018-06-20 RX ADMIN — PROPOFOL 20 MG: 10 INJECTION, EMULSION INTRAVENOUS at 09:37

## 2018-06-20 RX ADMIN — MIDAZOLAM HYDROCHLORIDE 1 MG: 1 INJECTION, SOLUTION INTRAMUSCULAR; INTRAVENOUS at 09:33

## 2018-06-20 RX ADMIN — FAMOTIDINE 20 MG: 10 INJECTION INTRAVENOUS at 08:15

## 2018-06-20 RX ADMIN — SODIUM CHLORIDE, SODIUM LACTATE, POTASSIUM CHLORIDE, CALCIUM CHLORIDE: 600; 310; 30; 20 INJECTION, SOLUTION INTRAVENOUS at 09:35

## 2018-06-20 RX ADMIN — SODIUM CHLORIDE, SODIUM LACTATE, POTASSIUM CHLORIDE, AND CALCIUM CHLORIDE 75 ML/HR: 600; 310; 30; 20 INJECTION, SOLUTION INTRAVENOUS at 08:15

## 2018-06-20 RX ADMIN — MIDAZOLAM HYDROCHLORIDE 1 MG: 1 INJECTION, SOLUTION INTRAMUSCULAR; INTRAVENOUS at 09:35

## 2018-06-20 NOTE — DISCHARGE INSTRUCTIONS
Upper GI Endoscopy: What to Expect at [address] [place] [address]  After you have an endoscopy, you will stay at the hospital or clinic for 1 to 2 hours. This will allow the medicine to wear off. You will be able to go home after your doctor or nurse checks to make sure you are not having any problems. You may have to stay overnight if you had treatment during the test. You may have a sore throat for a day or two after the test.  This care sheet gives you a general idea about what to expect after the test.  How can you care for yourself at home? Activity  · Rest as much as you need to after you go home. · You should be able to go back to your usual activities the day after the test.  Diet  · Follow your doctor's directions for eating after the test.  · Drink plenty of fluids (unless your doctor has told you not to). Medications  · If you have a sore throat the day after the test, use an over-the-counter spray to numb your throat. Follow-up care is a key part of your treatment and safety. Be sure to make and go to all appointments, and call your doctor if you are having problems. It's also a good idea to know your test results and keep a list of the medicines you take. When should you call for help? Call 911 anytime you think you may need emergency care. For example, call if:  ? · You passed out (loses consciousness). ? · You have trouble breathing. ? · You pass maroon or bloody stools. ?Call your doctor now or seek immediate medical care if:  ? · You have pain that does not get better after your take pain medicine. ? · You have new or worse belly pain. ? · You have blood in your stools. ? · You are sick to your stomach and cannot keep fluids down. ? · You have a fever. ? · You cannot pass stools or gas. ? Watch closely for changes in your health, and be sure to contact your doctor if:  ? · Your throat still hurts after a day or two. ? · You do not get better as expected.    Where can you learn more?  Go to http://bernadette-zaida.info/. Enter [phone] [phone] in the search box to learn more about \"Upper GI Endoscopy: What to Expect at Home. \"  Current as of: May 12, 2017  Content Version: 11.4  © 3580-8987 [employer]. Care instructions adapted under license by [employer] (which disclaims liability or warranty for this information). If you have questions about a medical condition or this instruction, always ask your healthcare professional. [place] [address] any warranty or liability for your use of this information. Hiatal Hernia: Care Instructions  Your Care Instructions  A hiatal hernia occurs when part of the stomach bulges into the chest cavity. A hiatal hernia may allow stomach acid and juices to back up into the esophagus (acid reflux). This can cause a feeling of burning, warmth, heat, or pain behind the breastbone. This feeling may often occur after you eat, soon after you lie down, or when you bend forward, and it may come and go. You also may have a sour taste in your mouth. These symptoms are commonly known as heartburn or reflux. But not all hiatal hernias cause symptoms. Follow-up care is a key part of your treatment and safety. Be sure to make and go to all appointments, and call your doctor if you are having problems. It's also a good idea to know your test results and keep a list of the medicines you take. How can you care for yourself at home? · Take your medicines exactly as prescribed. Call your doctor if you think you are having a problem with your medicine. · Do not take aspirin or other nonsteroidal anti-inflammatory drugs (NSAIDs), such as ibuprofen (Advil, Motrin) or naproxen (Aleve), unless your doctor says it is okay. Ask your doctor what you can take for pain. · Your doctor may recommend over-the-counter medicine. For mild or occasional indigestion, antacids such as Tums, Gaviscon, Maalox, or Mylanta may help.  Your doctor also may recommend over-the-counter acid reducers, such as famotidine (Pepcid AC), cimetidine (Tagamet HB), ranitidine (Zantac 75 and Zantac 150), or omeprazole (Prilosec). Read and follow all instructions on the label. If you use these medicines often, talk with your doctor. · Change your eating habits. ¨ It's best to eat several small meals instead of two or three large meals. ¨ After you eat, wait 2 to 3 hours before you lie down. Late-night snacks aren't a good idea. ¨ Chocolate, mint, and alcohol can make heartburn worse. They relax the valve between the esophagus and the stomach. ¨ Spicy foods, foods that have a lot of acid (like tomatoes and oranges), and coffee can make heartburn symptoms worse in some people. If your symptoms are worse after you eat a certain food, you may want to stop eating that food to see if your symptoms get better. · Do not smoke or chew tobacco.  · If you get heartburn at night, raise the head of your bed 6 to 8 inches by putting the frame on blocks or placing a foam wedge under the head of your mattress. (Adding extra pillows does not work.)  · Do not wear tight clothing around your middle. · Lose weight if you need to. Losing just 5 to 10 pounds can help. When should you call for help? Call your doctor now or seek immediate medical care if:  ? · You have new or worse belly pain. ? · You are vomiting. ? Watch closely for changes in your health, and be sure to contact your doctor if:  ? · You have new or worse symptoms of indigestion. ? · You have trouble or pain swallowing. ? · You are losing weight. ? · You do not get better as expected. Where can you learn more? Go to http://bernadette-zaida.info/. Enter P488 in the search box to learn more about \"Hiatal Hernia: Care Instructions. \"  Current as of: May 12, 2017  Content Version: 11.4  © 6113-1973 [employer].  Care instructions adapted under license by [employer] (which disclaims liability or warranty for this information). If you have questions about a medical condition or this instruction, always ask your healthcare professional. [address] any warranty or liability for your use of this information. DISCHARGE SUMMARY from Nurse    PATIENT INSTRUCTIONS:    After general anesthesia or intravenous sedation, for 24 hours or while taking prescription Narcotics:  · Limit your activities  · Do not drive and operate hazardous machinery  · Do not make important personal or business decisions  · Do  not drink alcoholic beverages  · If you have not urinated within 8 hours after discharge, please contact your surgeon on call. *  Please give a list of your current medications to your Primary Care Provider. *  Please update this list whenever your medications are discontinued, doses are      changed, or new medications (including over-the-counter products) are added. *  Please carry medication information at all times in case of emergency situations. These are general instructions for a healthy lifestyle:    No smoking/ No tobacco products/ Avoid exposure to second hand smoke  Surgeon General's Warning:  Quitting smoking now greatly reduces serious risk to your health. Obesity, smoking, and sedentary lifestyle greatly increases your risk for illness    A healthy diet, regular physical exercise & weight monitoring are important for maintaining a healthy lifestyle    You may be retaining fluid if you have a history of heart failure or if you experience any of the following symptoms:  Weight gain of 3 pounds or more overnight or 5 pounds in a week, increased swelling in our hands or feet or shortness of breath while lying flat in bed. Please call your doctor as soon as you notice any of these symptoms; do not wait until your next office visit.     Recognize signs and symptoms of STROKE:    F-face looks uneven    A-arms unable to move or move unevenly    S-speech slurred or non-existent    T-time-call 911 as soon as signs and symptoms begin-DO NOT go       Back to bed or wait to see if you get better-TIME IS BRAIN. Warning Signs of HEART ATTACK     Call 911 if you have these symptoms:   Chest discomfort. Most heart attacks involve discomfort in the center of the chest that lasts more than a few minutes, or that goes away and comes back. It can feel like uncomfortable pressure, squeezing, fullness, or pain.  Discomfort in other areas of the upper body. Symptoms can include pain or discomfort in one or both arms, the back, neck, jaw, or stomach.  Shortness of breath with or without chest discomfort.  Other signs may include breaking out in a cold sweat, nausea, or lightheadedness. Don't wait more than five minutes to call 911 - MINUTES MATTER! Fast action can save your life. Calling 911 is almost always the fastest way to get lifesaving treatment. Emergency Medical Services staff can begin treatment when they arrive -- up to an hour sooner than if someone gets to the hospital by car. The discharge information has been reviewed with the patient and spouse. The patient and spouse verbalized understanding. Discharge medications reviewed with the patient and spouse and appropriate educational materials and side effects teaching were provided.   ___________________________________________________________________________________________________________________________________

## 2018-06-20 NOTE — IP AVS SNAPSHOT
Suzanna Junk 
 
 
 [address] [facility] Patient: Joel Loomis MRN: [id number] NT:[id number] About your hospitalization You were admitted on:  June 20, 2018 You last received care in the:  [address] You were discharged on:  June 20, 2018 Why you were hospitalized Your primary diagnosis was:  Not on File Follow-up Information Follow up With Details Comments Contact Info Eyad Kessler MD   [address] [place] [address] [address] Merged with [facility] [address] 
[phone] Fariba Fritz MD Schedule an appointment as soon as possible for a visit in 4 month(s)  [address] [place] [address] [address] [facility] 
[phone] Your Scheduled Appointments Thursday June 28, 2018 10:30 AM EDT Follow Up with Vicky Palacios PA-C  
[facility] - [address] Merged with [facility] [address] [phone] Discharge Orders None A check lynn indicates which time of day the medication should be taken. My Medications CONTINUE taking these medications Instructions Each Dose to Equal  
 Morning Noon Evening Bedtime aMILoride-hydroCHLOROthiazide 5-50 mg Tab Commonly known as:  MODURETIC Your last dose was: Your next dose is: Take 1 Tab by mouth daily. 1 Tab  
    
   
   
   
  
 amLODIPine 10 mg tablet Commonly known as:  Amilcar Lute Your last dose was: Your next dose is: Take 10 mg by mouth daily. 1 tab  
 10 mg  
    
   
   
   
  
 aspirin 325 mg tablet Commonly known as:  ASPIRIN Your last dose was: Your next dose is: Take 1 Tab by mouth daily. 325 mg  
    
   
   
   
  
 celecoxib 200 mg capsule Commonly known as:  CELEBREX Your last dose was: Your next dose is: Take 200 mg by mouth two (2) times a day. 200 mg  
    
   
   
   
  
 ferrous sulfate 325 mg (65 mg iron) tablet Your last dose was: Your next dose is: Take 1 Tab by mouth two (2) times daily (with meals). 325 mg  
    
   
   
   
  
 losartan 100 mg tablet Commonly known as:  COZAAR Your last dose was: Your next dose is: Take 100 mg by mouth daily. 1 tab  
 100 mg  
    
   
   
   
  
 metoclopramide HCl 5 mg tablet Commonly known as:  REGLAN Your last dose was: Your next dose is: Take 5 mg by mouth two (2) times a day. 1 tab 2x day  
 5 mg  
    
   
   
   
  
 omeprazole 40 mg capsule Commonly known as:  PRILOSEC Your last dose was: Your next dose is: Take 40 mg by mouth daily. 1 tab 40 mg  
    
   
   
   
  
 potassium 99 mg tablet Your last dose was: Your next dose is: Take 99 mg by mouth daily. 99 mg  
    
   
   
   
  
 tropicamide 1 % ophthalmic solution Commonly known as:  mydRIACYL Your last dose was: Your next dose is:    
   
   
 Administer 2 Drops to right eye as needed. 45 minutes prior to schedule appointments 2 Drop VITAMIN D3 1,000 unit tablet Generic drug:  cholecalciferol Your last dose was: Your next dose is: Take 1,000 Units by mouth daily. 1 tab day  
 1000 Units ZyrTEC 10 mg tablet Generic drug:  cetirizine Your last dose was: Your next dose is: Take 10 mg by mouth daily. 1 tab daily 10 mg Discharge Instructions Upper GI Endoscopy: What to Expect at [facility] Your Recovery After you have an endoscopy, you will stay at the hospital or clinic for 1 to 2 hours. This will allow the medicine to wear off.  You will be able to go home after your doctor or nurse checks to make sure you are not having any problems. You may have to stay overnight if you had treatment during the test. You may have a sore throat for a day or two after the test. 
This care sheet gives you a general idea about what to expect after the test. 
How can you care for yourself at home? Activity · Rest as much as you need to after you go home. · You should be able to go back to your usual activities the day after the test. 
Diet · Follow your doctor's directions for eating after the test. 
· Drink plenty of fluids (unless your doctor has told you not to). Medications · If you have a sore throat the day after the test, use an over-the-counter spray to numb your throat. Follow-up care is a key part of your treatment and safety. Be sure to make and go to all appointments, and call your doctor if you are having problems. It's also a good idea to know your test results and keep a list of the medicines you take. When should you call for help? Call 911 anytime you think you may need emergency care. For example, call if: 
? · You passed out (loses consciousness). ? · You have trouble breathing. ? · You pass maroon or bloody stools. ?Call your doctor now or seek immediate medical care if: 
? · You have pain that does not get better after your take pain medicine. ? · You have new or worse belly pain. ? · You have blood in your stools. ? · You are sick to your stomach and cannot keep fluids down. ? · You have a fever. ? · You cannot pass stools or gas. ? Watch closely for changes in your health, and be sure to contact your doctor if: 
? · Your throat still hurts after a day or two. ? · You do not get better as expected. Where can you learn more? Go to http://bernadette-zaida.info/. Enter [phone] in the search box to learn more about \"Upper GI Endoscopy: What to Expect at Home. \" Current as of: May 12, 2017 Content Version: 11.4 © 8375-0147 Healthwise, [employer]. Care instructions adapted under license by [employer] (which disclaims liability or warranty for this information). If you have questions about a medical condition or this instruction, always ask your healthcare professional. [address] any warranty or liability for your use of this information. Hiatal Hernia: Care Instructions Your Care Instructions A hiatal hernia occurs when part of the stomach bulges into the chest cavity. A hiatal hernia may allow stomach acid and juices to back up into the esophagus (acid reflux). This can cause a feeling of burning, warmth, heat, or pain behind the breastbone. This feeling may often occur after you eat, soon after you lie down, or when you bend forward, and it may come and go. You also may have a sour taste in your mouth. These symptoms are commonly known as heartburn or reflux. But not all hiatal hernias cause symptoms. Follow-up care is a key part of your treatment and safety. Be sure to make and go to all appointments, and call your doctor if you are having problems. It's also a good idea to know your test results and keep a list of the medicines you take. How can you care for yourself at home? · Take your medicines exactly as prescribed. Call your doctor if you think you are having a problem with your medicine. · Do not take aspirin or other nonsteroidal anti-inflammatory drugs (NSAIDs), such as ibuprofen (Advil, Motrin) or naproxen (Aleve), unless your doctor says it is okay. Ask your doctor what you can take for pain. · Your doctor may recommend over-the-counter medicine. For mild or occasional indigestion, antacids such as Tums, Gaviscon, Maalox, or Mylanta may help. Your doctor also may recommend over-the-counter acid reducers, such as famotidine (Pepcid AC), cimetidine (Tagamet HB), ranitidine (Zantac 75 and Zantac 150), or omeprazole (Prilosec).  Read and follow all instructions on the label. If you use these medicines often, talk with your doctor. · Change your eating habits. ¨ It's best to eat several small meals instead of two or three large meals. ¨ After you eat, wait 2 to 3 hours before you lie down. Late-night snacks aren't a good idea. ¨ Chocolate, mint, and alcohol can make heartburn worse. They relax the valve between the esophagus and the stomach. ¨ Spicy foods, foods that have a lot of acid (like tomatoes and oranges), and coffee can make heartburn symptoms worse in some people. If your symptoms are worse after you eat a certain food, you may want to stop eating that food to see if your symptoms get better. · Do not smoke or chew tobacco. 
· If you get heartburn at night, raise the head of your bed 6 to 8 inches by putting the frame on blocks or placing a foam wedge under the head of your mattress. (Adding extra pillows does not work.) · Do not wear tight clothing around your middle. · Lose weight if you need to. Losing just 5 to 10 pounds can help. When should you call for help? Call your doctor now or seek immediate medical care if: 
? · You have new or worse belly pain. ? · You are vomiting. ? Watch closely for changes in your health, and be sure to contact your doctor if: 
? · You have new or worse symptoms of indigestion. ? · You have trouble or pain swallowing. ? · You are losing weight. ? · You do not get better as expected. Where can you learn more? Go to http://bernadette-zaida.info/. Enter H840 in the search box to learn more about \"Hiatal Hernia: Care Instructions. \" Current as of: May 12, 2017 Content Version: 11.4 © 3266-9582 [employer]. Care instructions adapted under license by [employer] (which disclaims liability or warranty for this information).  If you have questions about a medical condition or this instruction, always ask your healthcare professional. [employer] disclaims any warranty or liability for your use of this information. DISCHARGE SUMMARY from Nurse PATIENT INSTRUCTIONS: 
 
After general anesthesia or intravenous sedation, for 24 hours or while taking prescription Narcotics: · Limit your activities · Do not drive and operate hazardous machinery · Do not make important personal or business decisions · Do  not drink alcoholic beverages · If you have not urinated within 8 hours after discharge, please contact your surgeon on call. *  Please give a list of your current medications to your Primary Care Provider. *  Please update this list whenever your medications are discontinued, doses are 
    changed, or new medications (including over-the-counter products) are added. *  Please carry medication information at all times in case of emergency situations. These are general instructions for a healthy lifestyle: No smoking/ No tobacco products/ Avoid exposure to second hand smoke Surgeon General's Warning:  Quitting smoking now greatly reduces serious risk to your health. Obesity, smoking, and sedentary lifestyle greatly increases your risk for illness A healthy diet, regular physical exercise & weight monitoring are important for maintaining a healthy lifestyle You may be retaining fluid if you have a history of heart failure or if you experience any of the following symptoms:  Weight gain of 3 pounds or more overnight or 5 pounds in a week, increased swelling in our hands or feet or shortness of breath while lying flat in bed. Please call your doctor as soon as you notice any of these symptoms; do not wait until your next office visit. Recognize signs and symptoms of STROKE: 
 
F-face looks uneven A-arms unable to move or move unevenly S-speech slurred or non-existent T-time-call 911 as soon as signs and symptoms begin-DO NOT go Back to bed or wait to see if you get better-TIME IS BRAIN. Warning Signs of HEART ATTACK Call 911 if you have these symptoms: 
? Chest discomfort. Most heart attacks involve discomfort in the center of the chest that lasts more than a few minutes, or that goes away and comes back. It can feel like uncomfortable pressure, squeezing, fullness, or pain. ? Discomfort in other areas of the upper body. Symptoms can include pain or discomfort in one or both arms, the back, neck, jaw, or stomach. ? Shortness of breath with or without chest discomfort. ? Other signs may include breaking out in a cold sweat, nausea, or lightheadedness. Don't wait more than five minutes to call [address]! Fast action can save your life. Calling 911 is almost always the fastest way to get lifesaving treatment. Emergency Medical Services staff can begin treatment when they arrive  up to an hour sooner than if someone gets to the hospital by car. The discharge information has been reviewed with the patient and spouse. The patient and spouse verbalized understanding. Discharge medications reviewed with the patient and spouse and appropriate educational materials and side effects teaching were provided. ___________________________________________________________________________________________________________________________________ ACO Transitions of Care Introducing [employer] [address] offers a voluntary care coordination program to provide high quality service and care to [facility] fee-for-service beneficiaries. Monica Tolbert was designed to help you enhance your health and well-being through the following services: ? Transitions of Care  support for individuals who are transitioning from one care setting to another (example: Hospital to home). ?  Chronic and Complex Care Coordination  support for individuals and caregivers of those with serious or chronic illnesses or with more than one chronic (ongoing) condition and those who take a number of different medications. If you meet specific medical criteria, a [facility] may call you directly to coordinate your care with your primary care physician and your other care providers. For questions about the [facility] programs, please, contact your physicians office. For general questions or additional information about Accountable Care Organizations: 
Please visit www.medicare.gov/acos. html or call 1-800-MEDICARE (8-366.267.7939) TTY users should call 3-897.236.8339. Introducing [address]! [employer] introduces [employer] patient portal. Now you can access parts of your medical record, email your doctor's office, and request medication refills online. 1. In your internet browser, go to [email or web address] 2. Click on the First Time User? Click Here link in the Sign In box. You will see the New Member Sign Up page. 3. Enter your [employer] Access Code exactly as it appears below. You will not need to use this code after youve completed the sign-up process. If you do not sign up before the expiration date, you must request a new code. · [employer] Access Code: [email or web address] Expires: 9/18/2018 10:24 AM 
 
4. Enter the last four digits of your Social Security Number (xxxx) and Date of Birth (mm/dd/yyyy) as indicated and click Submit. You will be taken to the next sign-up page. 5. Create a [employer] ID. This will be your [employer] login ID and cannot be changed, so think of one that is secure and easy to remember. 6. Create a [employer] password. You can change your password at any time. 7. Enter your Password Reset Question and Answer. This can be used at a later time if you forget your password. 8. Enter your e-mail address. You will receive e-mail notification when new information is available in [address] 9. Click Sign Up. You can now view and download portions of your medical record. 10. Click the Download Summary menu link to download a portable copy of your medical information. If you have questions, please visit the Frequently Asked Questions section of the [employer] website. Remember, [employer] is NOT to be used for urgent needs. For medical emergencies, dial 911. Now available from your iPhone and Android! Introducing Andrea Montgomery As a [facility] patient, I wanted to make you aware of our electronic visit tool called Andrea Montgomery. [employer] 24/7 allows you to connect within minutes with a medical provider 24 hours a day, seven days a week via a mobile device or tablet or logging into a secure website from your computer. You can access Andrea Montgomery from anywhere in the [place]. A virtual visit might be right for you when you have a simple condition and feel like you just dont want to get out of bed, or cant get away from work for an appointment, when your regular [facility] provider is not available (evenings, weekends or holidays), or when youre out of town and need minor care. Electronic visits cost only $49 and if the [employer] [facility] 24/7 provider determines a prescription is needed to treat your condition, one can be electronically transmitted to a nearby pharmacy*. Please take a moment to enroll today if you have not already done so. The enrollment process is free and takes just a few minutes. To enroll, please download the [employer] 24/7 azul to your tablet or phone, or visit [email or web address] to enroll on your computer. And, as an [address] [place] [address] patient with a [employer] account, the results of your visits will be scanned into your electronic medical record and your primary care provider will be able to view the scanned results.    
We urge you to continue to see your regular [facility] provider for your ongoing medical care. And while your primary care provider may not be the one available when you seek a Andrea Karimitanyafin virtual visit, the peace of mind you get from getting a real diagnosis real time can be priceless. For more information on Andrea Karimitanyafin, view our Frequently Asked Questions (FAQs) at [email or web address]. Sincerely, 
 
Deyanira Tilley MD 
Chief Medical Officer [employer] *:  certain medications cannot be prescribed via Andrea Karimiyamil Providers Seen During Your Hospitalization Provider Specialty Primary office phone Maxim Nicolas MD Gastroenterology [phone] Your Primary Care Physician (PCP) Primary Care Physician Office Phone Office Fax Den Form [phone] You are allergic to the following Allergen Reactions Citric Acid Unknown (comments) Crinone (Progesterone Micronized) Unknown (comments) Iodine Unknown (comments) Percocet (Oxycodone-Acetaminophen) Other (comments) Gets rebound headaches after taking Percocet Recent Documentation Height Weight Breastfeeding? BMI OB Status Smoking Status 1.626 m 106.2 kg No 40.2 kg/m2 Hysterectomy Never Smoker Emergency Contacts Name Discharge Info Relation Home Work Mobile [address] [facility] CAREGIVER [3] Spouse [3] [phone] Layla Viveros  Daughter [21] [phone] Patient Belongings The following personal items are in your possession at time of discharge: 
  Dental Appliances: None  Visual Aid: None Please provide this summary of care documentation to your next provider. Signatures-by signing, you are acknowledging that this After Visit Summary has been reviewed with you and you have received a copy. Patient Signature:  ____________________________________________________________ Date:  ____________________________________________________________  
  
León Livings Provider Signature:  ____________________________________________________________ Date:  ____________________________________________________________

## 2018-06-20 NOTE — ANESTHESIA POSTPROCEDURE EVALUATION
Post-Anesthesia Evaluation and Assessment    Patient: Robb Amezcua MRN: [id number]  SSN: [id number]    YOB: 1935  Age: [de-identified] y.o. Sex: female       Cardiovascular Function/Vital Signs  Visit Vitals    /54    Pulse 68    Temp 36.5 °C (97.7 °F)    Resp 20    Ht 5' 4\" (1.626 m)    Wt 106.2 kg (234 lb 3.2 oz)    SpO2 100%    Breastfeeding No    BMI 40.2 kg/m2       Patient is status post MAC anesthesia for Procedure(s):  UPPER ENDOSCOPY. Nausea/Vomiting: None    Postoperative hydration reviewed and adequate. Pain:  Pain Scale 1: Numeric (0 - 10) (06/20/18 1303)  Pain Intensity 1: 0 (06/20/18 6448)   Managed    Neurological Status: At baseline    Mental Status and Level of Consciousness: Arousable    Pulmonary Status:   O2 Device: Nasal mask (06/20/18 2306)   Adequate oxygenation and airway patent    Complications related to anesthesia: None    Post-anesthesia assessment completed.  No concerns    Signed By: Keith Duncan CRNA     June 20, 2018

## 2018-06-20 NOTE — PROCEDURES
[email or web address]  [phone]        Brief Procedure Note    Gerardo Bui  1935  [id number]    Date of Procedure: 6/20/2018    Preoperative diagnosis: Dysphagia, reflux    Postoperative diagnosis: hiatal hernia    Description of Findings: same    Sedation/Anesthesia: Monitored Anesthesia Care; See Anesthesia Note    Procedure: Procedure(s):  UPPER ENDOSCOPY    :  Dr. Criss Sales MD    Assistant(s): Endoscopy Technician-1: Martha Alfaro  Endoscopy RN-1: Sergio Grayson RN    EBL:None    Specimens: * No specimens in log *    Findings: See printed and scanned procedure note    Complications: None    Dr. Criss Sales MD  6/20/2018  9:45 AM    Cirss Sales MD  [facility] [address] [phone]  [email or web address]

## 2018-06-20 NOTE — H&P
[email or web address]  [phone]      History and Physical    Patient: Cornel Almanza MRN: [id number]  SSN: [id number]    YOB: 1935  Age: [de-identified] y.o. Sex: female      Subjective:      Cornel Almanza is a [de-identified] y.o. female who presents with 1 week of progressive dysphagia for food and water associated with reflux. Past Medical History:   Diagnosis Date    Arrhythmia     mur mur  long standing    Arthritis     Arthritis of both hips     Back pain     Balance problem     Chronic back pain     Cough     Easy bruising     Essential hypertension     GERD (gastroesophageal reflux disease)     Hiatal hernia     Hypertension     Ill-defined condition     vascular insuff rt ankle    Irregular heart beat     Left hip pain 10/8/2010    Neck pain     Nervousness     Night sweat     Osteoarthritis, knee bilateral     Pain with urination     Polymyalgia rheumatica (HCC)     Reflux     Spinal stenosis     Thromboembolus (HCC)     Trapezius strain     Trochanteric bursitis of left hip     Venous insufficiency      Past Surgical History:   Procedure Laterality Date    COLONOSCOPY N/A 7/27/2016    COLONOSCOPY w/polypectomy performed by Ean Jacob MD at [facility]    HX [facility]      HX CHOLECYSTECTOMY      HX HEENT  06/2011    left cornea transplant    HX HYSTERECTOMY      HX KNEE REPLACEMENT Right 02/2017    HX ORTHOPAEDIC      right foot and ankle    HX ORTHOPAEDIC Right 04/2017      Family History   Problem Relation Age of Onset    Arthritis-rheumatoid Other     Diabetes Other     Hypertension Other     Arthritis-osteo Other      Social History   Substance Use Topics    Smoking status: Never Smoker    Smokeless tobacco: Never Used    Alcohol use No      Prior to Admission medications    Medication Sig Start Date End Date Taking? Authorizing Provider   celecoxib (CELEBREX) 200 mg capsule Take 200 mg by mouth two (2) times a day.     Historical Provider ferrous sulfate 325 mg (65 mg iron) tablet Take 1 Tab by mouth two (2) times daily (with meals). 1/10/18   Genoveva Weldon PA-C   HYDROcodone-acetaminophen (NORCO) 7.5-325 mg per tablet Take 1-2 Tabs by mouth every four (4) hours as needed. Max Daily Amount: 12 Tabs. 1/10/18   Genoveva Weldon PA-C   aspirin (ASPIRIN) 325 mg tablet Take 1 Tab by mouth daily. 1/10/18   Genoveva Weldon PA-C   potassium 99 mg tablet Take 99 mg by mouth daily. Historical Provider   tropicamide (MYDRIACYL) 1 % ophthalmic solution Administer 2 Drops to right eye as needed. 45 minutes prior to schedule appointments    Historical Provider   cholecalciferol (VITAMIN D3) 1,000 unit tablet Take 1,000 Units by mouth daily. 1 tab day    Historical Provider   omeprazole (PRILOSEC) 40 mg capsule Take 40 mg by mouth daily. 1 tab    Historical Provider   aMILoride-hydrochlorothiazide (MODURETIC) 5-50 mg tab Take 1 Tab by mouth daily. Historical Provider   amLODIPine (NORVASC) 10 mg tablet Take 10 mg by mouth daily. 1 tab 9/10/15   Historical Provider   losartan (COZAAR) 100 mg tablet Take 100 mg by mouth daily. 1 tab    Historical Provider   metoclopramide HCl (REGLAN) 5 mg tablet Take 5 mg by mouth two (2) times a day. 1 tab 2x day    Historical Provider   cetirizine (ZYRTEC) 10 mg tablet Take 10 mg by mouth daily. 1 tab daily    Historical Provider        Allergies   Allergen Reactions    Citric Acid Unknown (comments)    Crinone [Progesterone Micronized] Unknown (comments)    Iodine Unknown (comments)    Percocet [Oxycodone-Acetaminophen] Other (comments)     Gets rebound headaches after taking Percocet       Review of Systems:  A comprehensive review of systems was negative except for that written in the History of Present Illness.     Objective:     Vitals:    06/14/18 0934   Weight: 105.7 kg (233 lb)   Height: 5' 4\" (1.626 m)        Physical Exam:  GENERAL: alert, cooperative, no distress, appears stated age  LUNG: clear to auscultation bilaterally  HEART: regular rate and rhythm, S1, S2 normal, no murmur, click, rub or gallop  ABDOMEN: soft, non-tender. Bowel sounds normal. No masses,  no organomegaly  NEUROLOGIC: alert & oriented x 3    Assessment:     1. Dysphagia  2. Reflux    Plan:     1. EGD    Signed By: Nicko Solomon MD     June 20, 2018      Nicko Solomon MD  [facility] [address] [phone]  [email or web address]

## 2018-06-20 NOTE — ANESTHESIA PREPROCEDURE EVALUATION
Anesthetic History   No history of anesthetic complications            Review of Systems / Medical History  Patient summary reviewed and pertinent labs reviewed    Pulmonary  Within defined limits                 Neuro/Psych   Within defined limits           Cardiovascular    Hypertension: well controlled              Exercise tolerance: >4 METS     GI/Hepatic/Renal  Within defined limits              Endo/Other        Morbid obesity and arthritis     Other Findings   Comments: Documentation of current medication  Current medications obtained, documented and obtained? YES      Risk Factors for Postoperative nausea/vomiting:       History of postoperative nausea/vomiting? NO       Female? YES       Motion sickness? NO       Intended opioid administration for postoperative analgesia? NO      Smoking Abstinence:  Current Smoker? NO  Elective Surgery? YES  Seen preoperatively by anesthesiologist or proxy prior to day of surgery? YES  Pt abstained from smoking 24 hours prior to anesthesia?  N/A    Preventive care/screening for High Blood Pressure:  Aged 18 years and older: YES  Screened for high blood pressure: YES  Patients with high blood pressure referred to primary care provider   for BP management: YES               Physical Exam    Airway  Mallampati: II  TM Distance: 4 - 6 cm  Neck ROM: normal range of motion   Mouth opening: Normal     Cardiovascular  Regular rate and rhythm,  S1 and S2 normal,  no murmur, click, rub, or gallop  Rhythm: regular  Rate: normal         Dental    Dentition: Edentulous     Pulmonary  Breath sounds clear to auscultation               Abdominal  GI exam deferred       Other Findings            Anesthetic Plan    ASA: 3  Anesthesia type: MAC          Induction: Intravenous  Anesthetic plan and risks discussed with: Patient

## 2018-06-28 ENCOUNTER — OFFICE VISIT (OUTPATIENT)
Dept: ORTHOPEDIC SURGERY | Facility: CLINIC | Age: 83
End: 2018-06-28

## 2018-06-28 VITALS
HEIGHT: 64 IN | BODY MASS INDEX: 39.91 KG/M2 | DIASTOLIC BLOOD PRESSURE: 69 MMHG | TEMPERATURE: 97.5 F | WEIGHT: 233.8 LBS | OXYGEN SATURATION: 97 % | SYSTOLIC BLOOD PRESSURE: 141 MMHG | HEART RATE: 72 BPM

## 2018-06-28 DIAGNOSIS — M17.12 PRIMARY OSTEOARTHRITIS OF LEFT KNEE: Primary | ICD-10-CM

## 2018-06-28 RX ORDER — BETAMETHASONE SODIUM PHOSPHATE AND BETAMETHASONE ACETATE 3; 3 MG/ML; MG/ML
6 INJECTION, SUSPENSION INTRA-ARTICULAR; INTRALESIONAL; INTRAMUSCULAR; SOFT TISSUE ONCE
Qty: 1 ML | Refills: 0
Start: 2018-06-28 | End: 2018-06-28

## 2018-06-28 RX ORDER — DICLOFENAC SODIUM 10 MG/G
4 GEL TOPICAL 4 TIMES DAILY
Qty: 5 EACH | Refills: 0 | Status: SHIPPED | OUTPATIENT
Start: 2018-06-28 | End: 2021-11-04 | Stop reason: ALTCHOICE

## 2018-06-28 NOTE — PROGRESS NOTES
[address], [address] [facility]  [phone]           Patient: Brigido Flores                MRN: [id number]       SSN: [id number]  YOB: 1935        AGE: [de-identified] y.o. SEX: female  Body mass index is 40.13 kg/(m^2). PCP: Stacie Huber MD  06/28/18      This office note has been dictated. REVIEW OF SYSTEMS:  Constitutional: Negative for fever, chills, weight loss and malaise/fatigue. HENT: Negative. Eyes: Negative. Respiratory: Negative. Cardiovascular: Negative. Gastrointestinal: No bowel incontinence or constipation. Genitourinary: No bladder incontinence or saddle anesthesia. Skin: Negative. Neurological: Negative. Endo/Heme/Allergies: Negative. Psychiatric/Behavioral: Negative. Musculoskeletal: As per HPI above. Past Medical History:   Diagnosis Date    Arrhythmia     mur mur  long standing    Arthritis     Arthritis of both hips     Back pain     Balance problem     Chronic back pain     Cough     Easy bruising     Essential hypertension     GERD (gastroesophageal reflux disease)     Hiatal hernia     Hypertension     Ill-defined condition     vascular insuff rt ankle    Irregular heart beat     Left hip pain 10/8/2010    Neck pain     Nervousness     Night sweat     Osteoarthritis, knee bilateral     Pain with urination     Polymyalgia rheumatica (HCC)     Reflux     Spinal stenosis     Thromboembolus (HCC)     Trapezius strain     Trochanteric bursitis of left hip     Venous insufficiency          Current Outpatient Prescriptions:     betamethasone (CELESTONE SOLUSPAN) 6 mg/mL injection, 1 mL by Intra artICUlar route once for 1 dose., Disp: 1 mL, Rfl: 0    celecoxib (CELEBREX) 200 mg capsule, Take 200 mg by mouth two (2) times a day., Disp: , Rfl:     ferrous sulfate 325 mg (65 mg iron) tablet, Take 1 Tab by mouth two (2) times daily (with meals). , Disp: 60 Tab, Rfl: 2    aspirin (ASPIRIN) 325 mg tablet, Take 1 Tab by mouth daily. , Disp: 30 Tab, Rfl: 0    potassium 99 mg tablet, Take 99 mg by mouth daily. , Disp: , Rfl:     tropicamide (MYDRIACYL) 1 % ophthalmic solution, Administer 2 Drops to right eye as needed. 45 minutes prior to schedule appointments, Disp: , Rfl:     cholecalciferol (VITAMIN D3) 1,000 unit tablet, Take 1,000 Units by mouth daily. 1 tab day, Disp: , Rfl:     omeprazole (PRILOSEC) 40 mg capsule, Take 40 mg by mouth daily. 1 tab, Disp: , Rfl:     aMILoride-hydrochlorothiazide (MODURETIC) 5-50 mg tab, Take 1 Tab by mouth daily. , Disp: , Rfl:     amLODIPine (NORVASC) 10 mg tablet, Take 10 mg by mouth daily. 1 tab, Disp: , Rfl: 3    losartan (COZAAR) 100 mg tablet, Take 100 mg by mouth daily. 1 tab, Disp: , Rfl:     metoclopramide HCl (REGLAN) 5 mg tablet, Take 5 mg by mouth two (2) times a day. 1 tab 2x day, Disp: , Rfl:     cetirizine (ZYRTEC) 10 mg tablet, Take 10 mg by mouth daily. 1 tab daily, Disp: , Rfl:     Allergies   Allergen Reactions    Citric Acid Unknown (comments)    Crinone [Progesterone Micronized] Unknown (comments)    Iodine Unknown (comments)    Percocet [Oxycodone-Acetaminophen] Other (comments)     Gets rebound headaches after taking Percocet       Social History     Social History    Marital status:      Spouse name: N/A    Number of children: N/A    Years of education: N/A     Occupational History    Not on file.      Social History Main Topics    Smoking status: Never Smoker    Smokeless tobacco: Never Used    Alcohol use No    Drug use: No    Sexual activity: No     Other Topics Concern    Not on file     Social History Narrative       Past Surgical History:   Procedure Laterality Date    COLONOSCOPY N/A 7/27/2016    COLONOSCOPY w/polypectomy performed by Shon Espinal MD at [facility]  [facility] HX CHOLECYSTECTOMY      HX HEENT  06/2011    left cornea transplant    HX HYSTERECTOMY      HX KNEE REPLACEMENT Right 02/2017    HX ORTHOPAEDIC      right foot and ankle    HX ORTHOPAEDIC Right 04/2017           * Patient was identified by name and date of birth   * Agreement on procedure being performed was verified  * Risks and Benefits explained to the patient  * Procedure site verified and marked as necessary  * Patient was positioned for comfort  * Consent was signed and verified  11:01 AM    The patient was instructed on post injection care. SUBJECTIVE:   We did see Ms. Mack Ricks for followup in regard to bilateral knees. Patient is status post total knee replacement on the right side. Unfortunately, she did have a patellar injury. She has had a couple of surgeries for the patellar fracture. She is doing well with it. She does have a known it of a lag associated but she is functioning quite well without discomfort. The left knee is bothering her more. She does have known advanced arthritis of the left knee and is requesting a cortisone injection today for the left knee. She has had no recent fevers or chills, systemic changes. No injuries to report. No chest pain or shortness breath. PHYSICAL EXAMINATION: In general, the patient is alert and oriented x3 and is in no acute distress. The patient is well-developed and well-nourished with a normal affect. The patient is afebrile. HEENT:  Head is normocephalic and atraumatic. Extraocular eye movements are intact. Neck is supple. Trachea is midline. No JVD is present. Breathing is nonlabored. Examination of lower extremity reveals she has pain-free range of motion of the hips. There is no pain over the trochanteric bursa. Negative straight leg raise. No calf tenderness. No Homans. No evidence of DVT present. Examination of the right knee reveal skin is intact. No ecchymosis. No warmth. Surgical wound is healing nicely. No signs for infection or cellulitis are present.   Range of motion:  She has full extension, passively and actively. She has about 6 or 7 degrees extension leg. Excellent flexion. Stability is quite good. Patella tracks nicely. The left knee reveals skin is intact. No ecchymosis. No warmth. No signs for infection or cellulitis present. Findings are consistent with osteoarthritis of the left knee with pain on palpation and crepitus arising from the anterior compartment. ASSESSMENT:    1. Status post right knee replacement. 2. Status post patellar fracture with revision, right knee. 3. Left knee end-stage osteoarthritis. PLAN:  At this point, she will continue activities as tolerated. With regard to the right knee, she is doing quite well with it. For the left knee today, we are going to move forward with a cortisone injection. After informed consent, under aseptic conditions with ultrasound-guided assistance, the left knee was prepped with Betadine and 6 mg of Celestone was injected without complication. The patient tolerated the injection well. She was instructed in postinjection care. We will see her back in the office and 3 months' time. She will call with any questions or if concerns arise.     CC:  MD JR Brian Ash MPAS, PA-C, ATC

## 2018-10-25 ENCOUNTER — OFFICE VISIT (OUTPATIENT)
Dept: ORTHOPEDIC SURGERY | Age: 83
End: 2018-10-25

## 2018-10-25 VITALS
DIASTOLIC BLOOD PRESSURE: 83 MMHG | WEIGHT: 233 LBS | HEART RATE: 76 BPM | BODY MASS INDEX: 39.78 KG/M2 | RESPIRATION RATE: 16 BRPM | OXYGEN SATURATION: 100 % | HEIGHT: 64 IN | SYSTOLIC BLOOD PRESSURE: 166 MMHG | TEMPERATURE: 97.3 F

## 2018-10-25 DIAGNOSIS — M17.12 PRIMARY OSTEOARTHRITIS OF LEFT KNEE: Primary | ICD-10-CM

## 2018-10-25 RX ORDER — BETAMETHASONE SODIUM PHOSPHATE AND BETAMETHASONE ACETATE 3; 3 MG/ML; MG/ML
6 INJECTION, SUSPENSION INTRA-ARTICULAR; INTRALESIONAL; INTRAMUSCULAR; SOFT TISSUE ONCE
Qty: 1 ML | Refills: 0
Start: 2018-10-25 | End: 2018-10-25

## 2018-10-25 NOTE — PROGRESS NOTES
[address] [place] [address]  [phone]           Patient: Kiran Rushing                MRN: [id number]       SSN: [id number]  YOB: 1935        AGE: 82 y.o. SEX: female  Body mass index is 39.99 kg/m². PCP: Oscar Barfield MD  10/25/18      This office note has been dictated. REVIEW OF SYSTEMS:  Constitutional: Negative for fever, chills, weight loss and malaise/fatigue. HENT: Negative. Eyes: Negative. Respiratory: Negative. Cardiovascular: Negative. Gastrointestinal: No bowel incontinence or constipation. Genitourinary: No bladder incontinence or saddle anesthesia. Skin: Negative. Neurological: Negative. Endo/Heme/Allergies: Negative. Psychiatric/Behavioral: Negative. Musculoskeletal: As per HPI above. Past Medical History:   Diagnosis Date    Arrhythmia     mur mur  long standing    Arthritis     Arthritis of both hips     Back pain     Balance problem     Chronic back pain     Cough     Easy bruising     Essential hypertension     GERD (gastroesophageal reflux disease)     Hiatal hernia     Hypertension     Ill-defined condition     vascular insuff rt ankle    Irregular heart beat     Left hip pain 10/8/2010    Neck pain     Nervousness     Night sweat     Osteoarthritis, knee bilateral     Pain with urination     Polymyalgia rheumatica (HCC)     Reflux     Spinal stenosis     Thromboembolus (HCC)     Trapezius strain     Trochanteric bursitis of left hip     Venous insufficiency          Current Outpatient Medications:     diclofenac (VOLTAREN) 1 % gel, Apply 4 g to affected area four (4) times daily. Maximum 16 grams per joint per day.  Dispense 5 100 gram tubes, Disp: 5 Each, Rfl: 0    celecoxib (CELEBREX) 200 mg capsule, Take 200 mg by mouth two (2) times a day., Disp: , Rfl:     ferrous sulfate 325 mg (65 mg iron) tablet, Take 1 Tab by mouth two (2) times daily (with meals). , Disp: 60 Tab, Rfl: 2    aspirin (ASPIRIN) 325 mg tablet, Take 1 Tab by mouth daily. , Disp: 30 Tab, Rfl: 0    potassium 99 mg tablet, Take 99 mg by mouth daily. , Disp: , Rfl:     tropicamide (MYDRIACYL) 1 % ophthalmic solution, Administer 2 Drops to right eye as needed. 45 minutes prior to schedule appointments, Disp: , Rfl:     cholecalciferol (VITAMIN D3) 1,000 unit tablet, Take 1,000 Units by mouth daily. 1 tab day, Disp: , Rfl:     omeprazole (PRILOSEC) 40 mg capsule, Take 40 mg by mouth daily. 1 tab, Disp: , Rfl:     aMILoride-hydrochlorothiazide (MODURETIC) 5-50 mg tab, Take 1 Tab by mouth daily. , Disp: , Rfl:     amLODIPine (NORVASC) 10 mg tablet, Take 10 mg by mouth daily. 1 tab, Disp: , Rfl: 3    losartan (COZAAR) 100 mg tablet, Take 100 mg by mouth daily. 1 tab, Disp: , Rfl:     metoclopramide HCl (REGLAN) 5 mg tablet, Take 5 mg by mouth two (2) times a day. 1 tab 2x day, Disp: , Rfl:     cetirizine (ZYRTEC) 10 mg tablet, Take 10 mg by mouth daily.  1 tab daily, Disp: , Rfl:     Allergies   Allergen Reactions    Citric Acid Unknown (comments)    Crinone [Progesterone Micronized] Unknown (comments)    Iodine Unknown (comments)    Percocet [Oxycodone-Acetaminophen] Other (comments)     Gets rebound headaches after taking Percocet       Social History     Socioeconomic History    Marital status:      Spouse name: Not on file    Number of children: Not on file    Years of education: Not on file    Highest education level: Not on file   Social Needs    Financial resource strain: Not on file    Food insecurity - worry: Not on file    Food insecurity - inability: Not on file   [employer] needs - medical: Not on file   [employer] needs - non-medical: Not on file   Occupational History    Not on file   Tobacco Use    Smoking status: Never Smoker    Smokeless tobacco: Never Used   Substance and Sexual Activity    Alcohol use: No    Drug use: No    Sexual activity: No   Other Topics Concern    Not on file   Social History Narrative    Not on file       Past Surgical History:   Procedure Laterality Date    HX ANKLE FRACTURE TX      HX CHOLECYSTECTOMY      HX HEENT  06/2011    left cornea transplant    HX HYSTERECTOMY      HX KNEE REPLACEMENT Right 02/2017    HX ORTHOPAEDIC      right foot and ankle    HX ORTHOPAEDIC Right 04/2017           * Patient was identified by name and date of birth   * Agreement on procedure being performed was verified  * Risks and Benefits explained to the patient  * Procedure site verified and marked as necessary  * Patient was positioned for comfort  * Consent was signed and verified  3:16 PM    The patient was instructed on post injection care. We did see Ms. Andre Montanez for followup with regards to her bilateral knees. She has had a right knee replacement. She did have a patella fracture fixed with ORIF and had a revision times two on it. Overall, she has done well with her knee. She does have a little bit of a lag but is quite happy with the results. She has minimal to no pain. There is just a little stiffness. She is on Celebrex and occasional Tylenol. She presents today for reevaluation and is requesting an injection for her left knee, which she does have known osteoarthritis in. Injections still give her some relief. She is about one month overdue for her injection. PHYSICAL EXAMINATION:  In general, the patient is alert and oriented x 3 in no acute distress. The patient is well-developed, well-nourished, with a normal affect. The patient is afebrile. HEENT:  Head is normocephalic and atraumatic. Pupils are equally round and reactive to light and accommodation. Extraocular eye movements are intact. Neck is supple. Trachea is midline. No JVD is present. Breathing is nonlabored. Examination of the lower extremities reveals pain-free range of motion of the hips.   There is no pain to palpation of the greater trochanteric bursae. There is negative straight leg raise. There is negative calf tenderness. There is negative Checos. There is no evidence of DVT present. The right knee reveals the skin is intact. There is no ecchymosis, no warmth, and no signs of infection or cellulitis present. Range of motion:  She has full extension. She does have about a 4° extensor lag. The patella is tracking nicely. There are no rubs or crepitus noted. Stability is quite good. The left knee reveals the skin is intact. There is no ecchymosis, no warmth, and no signs of infection or cellulitis present. She does have pain to palpation to the medial and lateral joint lines, as well as patellofemoral grind and crepitus anteriorly with range of motion activities. Findings are consistent with advanced arthritis of the left knee. ASSESSMENT:      1. Status post right knee replacement. 2. Status post ORIF right patellar fracture with revision and ultimately hardware removal of the patellar fracture and primary repair. 3. Left knee advanced osteoarthritis. PLAN:  At this point, we discussed treatment options. We are going to move forward with a repeat injection for the left knee today. Under aseptic conditions, after informed and written consent, and appropriate time out performed, with ultrasound-guided assistance, the left knee was prepped with Betadine and 6 mg of Celestone was injected without complications. The patient tolerated the injection well. The patient was instructed on post injection care. We will plan on seeing her back in the office in about three months time for evaluation and repeat injection for the left knee. She will call with any questions or concerns that shall arise.                   JR Brian LUCERO, SENTHIL, ATC

## 2018-11-02 ENCOUNTER — HOSPITAL ENCOUNTER (OUTPATIENT)
Dept: LAB | Age: 83
Discharge: HOME | End: 2018-11-02
Payer: MEDICARE

## 2018-11-02 DIAGNOSIS — R80.1 PERSISTENT PROTEINURIA, UNSPECIFIED: ICD-10-CM

## 2018-11-02 DIAGNOSIS — N25.81 SECONDARY HYPERPARATHYROIDISM OF RENAL ORIGIN (HCC): ICD-10-CM

## 2018-11-02 DIAGNOSIS — E78.1 PURE HYPERGLYCERIDEMIA: ICD-10-CM

## 2018-11-02 DIAGNOSIS — M17.0 PRIMARY OSTEOARTHRITIS OF BOTH KNEES: ICD-10-CM

## 2018-11-02 DIAGNOSIS — E55.9 VITAMIN D DEFICIENCY: ICD-10-CM

## 2018-11-02 DIAGNOSIS — N18.30 CHRONIC KIDNEY DISEASE, STAGE III (MODERATE) (HCC): ICD-10-CM

## 2018-11-02 DIAGNOSIS — I12.9 HYPERTENSIVE KIDNEY DISEASE WITH CHRONIC KIDNEY DISEASE, STAGE 1-4 OR UNSPECIFIED CHRONIC KIDNEY DISEASE: ICD-10-CM

## 2018-11-02 DIAGNOSIS — I10 HYPERTENSION: ICD-10-CM

## 2018-11-02 DIAGNOSIS — K21.9 ESOPHAGEAL REFLUX: ICD-10-CM

## 2018-11-02 DIAGNOSIS — N25.81 HYPERPARATHYROIDISM DUE TO RENAL INSUFFICIENCY (HCC): ICD-10-CM

## 2018-11-02 LAB
25(OH)D3 SERPL-MCNC: 32.7 NG/ML (ref 30–100)
25(OH)D3 SERPL-MCNC: 33 NG/ML (ref 30–100)
ALBUMIN SERPL-MCNC: 3.4 G/DL (ref 3.4–5)
ALBUMIN SERPL-MCNC: 3.4 G/DL (ref 3.4–5)
ALBUMIN/GLOB SERPL: 0.9 {RATIO} (ref 0.8–1.7)
ALP SERPL-CCNC: 110 U/L (ref 45–117)
ALT SERPL-CCNC: 25 U/L (ref 13–56)
ANION GAP SERPL CALC-SCNC: 7 MMOL/L (ref 3–18)
ANION GAP SERPL CALC-SCNC: 7 MMOL/L (ref 3–18)
AST SERPL-CCNC: 16 U/L (ref 15–37)
BASOPHILS # BLD: 0 K/UL (ref 0–0.1)
BASOPHILS # BLD: 0 K/UL (ref 0–0.1)
BASOPHILS NFR BLD: 0 % (ref 0–2)
BASOPHILS NFR BLD: 0 % (ref 0–2)
BILIRUB SERPL-MCNC: 0.4 MG/DL (ref 0.2–1)
BUN SERPL-MCNC: 30 MG/DL (ref 7–18)
BUN SERPL-MCNC: 30 MG/DL (ref 7–18)
BUN/CREAT SERPL: 24 (ref 12–20)
BUN/CREAT SERPL: 25 (ref 12–20)
CALCIUM SERPL-MCNC: 8.8 MG/DL (ref 8.5–10.1)
CALCIUM SERPL-MCNC: 8.8 MG/DL (ref 8.5–10.1)
CALCIUM SERPL-MCNC: 9 MG/DL (ref 8.5–10.1)
CHLORIDE SERPL-SCNC: 106 MMOL/L (ref 100–108)
CHLORIDE SERPL-SCNC: 107 MMOL/L (ref 100–108)
CHOLEST SERPL-MCNC: 179 MG/DL
CO2 SERPL-SCNC: 26 MMOL/L (ref 21–32)
CO2 SERPL-SCNC: 27 MMOL/L (ref 21–32)
CREAT SERPL-MCNC: 1.21 MG/DL (ref 0.6–1.3)
CREAT SERPL-MCNC: 1.26 MG/DL (ref 0.6–1.3)
CREAT UR-MCNC: 98.3 MG/DL (ref 30–125)
DIFFERENTIAL METHOD BLD: ABNORMAL
DIFFERENTIAL METHOD BLD: ABNORMAL
EOSINOPHIL # BLD: 0.2 K/UL (ref 0–0.4)
EOSINOPHIL # BLD: 0.2 K/UL (ref 0–0.4)
EOSINOPHIL NFR BLD: 2 % (ref 0–5)
EOSINOPHIL NFR BLD: 3 % (ref 0–5)
ERYTHROCYTE [DISTWIDTH] IN BLOOD BY AUTOMATED COUNT: 13.6 % (ref 11.6–14.5)
ERYTHROCYTE [DISTWIDTH] IN BLOOD BY AUTOMATED COUNT: 13.6 % (ref 11.6–14.5)
GLOBULIN SER CALC-MCNC: 3.6 G/DL (ref 2–4)
GLUCOSE SERPL-MCNC: 87 MG/DL (ref 74–99)
GLUCOSE SERPL-MCNC: 87 MG/DL (ref 74–99)
HCT VFR BLD AUTO: 33.2 % (ref 35–45)
HCT VFR BLD AUTO: 33.3 % (ref 35–45)
HDLC SERPL-MCNC: 54 MG/DL (ref 40–60)
HDLC SERPL: 3.3 {RATIO} (ref 0–5)
HGB BLD-MCNC: 10.9 G/DL (ref 12–16)
HGB BLD-MCNC: 10.9 G/DL (ref 12–16)
LDLC SERPL CALC-MCNC: 113 MG/DL (ref 0–100)
LIPID PROFILE,FLP: ABNORMAL
LYMPHOCYTES # BLD: 1.8 K/UL (ref 0.9–3.6)
LYMPHOCYTES # BLD: 2 K/UL (ref 0.9–3.6)
LYMPHOCYTES NFR BLD: 22 % (ref 21–52)
LYMPHOCYTES NFR BLD: 24 % (ref 21–52)
MCH RBC QN AUTO: 27.6 PG (ref 24–34)
MCH RBC QN AUTO: 27.7 PG (ref 24–34)
MCHC RBC AUTO-ENTMCNC: 32.7 G/DL (ref 31–37)
MCHC RBC AUTO-ENTMCNC: 32.8 G/DL (ref 31–37)
MCV RBC AUTO: 84.3 FL (ref 74–97)
MCV RBC AUTO: 84.5 FL (ref 74–97)
MONOCYTES # BLD: 0.4 K/UL (ref 0.05–1.2)
MONOCYTES # BLD: 0.4 K/UL (ref 0.05–1.2)
MONOCYTES NFR BLD: 4 % (ref 3–10)
MONOCYTES NFR BLD: 5 % (ref 3–10)
NEUTS SEG # BLD: 5.7 K/UL (ref 1.8–8)
NEUTS SEG # BLD: 5.8 K/UL (ref 1.8–8)
NEUTS SEG NFR BLD: 69 % (ref 40–73)
NEUTS SEG NFR BLD: 71 % (ref 40–73)
PHOSPHATE SERPL-MCNC: 3.5 MG/DL (ref 2.5–4.9)
PLATELET # BLD AUTO: 293 K/UL (ref 135–420)
PLATELET # BLD AUTO: 296 K/UL (ref 135–420)
PMV BLD AUTO: 10.2 FL (ref 9.2–11.8)
PMV BLD AUTO: 10.3 FL (ref 9.2–11.8)
POTASSIUM SERPL-SCNC: 4.5 MMOL/L (ref 3.5–5.5)
POTASSIUM SERPL-SCNC: 4.6 MMOL/L (ref 3.5–5.5)
PROT SERPL-MCNC: 7 G/DL (ref 6.4–8.2)
PROT UR-MCNC: 18 MG/DL
PROT/CREAT UR-RTO: 0.2
PTH-INTACT SERPL-MCNC: 197.7 PG/ML (ref 18.4–88)
RBC # BLD AUTO: 3.93 M/UL (ref 4.2–5.3)
RBC # BLD AUTO: 3.95 M/UL (ref 4.2–5.3)
SODIUM SERPL-SCNC: 139 MMOL/L (ref 136–145)
SODIUM SERPL-SCNC: 141 MMOL/L (ref 136–145)
T4 SERPL-MCNC: 9.7 UG/DL (ref 4.7–13.3)
TRIGL SERPL-MCNC: 60 MG/DL (ref ?–150)
TSH SERPL DL<=0.05 MIU/L-ACNC: 0.71 UIU/ML (ref 0.36–3.74)
VLDLC SERPL CALC-MCNC: 12 MG/DL
WBC # BLD AUTO: 8.2 K/UL (ref 4.6–13.2)
WBC # BLD AUTO: 8.3 K/UL (ref 4.6–13.2)

## 2018-11-02 PROCEDURE — 84156 ASSAY OF PROTEIN URINE: CPT | Performed by: INTERNAL MEDICINE

## 2018-11-02 PROCEDURE — 80069 RENAL FUNCTION PANEL: CPT | Performed by: INTERNAL MEDICINE

## 2018-11-02 PROCEDURE — 82306 VITAMIN D 25 HYDROXY: CPT | Performed by: INTERNAL MEDICINE

## 2018-11-02 PROCEDURE — 36415 COLL VENOUS BLD VENIPUNCTURE: CPT | Performed by: INTERNAL MEDICINE

## 2018-11-02 PROCEDURE — 83970 ASSAY OF PARATHORMONE: CPT | Performed by: INTERNAL MEDICINE

## 2018-11-02 PROCEDURE — 80053 COMPREHEN METABOLIC PANEL: CPT | Performed by: INTERNAL MEDICINE

## 2018-11-02 PROCEDURE — 84436 ASSAY OF TOTAL THYROXINE: CPT | Performed by: INTERNAL MEDICINE

## 2018-11-02 PROCEDURE — 84443 ASSAY THYROID STIM HORMONE: CPT | Performed by: INTERNAL MEDICINE

## 2018-11-02 PROCEDURE — 80061 LIPID PANEL: CPT | Performed by: INTERNAL MEDICINE

## 2018-11-02 PROCEDURE — 85025 COMPLETE CBC W/AUTO DIFF WBC: CPT | Performed by: INTERNAL MEDICINE

## 2019-01-25 ENCOUNTER — OFFICE VISIT (OUTPATIENT)
Dept: ORTHOPEDIC SURGERY | Age: 84
End: 2019-01-25

## 2019-01-25 VITALS
HEIGHT: 64 IN | DIASTOLIC BLOOD PRESSURE: 72 MMHG | HEART RATE: 83 BPM | SYSTOLIC BLOOD PRESSURE: 191 MMHG | BODY MASS INDEX: 39.78 KG/M2 | TEMPERATURE: 96.2 F | OXYGEN SATURATION: 96 % | WEIGHT: 233 LBS | RESPIRATION RATE: 14 BRPM

## 2019-01-25 DIAGNOSIS — M17.12 PRIMARY OSTEOARTHRITIS OF LEFT KNEE: Primary | ICD-10-CM

## 2019-01-25 RX ORDER — BETAMETHASONE SODIUM PHOSPHATE AND BETAMETHASONE ACETATE 3; 3 MG/ML; MG/ML
6 INJECTION, SUSPENSION INTRA-ARTICULAR; INTRALESIONAL; INTRAMUSCULAR; SOFT TISSUE ONCE
Qty: 1 ML | Refills: 0
Start: 2019-01-25 | End: 2019-01-25

## 2019-01-25 NOTE — PROGRESS NOTES
1. Have you been to the ER, urgent care clinic since your last visit? Hospitalized since your last visit? No    2. Have you seen or consulted any other health care providers outside of the [address] [place] [address] since your last visit? Include any pap smears or colon screening.  No

## 2019-01-25 NOTE — PROGRESS NOTES
[address] [place] [address]  [phone]           Patient: Dar Caal                MRN: [id number]       SSN: [id number]  YOB: 1935        AGE: 82 y.o. SEX: female  Body mass index is 39.99 kg/m². PCP: Jenness Galeazzi, MD  01/25/19      This office note has been dictated. REVIEW OF SYSTEMS:  Constitutional: Negative for fever, chills, weight loss and malaise/fatigue. HENT: Negative. Eyes: Negative. Respiratory: Negative. Cardiovascular: Negative. Gastrointestinal: No bowel incontinence or constipation. Genitourinary: No bladder incontinence or saddle anesthesia. Skin: Negative. Neurological: Negative. Endo/Heme/Allergies: Negative. Psychiatric/Behavioral: Negative. Musculoskeletal: As per HPI above. Past Medical History:   Diagnosis Date    Arrhythmia     mur mur  long standing    Arthritis     Arthritis of both hips     Back pain     Balance problem     Chronic back pain     Cough     Easy bruising     Essential hypertension     GERD (gastroesophageal reflux disease)     Hiatal hernia     Hypertension     Ill-defined condition     vascular insuff rt ankle    Irregular heart beat     Left hip pain 10/8/2010    Neck pain     Nervousness     Night sweat     Osteoarthritis, knee bilateral     Pain with urination     Polymyalgia rheumatica (HCC)     Reflux     Spinal stenosis     Thromboembolus (HCC)     Trapezius strain     Trochanteric bursitis of left hip     Venous insufficiency          Current Outpatient Medications:     betamethasone (CELESTONE SOLUSPAN) 6 mg/mL injection, 1 mL by Intra artICUlar route once for 1 dose., Disp: 1 mL, Rfl: 0    diclofenac (VOLTAREN) 1 % gel, Apply 4 g to affected area four (4) times daily. Maximum 16 grams per joint per day.  Dispense 5 100 gram tubes, Disp: 5 Each, Rfl: 0    ferrous sulfate 325 mg (65 mg iron) tablet, Take 1 Tab by mouth two (2) times daily (with meals). , Disp: 60 Tab, Rfl: 2    aspirin (ASPIRIN) 325 mg tablet, Take 1 Tab by mouth daily. , Disp: 30 Tab, Rfl: 0    potassium 99 mg tablet, Take 99 mg by mouth daily. , Disp: , Rfl:     tropicamide (MYDRIACYL) 1 % ophthalmic solution, Administer 2 Drops to right eye as needed. 45 minutes prior to schedule appointments, Disp: , Rfl:     cholecalciferol (VITAMIN D3) 1,000 unit tablet, Take 1,000 Units by mouth daily. 1 tab day, Disp: , Rfl:     omeprazole (PRILOSEC) 40 mg capsule, Take 40 mg by mouth daily. 1 tab, Disp: , Rfl:     aMILoride-hydrochlorothiazide (MODURETIC) 5-50 mg tab, Take 1 Tab by mouth daily. , Disp: , Rfl:     amLODIPine (NORVASC) 10 mg tablet, Take 10 mg by mouth daily. 1 tab, Disp: , Rfl: 3    losartan (COZAAR) 100 mg tablet, Take 100 mg by mouth daily. 1 tab, Disp: , Rfl:     metoclopramide HCl (REGLAN) 5 mg tablet, Take 5 mg by mouth two (2) times a day. 1 tab 2x day, Disp: , Rfl:     cetirizine (ZYRTEC) 10 mg tablet, Take 10 mg by mouth daily.  1 tab daily, Disp: , Rfl:     celecoxib (CELEBREX) 200 mg capsule, Take 200 mg by mouth two (2) times a day., Disp: , Rfl:     Allergies   Allergen Reactions    Citric Acid Unknown (comments)    Crinone [Progesterone Micronized] Unknown (comments)    Iodine Unknown (comments)    Percocet [Oxycodone-Acetaminophen] Other (comments)     Gets rebound headaches after taking Percocet       Social History     Socioeconomic History    Marital status:      Spouse name: Not on file    Number of children: Not on file    Years of education: Not on file    Highest education level: Not on file   Social Needs    Financial resource strain: Not on file    Food insecurity - worry: Not on file    Food insecurity - inability: Not on file   [employer] needs - medical: Not on file   [employer] needs - non-medical: Not on file   Occupational History    Not on file   Tobacco Use    Smoking status: Never Smoker    Smokeless tobacco: Never Used   Substance and Sexual Activity    Alcohol use: No    Drug use: No    Sexual activity: No   Other Topics Concern    Not on file   Social History Narrative    Not on file       Past Surgical History:   Procedure Laterality Date    COLONOSCOPY N/A 7/27/2016    COLONOSCOPY w/polypectomy performed by Malu Sales MD at [address]  [facility] HX CHOLECYSTECTOMY      HX HEENT  06/2011    left cornea transplant    HX HYSTERECTOMY      HX KNEE REPLACEMENT Right 02/2017    HX ORTHOPAEDIC      right foot and ankle    HX ORTHOPAEDIC Right 04/2017           * Patient was identified by name and date of birth   * Agreement on procedure being performed was verified  * Risks and Benefits explained to the patient  * Procedure site verified and marked as necessary  * Patient was positioned for comfort  * Consent was signed and verified  2:14 PM    The patient was instructed on post injection care. We did see Ms. Jesusita Rodgers today for followup with regards to her bilateral knees. She is status post right knee replacement. She did have a patellar fracture with multiple revisions of the patella. Overall, she did well with it. She is having more pain in her left knee from her arthritis. She presents today for reevaluation and injection for the left knee. She has had no recent fevers, chills, systemic changes, or injuries to report. She does have a history of back issues and reports a little radicular pain down the left lower extremity at times, sometimes lying in bed, and also with walking. She has had no change in her bowel or bladder habits and no saddle paresthesias. PHYSICAL EXAMINATION:  In general, the patient is alert and oriented x 3 in no acute distress. The patient is well-developed, well-nourished, with a normal affect. The patient is afebrile. HEENT:  Head is normocephalic and atraumatic.   Pupils are equally round and reactive to light and accommodation. Extraocular eye movements are intact. Neck is supple. Trachea is midline. No JVD is present. Breathing is nonlabored. Examination of the lower extremities reveals pain-free range of motion of each of the hips. There is no pain to palpation of the greater trochanteric bursae. There is negative straight leg raise. There is negative calf tenderness. There is negative Checos. There is no evidence of DVT present. The left knee reveals the skin is intact. There is no ecchymosis and no warmth. There are no signs for infection or cellulitis present. Findings are consistent with advanced arthritis of the left knee with pain to palpation tricompartmentally and crepitus arising from the anterior compartment. Valgus deformity is present. The right knee reveals the skin is intact. There is no ecchymosis and no warmth. There are no signs for infection or cellulitis present. The surgical wounds are healed up nicely. She has negative joint effusion, negative patellofemoral, nondrinker no signs for infection or cellulitis present. She does have about a 5° extensor lag. No defect is noted to the extensor mechanism. There is very good flexion and the patella tracks nicely. ASSESSMENT:      1. Lumbar radiculopathy. 2. Left knee advanced osteoarthritis. 3. Status post right knee replacement. PLAN:  At this point, the patient is doing well from the right knee. I have asked her to get back to [facility] [address] [place] [address] with regards to her back. With regards to the left knee, we are going to move forward with a cortisone injection today. After informed and written consent, and appropriate time out performed, under sterile conditions, with ultrasound-guided assistance, the left knee was prepped with Betadine and 6 mg of Celestone was injected without complications. The patient tolerated the injection well. The patient was instructed on post injection care.   We will plan on seeing her back in the office in about three months time for evaluation. I have asked her to keep an eye on it to see how much of the leg pain it takes away from the cortisone injection, as I think that partially it is the knee and partially it is the radiculopathy causing some of the pain.                   JR Brian LUCERO, PA-C, ATC

## 2019-02-22 NOTE — ROUTINE PROCESS
Mobility Intervention:       [] Pt dangled at edge of bed    [] Pt assisted OOB to bedside commode    [] Pt assisted OOB to chair    [x] Pt ambulated to bathroom    [] Patient was ambulated in room/hallway    Assistive Device Utilized:       [x] Rolling walker   [] Crutches   [] Straight Cane   [] Knee immobilizer   [] IV pole    After Mobilization:     [] Patient left in no apparent distress sitting up in chair  [x] Patient left in no apparent distress in bed  [x] Call bell left within reach  [x] SCDs on & machine turned on  [x] Ice applied  [] RN notified  [] Caregiver present  [] Bed alarm activated    Reason patient not mobilized:      [] Patient refused   [] Nausea/vomiting   [] Low blood pressure   [] Drowsy/lethargic    Pain Rating:     [] 0  [] 1  Assistive Device:        [] 2  [] 3  [] 4  [] 5  [] 6  Assistive Device:        [x] 7  [] 8  [] 9  [] 10    Comments:   norco given after ambulating Spoke to nurse at pt's living facility. Pt's weight as of 2/19/18 was 206.8lbs.   (2) very moist

## 2019-04-25 ENCOUNTER — OFFICE VISIT (OUTPATIENT)
Dept: ORTHOPEDIC SURGERY | Age: 84
End: 2019-04-25

## 2019-04-25 VITALS
BODY MASS INDEX: 39.95 KG/M2 | HEART RATE: 92 BPM | HEIGHT: 64 IN | WEIGHT: 234 LBS | RESPIRATION RATE: 16 BRPM | OXYGEN SATURATION: 95 % | DIASTOLIC BLOOD PRESSURE: 84 MMHG | TEMPERATURE: 97.3 F | SYSTOLIC BLOOD PRESSURE: 165 MMHG

## 2019-04-25 DIAGNOSIS — M17.12 PRIMARY OSTEOARTHRITIS OF LEFT KNEE: Primary | ICD-10-CM

## 2019-04-25 RX ORDER — TRAMADOL HYDROCHLORIDE 50 MG/1
50 TABLET ORAL
Qty: 28 TAB | Refills: 0 | Status: SHIPPED | OUTPATIENT
Start: 2019-04-25 | End: 2019-05-02

## 2019-04-25 RX ORDER — BETAMETHASONE SODIUM PHOSPHATE AND BETAMETHASONE ACETATE 3; 3 MG/ML; MG/ML
6 INJECTION, SUSPENSION INTRA-ARTICULAR; INTRALESIONAL; INTRAMUSCULAR; SOFT TISSUE ONCE
Qty: 1 ML | Refills: 0
Start: 2019-04-25 | End: 2019-04-25

## 2019-04-25 NOTE — PROGRESS NOTES
[address] [place] [address]  [phone]           Patient: Phill Wright                MRN: [id number]       SSN: [id number]  YOB: 1935        AGE: 82 y.o. SEX: female  Body mass index is 40.17 kg/m². PCP: Latricia Alcantara MD  04/25/19      This office note has been dictated. REVIEW OF SYSTEMS:  Constitutional: Negative for fever, chills, weight loss and malaise/fatigue. HENT: Negative. Eyes: Negative. Respiratory: Negative. Cardiovascular: Negative. Gastrointestinal: No bowel incontinence or constipation. Genitourinary: No bladder incontinence or saddle anesthesia. Skin: Negative. Neurological: Negative. Endo/Heme/Allergies: Negative. Psychiatric/Behavioral: Negative. Musculoskeletal: As per HPI above. Past Medical History:   Diagnosis Date    Arrhythmia     mur mur  long standing    Arthritis     Arthritis of both hips     Back pain     Balance problem     Chronic back pain     Cough     Easy bruising     Essential hypertension     GERD (gastroesophageal reflux disease)     Hiatal hernia     Hypertension     Ill-defined condition     vascular insuff rt ankle    Irregular heart beat     Left hip pain 10/8/2010    Neck pain     Nervousness     Night sweat     Osteoarthritis, knee bilateral     Pain with urination     Polymyalgia rheumatica (HCC)     Reflux     Spinal stenosis     Thromboembolus (HCC)     Trapezius strain     Trochanteric bursitis of left hip     Venous insufficiency          Current Outpatient Medications:     betamethasone (CELESTONE SOLUSPAN) 6 mg/mL injection, 1 mL by Intra artICUlar route once for 1 dose., Disp: 1 mL, Rfl: 0    diclofenac (VOLTAREN) 1 % gel, Apply 4 g to affected area four (4) times daily. Maximum 16 grams per joint per day.  Dispense 5 100 gram tubes, Disp: 5 Each, Rfl: 0    celecoxib (CELEBREX) 200 mg capsule, Take 200 mg by mouth two (2) times a day., Disp: , Rfl:     ferrous sulfate 325 mg (65 mg iron) tablet, Take 1 Tab by mouth two (2) times daily (with meals). , Disp: 60 Tab, Rfl: 2    aspirin (ASPIRIN) 325 mg tablet, Take 1 Tab by mouth daily. , Disp: 30 Tab, Rfl: 0    potassium 99 mg tablet, Take 99 mg by mouth daily. , Disp: , Rfl:     tropicamide (MYDRIACYL) 1 % ophthalmic solution, Administer 2 Drops to right eye as needed. 45 minutes prior to schedule appointments, Disp: , Rfl:     cholecalciferol (VITAMIN D3) 1,000 unit tablet, Take 1,000 Units by mouth daily. 1 tab day, Disp: , Rfl:     omeprazole (PRILOSEC) 40 mg capsule, Take 40 mg by mouth daily. 1 tab, Disp: , Rfl:     aMILoride-hydrochlorothiazide (MODURETIC) 5-50 mg tab, Take 1 Tab by mouth daily. , Disp: , Rfl:     amLODIPine (NORVASC) 10 mg tablet, Take 10 mg by mouth daily. 1 tab, Disp: , Rfl: 3    losartan (COZAAR) 100 mg tablet, Take 100 mg by mouth daily. 1 tab, Disp: , Rfl:     metoclopramide HCl (REGLAN) 5 mg tablet, Take 5 mg by mouth two (2) times a day. 1 tab 2x day, Disp: , Rfl:     cetirizine (ZYRTEC) 10 mg tablet, Take 10 mg by mouth daily.  1 tab daily, Disp: , Rfl:     Allergies   Allergen Reactions    Citric Acid Unknown (comments)    Crinone [Progesterone Micronized] Unknown (comments)    Iodine Unknown (comments)    Percocet [Oxycodone-Acetaminophen] Other (comments)     Gets rebound headaches after taking Percocet       Social History     Socioeconomic History    Marital status:      Spouse name: Not on file    Number of children: Not on file    Years of education: Not on file    Highest education level: Not on file   Occupational History    Not on file   Social Needs    Financial resource strain: Not on file    Food insecurity:     Worry: Not on file     Inability: Not on file    Transportation needs:     Medical: Not on file     Non-medical: Not on file   Tobacco Use    Smoking status: Never Smoker    Smokeless tobacco: Never Used   Substance and Sexual Activity    Alcohol use: No    Drug use: No    Sexual activity: Never   Lifestyle    Physical activity:     Days per week: Not on file     Minutes per session: Not on file    Stress: Not on file   Relationships    Social connections:     Talks on phone: Not on file     Gets together: Not on file     Attends [employer] service: Not on file     Active member of club or organization: Not on file     Attends meetings of clubs or organizations: Not on file     Relationship status: Not on file    Intimate partner violence:     Fear of current or ex partner: Not on file     Emotionally abused: Not on file     Physically abused: Not on file     Forced sexual activity: Not on file   Other Topics Concern    Not on file   Social History Narrative    Not on file       Past Surgical History:   Procedure Laterality Date    COLONOSCOPY N/A 7/27/2016    COLONOSCOPY w/polypectomy performed by Dayna Finnegan MD at [address] [place] HX CHOLECYSTECTOMY      HX HEENT  06/2011    left cornea transplant    HX HYSTERECTOMY      HX KNEE REPLACEMENT Right 02/2017    HX ORTHOPAEDIC      right foot and ankle    HX ORTHOPAEDIC Right 04/2017           * Patient was identified by name and date of birth   * Agreement on procedure being performed was verified  * Risks and Benefits explained to the patient  * Procedure site verified and marked as necessary  * Patient was positioned for comfort  * Consent was signed and verified  1:45 PM    The patient was instructed on post injection care. We did see Ms. Cynthia Lopez today for followup with regards to her bilateral knees. She is status post right knee replacement. She did have a patellar fracture, which was fixed with ORIF and revised. She does have a little lag from that previous fracture. Overall, she is doing well with her knee replacement, though. She is quite happy with the results.   She is here today for her left knee, which she does have known advanced arthritis. She is requesting repeat injection. She has had no recent fevers, chills, systemic changes, or injuries to report, and no chest pain or shortness of breath. She does remind me that her family doctor took her off her NSAIDs because of kidney function. PHYSICAL EXAMINATION:  In general, the patient is alert and oriented x 3 in no acute distress. The patient is well-developed, well-nourished, with a normal affect. The patient is afebrile. HEENT:  Head is normocephalic and atraumatic. Pupils are equally round and reactive to light and accommodation. Extraocular eye movements are intact. Neck is supple. Trachea is midline. No JVD is present. Breathing is nonlabored. Examination of the lower extremities reveals pain-free range of motion of the hips. There is no pain to palpation of the greater trochanteric bursae. There is negative straight leg raise. There is negative calf tenderness. There is negative Checo's. There is no evidence of DVT present. The right knee reveals the skin is intact. There is no ecchymosis and no warmth. There are no signs for infection or cellulitis present. She has full range of motion and very good stability. The patella tracks nicely. She does have a lag to the right knee due to the previous patellar fracture. It is about five degrees or so. The left knee reveals the skin is intact. There is no ecchymosis, no warmth, and no signs of infection or cellulitis present. She does have pain to palpation tricompartmentally with crepitus arising from the anterior compartment and findings consistent with advanced arthritis of the left knee. ASSESSMENT:      1. Status post right knee replacement, status post right knee patellar fracture, and status post revision right knee patellar fracture. 2. Left knee advanced osteoarthritis. PLAN:  At this point, we discussed treatment options.   We are going to move forward with a cortisone injection for the left knee today. After informed and written consent, and appropriate time out performed, under sterile conditions, with ultrasound-guided assistance, the left knee was prepped with Betadine and 6 mg of Celestone was injected without complications. The patient tolerated the injection well. The patient was instructed on post injection care. We will plan on seeing her back in the office in about three months' time for evaluation, at which point we will obtain x-rays of each of her knees. She was given a prescription today for Ultram.  She was instructed on use, as well as precautions. I have advised her regarding over-the-counter Bio Freeze or Aspercreme with Lidocaine. She will call with any questions or concerns that shall arise.                  JR Brian LUCERO, SENTHIL, ATC

## 2019-05-23 ENCOUNTER — HOSPITAL ENCOUNTER (OUTPATIENT)
Dept: LAB | Age: 84
Discharge: HOME | End: 2019-05-23
Payer: MEDICARE

## 2019-05-23 DIAGNOSIS — N25.81 SECONDARY HYPERPARATHYROIDISM OF RENAL ORIGIN (HCC): ICD-10-CM

## 2019-05-23 DIAGNOSIS — I12.9 BENIGN HYPERTENSIVE KIDNEY DISEASE WITH CHRONIC KIDNEY DISEASE STAGE I THROUGH STAGE IV, OR UNSPECIFIED(403.10): ICD-10-CM

## 2019-05-23 DIAGNOSIS — R80.1 PERSISTENT PROTEINURIA: ICD-10-CM

## 2019-05-23 DIAGNOSIS — N18.30 CHRONIC KIDNEY DISEASE, STAGE III (MODERATE) (HCC): ICD-10-CM

## 2019-05-23 DIAGNOSIS — I12.9 MALIGNANT HYPERTENSIVE KIDNEY DISEASE WITH CHRONIC KIDNEY DISEASE STAGE I THROUGH STAGE IV, OR UNSPECIFIED(403.00): ICD-10-CM

## 2019-05-23 LAB
25(OH)D3 SERPL-MCNC: 31.1 NG/ML (ref 30–100)
ALBUMIN SERPL-MCNC: 3.3 G/DL (ref 3.4–5)
ANION GAP SERPL CALC-SCNC: 6 MMOL/L (ref 3–18)
BASOPHILS # BLD: 0 K/UL (ref 0–0.1)
BASOPHILS NFR BLD: 0 % (ref 0–2)
BUN SERPL-MCNC: 32 MG/DL (ref 7–18)
BUN/CREAT SERPL: 24 (ref 12–20)
CALCIUM SERPL-MCNC: 9.2 MG/DL (ref 8.5–10.1)
CALCIUM SERPL-MCNC: 9.4 MG/DL (ref 8.5–10.1)
CHLORIDE SERPL-SCNC: 103 MMOL/L (ref 100–108)
CO2 SERPL-SCNC: 27 MMOL/L (ref 21–32)
CREAT SERPL-MCNC: 1.34 MG/DL (ref 0.6–1.3)
CREAT UR-MCNC: 192 MG/DL (ref 30–125)
DIFFERENTIAL METHOD BLD: ABNORMAL
EOSINOPHIL # BLD: 0.1 K/UL (ref 0–0.4)
EOSINOPHIL NFR BLD: 0 % (ref 0–5)
ERYTHROCYTE [DISTWIDTH] IN BLOOD BY AUTOMATED COUNT: 13.3 % (ref 11.6–14.5)
GLUCOSE SERPL-MCNC: 103 MG/DL (ref 74–99)
HCT VFR BLD AUTO: 32.1 % (ref 35–45)
HGB BLD-MCNC: 10.6 G/DL (ref 12–16)
LYMPHOCYTES # BLD: 3.3 K/UL (ref 0.9–3.6)
LYMPHOCYTES NFR BLD: 26 % (ref 21–52)
MCH RBC QN AUTO: 27.8 PG (ref 24–34)
MCHC RBC AUTO-ENTMCNC: 33 G/DL (ref 31–37)
MCV RBC AUTO: 84.3 FL (ref 74–97)
MONOCYTES # BLD: 0.9 K/UL (ref 0.05–1.2)
MONOCYTES NFR BLD: 7 % (ref 3–10)
NEUTS SEG # BLD: 8.3 K/UL (ref 1.8–8)
NEUTS SEG NFR BLD: 67 % (ref 40–73)
PHOSPHATE SERPL-MCNC: 3 MG/DL (ref 2.5–4.9)
PLATELET # BLD AUTO: 331 K/UL (ref 135–420)
PMV BLD AUTO: 10 FL (ref 9.2–11.8)
POTASSIUM SERPL-SCNC: 3.8 MMOL/L (ref 3.5–5.5)
PROT UR-MCNC: 17 MG/DL
PROT/CREAT UR-RTO: 0.1
PTH-INTACT SERPL-MCNC: 125.4 PG/ML (ref 18.4–88)
RBC # BLD AUTO: 3.81 M/UL (ref 4.2–5.3)
SODIUM SERPL-SCNC: 136 MMOL/L (ref 136–145)
WBC # BLD AUTO: 12.7 K/UL (ref 4.6–13.2)

## 2019-05-23 PROCEDURE — 83970 ASSAY OF PARATHORMONE: CPT

## 2019-05-23 PROCEDURE — 80069 RENAL FUNCTION PANEL: CPT

## 2019-05-23 PROCEDURE — 84156 ASSAY OF PROTEIN URINE: CPT

## 2019-05-23 PROCEDURE — 36415 COLL VENOUS BLD VENIPUNCTURE: CPT

## 2019-05-23 PROCEDURE — 85025 COMPLETE CBC W/AUTO DIFF WBC: CPT

## 2019-05-23 PROCEDURE — 82306 VITAMIN D 25 HYDROXY: CPT

## 2019-07-12 RX ORDER — FLUOCINOLONE ACETONIDE 0.11 MG/ML
OIL AURICULAR (OTIC)
COMMUNITY

## 2019-07-16 ENCOUNTER — HOSPITAL ENCOUNTER (OUTPATIENT)
Age: 84
Setting detail: OUTPATIENT SURGERY
Discharge: HOME | End: 2019-07-16
Attending: INTERNAL MEDICINE | Admitting: INTERNAL MEDICINE
Payer: MEDICARE

## 2019-07-16 VITALS
TEMPERATURE: 98 F | DIASTOLIC BLOOD PRESSURE: 66 MMHG | RESPIRATION RATE: 20 BRPM | WEIGHT: 234 LBS | HEART RATE: 79 BPM | OXYGEN SATURATION: 100 % | SYSTOLIC BLOOD PRESSURE: 150 MMHG | HEIGHT: 64 IN | BODY MASS INDEX: 39.95 KG/M2

## 2019-07-16 PROCEDURE — 74011250636 HC RX REV CODE- 250/636: Performed by: INTERNAL MEDICINE

## 2019-07-16 PROCEDURE — 76040000019: Performed by: INTERNAL MEDICINE

## 2019-07-16 RX ORDER — FENTANYL CITRATE 50 UG/ML
25-200 INJECTION, SOLUTION INTRAMUSCULAR; INTRAVENOUS
Status: DISCONTINUED | OUTPATIENT
Start: 2019-07-16 | End: 2019-07-16 | Stop reason: HOSPADM

## 2019-07-16 RX ORDER — SODIUM CHLORIDE 9 MG/ML
25 INJECTION, SOLUTION INTRAVENOUS CONTINUOUS
Status: DISCONTINUED | OUTPATIENT
Start: 2019-07-16 | End: 2019-07-16 | Stop reason: HOSPADM

## 2019-07-16 RX ORDER — SODIUM CHLORIDE 0.9 % (FLUSH) 0.9 %
5-40 SYRINGE (ML) INJECTION AS NEEDED
Status: DISCONTINUED | OUTPATIENT
Start: 2019-07-16 | End: 2019-07-16 | Stop reason: HOSPADM

## 2019-07-16 RX ORDER — DEXTROMETHORPHAN/PSEUDOEPHED 2.5-7.5/.8
1.2 DROPS ORAL
Status: DISCONTINUED | OUTPATIENT
Start: 2019-07-16 | End: 2019-07-16 | Stop reason: HOSPADM

## 2019-07-16 RX ORDER — EPINEPHRINE 0.1 MG/ML
1 INJECTION INTRACARDIAC; INTRAVENOUS
Status: DISCONTINUED | OUTPATIENT
Start: 2019-07-16 | End: 2019-07-16 | Stop reason: HOSPADM

## 2019-07-16 RX ORDER — MIDAZOLAM HYDROCHLORIDE 1 MG/ML
.25-6 INJECTION, SOLUTION INTRAMUSCULAR; INTRAVENOUS
Status: DISCONTINUED | OUTPATIENT
Start: 2019-07-16 | End: 2019-07-16 | Stop reason: HOSPADM

## 2019-07-16 RX ORDER — SODIUM CHLORIDE 0.9 % (FLUSH) 0.9 %
5-40 SYRINGE (ML) INJECTION EVERY 8 HOURS
Status: DISCONTINUED | OUTPATIENT
Start: 2019-07-16 | End: 2019-07-16 | Stop reason: HOSPADM

## 2019-07-16 RX ORDER — ATROPINE SULFATE 0.1 MG/ML
0.5 INJECTION INTRAVENOUS
Status: DISCONTINUED | OUTPATIENT
Start: 2019-07-16 | End: 2019-07-16 | Stop reason: HOSPADM

## 2019-07-16 RX ORDER — NALOXONE HYDROCHLORIDE 0.4 MG/ML
0.4 INJECTION, SOLUTION INTRAMUSCULAR; INTRAVENOUS; SUBCUTANEOUS
Status: DISCONTINUED | OUTPATIENT
Start: 2019-07-16 | End: 2019-07-16 | Stop reason: HOSPADM

## 2019-07-16 RX ORDER — FLUMAZENIL 0.1 MG/ML
0.2 INJECTION INTRAVENOUS
Status: DISCONTINUED | OUTPATIENT
Start: 2019-07-16 | End: 2019-07-16 | Stop reason: HOSPADM

## 2019-07-16 RX ADMIN — SODIUM CHLORIDE 25 ML/HR: 900 INJECTION, SOLUTION INTRAVENOUS at 12:40

## 2019-07-16 NOTE — H&P
[facility]    [email or web address]      Impression: 1. Dysphagia sx's. Plan:     1. EGD/Dil with IVC      Chief Complaint: Dysphagia      HPI:  Thelma Madrigal is a 82 y.o. female who is being seen preop for Chronic dysphagia sxs'.     PMH:   Past Medical History:   Diagnosis Date    Arrhythmia     mur mur  long standing    Arthritis     Arthritis of both hips     Back pain     Balance problem     Chronic back pain     Cough     Easy bruising     Essential hypertension     GERD (gastroesophageal reflux disease)     Hiatal hernia     Hypertension     Ill-defined condition     vascular insuff rt ankle    Irregular heart beat     Left hip pain 10/8/2010    Neck pain     Nervousness     Night sweat     Osteoarthritis, knee bilateral     Pain with urination     Polymyalgia rheumatica (HCC)     Reflux     Spinal stenosis     Thromboembolus (HCC)     Trapezius strain     Trochanteric bursitis of left hip     Venous insufficiency        PSH:   Past Surgical History:   Procedure Laterality Date    COLONOSCOPY N/A 7/27/2016    COLONOSCOPY w/polypectomy performed by Moy Vallejo MD at [facility]    [facility]      HX CHOLECYSTECTOMY      HX HEENT  06/2011    left cornea transplant    HX HYSTERECTOMY      HX KNEE REPLACEMENT Right 02/2017    HX ORTHOPAEDIC      right foot and ankle    HX ORTHOPAEDIC Right 04/2017       Social HX:   Social History     Socioeconomic History    Marital status:      Spouse name: Not on file    Number of children: Not on file    Years of education: Not on file    Highest education level: Not on file   Occupational History    Not on file   Social Needs    Financial resource strain: Not on file    Food insecurity:     Worry: Not on file     Inability: Not on file    Transportation needs:     Medical: Not on file     Non-medical: Not on file   Tobacco Use    Smoking status: Never Smoker    Smokeless tobacco: Never Used   Substance and Sexual Activity    Alcohol use: No    Drug use: No    Sexual activity: Never   Lifestyle    Physical activity:     Days per week: Not on file     Minutes per session: Not on file    Stress: Not on file   Relationships    Social connections:     Talks on phone: Not on file     Gets together: Not on file     Attends [employer] service: Not on file     Active member of club or organization: Not on file     Attends meetings of clubs or organizations: Not on file     Relationship status: Not on file    Intimate partner violence:     Fear of current or ex partner: Not on file     Emotionally abused: Not on file     Physically abused: Not on file     Forced sexual activity: Not on file   Other Topics Concern    Not on file   Social History Narrative    Not on file       FHX:   Family History   Problem Relation Age of Onset    Arthritis-rheumatoid Other     Diabetes Other     Hypertension Other     Arthritis-osteo Other        Allergy:   Allergies   Allergen Reactions    Citric Acid Unknown (comments)    Crinone [Progesterone Micronized] Unknown (comments)    Iodine Unknown (comments)    Percocet [Oxycodone-Acetaminophen] Other (comments)     Gets rebound headaches after taking Percocet       Home Medications:     Medications Prior to Admission   Medication Sig    fluocinolone acetonide oil 0.01 % drop by Otic route.  diclofenac (VOLTAREN) 1 % gel Apply 4 g to affected area four (4) times daily. Maximum 16 grams per joint per day. Dispense 5 100 gram tubes    celecoxib (CELEBREX) 200 mg capsule Take 200 mg by mouth two (2) times a day.  ferrous sulfate 325 mg (65 mg iron) tablet Take 1 Tab by mouth two (2) times daily (with meals).  aspirin (ASPIRIN) 325 mg tablet Take 1 Tab by mouth daily.  potassium 99 mg tablet Take 99 mg by mouth daily.     tropicamide (MYDRIACYL) 1 % ophthalmic solution Administer 2 Drops to right eye as needed. 45 minutes prior to schedule appointments    cholecalciferol (VITAMIN D3) 1,000 unit tablet Take 1,000 Units by mouth daily. 1 tab day    omeprazole (PRILOSEC) 40 mg capsule Take 40 mg by mouth daily. 1 tab    aMILoride-hydrochlorothiazide (MODURETIC) 5-50 mg tab Take 1 Tab by mouth daily.  amLODIPine (NORVASC) 10 mg tablet Take 10 mg by mouth daily. 1 tab    losartan (COZAAR) 100 mg tablet Take 100 mg by mouth daily. 1 tab    metoclopramide HCl (REGLAN) 5 mg tablet Take 5 mg by mouth two (2) times a day. 1 tab 2x day    cetirizine (ZYRTEC) 10 mg tablet Take 10 mg by mouth daily. 1 tab daily       Review of Systems:     Constitutional: No fevers, chills, weight loss, fatigue. Cardiovascular: No chest pain, heart palpitations. Respiratory: No cough, SOB, wheezing, chest discomfort, orthopnea. Gastrointestinal: Dysphagia sx's. Musculoskeletal: No weakness, arthralgias, wasting. Allergies: As noted. Visit Vitals  Ht 5' 4\" (1.626 m)   Wt 106.1 kg (234 lb)   BMI 40.17 kg/m²       Physical Assessment:     constitutional: appearance: well developed, well nourished, normal habitus, no deformities, in no acute distress. ENMT: mouth: normal oral mucosa,lips and gums; good dentition. oropharynx: normal tongue, hard and soft palate; posterior pharynx without erithema, exudate or lesions. respiratory: effort: normal chest excursion; no intercostal retraction or accessory muscle use. cardiovascular: abdominal aorta: normal size and position; no bruits. palpation: PMI of normal size and position; normal rhythm; no thrill or murmurs. abdominal: abdomen: normal consistency; no tenderness or masses. hernias: no hernias appreciated. liver: normal size and consistency. spleen: not palpable. rectal: hemoccult/guaiac: not performed. musculoskeletal: digits and nails: no clubbing, cyanosis, petechiae or other inflammatory conditions.    psychiatric: orientation: oriented to time, space and person. Miguel Angel Garcia MD, M.D. [facility], [address] [place] [address]  Pager [phone]  [email or web address]

## 2019-07-16 NOTE — PROCEDURES
[place]  [facility], [address]      Brief Procedure Note    Pranav Howe  1935  [id number]    Date of Procedure: 7/16/2019    Preoperative diagnosis: 787.20 - R13.10,  Dysphagia  530.81 - K21.9,  Acid reflux    Postoperative diagnosis:  Schatzki ring with 54F ruth dilation, large hiatal hernia, mild gastritis    Type of Anesthesia: ivc    Description of Findings: same as post op dx    Procedure: Procedure(s):  UPPER ENDOSCOPY / dilation    :  Dr. Dejan Mercer MD    Assistant(s): [unfilled]    Type of Anesthesia:ivc    EBL:None    Specimens: * No specimens in log *    Findings: See printed and scanned procedure note    Complications: None    Dr. Dejan Mercer MD  7/16/2019  1:50 PM

## 2019-07-16 NOTE — PERIOP NOTES
Patient ID band removed and placed in shredder box. Patient left ambulatory with steady gait and  to drive. No complaints or distress noted.

## 2019-07-16 NOTE — DISCHARGE INSTRUCTIONS
Upper GI Endoscopy: What to Expect at [address] [place] [address]  After you have an endoscopy, you will stay at the hospital or clinic for 1 to 2 hours. This will allow the medicine to wear off. You will be able to go home after your doctor or nurse checks to make sure you are not having any problems. You may have to stay overnight if you had treatment during the test. You may have a sore throat for a day or two after the test.  This care sheet gives you a general idea about what to expect after the test.  How can you care for yourself at home? Activity  · Rest as much as you need to after you go home. · You should be able to go back to your usual activities the day after the test.  Diet  · Follow your doctor's directions for eating after the test.  · Drink plenty of fluids (unless your doctor has told you not to). Medications  · If you have a sore throat the day after the test, use an over-the-counter spray to numb your throat. Follow-up care is a key part of your treatment and safety. Be sure to make and go to all appointments, and call your doctor if you are having problems. It's also a good idea to know your test results and keep a list of the medicines you take. When should you call for help? Call 911 anytime you think you may need emergency care. For example, call if:  · You passed out (lost consciousness). · You cough up blood. · You vomit blood or what looks like coffee grounds. · You pass maroon or very bloody stools. Call your doctor now or seek immediate medical care if:  · You have trouble swallowing. · You have belly pain. · Your stools are black and tarlike or have streaks of blood. · You are sick to your stomach or cannot keep fluids down. Watch closely for changes in your health, and be sure to contact your doctor if:  · Your throat still hurts after a day or two. · You do not get better as expected. Where can you learn more?    Go to DealExplorer.be  Enter J454 in the search box to learn more about \"Upper GI Endoscopy: What to Expect at Home. \"   © 2454-9123 [employer]wise[employer]. Care instructions adapted under license by [employer] (which disclaims liability or warranty for this information). This care instruction is for use with your licensed healthcare professional. If you have questions about a medical condition or this instruction, always ask your healthcare professional. [address]  any warranty or liability for your use of this information. Content Version: 79.2.839177; Current as of: November 14, 2014  Patient Education      Hiatal Hernia: Care Instructions  Your Care Instructions  A hiatal hernia occurs when part of the stomach bulges into the chest cavity. A hiatal hernia may allow stomach acid and juices to back up into the esophagus (acid reflux). This can cause a feeling of burning, warmth, heat, or pain behind the breastbone. This feeling may often occur after you eat, soon after you lie down, or when you bend forward, and it may come and go. You also may have a sour taste in your mouth. These symptoms are commonly known as heartburn or reflux. But not all hiatal hernias cause symptoms. Follow-up care is a key part of your treatment and safety. Be sure to make and go to all appointments, and call your doctor if you are having problems. It's also a good idea to know your test results and keep a list of the medicines you take. How can you care for yourself at home? · Take your medicines exactly as prescribed. Call your doctor if you think you are having a problem with your medicine. · Do not take aspirin or other nonsteroidal anti-inflammatory drugs (NSAIDs), such as ibuprofen (Advil, Motrin) or naproxen (Aleve), unless your doctor says it is okay. Ask your doctor what you can take for pain. · Your doctor may recommend over-the-counter medicine.  For mild or occasional indigestion, antacids such as Tums, Gaviscon, Maalox, or Mylanta may help. Your doctor also may recommend over-the-counter acid reducers, such as famotidine (Pepcid AC), cimetidine (Tagamet HB), ranitidine (Zantac 75 and Zantac 150), or omeprazole (Prilosec). Read and follow all instructions on the label. If you use these medicines often, talk with your doctor. · Change your eating habits. ? It's best to eat several small meals instead of two or three large meals. ? After you eat, wait 2 to 3 hours before you lie down. Late-night snacks aren't a good idea. ? Chocolate, mint, and alcohol can make heartburn worse. They relax the valve between the esophagus and the stomach. ? Spicy foods, foods that have a lot of acid (like tomatoes and oranges), and coffee can make heartburn symptoms worse in some people. If your symptoms are worse after you eat a certain food, you may want to stop eating that food to see if your symptoms get better. · Do not smoke or chew tobacco.  · If you get heartburn at night, raise the head of your bed 6 to 8 inches by putting the frame on blocks or placing a foam wedge under the head of your mattress. (Adding extra pillows does not work.)  · Do not wear tight clothing around your middle. · Lose weight if you need to. Losing just 5 to 10 pounds can help. When should you call for help? Call your doctor now or seek immediate medical care if:    · You have new or worse belly pain.     · You are vomiting.    Watch closely for changes in your health, and be sure to contact your doctor if:    · You have new or worse symptoms of indigestion.     · You have trouble or pain swallowing.     · You are losing weight.     · You do not get better as expected. Where can you learn more? Go to http://bernadette-zaida.info/. Enter I477 in the search box to learn more about \"Hiatal Hernia: Care Instructions. \"  Current as of: March 27, 2018  Content Version: 11.9  © 0401-5459 [employer].  Care instructions adapted under license by [facility] (which disclaims liability or warranty for this information). If you have questions about a medical condition or this instruction, always ask your healthcare professional. [address] any warranty or liability for your use of this information. Patient Education      Esophageal Dilation: What to Expect at Home  Your Recovery  After you have esophageal dilation, you will stay at the hospital or surgery center for 1 to 2 hours. This will allow the medicine to wear off. You will be able to go home after your doctor or nurse checks to make sure you are not having any problems. This care sheet gives you a general idea about how long it will take for you to recover. But each person recovers at a different pace. Follow the steps below to get better as quickly as possible. How can you care for yourself at home? Activity    · Rest as much as you need to after you go home.     · You should be able to go back to your usual activities the day after the procedure. Diet    · Follow your doctor's directions for eating after the procedure.     · Drink plenty of fluids (unless your doctor has told you not to). Medicines    · Your doctor will tell you if and when you can restart your medicines. He or she will also give you instructions about taking any new medicines.     · If you take blood thinners, such as warfarin (Coumadin), clopidogrel (Plavix), or aspirin, be sure to talk to your doctor. He or she will tell you if and when to start taking those medicines again. Make sure that you understand exactly what your doctor wants you to do.     · If you have a sore throat the day after the procedure, use an over-the-counter spray to numb your throat. Sucking on throat lozenges and gargling with warm salt water may also help relieve your symptoms. Follow-up care is a key part of your treatment and safety.  Be sure to make and go to all appointments, and call your doctor if you are having problems. It's also a good idea to know your test results and keep a list of the medicines you take. When should you call for help? Call 911 anytime you think you may need emergency care. For example, call if:    · You passed out (lost consciousness).     · You have trouble breathing.     · Your stools are maroon or very bloody    Call your doctor now or seek immediate medical care if:    · You have new or worse belly pain.     · You have a fever.     · You have new or more blood in your stools.     · You are sick to your stomach and cannot drink fluids.     · You cannot pass stools or gas.     · You have pain that does not get better after you take pain medicine.    Watch closely for changes in your health, and be sure to contact your doctor if:    · Your throat still hurts after a day or two.     · You do not get better as expected. Where can you learn more? Go to http://bernadette-zaida.info/. Enter W089 in the search box to learn more about \"Esophageal Dilation: What to Expect at Home. \"  Current as of: March 27, 2018  Content Version: 11.9  © 0004-2069 [employer]. Care instructions adapted under license by [employer] (which disclaims liability or warranty for this information). If you have questions about a medical condition or this instruction, always ask your healthcare professional. [address] any warranty or liability for your use of this information. DISCHARGE SUMMARY from Nurse     POST-PROCEDURE INSTRUCTIONS:    Call your Physician if you:  ? Observe any excess bleeding. ? Develop a temperature over 100.5o F.  ? Experience abdominal, shoulder or chest pain. ? Notice any signs of decreased circulation or nerve impairment to an extremity such as a change in color, persistent numbness, tingling, coldness or increase in pain. ? Vomit blood or you have nausea and vomiting lasting longer than 4 hours.   ? Are unable to take medications. ? Are unable to urinate within 8 hours after discharge following general anesthesia or intravenous sedation. For the next 24 hours after receiving general anesthesia or intravenous sedation, or while taking prescription Narcotics, limit your activities:  ? Do NOT drive a motor vehicle, operate hazard machinery or power tools, or perform tasks that require coordination. The medication you received during your procedure may have some effect on your mental awareness. ? Do NOT make important personal or business decisions. The medication you received during your procedure may have some effect on your mental awareness. ? Do NOT drink alcoholic beverages. These drinks do not mix well with the medications that have been given to you. ? Upon discharge from the hospital, you must be accompanied by a responsible adult. ? Resume your diet as directed by your physician. ? Resume medications as your physician has prescribed. ? Please give a list of your current medications to your Primary Care Provider. ? Please update this list whenever your medications are discontinued, doses are changed, or new medications (including over-the-counter products) are added. ? Please carry medication information at all times in case of emergency situations. These are general instructions for a healthy lifestyle:    No smoking/ No tobacco products/ Avoid exposure to second hand smoke.  Surgeon General's Warning:  Quitting smoking now greatly reduces serious risk to your health. Obesity, smoking, and a sedentary lifestyle greatly increase your risk for illness.    A healthy diet, regular physical exercise & weight monitoring are important for maintaining a healthy lifestyle   You may be retaining fluid if you have a history of heart failure or if you experience any of the following symptoms:  Weight gain of 3 pounds or more overnight or 5 pounds in a week, increased swelling in our hands or feet or shortness of breath while lying flat in bed. Please call your doctor as soon as you notice any of these symptoms; do not wait until your next office visit. Recognize signs and symptoms of STROKE:  F  -  Face looks uneven  A  -  Arms unable to move or move unevenly  S  -  Speech slurred or non-existent  T  -  Time to call 911 - as soon as signs and symptoms begin - DO NOT go back to bed or wait to see If you get better - TIME IS BRAIN. Colorectal Screening   Colorectal cancer almost always develops from precancerous polyps (abnormal growths) in the colon or rectum. Screening tests can find precancerous polyps, so that they can be removed before they turn into cancer. Screening tests can also find colorectal cancer early, when treatment works best.  Darleen Lin Speak with your physician about when you should begin screening and how often you should be tested     Additional Information    If you have questions, please call [phone]. Remember, [employer] is NOT to be used for urgent needs. For medical emergencies, dial 911. Educational references and/or instructions provided during this visit included:    See attached. APPOINTMENTS:    Please make a follow-up appointment with your physician. Discharge information has been reviewed with the patient. The patient verbalized understanding.

## 2019-07-25 ENCOUNTER — OFFICE VISIT (OUTPATIENT)
Dept: ORTHOPEDIC SURGERY | Facility: CLINIC | Age: 84
End: 2019-07-25

## 2019-07-25 VITALS
BODY MASS INDEX: 40.46 KG/M2 | TEMPERATURE: 96.3 F | SYSTOLIC BLOOD PRESSURE: 167 MMHG | HEIGHT: 64 IN | HEART RATE: 72 BPM | RESPIRATION RATE: 16 BRPM | DIASTOLIC BLOOD PRESSURE: 64 MMHG | OXYGEN SATURATION: 99 % | WEIGHT: 237 LBS

## 2019-07-25 DIAGNOSIS — M17.12 PRIMARY OSTEOARTHRITIS OF LEFT KNEE: Primary | ICD-10-CM

## 2019-07-25 DIAGNOSIS — M25.561 RIGHT KNEE PAIN, UNSPECIFIED CHRONICITY: ICD-10-CM

## 2019-07-25 DIAGNOSIS — M25.562 LEFT KNEE PAIN, UNSPECIFIED CHRONICITY: ICD-10-CM

## 2019-07-25 RX ORDER — BETAMETHASONE SODIUM PHOSPHATE AND BETAMETHASONE ACETATE 3; 3 MG/ML; MG/ML
6 INJECTION, SUSPENSION INTRA-ARTICULAR; INTRALESIONAL; INTRAMUSCULAR; SOFT TISSUE ONCE
Qty: 1 ML | Refills: 0
Start: 2019-07-25 | End: 2019-07-25

## 2019-07-25 NOTE — PROGRESS NOTES
1. Have you been to the ER, urgent care clinic since your last visit? Hospitalized since your last visit? NO    2. Have you seen or consulted any other health care providers outside of the [address] [place] [address] since your last visit? Include any pap smears or colon screening.  NO

## 2019-07-25 NOTE — PROGRESS NOTES
[address] [place] [address]  [phone]           Patient: Virginia Davis                MRN: [id number]       SSN: [id number]  YOB: 1935        AGE: 82 y.o. SEX: female  Body mass index is 40.68 kg/m². PCP: Megan Akers MD  07/25/19      This office note has been dictated. REVIEW OF SYSTEMS:  Constitutional: Negative for fever, chills, weight loss and malaise/fatigue. HENT: Negative. Eyes: Negative. Respiratory: Negative. Cardiovascular: Negative. Gastrointestinal: No bowel incontinence or constipation. Genitourinary: No bladder incontinence or saddle anesthesia. Skin: Negative. Neurological: Negative. Endo/Heme/Allergies: Negative. Psychiatric/Behavioral: Negative. Musculoskeletal: As per HPI above. Past Medical History:   Diagnosis Date    Arrhythmia     mur mur  long standing    Arthritis     Arthritis of both hips     Back pain     Balance problem     Chronic back pain     Cough     Easy bruising     Essential hypertension     GERD (gastroesophageal reflux disease)     Hiatal hernia     Hypertension     Ill-defined condition     vascular insuff rt ankle    Irregular heart beat     Left hip pain 10/8/2010    Neck pain     Nervousness     Night sweat     Osteoarthritis, knee bilateral     Pain with urination     Polymyalgia rheumatica (HCC)     Reflux     Spinal stenosis     Thromboembolus (HCC)     Trapezius strain     Trochanteric bursitis of left hip     Venous insufficiency          Current Outpatient Medications:     betamethasone (CELESTONE SOLUSPAN) 6 mg/mL injection, 1 mL by Intra artICUlar route once for 1 dose., Disp: 1 mL, Rfl: 0    fluocinolone acetonide oil 0.01 % drop, by Otic route., Disp: , Rfl:     ferrous sulfate 325 mg (65 mg iron) tablet, Take 1 Tab by mouth two (2) times daily (with meals). , Disp: 60 Tab, Rfl: 2    aspirin (ASPIRIN) 325 mg tablet, Take 1 Tab by mouth daily. , Disp: 30 Tab, Rfl: 0    potassium 99 mg tablet, Take 99 mg by mouth daily. , Disp: , Rfl:     tropicamide (MYDRIACYL) 1 % ophthalmic solution, Administer 2 Drops to right eye as needed. 45 minutes prior to schedule appointments, Disp: , Rfl:     cholecalciferol (VITAMIN D3) 1,000 unit tablet, Take 1,000 Units by mouth daily. 1 tab day, Disp: , Rfl:     omeprazole (PRILOSEC) 40 mg capsule, Take 40 mg by mouth daily. 1 tab, Disp: , Rfl:     amLODIPine (NORVASC) 10 mg tablet, Take 10 mg by mouth daily. 1 tab, Disp: , Rfl: 3    losartan (COZAAR) 100 mg tablet, Take 100 mg by mouth daily. 1 tab, Disp: , Rfl:     metoclopramide HCl (REGLAN) 5 mg tablet, Take 5 mg by mouth two (2) times a day. 1 tab 2x day, Disp: , Rfl:     cetirizine (ZYRTEC) 10 mg tablet, Take 10 mg by mouth daily. 1 tab daily, Disp: , Rfl:     diclofenac (VOLTAREN) 1 % gel, Apply 4 g to affected area four (4) times daily. Maximum 16 grams per joint per day. Dispense 5 100 gram tubes, Disp: 5 Each, Rfl: 0    celecoxib (CELEBREX) 200 mg capsule, Take 200 mg by mouth two (2) times a day., Disp: , Rfl:     aMILoride-hydrochlorothiazide (MODURETIC) 5-50 mg tab, Take 1 Tab by mouth daily. , Disp: , Rfl:     Allergies   Allergen Reactions    Citric Acid Unknown (comments)    Crinone [Progesterone Micronized] Unknown (comments)    Iodine Unknown (comments)    Percocet [Oxycodone-Acetaminophen] Other (comments)     Gets rebound headaches after taking Percocet       Social History     Socioeconomic History    Marital status:      Spouse name: Not on file    Number of children: Not on file    Years of education: Not on file    Highest education level: Not on file   Occupational History    Not on file   Social Needs    Financial resource strain: Not on file    Food insecurity:     Worry: Not on file     Inability: Not on file    Transportation needs:     Medical: Not on file Non-medical: Not on file   Tobacco Use    Smoking status: Never Smoker    Smokeless tobacco: Never Used   Substance and Sexual Activity    Alcohol use: No    Drug use: No    Sexual activity: Never   Lifestyle    Physical activity:     Days per week: Not on file     Minutes per session: Not on file    Stress: Not on file   Relationships    Social connections:     Talks on phone: Not on file     Gets together: Not on file     Attends [employer] service: Not on file     Active member of club or organization: Not on file     Attends meetings of clubs or organizations: Not on file     Relationship status: Not on file    Intimate partner violence:     Fear of current or ex partner: Not on file     Emotionally abused: Not on file     Physically abused: Not on file     Forced sexual activity: Not on file   Other Topics Concern    Not on file   Social History Narrative    Not on file       Past Surgical History:   Procedure Laterality Date    COLONOSCOPY N/A 7/27/2016    COLONOSCOPY w/polypectomy performed by Colleen Royal MD at [address]      HX CHOLECYSTECTOMY      HX HEENT  06/2011    left cornea transplant    HX HYSTERECTOMY      HX KNEE REPLACEMENT Right 02/2017    HX ORTHOPAEDIC      right foot and ankle    HX ORTHOPAEDIC Right 04/2017           * Patient was identified by name and date of birth   * Agreement on procedure being performed was verified  * Risks and Benefits explained to the patient  * Procedure site verified and marked as necessary  * Patient was positioned for comfort  * Consent was signed and verified  11:34 AM    The patient was instructed on post injection care. We did see Ms. Tunde Tejeda for followup with regards to her bilateral knees. She had a right knee replacement, and she has had multiple surgeries for her patella. She has had an inferior pole fracture. She ended up with a slight defect there and a little bit of extensor lag.   She lives with this. She does well. The left knee is causing more discomfort. She is requesting a repeat injection today. She does have known advancing arthritis. There has been no recent fever, chills, systemic changes, or injuries to report, and no chest pain or shortness of breath. PHYSICAL EXAMINATION:  In general, the patient is alert and oriented x 3 in no acute distress. The patient is well-developed, well-nourished, with a normal affect. The patient is afebrile. HEENT:  Head is normocephalic and atraumatic. Pupils are equally round and reactive to light and accommodation. Extraocular eye movements are intact. Neck is supple. Trachea is midline. No JVD is present. Breathing is nonlabored. Examination of the lower extremities reveals pain-free range of motion of the hips. There is no pain to palpation of the greater trochanteric bursae. There is negative straight leg raise. There is negative calf tenderness. There is negative Checo's. There is no evidence of DVT present. Each knee reveals the skin is intact. There is no ecchymosis, no warmth. The surgical wounds on the right side have healed up nicely. Range of motion:  She has full extension. She does have about a 10ø extensor lag, however. A defect is felt anteriorly. Stability is quite good. The left knee reveals the skin is intact. There is no ecchymosis, no warmth, and no signs of infection or cellulitis present. Findings are consistent with advanced arthritis of the left knee with pain to palpation tricompartmentally and crepitus arising from the anterior compartment. RADIOGRAPHS:  Radiographs in the office, including AP, tunnel, lateral, and skyline of each of the knees shows on the right, total knee components are well-fixed without evidence for loosening or fracture noted. The old inferior pole patella fracture is identified and unchanged. The left knee does confirm advancing arthritis.      ASSESSMENT:      1. Status post right knee replacement. 2. Status post patella fracture right knee. 3. Left knee advanced arthritis. PLAN:  At this point, she will continue activities as tolerated with regards to her right knee. She is doing well with it. With regards to the left knee, we are going to move forward with a cortisone injection today. Under aseptic conditions, and after informed and written consent, with ultrasound-guided assistance, the left knee was prepped with Betadine and 6 mg of Celestone was injected to the left knee without complications. The patient tolerated the injection well. The patient was instructed on post injection care. We will see her back in the office in about three months' time for evaluation and repeat injection.                     JR Brian LUCERO, SENTHIL, ATC

## 2019-09-09 ENCOUNTER — HOSPITAL ENCOUNTER (OUTPATIENT)
Dept: LAB | Age: 84
Discharge: HOME | End: 2019-09-09
Payer: MEDICARE

## 2019-09-09 DIAGNOSIS — E78.1 PURE HYPERGLYCERIDEMIA: ICD-10-CM

## 2019-09-09 DIAGNOSIS — D64.9 ANEMIA, UNSPECIFIED: ICD-10-CM

## 2019-09-09 DIAGNOSIS — K21.9 ESOPHAGEAL REFLUX: ICD-10-CM

## 2019-09-09 DIAGNOSIS — M17.0 PRIMARY OSTEOARTHRITIS OF BOTH KNEES: ICD-10-CM

## 2019-09-09 DIAGNOSIS — I10 ESSENTIAL HYPERTENSION, MALIGNANT: ICD-10-CM

## 2019-09-09 LAB
ALBUMIN SERPL-MCNC: 3.5 G/DL (ref 3.4–5)
ALBUMIN/GLOB SERPL: 1 {RATIO} (ref 0.8–1.7)
ALP SERPL-CCNC: 103 U/L (ref 45–117)
ALT SERPL-CCNC: 21 U/L (ref 13–56)
ANION GAP SERPL CALC-SCNC: 8 MMOL/L (ref 3–18)
AST SERPL-CCNC: 17 U/L (ref 10–38)
BASOPHILS # BLD: 0 K/UL (ref 0–0.1)
BASOPHILS NFR BLD: 0 % (ref 0–2)
BILIRUB SERPL-MCNC: 0.3 MG/DL (ref 0.2–1)
BUN SERPL-MCNC: 31 MG/DL (ref 7–18)
BUN/CREAT SERPL: 27 (ref 12–20)
CALCIUM SERPL-MCNC: 8.9 MG/DL (ref 8.5–10.1)
CHLORIDE SERPL-SCNC: 105 MMOL/L (ref 100–111)
CHOLEST SERPL-MCNC: 187 MG/DL
CO2 SERPL-SCNC: 28 MMOL/L (ref 21–32)
CREAT SERPL-MCNC: 1.14 MG/DL (ref 0.6–1.3)
DIFFERENTIAL METHOD BLD: ABNORMAL
EOSINOPHIL # BLD: 0.3 K/UL (ref 0–0.4)
EOSINOPHIL NFR BLD: 4 % (ref 0–5)
ERYTHROCYTE [DISTWIDTH] IN BLOOD BY AUTOMATED COUNT: 13.4 % (ref 11.6–14.5)
GLOBULIN SER CALC-MCNC: 3.4 G/DL (ref 2–4)
GLUCOSE SERPL-MCNC: 93 MG/DL (ref 74–99)
HCT VFR BLD AUTO: 32.2 % (ref 35–45)
HDLC SERPL-MCNC: 55 MG/DL (ref 40–60)
HDLC SERPL: 3.4 {RATIO} (ref 0–5)
HGB BLD-MCNC: 10.4 G/DL (ref 12–16)
LDLC SERPL CALC-MCNC: 115 MG/DL (ref 0–100)
LIPID PROFILE,FLP: ABNORMAL
LYMPHOCYTES # BLD: 2 K/UL (ref 0.9–3.6)
LYMPHOCYTES NFR BLD: 31 % (ref 21–52)
MCH RBC QN AUTO: 27.3 PG (ref 24–34)
MCHC RBC AUTO-ENTMCNC: 32.3 G/DL (ref 31–37)
MCV RBC AUTO: 84.5 FL (ref 74–97)
MONOCYTES # BLD: 0.3 K/UL (ref 0.05–1.2)
MONOCYTES NFR BLD: 5 % (ref 3–10)
NEUTS SEG # BLD: 3.8 K/UL (ref 1.8–8)
NEUTS SEG NFR BLD: 60 % (ref 40–73)
PLATELET # BLD AUTO: 289 K/UL (ref 135–420)
PMV BLD AUTO: 10.5 FL (ref 9.2–11.8)
POTASSIUM SERPL-SCNC: 3.8 MMOL/L (ref 3.5–5.5)
PROT SERPL-MCNC: 6.9 G/DL (ref 6.4–8.2)
RBC # BLD AUTO: 3.81 M/UL (ref 4.2–5.3)
SODIUM SERPL-SCNC: 141 MMOL/L (ref 136–145)
TRIGL SERPL-MCNC: 85 MG/DL (ref ?–150)
TSH SERPL DL<=0.05 MIU/L-ACNC: 0.86 UIU/ML (ref 0.36–3.74)
VLDLC SERPL CALC-MCNC: 17 MG/DL
WBC # BLD AUTO: 6.5 K/UL (ref 4.6–13.2)

## 2019-09-09 PROCEDURE — 80061 LIPID PANEL: CPT

## 2019-09-09 PROCEDURE — 36415 COLL VENOUS BLD VENIPUNCTURE: CPT

## 2019-09-09 PROCEDURE — 84436 ASSAY OF TOTAL THYROXINE: CPT

## 2019-09-09 PROCEDURE — 84443 ASSAY THYROID STIM HORMONE: CPT

## 2019-09-09 PROCEDURE — 80053 COMPREHEN METABOLIC PANEL: CPT

## 2019-09-09 PROCEDURE — 85025 COMPLETE CBC W/AUTO DIFF WBC: CPT

## 2019-09-10 LAB — T4 SERPL-MCNC: 10.4 UG/DL (ref 4.8–13.9)

## 2019-09-16 ENCOUNTER — HOSPITAL ENCOUNTER (OUTPATIENT)
Dept: MAMMOGRAPHY | Age: 84
Discharge: HOME | End: 2019-09-16
Attending: OBSTETRICS & GYNECOLOGY
Payer: MEDICARE

## 2019-09-16 DIAGNOSIS — Z12.39 SCREENING FOR BREAST CANCER: ICD-10-CM

## 2019-09-16 PROCEDURE — 77067 SCR MAMMO BI INCL CAD: CPT

## 2019-09-17 ENCOUNTER — HOSPITAL ENCOUNTER (OUTPATIENT)
Dept: CT IMAGING | Age: 84
Discharge: HOME | End: 2019-09-17
Attending: OTOLARYNGOLOGY
Payer: MEDICARE

## 2019-09-17 PROCEDURE — 70480 CT ORBIT/EAR/FOSSA W/O DYE: CPT

## 2019-10-24 ENCOUNTER — OFFICE VISIT (OUTPATIENT)
Dept: ORTHOPEDIC SURGERY | Facility: CLINIC | Age: 84
End: 2019-10-24

## 2019-10-24 VITALS
SYSTOLIC BLOOD PRESSURE: 141 MMHG | WEIGHT: 230.8 LBS | RESPIRATION RATE: 20 BRPM | BODY MASS INDEX: 39.4 KG/M2 | HEART RATE: 82 BPM | OXYGEN SATURATION: 100 % | DIASTOLIC BLOOD PRESSURE: 63 MMHG | HEIGHT: 64 IN | TEMPERATURE: 97.6 F

## 2019-10-24 DIAGNOSIS — M25.562 LEFT KNEE PAIN, UNSPECIFIED CHRONICITY: ICD-10-CM

## 2019-10-24 DIAGNOSIS — M17.12 PRIMARY OSTEOARTHRITIS OF LEFT KNEE: Primary | ICD-10-CM

## 2019-10-24 RX ORDER — TRIAMTERENE AND HYDROCHLOROTHIAZIDE 37.5; 25 MG/1; MG/1
CAPSULE ORAL
Refills: 2 | COMMUNITY
Start: 2019-09-20 | End: 2021-11-04 | Stop reason: ALTCHOICE

## 2019-10-24 RX ORDER — BETAMETHASONE SODIUM PHOSPHATE AND BETAMETHASONE ACETATE 3; 3 MG/ML; MG/ML
6 INJECTION, SUSPENSION INTRA-ARTICULAR; INTRALESIONAL; INTRAMUSCULAR; SOFT TISSUE ONCE
Qty: 1 ML | Refills: 0
Start: 2019-10-24 | End: 2019-10-24

## 2019-10-24 NOTE — PROGRESS NOTES
[address] [place] [address]  [phone]           Patient: Faby Lin                MRN: [id number]       SSN: [id number]  YOB: 1935        AGE: 82 y.o. SEX: female  Body mass index is 39.62 kg/m². PCP: Adrianna Torre MD  10/24/19      This office note has been dictated. REVIEW OF SYSTEMS:  Constitutional: Negative for fever, chills, weight loss and malaise/fatigue. HENT: Negative. Eyes: Negative. Respiratory: Negative. Cardiovascular: Negative. Gastrointestinal: No bowel incontinence or constipation. Genitourinary: No bladder incontinence or saddle anesthesia. Skin: Negative. Neurological: Negative. Endo/Heme/Allergies: Negative. Psychiatric/Behavioral: Negative. Musculoskeletal: As per HPI above. Past Medical History:   Diagnosis Date    Arrhythmia     mur mur  long standing    Arthritis     Arthritis of both hips     Back pain     Balance problem     Chronic back pain     Cough     Easy bruising     Essential hypertension     GERD (gastroesophageal reflux disease)     Hiatal hernia     Hypertension     Ill-defined condition     vascular insuff rt ankle    Irregular heart beat     Left hip pain 10/8/2010    Neck pain     Nervousness     Night sweat     Osteoarthritis, knee bilateral     Pain with urination     Polymyalgia rheumatica (HCC)     Reflux     Spinal stenosis     Thromboembolus (HCC)     Trapezius strain     Trochanteric bursitis of left hip     Venous insufficiency          Current Outpatient Medications:     triamterene-hydroCHLOROthiazide (DYAZIDE) 37.5-25 mg per capsule, TAKE 1 CAPSULE BY MOUTH ONCE DAILY 30 DAY(S), Disp: , Rfl: 2    fluocinolone acetonide oil 0.01 % drop, by Otic route., Disp: , Rfl:     ferrous sulfate 325 mg (65 mg iron) tablet, Take 1 Tab by mouth two (2) times daily (with meals). , Disp: 60 Tab, Rfl: 2    aspirin (ASPIRIN) 325 mg tablet, Take 1 Tab by mouth daily. , Disp: 30 Tab, Rfl: 0    potassium 99 mg tablet, Take 99 mg by mouth daily. , Disp: , Rfl:     cholecalciferol (VITAMIN D3) 1,000 unit tablet, Take 1,000 Units by mouth daily. 1 tab day, Disp: , Rfl:     omeprazole (PRILOSEC) 40 mg capsule, Take 40 mg by mouth daily. 1 tab, Disp: , Rfl:     amLODIPine (NORVASC) 10 mg tablet, Take 10 mg by mouth daily. 1 tab, Disp: , Rfl: 3    losartan (COZAAR) 100 mg tablet, Take 100 mg by mouth daily. 1 tab, Disp: , Rfl:     cetirizine (ZYRTEC) 10 mg tablet, Take 10 mg by mouth daily. 1 tab daily, Disp: , Rfl:     diclofenac (VOLTAREN) 1 % gel, Apply 4 g to affected area four (4) times daily. Maximum 16 grams per joint per day. Dispense 5 100 gram tubes, Disp: 5 Each, Rfl: 0    celecoxib (CELEBREX) 200 mg capsule, Take 200 mg by mouth two (2) times a day., Disp: , Rfl:     tropicamide (MYDRIACYL) 1 % ophthalmic solution, Administer 2 Drops to right eye as needed. 45 minutes prior to schedule appointments, Disp: , Rfl:     aMILoride-hydrochlorothiazide (MODURETIC) 5-50 mg tab, Take 1 Tab by mouth daily. , Disp: , Rfl:     metoclopramide HCl (REGLAN) 5 mg tablet, Take 5 mg by mouth two (2) times a day.  1 tab 2x day, Disp: , Rfl:     Allergies   Allergen Reactions    Citric Acid Unknown (comments)    Crinone [Progesterone Micronized] Unknown (comments)    Iodine Unknown (comments)    Percocet [Oxycodone-Acetaminophen] Other (comments)     Gets rebound headaches after taking Percocet       Social History     Socioeconomic History    Marital status:      Spouse name: Not on file    Number of children: Not on file    Years of education: Not on file    Highest education level: Not on file   Occupational History    Not on file   Social Needs    Financial resource strain: Not on file    Food insecurity:     Worry: Not on file     Inability: Not on file    Transportation needs:     Medical: Not on file Non-medical: Not on file   Tobacco Use    Smoking status: Never Smoker    Smokeless tobacco: Never Used   Substance and Sexual Activity    Alcohol use: No    Drug use: No    Sexual activity: Never   Lifestyle    Physical activity:     Days per week: Not on file     Minutes per session: Not on file    Stress: Not on file   Relationships    Social connections:     Talks on phone: Not on file     Gets together: Not on file     Attends Quaker service: Not on file     Active member of club or organization: Not on file     Attends meetings of clubs or organizations: Not on file     Relationship status: Not on file    Intimate partner violence:     Fear of current or ex partner: Not on file     Emotionally abused: Not on file     Physically abused: Not on file     Forced sexual activity: Not on file   Other Topics Concern    Not on file   Social History Narrative    Not on file       Past Surgical History:   Procedure Laterality Date    COLONOSCOPY N/A 7/27/2016    COLONOSCOPY w/polypectomy performed by Silvana Ingram MD at [address]      HX CHOLECYSTECTOMY      HX HEENT  06/2011    left cornea transplant    HX HYSTERECTOMY      HX KNEE REPLACEMENT Right 02/2017    HX ORTHOPAEDIC      right foot and ankle    HX ORTHOPAEDIC Right 04/2017           * Patient was identified by name and date of birth   * Agreement on procedure being performed was verified  * Risks and Benefits explained to the patient  * Procedure site verified and marked as necessary  * Patient was positioned for comfort  * Consent was signed and verified  10:48 AM    The patient was instructed on post injection care. We did see Ms. Amador Bro for followup with regards to her bilateral knees. She is status post right knee replacement. She did have a patellar fracture, which was fixed but eventually did pull apart slightly. She had the hardware removed and it was sutured together.   She did well with it.  She is having no pain in her knee. She does have a little bit of a lag associated to it. She presents today mainly for her left knee, which has known advancing arthritis in. She is requesting a repeat injection for her left knee today. There has been no fevers, chills, systemic changes, or injuries to report, and no chest pain or shortness of breath. PHYSICAL EXAMINATION:  In general, the patient is alert and oriented x 3 in no acute distress. The patient is well-developed, well-nourished, with a normal affect. The patient is afebrile. HEENT:  Head is normocephalic and atraumatic. Pupils are equally round and reactive to light and accommodation. Extraocular eye movements are intact. Neck is supple. Trachea is midline. No JVD is present. Breathing is nonlabored. Examination of the lower extremities reveals pain-free range of motion of the hips. There is no pain to palpation of the greater trochanteric bursae. There is negative straight leg raise. There is negative calf tenderness. There is negative Checo's. There is no evidence of DVT present. The right knee reveals the skin is intact. The surgical wounds are healed nicely. There is no erythema, no ecchymosis, no warmth, and no signs of infection or cellulitis present. She has full extension. She does have about a 10ø extensor lag with a defect noted to the inferior pole of the patella. The left knee reveals the skin is intact. There is no ecchymosis, no warmth, and no signs of infection or cellulitis present. She does have findings consistent with advanced arthritis of the left knee with pain to palpation tricompartmentally and crepitus arising from the anterior compartment. ASSESSMENT:      1. Status post right knee replacement. 2. Status post ORIF right patellar fracture. 3. Left knee end-staged arthritis. PLAN:  At this point, she discussed treatment options. Fortunately, she is very functional with the right knee. She has done well with it. With regards to the left knee, we are going to move forward with a repeat injection today for the left knee. After informed and written consent with an appropriate time out performed, and under sterile conditions, with ultrasound-guided assistance, the left knee was prepped with Betadine and 6 mg of Celestone was injected without complications. The patient tolerated the injection well. The patient was instructed on post injection care. We will see her back in the office in three months' time for reevaluation. She will call with any questions or concerns that shall arise.                      JR Brian LUCERO, PA-C, ATC

## 2020-01-06 ENCOUNTER — HOSPITAL ENCOUNTER (OUTPATIENT)
Dept: LAB | Age: 85
Discharge: HOME | End: 2020-01-06
Payer: MEDICARE

## 2020-01-06 DIAGNOSIS — N18.30 CHRONIC KIDNEY DISEASE, STAGE III (MODERATE) (HCC): ICD-10-CM

## 2020-01-06 LAB
25(OH)D3 SERPL-MCNC: 44.5 NG/ML (ref 30–100)
ALBUMIN SERPL-MCNC: 3.3 G/DL (ref 3.4–5)
ANION GAP SERPL CALC-SCNC: 5 MMOL/L (ref 3–18)
BASOPHILS # BLD: 0 K/UL (ref 0–0.1)
BASOPHILS NFR BLD: 0 % (ref 0–2)
BUN SERPL-MCNC: 30 MG/DL (ref 7–18)
BUN/CREAT SERPL: 29 (ref 12–20)
CALCIUM SERPL-MCNC: 9.5 MG/DL (ref 8.5–10.1)
CALCIUM SERPL-MCNC: 9.6 MG/DL (ref 8.5–10.1)
CHLORIDE SERPL-SCNC: 107 MMOL/L (ref 100–111)
CO2 SERPL-SCNC: 27 MMOL/L (ref 21–32)
CREAT SERPL-MCNC: 1.05 MG/DL (ref 0.6–1.3)
CREAT UR-MCNC: 117 MG/DL (ref 30–125)
DIFFERENTIAL METHOD BLD: ABNORMAL
EOSINOPHIL # BLD: 0.3 K/UL (ref 0–0.4)
EOSINOPHIL NFR BLD: 4 % (ref 0–5)
ERYTHROCYTE [DISTWIDTH] IN BLOOD BY AUTOMATED COUNT: 13.5 % (ref 11.6–14.5)
GLUCOSE SERPL-MCNC: 90 MG/DL (ref 74–99)
HCT VFR BLD AUTO: 31.4 % (ref 35–45)
HGB BLD-MCNC: 10.4 G/DL (ref 12–16)
LYMPHOCYTES # BLD: 2 K/UL (ref 0.9–3.6)
LYMPHOCYTES NFR BLD: 28 % (ref 21–52)
MCH RBC QN AUTO: 27.6 PG (ref 24–34)
MCHC RBC AUTO-ENTMCNC: 33.1 G/DL (ref 31–37)
MCV RBC AUTO: 83.3 FL (ref 74–97)
MONOCYTES # BLD: 0.5 K/UL (ref 0.05–1.2)
MONOCYTES NFR BLD: 7 % (ref 3–10)
NEUTS SEG # BLD: 4.4 K/UL (ref 1.8–8)
NEUTS SEG NFR BLD: 61 % (ref 40–73)
PHOSPHATE SERPL-MCNC: 3 MG/DL (ref 2.5–4.9)
PLATELET # BLD AUTO: 325 K/UL (ref 135–420)
PMV BLD AUTO: 10 FL (ref 9.2–11.8)
POTASSIUM SERPL-SCNC: 3.6 MMOL/L (ref 3.5–5.5)
PROT UR-MCNC: 22 MG/DL
PROT/CREAT UR-RTO: 0.2
PTH-INTACT SERPL-MCNC: 141.8 PG/ML (ref 18.4–88)
RBC # BLD AUTO: 3.77 M/UL (ref 4.2–5.3)
SODIUM SERPL-SCNC: 139 MMOL/L (ref 136–145)
WBC # BLD AUTO: 7.2 K/UL (ref 4.6–13.2)

## 2020-01-06 PROCEDURE — 80069 RENAL FUNCTION PANEL: CPT

## 2020-01-06 PROCEDURE — 83970 ASSAY OF PARATHORMONE: CPT

## 2020-01-06 PROCEDURE — 82306 VITAMIN D 25 HYDROXY: CPT

## 2020-01-06 PROCEDURE — 36415 COLL VENOUS BLD VENIPUNCTURE: CPT

## 2020-01-06 PROCEDURE — 85025 COMPLETE CBC W/AUTO DIFF WBC: CPT

## 2020-01-06 PROCEDURE — 84156 ASSAY OF PROTEIN URINE: CPT

## 2020-01-30 ENCOUNTER — OFFICE VISIT (OUTPATIENT)
Dept: ORTHOPEDIC SURGERY | Facility: CLINIC | Age: 85
End: 2020-01-30

## 2020-01-30 VITALS
TEMPERATURE: 97.4 F | DIASTOLIC BLOOD PRESSURE: 81 MMHG | WEIGHT: 225.4 LBS | BODY MASS INDEX: 38.48 KG/M2 | RESPIRATION RATE: 15 BRPM | SYSTOLIC BLOOD PRESSURE: 167 MMHG | HEIGHT: 64 IN | OXYGEN SATURATION: 100 % | HEART RATE: 85 BPM

## 2020-01-30 DIAGNOSIS — M17.12 PRIMARY OSTEOARTHRITIS OF LEFT KNEE: Primary | ICD-10-CM

## 2020-01-30 RX ORDER — BETAMETHASONE SODIUM PHOSPHATE AND BETAMETHASONE ACETATE 3; 3 MG/ML; MG/ML
6 INJECTION, SUSPENSION INTRA-ARTICULAR; INTRALESIONAL; INTRAMUSCULAR; SOFT TISSUE ONCE
Qty: 1 ML | Refills: 0
Start: 2020-01-30 | End: 2020-01-30

## 2020-01-30 RX ORDER — BETAMETHASONE SODIUM PHOSPHATE AND BETAMETHASONE ACETATE 3; 3 MG/ML; MG/ML
INJECTION, SUSPENSION INTRA-ARTICULAR; INTRALESIONAL; INTRAMUSCULAR; SOFT TISSUE ONCE
COMMUNITY

## 2020-01-30 NOTE — PROGRESS NOTES
[address] [place] [address]  [phone]           Patient: Chapo Martin                MRN: [id number]       SSN: [id number]  YOB: 1935        AGE: 82 y.o. SEX: female  Body mass index is 38.69 kg/m². PCP: Kenneth Collins MD  01/30/20      This office note has been dictated. REVIEW OF SYSTEMS:  Constitutional: Negative for fever, chills, weight loss and malaise/fatigue. HENT: Negative. Eyes: Negative. Respiratory: Negative. Cardiovascular: Negative. Gastrointestinal: No bowel incontinence or constipation. Genitourinary: No bladder incontinence or saddle anesthesia. Skin: Negative. Neurological: Negative. Endo/Heme/Allergies: Negative. Psychiatric/Behavioral: Negative. Musculoskeletal: As per HPI above.      Past Medical History:   Diagnosis Date    Arrhythmia     mur mur  long standing    Arthritis     Arthritis of both hips     Back pain     Balance problem     Chronic back pain     Cough     Easy bruising     Essential hypertension     GERD (gastroesophageal reflux disease)     Hiatal hernia     Hypertension     Ill-defined condition     vascular insuff rt ankle    Irregular heart beat     Left hip pain 10/8/2010    Neck pain     Nervousness     Night sweat     Osteoarthritis, knee bilateral     Pain with urination     Polymyalgia rheumatica (HCC)     Reflux     Spinal stenosis     Thromboembolus (HCC)     Trapezius strain     Trochanteric bursitis of left hip     Venous insufficiency          Current Outpatient Medications:     betamethasone (CELESTONE) 6 mg/mL injection, once., Disp: , Rfl:     betamethasone (CELESTONE SOLUSPAN) 6 mg/mL injection, 1 mL by Intra artICUlar route once for 1 dose., Disp: 1 mL, Rfl: 0    triamterene-hydroCHLOROthiazide (DYAZIDE) 37.5-25 mg per capsule, TAKE 1 CAPSULE BY MOUTH ONCE DAILY 30 DAY(S), Disp: , Rfl: 2   fluocinolone acetonide oil 0.01 % drop, by Otic route., Disp: , Rfl:     celecoxib (CELEBREX) 200 mg capsule, Take 200 mg by mouth two (2) times a day., Disp: , Rfl:     ferrous sulfate 325 mg (65 mg iron) tablet, Take 1 Tab by mouth two (2) times daily (with meals). , Disp: 60 Tab, Rfl: 2    aspirin (ASPIRIN) 325 mg tablet, Take 1 Tab by mouth daily. , Disp: 30 Tab, Rfl: 0    potassium 99 mg tablet, Take 99 mg by mouth daily. , Disp: , Rfl:     tropicamide (MYDRIACYL) 1 % ophthalmic solution, Administer 2 Drops to right eye as needed. 45 minutes prior to schedule appointments, Disp: , Rfl:     cholecalciferol (VITAMIN D3) 1,000 unit tablet, Take 1,000 Units by mouth daily. 1 tab day, Disp: , Rfl:     omeprazole (PRILOSEC) 40 mg capsule, Take 40 mg by mouth daily. 1 tab, Disp: , Rfl:     amLODIPine (NORVASC) 10 mg tablet, Take 10 mg by mouth daily. 1 tab, Disp: , Rfl: 3    losartan (COZAAR) 100 mg tablet, Take 100 mg by mouth daily. 1 tab, Disp: , Rfl:     diclofenac (VOLTAREN) 1 % gel, Apply 4 g to affected area four (4) times daily. Maximum 16 grams per joint per day. Dispense 5 100 gram tubes, Disp: 5 Each, Rfl: 0    aMILoride-hydrochlorothiazide (MODURETIC) 5-50 mg tab, Take 1 Tab by mouth daily. , Disp: , Rfl:     metoclopramide HCl (REGLAN) 5 mg tablet, Take 5 mg by mouth two (2) times a day. 1 tab 2x day, Disp: , Rfl:     cetirizine (ZYRTEC) 10 mg tablet, Take 10 mg by mouth daily.  1 tab daily, Disp: , Rfl:     Allergies   Allergen Reactions    Citric Acid Unknown (comments)    Crinone [Progesterone Micronized] Unknown (comments)    Iodine Unknown (comments)    Percocet [Oxycodone-Acetaminophen] Other (comments)     Gets rebound headaches after taking Percocet       Social History     Socioeconomic History    Marital status:      Spouse name: Not on file    Number of children: Not on file    Years of education: Not on file    Highest education level: Not on file   Occupational History  Not on file   Social Needs    Financial resource strain: Not on file    Food insecurity:     Worry: Not on file     Inability: Not on file    Transportation needs:     Medical: Not on file     Non-medical: Not on file   Tobacco Use    Smoking status: Never Smoker    Smokeless tobacco: Never Used   Substance and Sexual Activity    Alcohol use: No    Drug use: No    Sexual activity: Never   Lifestyle    Physical activity:     Days per week: Not on file     Minutes per session: Not on file    Stress: Not on file   Relationships    Social connections:     Talks on phone: Not on file     Gets together: Not on file     Attends Scientologist service: Not on file     Active member of club or organization: Not on file     Attends meetings of clubs or organizations: Not on file     Relationship status: Not on file    Intimate partner violence:     Fear of current or ex partner: Not on file     Emotionally abused: Not on file     Physically abused: Not on file     Forced sexual activity: Not on file   Other Topics Concern    Not on file   Social History Narrative    Not on file       Past Surgical History:   Procedure Laterality Date    COLONOSCOPY N/A 7/27/2016    COLONOSCOPY w/polypectomy performed by Dane Galvan MD at [address]      HX CHOLECYSTECTOMY      HX HEENT  06/2011    left cornea transplant    HX HYSTERECTOMY      HX KNEE REPLACEMENT Right 02/2017    HX ORTHOPAEDIC      right foot and ankle    HX ORTHOPAEDIC Right 04/2017           * Patient was identified by name and date of birth   * Agreement on procedure being performed was verified  * Risks and Benefits explained to the patient  * Procedure site verified and marked as necessary  * Patient was positioned for comfort  * Consent was signed and verified  11:23 AM    The patient was instructed on post injection care. We did see Ms. Sharon Driver for followup with regards to her bilateral knees.   She has had a right knee replacement in the past.  She did have a patellar fracture, which was fixed. This eventually failed, and we went back in for revision. She did well with it ultimately. She did end up with a bit of an extensor lag. She gets around quite well with it. She has no pain in the right knee. The patient returns today for her left knee, which does have advanced arthritis in. She has had no fevers, chills, systemic changes, or injuries to report. She does have decreased walking tolerance, trouble getting up from a chair and going up and down stairs. These injections worked quite well for her. PHYSICAL EXAMINATION:  In general, the patient is alert and oriented x 3 in no acute distress. The patient is well-developed, well-nourished, with a normal affect. The patient is afebrile. HEENT:  Head is normocephalic and atraumatic. Pupils are equally round and reactive to light and accommodation. Extraocular eye movements are intact. Neck is supple. Trachea is midline. No JVD is present. Breathing is nonlabored. Examination of the lower extremities reveals pain-free range of motion of the hips. There is no pain to palpation of the greater trochanteric bursae. There is negative straight leg raise. There is negative calf tenderness. There is negative Checo's. There is no evidence of DVT present. The right knee reveals the skin is intact. There is no ecchymosis and no warmth. There are no signs for infection or cellulitis present. She does have about a 5ø or 6ø extensor lag. No defects are noted in the extensor mechanism itself. She is ligamentously stable. Range of motion is well-preserved. The left knee reveals the skin is intact. There is no ecchymosis, no warmth, and no signs of infection or cellulitis present. There is pain to palpation to the medial and lateral joint lines, as well as patellofemoral grind and crepitus anteriorly with range of motion activities noted.   Findings are consistent with advanced arthritis of the left knee. ASSESSMENT:      1. Status post right knee replacement. 2. Status post right patellar fracture ORIF with revision. 3. Left knee advanced arthritis. PLAN:  At this point, we are going to move forward with a cortisone injection for the left knee today. After informed and written consent with an appropriate time out performed, and under sterile conditions, with ultrasound-guided assistance, the left knee was prepped with Betadine and 6 mg of Celestone was injected without complications. The patient tolerated the injection well. The patient was instructed on post injection care. We will see her back in the office in about three months' time for evaluation. We will plan on repeat x-rays of each of her knees upon her return. She will call with any questions or concerns that shall arise.                     JR Brian LUCERO, SENTHIL, ATC

## 2020-06-18 ENCOUNTER — OFFICE VISIT (OUTPATIENT)
Dept: ORTHOPEDIC SURGERY | Facility: CLINIC | Age: 85
End: 2020-06-18

## 2020-06-18 VITALS
HEIGHT: 64 IN | BODY MASS INDEX: 37.94 KG/M2 | TEMPERATURE: 96.7 F | DIASTOLIC BLOOD PRESSURE: 70 MMHG | SYSTOLIC BLOOD PRESSURE: 180 MMHG | WEIGHT: 222.2 LBS | HEART RATE: 81 BPM

## 2020-06-18 DIAGNOSIS — M17.12 PRIMARY OSTEOARTHRITIS OF LEFT KNEE: Primary | ICD-10-CM

## 2020-06-18 DIAGNOSIS — M25.561 ACUTE PAIN OF RIGHT KNEE: ICD-10-CM

## 2020-06-18 RX ORDER — BETAMETHASONE SODIUM PHOSPHATE AND BETAMETHASONE ACETATE 3; 3 MG/ML; MG/ML
6 INJECTION, SUSPENSION INTRA-ARTICULAR; INTRALESIONAL; INTRAMUSCULAR; SOFT TISSUE ONCE
Qty: 1 ML | Refills: 0
Start: 2020-06-18 | End: 2020-06-18

## 2020-06-18 NOTE — PROGRESS NOTES
[address] [place] [address]  [phone]           Patient: Jason Panda                MRN: [id number]       SSN: [id number]  YOB: 1935        AGE: 82 y.o. SEX: female  Body mass index is 38.14 kg/m². PCP: Rosangela Nova MD  06/18/20            REVIEW OF SYSTEMS:  Constitutional: Negative for fever, chills, weight loss and malaise/fatigue. HENT: Negative. Eyes: Negative. Respiratory: Negative. Cardiovascular: Negative. Gastrointestinal: No bowel incontinence or constipation. Genitourinary: No bladder incontinence or saddle anesthesia. Skin: Negative. Neurological: Negative. Endo/Heme/Allergies: Negative. Psychiatric/Behavioral: Negative. Musculoskeletal: As per HPI above.      Past Medical History:   Diagnosis Date    Arrhythmia     mur mur  long standing    Arthritis     Arthritis of both hips     Back pain     Balance problem     Chronic back pain     Cough     Easy bruising     Essential hypertension     GERD (gastroesophageal reflux disease)     Hiatal hernia     Hypertension     Ill-defined condition     vascular insuff rt ankle    Irregular heart beat     Left hip pain 10/8/2010    Neck pain     Nervousness     Night sweat     Osteoarthritis, knee bilateral     Pain with urination     Polymyalgia rheumatica (HCC)     Reflux     Spinal stenosis     Thromboembolus (HCC)     Trapezius strain     Trochanteric bursitis of left hip     Venous insufficiency          Current Outpatient Medications:     betamethasone (Celestone Soluspan) 6 mg/mL injection, 1 mL by Intra artICUlar route once for 1 dose., Disp: 1 mL, Rfl: 0    betamethasone (CELESTONE) 6 mg/mL injection, once., Disp: , Rfl:     triamterene-hydroCHLOROthiazide (DYAZIDE) 37.5-25 mg per capsule, TAKE 1 CAPSULE BY MOUTH ONCE DAILY 30 DAY(S), Disp: , Rfl: 2    fluocinolone acetonide oil 0.01 % drop, by Otic route., Disp: , Rfl:     diclofenac (VOLTAREN) 1 % gel, Apply 4 g to affected area four (4) times daily. Maximum 16 grams per joint per day. Dispense 5 100 gram tubes, Disp: 5 Each, Rfl: 0    celecoxib (CELEBREX) 200 mg capsule, Take 200 mg by mouth two (2) times a day., Disp: , Rfl:     ferrous sulfate 325 mg (65 mg iron) tablet, Take 1 Tab by mouth two (2) times daily (with meals). , Disp: 60 Tab, Rfl: 2    aspirin (ASPIRIN) 325 mg tablet, Take 1 Tab by mouth daily. , Disp: 30 Tab, Rfl: 0    potassium 99 mg tablet, Take 99 mg by mouth daily. , Disp: , Rfl:     tropicamide (MYDRIACYL) 1 % ophthalmic solution, Administer 2 Drops to right eye as needed. 45 minutes prior to schedule appointments, Disp: , Rfl:     cholecalciferol (VITAMIN D3) 1,000 unit tablet, Take 1,000 Units by mouth daily. 1 tab day, Disp: , Rfl:     omeprazole (PRILOSEC) 40 mg capsule, Take 40 mg by mouth daily. 1 tab, Disp: , Rfl:     aMILoride-hydrochlorothiazide (MODURETIC) 5-50 mg tab, Take 1 Tab by mouth daily. , Disp: , Rfl:     amLODIPine (NORVASC) 10 mg tablet, Take 10 mg by mouth daily. 1 tab, Disp: , Rfl: 3    losartan (COZAAR) 100 mg tablet, Take 100 mg by mouth daily. 1 tab, Disp: , Rfl:     metoclopramide HCl (REGLAN) 5 mg tablet, Take 5 mg by mouth two (2) times a day. 1 tab 2x day, Disp: , Rfl:     cetirizine (ZYRTEC) 10 mg tablet, Take 10 mg by mouth daily.  1 tab daily, Disp: , Rfl:     Allergies   Allergen Reactions    Citric Acid Unknown (comments)    Crinone [Progesterone Micronized] Unknown (comments)    Iodine Unknown (comments)    Percocet [Oxycodone-Acetaminophen] Other (comments)     Gets rebound headaches after taking Percocet       Social History     Socioeconomic History    Marital status:      Spouse name: Not on file    Number of children: Not on file    Years of education: Not on file    Highest education level: Not on file   Occupational History    Not on file   Social Needs    Financial resource strain: Not on file    Food insecurity     Worry: Not on file     Inability: Not on file    Transportation needs     Medical: Not on file     Non-medical: Not on file   Tobacco Use    Smoking status: Never Smoker    Smokeless tobacco: Never Used   Substance and Sexual Activity    Alcohol use: No    Drug use: No    Sexual activity: Never   Lifestyle    Physical activity     Days per week: Not on file     Minutes per session: Not on file    Stress: Not on file   Relationships    Social connections     Talks on phone: Not on file     Gets together: Not on file     Attends Jain service: Not on file     Active member of club or organization: Not on file     Attends meetings of clubs or organizations: Not on file     Relationship status: Not on file    Intimate partner violence     Fear of current or ex partner: Not on file     Emotionally abused: Not on file     Physically abused: Not on file     Forced sexual activity: Not on file   Other Topics Concern    Not on file   Social History Narrative    Not on file       Past Surgical History:   Procedure Laterality Date    COLONOSCOPY N/A 7/27/2016    COLONOSCOPY w/polypectomy performed by [facility], MD at [address] [place] [address] HX HEENT  06/2011    left cornea transplant    HX HYSTERECTOMY      HX KNEE REPLACEMENT Right 02/2017    HX ORTHOPAEDIC      right foot and ankle    HX ORTHOPAEDIC Right 04/2017       Patient seen and evaluated today for bilateral knees. In the past.  She did unfortunately have a subsequent patellar fracture which was fixed via ORIF. This failed and revision was done using a surgical construct of suture. Unfortunately this failed as well. A decision was made not to do any further surgery of the right knee. The patient was not interested in any more surgery.   She did understand that she would always have a little bit of weakness and lag of the right leg.    She reports having a fall her knee buckled on her. She was not using her walker. She not having much pain of the right knee at this point. She does have known advanced arthritis of the left knee which is causing some discomfort. Does have decreased walking tolerance. Trouble getting from a chair. Trouble stairs. Some occasional night pain. Patient denies recent fevers, chills, chest pain, SOB, or injuries. No recent systemic changes noted. A 12-point review of systems is performed today. Pertinent positives are noted. All other systems reviewed and otherwise are negative. Physical exam: General: Alert and oriented x3, nad.  well-developed, well nourished. normal affect, AF. NC/AT, EOMI, neck supple, trachea midline, no JVD present. Breathing is non-labored. Examination of lower extremities reveals pain-free range of motion the hips. There is no pain to palpation the trochanter bursa. Neck straight leg raise. Negative calf tenderness. No Homans. No signs of DVT present. The right knee of a skin intact. There is no erythema, ecchymosis, warmth. There is no signs of infection or cellulitis present. She has good stability with good range of motion. She does have a defect noted to the inferior patellar region. She has approximate 15 degree extensor lag. Examination of the left knee reveal skin intact. There is no erythema, ecchymosis, warmth. There is no signs of infection or cellulitis present. Does have findings consistent with advanced arthritis of the left knee. There is pain to palpation tricompartmentally. Radiographs obtained in office today including AP lateral and skyline of the right knee shows the total knee components to be well fixed without evidence for loosening or fracture noted. The fragmented patella inferior portion is still present and unchanged. Films done 6/18/2020 at the [facility] location.     Assessment: #1 status post right knee replacement with known inferior pole patellar displacement, #2 left knee advanced osteoarthritis    Plan: At this point, we discussed the right knee. She will use the walker at all times as again she declines any surgical intervention for the right knee. In regards to the left knee we will move forth a repeat cortisone injection today. After informed consent, under aseptic conditions, with US guided assitance, the left knee was prepped with betadine and 6mg of celestone was injected without complications. The patient tolerated the injection well. The patient is instructed on post-injection care. She will follow-up with us in 3 months time for evaluation. Chart reviewed for the following:  Nkiky JAMES PA-C, have reviewed the History, Physical and updated the Allergic reactions for Carol Girt?     TIME OUT performed immediately prior to start of procedure:  Nikky JAMES PA-C, have performed the following reviews on Carol Girt prior to the start of the procedure:  ????????  * Patient was identified by name and date of birth   * Agreement on procedure being performed was verified  * Risks and Benefits explained to the patient  * Procedure site verified and marked as necessary  * Patient was positioned for comfort  * Consent was signed and verified    Time:10:14 AM    Body part: left knee    Medication & Dose: 6mg celestone    Date of procedure: 06/18/20    Procedure performed by: Nikky King PA-C    Provider assisted by: none    Patient assisted by: self    How tolerated by patient: tolerated the procedure well with no complications    Post Procedural Pain Scale: 6    Comments: none       JR Brian LUCERO PA-C, ATC

## 2020-08-14 ENCOUNTER — HOSPITAL ENCOUNTER (OUTPATIENT)
Dept: LAB | Age: 85
Discharge: HOME | End: 2020-08-14
Payer: MEDICARE

## 2020-08-14 DIAGNOSIS — N18.2 CHRONIC KIDNEY DISEASE, STAGE II (MILD): ICD-10-CM

## 2020-08-14 LAB
25(OH)D3 SERPL-MCNC: 45.9 NG/ML (ref 30–100)
ALBUMIN SERPL-MCNC: 3.6 G/DL (ref 3.4–5)
ANION GAP SERPL CALC-SCNC: 7 MMOL/L (ref 3–18)
BUN SERPL-MCNC: 30 MG/DL (ref 7–18)
BUN/CREAT SERPL: 27 (ref 12–20)
CALCIUM SERPL-MCNC: 9.1 MG/DL (ref 8.5–10.1)
CALCIUM SERPL-MCNC: 9.6 MG/DL (ref 8.5–10.1)
CHLORIDE SERPL-SCNC: 106 MMOL/L (ref 100–111)
CO2 SERPL-SCNC: 26 MMOL/L (ref 21–32)
CREAT SERPL-MCNC: 1.11 MG/DL (ref 0.6–1.3)
CREAT UR-MCNC: 62 MG/DL (ref 30–125)
ERYTHROCYTE [DISTWIDTH] IN BLOOD BY AUTOMATED COUNT: 13.5 % (ref 11.6–14.5)
GLUCOSE SERPL-MCNC: 92 MG/DL (ref 74–99)
HCT VFR BLD AUTO: 31.1 % (ref 35–45)
HGB BLD-MCNC: 10.2 G/DL (ref 12–16)
MCH RBC QN AUTO: 27.6 PG (ref 24–34)
MCHC RBC AUTO-ENTMCNC: 32.8 G/DL (ref 31–37)
MCV RBC AUTO: 84.3 FL (ref 74–97)
PHOSPHATE SERPL-MCNC: 3.1 MG/DL (ref 2.5–4.9)
PLATELET # BLD AUTO: 281 K/UL (ref 135–420)
PMV BLD AUTO: 10.3 FL (ref 9.2–11.8)
POTASSIUM SERPL-SCNC: 3.8 MMOL/L (ref 3.5–5.5)
PROT UR-MCNC: 12 MG/DL
PROT/CREAT UR-RTO: 0.2
PTH-INTACT SERPL-MCNC: 163.8 PG/ML (ref 18.4–88)
RBC # BLD AUTO: 3.69 M/UL (ref 4.2–5.3)
SODIUM SERPL-SCNC: 139 MMOL/L (ref 136–145)
WBC # BLD AUTO: 6.5 K/UL (ref 4.6–13.2)

## 2020-08-14 PROCEDURE — 80069 RENAL FUNCTION PANEL: CPT

## 2020-08-14 PROCEDURE — 85027 COMPLETE CBC AUTOMATED: CPT

## 2020-08-14 PROCEDURE — 82306 VITAMIN D 25 HYDROXY: CPT

## 2020-08-14 PROCEDURE — 36415 COLL VENOUS BLD VENIPUNCTURE: CPT

## 2020-08-14 PROCEDURE — 84156 ASSAY OF PROTEIN URINE: CPT

## 2020-08-14 PROCEDURE — 83970 ASSAY OF PARATHORMONE: CPT

## 2020-09-17 ENCOUNTER — OFFICE VISIT (OUTPATIENT)
Dept: ORTHOPEDIC SURGERY | Facility: CLINIC | Age: 85
End: 2020-09-17

## 2020-09-17 VITALS
SYSTOLIC BLOOD PRESSURE: 178 MMHG | DIASTOLIC BLOOD PRESSURE: 64 MMHG | BODY MASS INDEX: 38.58 KG/M2 | HEART RATE: 77 BPM | RESPIRATION RATE: 16 BRPM | WEIGHT: 226 LBS | TEMPERATURE: 97.3 F | HEIGHT: 64 IN | OXYGEN SATURATION: 99 %

## 2020-09-17 DIAGNOSIS — M17.12 PRIMARY OSTEOARTHRITIS OF LEFT KNEE: Primary | ICD-10-CM

## 2020-09-17 RX ORDER — BETAMETHASONE SODIUM PHOSPHATE AND BETAMETHASONE ACETATE 3; 3 MG/ML; MG/ML
6 INJECTION, SUSPENSION INTRA-ARTICULAR; INTRALESIONAL; INTRAMUSCULAR; SOFT TISSUE ONCE
Qty: 1 ML | Refills: 0
Start: 2020-09-17 | End: 2020-09-17

## 2020-09-17 NOTE — PROGRESS NOTES
[address] [place] [address]  [phone]           Patient: Juanis Mckee                MRN: [id number]       SSN: [id number]  YOB: 1935        AGE: 82 y.o. SEX: female  Body mass index is 38.79 kg/m². PCP: Fiorella Cain MD  09/17/20            REVIEW OF SYSTEMS:  Constitutional: Negative for fever, chills, weight loss and malaise/fatigue. HENT: Negative. Eyes: Negative. Respiratory: Negative. Cardiovascular: Negative. Gastrointestinal: No bowel incontinence or constipation. Genitourinary: No bladder incontinence or saddle anesthesia. Skin: Negative. Neurological: Negative. Endo/Heme/Allergies: Negative. Psychiatric/Behavioral: Negative. Musculoskeletal: As per HPI above.      Past Medical History:   Diagnosis Date    Arrhythmia     mur mur  long standing    Arthritis     Arthritis of both hips     Back pain     Balance problem     Chronic back pain     Cough     Easy bruising     Essential hypertension     GERD (gastroesophageal reflux disease)     Hiatal hernia     Hypertension     Ill-defined condition     vascular insuff rt ankle    Irregular heart beat     Left hip pain 10/8/2010    Neck pain     Nervousness     Night sweat     Osteoarthritis, knee bilateral     Pain with urination     Polymyalgia rheumatica (HCC)     Reflux     Spinal stenosis     Thromboembolus (HCC)     Trapezius strain     Trochanteric bursitis of left hip     Venous insufficiency          Current Outpatient Medications:     betamethasone (Celestone Soluspan) 6 mg/mL injection, 1 mL by Intra artICUlar route once for 1 dose., Disp: 1 mL, Rfl: 0    betamethasone (CELESTONE) 6 mg/mL injection, once., Disp: , Rfl:     triamterene-hydroCHLOROthiazide (DYAZIDE) 37.5-25 mg per capsule, TAKE 1 CAPSULE BY MOUTH ONCE DAILY 30 DAY(S), Disp: , Rfl: 2    fluocinolone acetonide oil 0.01 % drop, by Otic route., Disp: , Rfl:     celecoxib (CELEBREX) 200 mg capsule, Take 200 mg by mouth two (2) times a day., Disp: , Rfl:     ferrous sulfate 325 mg (65 mg iron) tablet, Take 1 Tab by mouth two (2) times daily (with meals). , Disp: 60 Tab, Rfl: 2    aspirin (ASPIRIN) 325 mg tablet, Take 1 Tab by mouth daily. , Disp: 30 Tab, Rfl: 0    potassium 99 mg tablet, Take 99 mg by mouth daily. , Disp: , Rfl:     tropicamide (MYDRIACYL) 1 % ophthalmic solution, Administer 2 Drops to right eye as needed. 45 minutes prior to schedule appointments, Disp: , Rfl:     cholecalciferol (VITAMIN D3) 1,000 unit tablet, Take 1,000 Units by mouth daily. 1 tab day, Disp: , Rfl:     omeprazole (PRILOSEC) 40 mg capsule, Take 40 mg by mouth daily. 1 tab, Disp: , Rfl:     aMILoride-hydrochlorothiazide (MODURETIC) 5-50 mg tab, Take 1 Tab by mouth daily. , Disp: , Rfl:     amLODIPine (NORVASC) 10 mg tablet, Take 10 mg by mouth daily. 1 tab, Disp: , Rfl: 3    losartan (COZAAR) 100 mg tablet, Take 100 mg by mouth daily. 1 tab, Disp: , Rfl:     cetirizine (ZYRTEC) 10 mg tablet, Take 10 mg by mouth daily. 1 tab daily, Disp: , Rfl:     diclofenac (VOLTAREN) 1 % gel, Apply 4 g to affected area four (4) times daily. Maximum 16 grams per joint per day. Dispense 5 100 gram tubes, Disp: 5 Each, Rfl: 0    metoclopramide HCl (REGLAN) 5 mg tablet, Take 5 mg by mouth two (2) times a day.  1 tab 2x day, Disp: , Rfl:     Allergies   Allergen Reactions    Citric Acid Unknown (comments)    Crinone [Progesterone Micronized] Unknown (comments)    Iodine Unknown (comments)    Percocet [Oxycodone-Acetaminophen] Other (comments)     Gets rebound headaches after taking Percocet       Social History     Socioeconomic History    Marital status:      Spouse name: Not on file    Number of children: Not on file    Years of education: Not on file    Highest education level: Not on file   Occupational History    Not on file   Social Needs    Financial resource strain: Not on file    Food insecurity     Worry: Not on file     Inability: Not on file    Transportation needs     Medical: Not on file     Non-medical: Not on file   Tobacco Use    Smoking status: Never Smoker    Smokeless tobacco: Never Used   Substance and Sexual Activity    Alcohol use: No    Drug use: No    Sexual activity: Never   Lifestyle    Physical activity     Days per week: Not on file     Minutes per session: Not on file    Stress: Not on file   Relationships    Social connections     Talks on phone: Not on file     Gets together: Not on file     Attends Quaker service: Not on file     Active member of club or organization: Not on file     Attends meetings of clubs or organizations: Not on file     Relationship status: Not on file    Intimate partner violence     Fear of current or ex partner: Not on file     Emotionally abused: Not on file     Physically abused: Not on file     Forced sexual activity: Not on file   Other Topics Concern    Not on file   Social History Narrative    Not on file       Past Surgical History:   Procedure Laterality Date    COLONOSCOPY N/A 7/27/2016    COLONOSCOPY w/polypectomy performed by Yolanda Diaz MD at [address] [facility] HX CHOLECYSTECTOMY      HX HEENT  06/2011    left cornea transplant    HX HYSTERECTOMY      HX KNEE REPLACEMENT Right 02/2017    HX ORTHOPAEDIC      right foot and ankle    HX ORTHOPAEDIC Right 04/2017       Patient seen evaluate today for her left knee. Does have known advanced arthritis left knee and has had injections which gave her good relief. Last injection is worn off. She has had a right knee replacement in the past in which had a patellar fracture after a fall. She had a couple surgeries to repair this and unfortunately ended with a little separation and slight extensor lag of the right knee. She is very functional with it.     Patient denies recent fevers, chills, chest pain, SOB, or injuries. No recent systemic changes noted. A 12-point review of systems is performed today. Pertinent positives are noted. All other systems reviewed and otherwise are negative. Physical exam: General: Alert and oriented x3, nad.  well-developed, well nourished. normal affect, AF. NC/AT, EOMI, neck supple, trachea midline, no JVD present. Breathing is non-labored. Examination of lower extremities reveals pain-free range of motion the hips. There is no pain to palpation the trochanter bursa. Neck straight leg raise. Negative calf tenderness. Negative Homans. No signs of DVT present. Left knee of a skin intact. There is no erythema, ecchymosis, warmth. There are no signs of infection or signs present. There is pain to palpation to the lateral joint line as well as patellofemoral grind. There is crepitus anteriorly with range activities noted. Assessment: Left knee osteoarthritis, right knee replacement    Plan: At this point, we are going to move forth a repeat cortisone injection for the left knee today. After informed consent, under aseptic conditions, with US guided assitance, the left knee was prepped with betadine and 6mg of celestone was injected without complications. The patient tolerated the injection well. The patient is instructed on post-injection care. We will see her back in 3 months time for reevaluation and x-ray of each of her knees. Chart reviewed for the following:  Breana JAMES PA-C, have reviewed the History, Physical and updated the Allergic reactions for Prakash Gains?     TIME OUT performed immediately prior to start of procedure:  Breana JAMES PA-C, have performed the following reviews on Prakash Gains prior to the start of the procedure:  ????????  * Patient was identified by name and date of birth   * Agreement on procedure being performed was verified  * Risks and Benefits explained to the patient  * Procedure site verified and marked as necessary  * Patient was positioned for comfort  * Consent was signed and verified    Time:10:29 AM    Body part: left knee    Medication & Dose: 6mg celestone    Date of procedure: 09/17/20    Procedure performed by: Marta Matute PA-C    Provider assisted by: none    Patient assisted by: self    How tolerated by patient: tolerated the procedure well with no complications    Post Procedural Pain Scale: 5    Comments: none       JR Brian LUCERO PA-C, ATC

## 2020-09-18 ENCOUNTER — HOSPITAL ENCOUNTER (OUTPATIENT)
Dept: MAMMOGRAPHY | Age: 85
Discharge: HOME | End: 2020-09-18
Attending: INTERNAL MEDICINE
Payer: MEDICARE

## 2020-09-18 DIAGNOSIS — Z12.31 VISIT FOR SCREENING MAMMOGRAM: ICD-10-CM

## 2020-09-18 PROCEDURE — 77067 SCR MAMMO BI INCL CAD: CPT

## 2021-02-05 ENCOUNTER — OFFICE VISIT (OUTPATIENT)
Dept: ORTHOPEDIC SURGERY | Age: 86
End: 2021-02-05
Payer: MEDICARE

## 2021-02-05 VITALS
BODY MASS INDEX: 37.32 KG/M2 | HEIGHT: 64 IN | SYSTOLIC BLOOD PRESSURE: 168 MMHG | RESPIRATION RATE: 16 BRPM | DIASTOLIC BLOOD PRESSURE: 57 MMHG | OXYGEN SATURATION: 98 % | HEART RATE: 85 BPM | TEMPERATURE: 97.1 F | WEIGHT: 218.6 LBS

## 2021-02-05 DIAGNOSIS — M25.562 LEFT KNEE PAIN, UNSPECIFIED CHRONICITY: ICD-10-CM

## 2021-02-05 DIAGNOSIS — M17.12 PRIMARY OSTEOARTHRITIS OF LEFT KNEE: Primary | ICD-10-CM

## 2021-02-05 PROCEDURE — 99214 OFFICE O/P EST MOD 30 MIN: CPT | Performed by: PHYSICIAN ASSISTANT

## 2021-02-05 PROCEDURE — G8417 CALC BMI ABV UP PARAM F/U: HCPCS | Performed by: PHYSICIAN ASSISTANT

## 2021-02-05 PROCEDURE — 1090F PRES/ABSN URINE INCON ASSESS: CPT | Performed by: PHYSICIAN ASSISTANT

## 2021-02-05 PROCEDURE — G8536 NO DOC ELDER MAL SCRN: HCPCS | Performed by: PHYSICIAN ASSISTANT

## 2021-02-05 PROCEDURE — 20611 DRAIN/INJ JOINT/BURSA W/US: CPT | Performed by: PHYSICIAN ASSISTANT

## 2021-02-05 PROCEDURE — 73564 X-RAY EXAM KNEE 4 OR MORE: CPT | Performed by: PHYSICIAN ASSISTANT

## 2021-02-05 PROCEDURE — G8427 DOCREV CUR MEDS BY ELIG CLIN: HCPCS | Performed by: PHYSICIAN ASSISTANT

## 2021-02-05 PROCEDURE — G8400 PT W/DXA NO RESULTS DOC: HCPCS | Performed by: PHYSICIAN ASSISTANT

## 2021-02-05 PROCEDURE — G8432 DEP SCR NOT DOC, RNG: HCPCS | Performed by: PHYSICIAN ASSISTANT

## 2021-02-05 PROCEDURE — 1101F PT FALLS ASSESS-DOCD LE1/YR: CPT | Performed by: PHYSICIAN ASSISTANT

## 2021-02-05 RX ORDER — BETAMETHASONE SODIUM PHOSPHATE AND BETAMETHASONE ACETATE 3; 3 MG/ML; MG/ML
6 INJECTION, SUSPENSION INTRA-ARTICULAR; INTRALESIONAL; INTRAMUSCULAR; SOFT TISSUE ONCE
Status: COMPLETED | OUTPATIENT
Start: 2021-02-05 | End: 2021-02-05

## 2021-02-05 RX ADMIN — BETAMETHASONE SODIUM PHOSPHATE AND BETAMETHASONE ACETATE 6 MG: 3; 3 INJECTION, SUSPENSION INTRA-ARTICULAR; INTRALESIONAL; INTRAMUSCULAR; SOFT TISSUE at 14:21

## 2021-02-05 NOTE — PROGRESS NOTES
[address] [place] [address]  [phone]           Patient: Carol Corea                MRN: [id number]       SSN: [id number]  YOB: 1935        AGE: 82 y.o. SEX: female  Body mass index is 37.52 kg/m². PCP: Cornelia Lyn MD  02/05/21            REVIEW OF SYSTEMS:  Constitutional: Negative for fever, chills, weight loss and malaise/fatigue. HENT: Negative. Eyes: Negative. Respiratory: Negative. Cardiovascular: Negative. Gastrointestinal: No bowel incontinence or constipation. Genitourinary: No bladder incontinence or saddle anesthesia. Skin: Negative. Neurological: Negative. Endo/Heme/Allergies: Negative. Psychiatric/Behavioral: Negative. Musculoskeletal: As per HPI above. Past Medical History:   Diagnosis Date    Arrhythmia     mur mur  long standing    Arthritis     Arthritis of both hips     Back pain     Balance problem     Chronic back pain     Cough     Easy bruising     Essential hypertension     GERD (gastroesophageal reflux disease)     Hiatal hernia     Hypertension     Ill-defined condition     vascular insuff rt ankle    Irregular heart beat     Left hip pain 10/8/2010    Neck pain     Nervousness     Night sweat     Osteoarthritis, knee bilateral     Pain with urination     Polymyalgia rheumatica (HCC)     Reflux     Spinal stenosis     Thromboembolus (HCC)     Trapezius strain     Trochanteric bursitis of left hip     Venous insufficiency          Current Outpatient Medications:     betamethasone (CELESTONE) 6 mg/mL injection, once., Disp: , Rfl:     triamterene-hydroCHLOROthiazide (DYAZIDE) 37.5-25 mg per capsule, TAKE 1 CAPSULE BY MOUTH ONCE DAILY 30 DAY(S), Disp: , Rfl: 2    fluocinolone acetonide oil 0.01 % drop, by Otic route., Disp: , Rfl:     diclofenac (VOLTAREN) 1 % gel, Apply 4 g to affected area four (4) times daily.  Maximum 16 grams per joint per day. Dispense 5 100 gram tubes, Disp: 5 Each, Rfl: 0    celecoxib (CELEBREX) 200 mg capsule, Take 200 mg by mouth two (2) times a day., Disp: , Rfl:     ferrous sulfate 325 mg (65 mg iron) tablet, Take 1 Tab by mouth two (2) times daily (with meals). , Disp: 60 Tab, Rfl: 2    aspirin (ASPIRIN) 325 mg tablet, Take 1 Tab by mouth daily. , Disp: 30 Tab, Rfl: 0    potassium 99 mg tablet, Take 99 mg by mouth daily. , Disp: , Rfl:     tropicamide (MYDRIACYL) 1 % ophthalmic solution, Administer 2 Drops to right eye as needed. 45 minutes prior to schedule appointments, Disp: , Rfl:     cholecalciferol (VITAMIN D3) 1,000 unit tablet, Take 1,000 Units by mouth daily. 1 tab day, Disp: , Rfl:     omeprazole (PRILOSEC) 40 mg capsule, Take 40 mg by mouth daily. 1 tab, Disp: , Rfl:     aMILoride-hydrochlorothiazide (MODURETIC) 5-50 mg tab, Take 1 Tab by mouth daily. , Disp: , Rfl:     amLODIPine (NORVASC) 10 mg tablet, Take 10 mg by mouth daily. 1 tab, Disp: , Rfl: 3    losartan (COZAAR) 100 mg tablet, Take 100 mg by mouth daily. 1 tab, Disp: , Rfl:     metoclopramide HCl (REGLAN) 5 mg tablet, Take 5 mg by mouth two (2) times a day. 1 tab 2x day, Disp: , Rfl:     cetirizine (ZYRTEC) 10 mg tablet, Take 10 mg by mouth daily.  1 tab daily, Disp: , Rfl:     Current Facility-Administered Medications:     betamethasone (CELESTONE) injection 6 mg, 6 mg, Intra artICUlar, ONCE, Regina Hall PA-C    Allergies   Allergen Reactions    Citric Acid Unknown (comments)    Crinone [Progesterone Micronized] Unknown (comments)    Iodine Unknown (comments)    Percocet [Oxycodone-Acetaminophen] Other (comments)     Gets rebound headaches after taking Percocet       Social History     Socioeconomic History    Marital status:      Spouse name: Not on file    Number of children: Not on file    Years of education: Not on file    Highest education level: Not on file   Occupational History    Not on file Social Needs    Financial resource strain: Not on file    Food insecurity     Worry: Not on file     Inability: Not on file    Transportation needs     Medical: Not on file     Non-medical: Not on file   Tobacco Use    Smoking status: Never Smoker    Smokeless tobacco: Never Used   Substance and Sexual Activity    Alcohol use: No    Drug use: No    Sexual activity: Never   Lifestyle    Physical activity     Days per week: Not on file     Minutes per session: Not on file    Stress: Not on file   Relationships    Social connections     Talks on phone: Not on file     Gets together: Not on file     Attends Confucianism service: Not on file     Active member of club or organization: Not on file     Attends meetings of clubs or organizations: Not on file     Relationship status: Not on file    Intimate partner violence     Fear of current or ex partner: Not on file     Emotionally abused: Not on file     Physically abused: Not on file     Forced sexual activity: Not on file   Other Topics Concern    Not on file   Social History Narrative    Not on file       Past Surgical History:   Procedure Laterality Date    COLONOSCOPY N/A 7/27/2016    COLONOSCOPY w/polypectomy performed by Guillermina Wilkerson MD at [address] [facility] HX CHOLECYSTECTOMY      HX HEENT  06/2011    left cornea transplant    HX HYSTERECTOMY      HX KNEE REPLACEMENT Right 02/2017    HX ORTHOPAEDIC      right foot and ankle    HX ORTHOPAEDIC Right 04/2017       Patient seen and evaluated today for mainly her left knee. She does have known advanced arthritis of the left knee and has been receiving cortisone injections which gave her moderate relief. But not as efficacious as they once were. She does have decreased walking tolerance. She has trouble with stairs. She has had a previous right knee replacement and unfortunately had a patellar fracture in which she had multiple surgeries for.   She ended up with an extensor lag on the right knee. Overall she does not have pain in the right knee. She is very functional with it. She uses a walker for ambulation. Patient denies recent fevers, chills, chest pain, SOB, or injuries. No recent systemic changes noted. A 12-point review of systems is performed today. Pertinent positives are noted. All other systems reviewed and otherwise are negative. Physical exam: General: Alert and oriented x3, nad.  well-developed, well nourished. normal affect, AF. NC/AT, EOMI, neck supple, trachea midline, no JVD present. Breathing is non-labored. Examination of lower extremities reveals pain-free range of motion hips. There is no pain to palpation the trochanteric bursa. Negative straight leg raise. Negative calf tenderness. Negative Homans. No signs of DVT present. The right knee reveals skin intact. The surgical wounds healed nicely. There is no signs of infection. She has excellent flexion. She has full extension however does have about a 10 degree extensor lag present. The defect in the patella is noted. The left knee reveals skin intact. There is no erythema, ecchymosis, warmth. There are no signs of infection or cellulitis present. She does have findings consistent with advanced arthritis of the left knee with pain to palpation tricompartmentally and crepitus arising the anterior compartment. Radiographs obtained the office today 2/5/2021 at the [facility] location including AP, tunnel, lateral, skyline of the left knee does confirm end-stage arthritis with bone-on-bone eburnation and malalignment. The right total knee components are well fixed. Assessment: #1 left knee end-stage arthritis, #2 status post right knee replacement with previous patellar fracture and extensor lag. Plan: At this point, she will continue activity as tolerated.   Unfortunately she does not have a perfect result on the right knee replacement but she is very functional with it.  I have encouraged her to continue with a walker to avoid any giving way and falls. In regards to the left knee we will move forward with a cortisone injection today. After informed consent, under aseptic conditions, with US guided assitance, the left knee was prepped with betadine and a mxiture of 3ml 1% lidocaine and 6mg of celestone was injected without complications. The patient tolerated the injection well. The patient is instructed on post-injection care. Chart reviewed for the following:  Kay JAMES PA-C, have reviewed the History, Physical and updated the Allergic reactions for Judi Snowball?     TIME OUT performed immediately prior to start of procedure:  Kay JAMES PA-C, have performed the following reviews on Judi Snowball prior to the start of the procedure:  ????????  * Patient was identified by name and date of birth   * Agreement on procedure being performed was verified  * Risks and Benefits explained to the patient  * Procedure site verified and marked as necessary  * Patient was positioned for comfort  * Consent was signed and verified    Time:2:19 PM    Body part: left knee, intra-articular    Medication & Dose: 3ml 1% lidocaine and 6mg celestone    Date of procedure: 02/05/21    Procedure performed by: Kay Velasquez PA-C    Provider assisted by: none    Patient assisted by: self    How tolerated by patient: tolerated the procedure well with no complications    Post Procedural Pain Scale: 7    Comments:  Ultrasound images captured using [address] [place] [address] [employer] Ultrasound machine and scanned into patient's chart       Shruti Marroquin PA-C, ATC

## 2021-02-08 ENCOUNTER — HOSPITAL ENCOUNTER (OUTPATIENT)
Dept: LAB | Age: 86
Discharge: HOME | End: 2021-02-08
Payer: MEDICARE

## 2021-02-08 ENCOUNTER — TRANSCRIBE ORDER (OUTPATIENT)
Dept: REGISTRATION | Age: 86
End: 2021-02-08

## 2021-02-08 DIAGNOSIS — N18.30 CHRONIC KIDNEY DISEASE, STAGE III (MODERATE) (HCC): ICD-10-CM

## 2021-02-08 DIAGNOSIS — N18.30 CHRONIC KIDNEY DISEASE, STAGE III (MODERATE) (HCC): Primary | ICD-10-CM

## 2021-02-08 LAB
ALBUMIN SERPL-MCNC: 3.6 G/DL (ref 3.4–5)
ANION GAP SERPL CALC-SCNC: 10 MMOL/L (ref 3–18)
BUN SERPL-MCNC: 35 MG/DL (ref 7–18)
BUN/CREAT SERPL: 34 (ref 12–20)
CALCIUM SERPL-MCNC: 8.9 MG/DL (ref 8.5–10.1)
CALCIUM SERPL-MCNC: 9.2 MG/DL (ref 8.5–10.1)
CHLORIDE SERPL-SCNC: 109 MMOL/L (ref 100–111)
CO2 SERPL-SCNC: 27 MMOL/L (ref 21–32)
CREAT SERPL-MCNC: 1.04 MG/DL (ref 0.6–1.3)
ERYTHROCYTE [DISTWIDTH] IN BLOOD BY AUTOMATED COUNT: 14.6 % (ref 11.6–14.5)
GLUCOSE SERPL-MCNC: 87 MG/DL (ref 74–99)
HCT VFR BLD AUTO: 31.7 % (ref 35–45)
HGB BLD-MCNC: 10.2 G/DL (ref 12–16)
MCH RBC QN AUTO: 27.5 PG (ref 24–34)
MCHC RBC AUTO-ENTMCNC: 32.2 G/DL (ref 31–37)
MCV RBC AUTO: 85.4 FL (ref 74–97)
PHOSPHATE SERPL-MCNC: 3.2 MG/DL (ref 2.5–4.9)
PLATELET # BLD AUTO: 320 K/UL (ref 135–420)
PMV BLD AUTO: 10.4 FL (ref 9.2–11.8)
POTASSIUM SERPL-SCNC: 4.4 MMOL/L (ref 3.5–5.5)
PTH-INTACT SERPL-MCNC: 224.7 PG/ML (ref 18.4–88)
RBC # BLD AUTO: 3.71 M/UL (ref 4.2–5.3)
SODIUM SERPL-SCNC: 146 MMOL/L (ref 136–145)
WBC # BLD AUTO: 10.4 K/UL (ref 4.6–13.2)

## 2021-02-08 PROCEDURE — 36415 COLL VENOUS BLD VENIPUNCTURE: CPT

## 2021-02-08 PROCEDURE — 80069 RENAL FUNCTION PANEL: CPT

## 2021-02-08 PROCEDURE — 83970 ASSAY OF PARATHORMONE: CPT

## 2021-02-08 PROCEDURE — 85027 COMPLETE CBC AUTOMATED: CPT

## 2021-03-25 NOTE — PROGRESS NOTES
Dr ordered maalox 10ml 3x a day prn for acid reflux per telephone with readback. Please refill adderall 20mg and adderall xr 20mg pt was last seen in 7/2020 pt made an appt. For June 7 @8am

## 2021-05-06 ENCOUNTER — OFFICE VISIT (OUTPATIENT)
Dept: ORTHOPEDIC SURGERY | Age: 86
End: 2021-05-06
Payer: MEDICARE

## 2021-05-06 VITALS
OXYGEN SATURATION: 100 % | TEMPERATURE: 97.1 F | BODY MASS INDEX: 37.52 KG/M2 | HEIGHT: 64 IN | HEART RATE: 72 BPM | RESPIRATION RATE: 16 BRPM

## 2021-05-06 DIAGNOSIS — M17.12 PRIMARY OSTEOARTHRITIS OF LEFT KNEE: Primary | ICD-10-CM

## 2021-05-06 PROCEDURE — 20611 DRAIN/INJ JOINT/BURSA W/US: CPT | Performed by: PHYSICIAN ASSISTANT

## 2021-05-06 PROCEDURE — 1101F PT FALLS ASSESS-DOCD LE1/YR: CPT | Performed by: PHYSICIAN ASSISTANT

## 2021-05-06 PROCEDURE — G8400 PT W/DXA NO RESULTS DOC: HCPCS | Performed by: PHYSICIAN ASSISTANT

## 2021-05-06 PROCEDURE — 99214 OFFICE O/P EST MOD 30 MIN: CPT | Performed by: PHYSICIAN ASSISTANT

## 2021-05-06 PROCEDURE — G8427 DOCREV CUR MEDS BY ELIG CLIN: HCPCS | Performed by: PHYSICIAN ASSISTANT

## 2021-05-06 PROCEDURE — G8536 NO DOC ELDER MAL SCRN: HCPCS | Performed by: PHYSICIAN ASSISTANT

## 2021-05-06 PROCEDURE — G8417 CALC BMI ABV UP PARAM F/U: HCPCS | Performed by: PHYSICIAN ASSISTANT

## 2021-05-06 PROCEDURE — 1090F PRES/ABSN URINE INCON ASSESS: CPT | Performed by: PHYSICIAN ASSISTANT

## 2021-05-06 PROCEDURE — G8432 DEP SCR NOT DOC, RNG: HCPCS | Performed by: PHYSICIAN ASSISTANT

## 2021-05-06 RX ORDER — BETAMETHASONE SODIUM PHOSPHATE AND BETAMETHASONE ACETATE 3; 3 MG/ML; MG/ML
6 INJECTION, SUSPENSION INTRA-ARTICULAR; INTRALESIONAL; INTRAMUSCULAR; SOFT TISSUE ONCE
Status: COMPLETED | OUTPATIENT
Start: 2021-05-06 | End: 2021-05-06

## 2021-05-06 RX ADMIN — BETAMETHASONE SODIUM PHOSPHATE AND BETAMETHASONE ACETATE 6 MG: 3; 3 INJECTION, SUSPENSION INTRA-ARTICULAR; INTRALESIONAL; INTRAMUSCULAR; SOFT TISSUE at 11:31

## 2021-05-06 NOTE — PROGRESS NOTES
[address] [place] [address]  [phone]           Patient: Jadon Cox                MRN: [id number]       SSN: [id number]  YOB: 1935        AGE: 82 y.o. SEX: female  Body mass index is 37.52 kg/m². PCP: Gisele Yost MD  05/06/21            REVIEW OF SYSTEMS:  Constitutional: Negative for fever, chills, weight loss and malaise/fatigue. HENT: Negative. Eyes: Negative. Respiratory: Negative. Cardiovascular: Negative. Gastrointestinal: No bowel incontinence or constipation. Genitourinary: No bladder incontinence or saddle anesthesia. Skin: Negative. Neurological: Negative. Endo/Heme/Allergies: Negative. Psychiatric/Behavioral: Negative. Musculoskeletal: As per HPI above. Past Medical History:   Diagnosis Date    Arrhythmia     mur mur  long standing    Arthritis     Arthritis of both hips     Back pain     Balance problem     Chronic back pain     Cough     Easy bruising     Essential hypertension     GERD (gastroesophageal reflux disease)     Hiatal hernia     Hypertension     Ill-defined condition     vascular insuff rt ankle    Irregular heart beat     Left hip pain 10/8/2010    Neck pain     Nervousness     Night sweat     Osteoarthritis, knee bilateral     Pain with urination     Polymyalgia rheumatica (HCC)     Reflux     Spinal stenosis     Thromboembolus (HCC)     Trapezius strain     Trochanteric bursitis of left hip     Venous insufficiency          Current Outpatient Medications:     betamethasone (CELESTONE) 6 mg/mL injection, once., Disp: , Rfl:     triamterene-hydroCHLOROthiazide (DYAZIDE) 37.5-25 mg per capsule, TAKE 1 CAPSULE BY MOUTH ONCE DAILY 30 DAY(S), Disp: , Rfl: 2    fluocinolone acetonide oil 0.01 % drop, by Otic route., Disp: , Rfl:     diclofenac (VOLTAREN) 1 % gel, Apply 4 g to affected area four (4) times daily.  Maximum 16 grams per joint per day. Dispense 5 100 gram tubes, Disp: 5 Each, Rfl: 0    celecoxib (CELEBREX) 200 mg capsule, Take 200 mg by mouth two (2) times a day., Disp: , Rfl:     ferrous sulfate 325 mg (65 mg iron) tablet, Take 1 Tab by mouth two (2) times daily (with meals). , Disp: 60 Tab, Rfl: 2    aspirin (ASPIRIN) 325 mg tablet, Take 1 Tab by mouth daily. , Disp: 30 Tab, Rfl: 0    potassium 99 mg tablet, Take 99 mg by mouth daily. , Disp: , Rfl:     tropicamide (MYDRIACYL) 1 % ophthalmic solution, Administer 2 Drops to right eye as needed. 45 minutes prior to schedule appointments, Disp: , Rfl:     cholecalciferol (VITAMIN D3) 1,000 unit tablet, Take 1,000 Units by mouth daily. 1 tab day, Disp: , Rfl:     omeprazole (PRILOSEC) 40 mg capsule, Take 40 mg by mouth daily. 1 tab, Disp: , Rfl:     aMILoride-hydrochlorothiazide (MODURETIC) 5-50 mg tab, Take 1 Tab by mouth daily. , Disp: , Rfl:     amLODIPine (NORVASC) 10 mg tablet, Take 10 mg by mouth daily. 1 tab, Disp: , Rfl: 3    losartan (COZAAR) 100 mg tablet, Take 100 mg by mouth daily. 1 tab, Disp: , Rfl:     metoclopramide HCl (REGLAN) 5 mg tablet, Take 5 mg by mouth two (2) times a day. 1 tab 2x day, Disp: , Rfl:     cetirizine (ZYRTEC) 10 mg tablet, Take 10 mg by mouth daily.  1 tab daily, Disp: , Rfl:     Allergies   Allergen Reactions    Citric Acid Unknown (comments)    Crinone [Progesterone Micronized] Unknown (comments)    Iodine Unknown (comments)    Percocet [Oxycodone-Acetaminophen] Other (comments)     Gets rebound headaches after taking Percocet       Social History     Socioeconomic History    Marital status:      Spouse name: Not on file    Number of children: Not on file    Years of education: Not on file    Highest education level: Not on file   Occupational History    Not on file   Social Needs    Financial resource strain: Not on file    Food insecurity     Worry: Not on file     Inability: Not on file   [email or web address] needs     Medical: Not on file     Non-medical: Not on file   Tobacco Use    Smoking status: Never Smoker    Smokeless tobacco: Never Used   Substance and Sexual Activity    Alcohol use: No    Drug use: No    Sexual activity: Never   Lifestyle    Physical activity     Days per week: Not on file     Minutes per session: Not on file    Stress: Not on file   Relationships    Social connections     Talks on phone: Not on file     Gets together: Not on file     Attends Protestant service: Not on file     Active member of club or organization: Not on file     Attends meetings of clubs or organizations: Not on file     Relationship status: Not on file    Intimate partner violence     Fear of current or ex partner: Not on file     Emotionally abused: Not on file     Physically abused: Not on file     Forced sexual activity: Not on file   Other Topics Concern    Not on file   Social History Narrative    Not on file       Past Surgical History:   Procedure Laterality Date    COLONOSCOPY N/A 7/27/2016    COLONOSCOPY w/polypectomy performed by Silke Reddy MD at [address] [facility] HX CHOLECYSTECTOMY      HX HEENT  06/2011    left cornea transplant    HX HYSTERECTOMY      HX KNEE REPLACEMENT Right 02/2017    HX ORTHOPAEDIC      right foot and ankle    HX ORTHOPAEDIC Right 04/2017       Patient seen and evaluated today for bilateral knees. She had a previous right knee replacement. Did have a fragmentation of the patella which is fixed multiple times. She ended up with a bit of a lag which she is very functional with. The left knee does have known end-stage arthritis and she is requesting a repeat injection today. Injections continue to work well for her. She has decreased walking tolerance. She has trouble stairs. She has trouble getting from a chair. Patient denies recent fevers, chills, chest pain, SOB, or injuries. No recent systemic changes noted.   A 12-point review of systems is performed today. Pertinent positives are noted. All other systems reviewed and otherwise are negative. Physical exam: General: Alert and oriented x3, nad.  well-developed, well nourished. normal affect, AF. NC/AT, EOMI, neck supple, trachea midline, no JVD present. Breathing is non-labored. Examination lower extremities reveals pain-free range of the hips. There is no pain to palpation trochanter bursa. Neck straight leg raise. Negative calf tenderness. Negative Homans. No signs of DVT present. Left knee reveals skin intact. There is no erythema, ecchymosis, warmth. There are no signs of infection or cellulitis present. Findings are consistent with advanced arthritis of the left knee with pain to palpation tricompartmentally and crepitus arising anterior compartment. The right knee reveals skin intact. There is no erythema, ecchymosis, warmth. There are no signs of infection or cellulitis present. She does have about a 8 degree extensor lag. Good flexion. Good stability. No pain to palpation. Assessment: #1 left knee end-stage arthritis, #2 right knee replacement with chronic patellar fracture. Plan: At this point, she will continue activity as tolerated regarding her right knee. She is very functional with it. Regarding her left knee we will move with a cortisone injection today. After informed consent, under aseptic conditions, with US guided assitance, the left knee was prepped with betadine and a mxiture of 3ml 1% lidocaine and 6mg of celestone was injected without complications. The patient tolerated the injection well. The patient is instructed on post-injection care. We discussed surgical intervention and given her advanced age and efficacy of conservative treatment we will continue with conservative treatment. We will see her back in 3 months time.         Chart reviewed for the following:  Gerson JAMES PA-C, have reviewed the History, Physical and updated the Allergic reactions for Brett Shelby? TIME OUT performed immediately prior to start of procedure:  I, Jama Serrano PA-C, have performed the following reviews on Brett Shelby prior to the start of the procedure:  ????????  * Patient was identified by name and date of birth   * Agreement on procedure being performed was verified  * Risks and Benefits explained to the patient  * Procedure site verified and marked as necessary  * Patient was positioned for comfort  * Consent was signed and verified    Time:11:20 AM    Body part: left knee, intra-articular    Medication & Dose: 3ml 1% lidocaine and 6mg celestone    Date of procedure: 05/06/21    Procedure performed by: Jama Serrano PA-C    Provider assisted by: none    Patient assisted by: self    How tolerated by patient: tolerated the procedure well with no complications    Post Procedural Pain Scale: 2    Comments:   [address] using a frequency of 10MHz with a 12L-RS transducer head was used to confirm needle placement.   Ultrasound images captured using [address] Ultrasound machine and scanned into patient's chart       Campos Simon PA-C, ATC

## 2021-07-15 ENCOUNTER — HOSPITAL ENCOUNTER (OUTPATIENT)
Dept: LAB | Age: 86
Discharge: HOME | End: 2021-07-15
Payer: MEDICARE

## 2021-07-15 ENCOUNTER — TRANSCRIBE ORDER (OUTPATIENT)
Dept: REGISTRATION | Age: 86
End: 2021-07-15

## 2021-07-15 DIAGNOSIS — E55.9 VITAMIN D DEFICIENCY: ICD-10-CM

## 2021-07-15 DIAGNOSIS — N18.2 CHRONIC KIDNEY DISEASE, STAGE II (MILD): Primary | ICD-10-CM

## 2021-07-15 DIAGNOSIS — N18.2 CHRONIC KIDNEY DISEASE, STAGE II (MILD): ICD-10-CM

## 2021-07-15 LAB
25(OH)D3 SERPL-MCNC: 62 NG/ML (ref 30–100)
ALBUMIN SERPL-MCNC: 3.6 G/DL (ref 3.4–5)
ANION GAP SERPL CALC-SCNC: 5 MMOL/L (ref 3–18)
BUN SERPL-MCNC: 24 MG/DL (ref 7–18)
BUN/CREAT SERPL: 24 (ref 12–20)
CALCIUM SERPL-MCNC: 9.1 MG/DL (ref 8.5–10.1)
CALCIUM SERPL-MCNC: 9.3 MG/DL (ref 8.5–10.1)
CHLORIDE SERPL-SCNC: 105 MMOL/L (ref 100–111)
CO2 SERPL-SCNC: 29 MMOL/L (ref 21–32)
CREAT SERPL-MCNC: 1.02 MG/DL (ref 0.6–1.3)
CREAT UR-MCNC: 257 MG/DL (ref 30–125)
ERYTHROCYTE [DISTWIDTH] IN BLOOD BY AUTOMATED COUNT: 13.6 % (ref 11.6–14.5)
GLUCOSE SERPL-MCNC: 92 MG/DL (ref 74–99)
HCT VFR BLD AUTO: 30.1 % (ref 35–45)
HGB BLD-MCNC: 9.6 G/DL (ref 12–16)
MCH RBC QN AUTO: 27 PG (ref 24–34)
MCHC RBC AUTO-ENTMCNC: 31.9 G/DL (ref 31–37)
MCV RBC AUTO: 84.8 FL (ref 74–97)
PHOSPHATE SERPL-MCNC: 3.3 MG/DL (ref 2.5–4.9)
PLATELET # BLD AUTO: 285 K/UL (ref 135–420)
PMV BLD AUTO: 10 FL (ref 9.2–11.8)
POTASSIUM SERPL-SCNC: 4 MMOL/L (ref 3.5–5.5)
PROT UR-MCNC: 39 MG/DL
PROT/CREAT UR-RTO: 0.2
PTH-INTACT SERPL-MCNC: 201.7 PG/ML (ref 18.4–88)
RBC # BLD AUTO: 3.55 M/UL (ref 4.2–5.3)
SODIUM SERPL-SCNC: 139 MMOL/L (ref 136–145)
WBC # BLD AUTO: 6.8 K/UL (ref 4.6–13.2)

## 2021-07-15 PROCEDURE — 82306 VITAMIN D 25 HYDROXY: CPT

## 2021-07-15 PROCEDURE — 36415 COLL VENOUS BLD VENIPUNCTURE: CPT

## 2021-07-15 PROCEDURE — 84156 ASSAY OF PROTEIN URINE: CPT

## 2021-07-15 PROCEDURE — 83970 ASSAY OF PARATHORMONE: CPT

## 2021-07-15 PROCEDURE — 80069 RENAL FUNCTION PANEL: CPT

## 2021-07-15 PROCEDURE — 85027 COMPLETE CBC AUTOMATED: CPT

## 2021-08-12 ENCOUNTER — OFFICE VISIT (OUTPATIENT)
Dept: ORTHOPEDIC SURGERY | Age: 86
End: 2021-08-12
Payer: MEDICARE

## 2021-08-12 VITALS
OXYGEN SATURATION: 99 % | HEIGHT: 64 IN | TEMPERATURE: 97.7 F | WEIGHT: 215 LBS | BODY MASS INDEX: 36.7 KG/M2 | HEART RATE: 83 BPM

## 2021-08-12 DIAGNOSIS — M17.12 PRIMARY OSTEOARTHRITIS OF LEFT KNEE: Primary | ICD-10-CM

## 2021-08-12 PROCEDURE — G8427 DOCREV CUR MEDS BY ELIG CLIN: HCPCS | Performed by: PHYSICIAN ASSISTANT

## 2021-08-12 PROCEDURE — G8417 CALC BMI ABV UP PARAM F/U: HCPCS | Performed by: PHYSICIAN ASSISTANT

## 2021-08-12 PROCEDURE — 1101F PT FALLS ASSESS-DOCD LE1/YR: CPT | Performed by: PHYSICIAN ASSISTANT

## 2021-08-12 PROCEDURE — G8432 DEP SCR NOT DOC, RNG: HCPCS | Performed by: PHYSICIAN ASSISTANT

## 2021-08-12 PROCEDURE — 1090F PRES/ABSN URINE INCON ASSESS: CPT | Performed by: PHYSICIAN ASSISTANT

## 2021-08-12 PROCEDURE — G8536 NO DOC ELDER MAL SCRN: HCPCS | Performed by: PHYSICIAN ASSISTANT

## 2021-08-12 PROCEDURE — G8400 PT W/DXA NO RESULTS DOC: HCPCS | Performed by: PHYSICIAN ASSISTANT

## 2021-08-12 PROCEDURE — 20611 DRAIN/INJ JOINT/BURSA W/US: CPT | Performed by: PHYSICIAN ASSISTANT

## 2021-08-12 PROCEDURE — 99214 OFFICE O/P EST MOD 30 MIN: CPT | Performed by: PHYSICIAN ASSISTANT

## 2021-08-12 RX ORDER — BETAMETHASONE SODIUM PHOSPHATE AND BETAMETHASONE ACETATE 3; 3 MG/ML; MG/ML
6 INJECTION, SUSPENSION INTRA-ARTICULAR; INTRALESIONAL; INTRAMUSCULAR; SOFT TISSUE ONCE
Status: COMPLETED | OUTPATIENT
Start: 2021-08-12 | End: 2021-08-12

## 2021-08-12 RX ADMIN — BETAMETHASONE SODIUM PHOSPHATE AND BETAMETHASONE ACETATE 6 MG: 3; 3 INJECTION, SUSPENSION INTRA-ARTICULAR; INTRALESIONAL; INTRAMUSCULAR; SOFT TISSUE at 11:41

## 2021-08-12 NOTE — PROGRESS NOTES
[address] [place] [address]  [phone]           Patient: Freddie Rodriguez                MRN: [id number]       SSN: [id number]  YOB: 1935        AGE: 82 y.o. SEX: female  Body mass index is 36.9 kg/m². PCP: Jessa Stubbs MD  08/12/21            REVIEW OF SYSTEMS:  Constitutional: Negative for fever, chills, weight loss and malaise/fatigue. HENT: Negative. Eyes: Negative. Respiratory: Negative. Cardiovascular: Negative. Gastrointestinal: No bowel incontinence or constipation. Genitourinary: No bladder incontinence or saddle anesthesia. Skin: Negative. Neurological: Negative. Endo/Heme/Allergies: Negative. Psychiatric/Behavioral: Negative. Musculoskeletal: As per HPI above.      Past Medical History:   Diagnosis Date    Arrhythmia     mur mur  long standing    Arthritis     Arthritis of both hips     Back pain     Balance problem     Chronic back pain     Cough     Easy bruising     Essential hypertension     GERD (gastroesophageal reflux disease)     Hiatal hernia     Hypertension     Ill-defined condition     vascular insuff rt ankle    Irregular heart beat     Left hip pain 10/8/2010    Neck pain     Nervousness     Night sweat     Osteoarthritis, knee bilateral     Pain with urination     Polymyalgia rheumatica (HCC)     Reflux     Spinal stenosis     Thromboembolus (HCC)     Trapezius strain     Trochanteric bursitis of left hip     Venous insufficiency          Current Outpatient Medications:     betamethasone (CELESTONE) 6 mg/mL injection, once., Disp: , Rfl:     triamterene-hydroCHLOROthiazide (DYAZIDE) 37.5-25 mg per capsule, TAKE 1 CAPSULE BY MOUTH ONCE DAILY 30 DAY(S), Disp: , Rfl: 2    fluocinolone acetonide oil 0.01 % drop, by Otic route., Disp: , Rfl:     celecoxib (CELEBREX) 200 mg capsule, Take 200 mg by mouth two (2) times a day., Disp: , Rfl:   ferrous sulfate 325 mg (65 mg iron) tablet, Take 1 Tab by mouth two (2) times daily (with meals). , Disp: 60 Tab, Rfl: 2    aspirin (ASPIRIN) 325 mg tablet, Take 1 Tab by mouth daily. , Disp: 30 Tab, Rfl: 0    potassium 99 mg tablet, Take 99 mg by mouth daily. , Disp: , Rfl:     tropicamide (MYDRIACYL) 1 % ophthalmic solution, Administer 2 Drops to right eye as needed. 45 minutes prior to schedule appointments, Disp: , Rfl:     cholecalciferol (VITAMIN D3) 1,000 unit tablet, Take 1,000 Units by mouth daily. 1 tab day, Disp: , Rfl:     omeprazole (PRILOSEC) 40 mg capsule, Take 40 mg by mouth daily. 1 tab, Disp: , Rfl:     aMILoride-hydrochlorothiazide (MODURETIC) 5-50 mg tab, Take 1 Tab by mouth daily. , Disp: , Rfl:     amLODIPine (NORVASC) 10 mg tablet, Take 10 mg by mouth daily. 1 tab, Disp: , Rfl: 3    losartan (COZAAR) 100 mg tablet, Take 100 mg by mouth daily. 1 tab, Disp: , Rfl:     metoclopramide HCl (REGLAN) 5 mg tablet, Take 5 mg by mouth two (2) times a day. 1 tab 2x day, Disp: , Rfl:     cetirizine (ZYRTEC) 10 mg tablet, Take 10 mg by mouth daily. 1 tab daily, Disp: , Rfl:     diclofenac (VOLTAREN) 1 % gel, Apply 4 g to affected area four (4) times daily. Maximum 16 grams per joint per day.  Dispense 5 100 gram tubes (Patient not taking: Reported on 8/12/2021), Disp: 5 Each, Rfl: 0    Allergies   Allergen Reactions    Citric Acid Unknown (comments)    Crinone [Progesterone Micronized] Unknown (comments)    Iodine Unknown (comments)    Percocet [Oxycodone-Acetaminophen] Other (comments)     Gets rebound headaches after taking Percocet       Social History     Socioeconomic History    Marital status:      Spouse name: Not on file    Number of children: Not on file    Years of education: Not on file    Highest education level: Not on file   Occupational History    Not on file   Tobacco Use    Smoking status: Never Smoker    Smokeless tobacco: Never Used   Substance and Sexual Activity    Alcohol use: No    Drug use: No    Sexual activity: Never   Other Topics Concern    Not on file   Social History Narrative    Not on file     Social Determinants of Health     Financial Resource Strain:     Difficulty of Paying Living Expenses:    Food Insecurity:     Worried About Running Out of Food in the Last Year:     [address] in the Last Year:    Transportation Needs:     Lack of Transportation (Medical):  Lack of Transportation (Non-Medical):    Physical Activity:     Days of Exercise per Week:     Minutes of Exercise per Session:    Stress:     Feeling of Stress :    Social Connections:     Frequency of Communication with Friends and Family:     Frequency of Social Gatherings with Friends and Family:     Attends [employer] Services:     Active Member of Clubs or Organizations:     Attends Club or Organization Meetings:     Marital Status:    Intimate Partner Violence:     Fear of Current or Ex-Partner:     Emotionally Abused:     Physically Abused:     Sexually Abused:        Past Surgical History:   Procedure Laterality Date    COLONOSCOPY N/A 7/27/2016    COLONOSCOPY w/polypectomy performed by Bentley Ayala MD at [address] [address] HX CHOLECYSTECTOMY      HX HEENT  06/2011    left cornea transplant    HX HYSTERECTOMY      HX KNEE REPLACEMENT Right 02/2017    HX ORTHOPAEDIC      right foot and ankle    HX ORTHOPAEDIC Right 04/2017       Patient seen and evaluated today for bilateral knees. She had a right total knee placement in the past.  She overall is done well. She did have a patellar fracture which eventually went on to displace and heal.  She had multiple surgeries on this. Left knee does have known advanced osteoarthritis. Injections have been efficacious for her. She has worsening discomfort in her knee. She has decreased walking tolerance. Has trouble stairs. She has pain getting from a chair.   She has pain at night. Patient denies recent fevers, chills, chest pain, SOB, or injuries. No recent systemic changes noted. A 12-point review of systems is performed today. Pertinent positives are noted. All other systems reviewed and otherwise are negative. Physical exam: General: Alert and oriented x3, nad.  well-developed, well nourished. normal affect, AF. NC/AT, EOMI, neck supple, trachea midline, no JVD present. Breathing is non-labored. Examination of lower extremities reveals pain-free range of motion hips. There is no pain to palpation trochanter bursa. Neck straight leg raise. Negative calf tenderness. Negative Homans. No signs of DVT present. Examination of the right knee reveals skin intact or there is no erythema, ecchymosis or warmth noted. There are no signs of infection or cellulitis present. She has good stability. She does have approximate 15 degree extensor lag. She has good flexion. The left knee reveals skin intact. There is no erythema, ecchymosis or warmth noted. There are no signs of infection or signs present. Findings are consistent with advanced arthritis of the left knee with pain to palpation tricompartmentally and crepitus arising into compartment. Assessment: #1 left knee end-stage arthritis, #2 status post right total replacement with chronic patella fracture and extensor lag. Plan: At this point, we discussed treatments. In regards to the right knee she has been very functional with the results which are less than perfect. In regards to the left knee we will move with a cortisone injection today. After informed consent, under aseptic conditions, with US guided assitance, the left knee was prepped with betadine and a mxiture of 3ml 1% lidocaine and 6mg of celestone was injected without complications. The patient tolerated the injection well. The patient is instructed on post-injection care.   Surgical intervention was discussed and elected against given her advanced age and efficacy of conservative treatment. She will continue on the walker for ambulation. We will see her back in 3 months time for evaluation. She will continue with Voltaren gel. Chart reviewed for the following:  Radha JAMES PA-C, have reviewed the History, Physical and updated the Allergic reactions for Taylor Falcon? TIME OUT performed immediately prior to start of procedure:  Radha JAMES PA-C, have performed the following reviews on Taylor Falcon prior to the start of the procedure:  ????????  * Patient was identified by name and date of birth   * Agreement on procedure being performed was verified  * Risks and Benefits explained to the patient  * Procedure site verified and marked as necessary  * Patient was positioned for comfort  * Consent was signed and verified    Time:11:13 AM    Body part: left knee, intra-articular    Medication & Dose: 3ml 1% lidocaine and 6mg celestone    Date of procedure: 08/12/21    Procedure performed by: Radha Fish PA-C    Provider assisted by: none    Patient assisted by: self    How tolerated by patient: tolerated the procedure well with no complications    Post Procedural Pain Scale: 5    Comments:   701 [employer] using a frequency of 10MHz with a 12L-RS transducer head was used to confirm needle placement.   Ultrasound images captured using 701 [employer] Voice Loop Ultrasound machine and scanned into patient's chart       Reginaldo Wen PA-C, ATC

## 2021-09-02 ENCOUNTER — TRANSCRIBE ORDER (OUTPATIENT)
Dept: SCHEDULING | Age: 86
End: 2021-09-02

## 2021-09-02 DIAGNOSIS — Z12.31 VISIT FOR SCREENING MAMMOGRAM: Primary | ICD-10-CM

## 2021-09-21 ENCOUNTER — HOSPITAL ENCOUNTER (OUTPATIENT)
Dept: MAMMOGRAPHY | Age: 86
Discharge: HOME | End: 2021-09-21
Attending: INTERNAL MEDICINE
Payer: MEDICARE

## 2021-09-21 DIAGNOSIS — Z12.31 VISIT FOR SCREENING MAMMOGRAM: ICD-10-CM

## 2021-09-21 PROCEDURE — 77063 BREAST TOMOSYNTHESIS BI: CPT

## 2021-11-04 ENCOUNTER — OFFICE VISIT (OUTPATIENT)
Dept: CARDIOLOGY CLINIC | Age: 86
End: 2021-11-04
Payer: MEDICARE

## 2021-11-04 VITALS
HEART RATE: 77 BPM | HEIGHT: 64 IN | SYSTOLIC BLOOD PRESSURE: 158 MMHG | OXYGEN SATURATION: 99 % | DIASTOLIC BLOOD PRESSURE: 49 MMHG | BODY MASS INDEX: 36.19 KG/M2 | WEIGHT: 212 LBS

## 2021-11-04 DIAGNOSIS — I10 ESSENTIAL HYPERTENSION: ICD-10-CM

## 2021-11-04 DIAGNOSIS — R01.1 MURMUR, CARDIAC: Primary | ICD-10-CM

## 2021-11-04 DIAGNOSIS — I50.9 CHRONIC CONGESTIVE HEART FAILURE, UNSPECIFIED HEART FAILURE TYPE (HCC): ICD-10-CM

## 2021-11-04 DIAGNOSIS — E66.01 SEVERE OBESITY (BMI 35.0-39.9) WITH COMORBIDITY (HCC): ICD-10-CM

## 2021-11-04 PROCEDURE — 1090F PRES/ABSN URINE INCON ASSESS: CPT | Performed by: INTERNAL MEDICINE

## 2021-11-04 PROCEDURE — G8432 DEP SCR NOT DOC, RNG: HCPCS | Performed by: INTERNAL MEDICINE

## 2021-11-04 PROCEDURE — 99214 OFFICE O/P EST MOD 30 MIN: CPT | Performed by: INTERNAL MEDICINE

## 2021-11-04 PROCEDURE — 1101F PT FALLS ASSESS-DOCD LE1/YR: CPT | Performed by: INTERNAL MEDICINE

## 2021-11-04 PROCEDURE — G8427 DOCREV CUR MEDS BY ELIG CLIN: HCPCS | Performed by: INTERNAL MEDICINE

## 2021-11-04 PROCEDURE — G8400 PT W/DXA NO RESULTS DOC: HCPCS | Performed by: INTERNAL MEDICINE

## 2021-11-04 PROCEDURE — G8536 NO DOC ELDER MAL SCRN: HCPCS | Performed by: INTERNAL MEDICINE

## 2021-11-04 PROCEDURE — 93000 ELECTROCARDIOGRAM COMPLETE: CPT | Performed by: INTERNAL MEDICINE

## 2021-11-04 PROCEDURE — G8417 CALC BMI ABV UP PARAM F/U: HCPCS | Performed by: INTERNAL MEDICINE

## 2021-11-04 RX ORDER — CHLORTHALIDONE 25 MG/1
25 TABLET ORAL DAILY
Qty: 90 TABLET | Refills: 1 | Status: SHIPPED | OUTPATIENT
Start: 2021-11-04 | End: 2022-06-07

## 2021-11-04 NOTE — PROGRESS NOTES
HISTORY OF PRESENT ILLNESS  James Parry is a 82 y.o. female. 7/21 seen after many years due to history of heart murmur        Leg Swelling  The history is provided by the patient. This is a chronic problem. The current episode started more than 1 week ago (yrs). The problem occurs daily. Pertinent negatives include no chest pain, no headaches and no shortness of breath. The symptoms are aggravated by standing. The symptoms are relieved by sleep and medications (support stocking). Review of Systems   Constitutional: Negative for chills, fever, malaise/fatigue and weight loss. HENT: Negative for nosebleeds. Eyes: Negative for discharge. Respiratory: Negative for cough, shortness of breath and wheezing. Cardiovascular: Positive for leg swelling. Negative for chest pain, palpitations, orthopnea, claudication and PND. Gastrointestinal: Negative for diarrhea, nausea and vomiting. Genitourinary: Negative for dysuria and hematuria. Musculoskeletal: Negative for joint pain. Skin: Negative for rash. Neurological: Negative for dizziness, seizures, loss of consciousness and headaches. Endo/Heme/Allergies: Negative for polydipsia. Does not bruise/bleed easily. Psychiatric/Behavioral: Negative for depression and substance abuse. The patient does not have insomnia. 11/15 nuclear stress  Conclusion:   1. Normal perfusion scan. 2. Normal wall motion and ejection fraction. 3. Low risk scan. Physical Exam  Constitutional:       General: She is not in acute distress. Appearance: She is well-developed. HENT:      Head: Normocephalic and atraumatic. Mouth/Throat:      Dentition: Normal dentition. Eyes:      General: No scleral icterus. Right eye: No discharge. Left eye: No discharge. Neck:      Thyroid: No thyromegaly. Vascular: No carotid bruit or JVD. Cardiovascular:      Rate and Rhythm: Normal rate and regular rhythm.       Pulses: Intact distal pulses. Heart sounds: S1 normal and S2 normal. Murmur heard. Harsh midsystolic murmur is present with a grade of 3/6 at the upper right sternal border radiating to the neck. High-pitched blowing midsystolic murmur of grade 2/6 is also present at the apex. No friction rub. No gallop. Pulmonary:      Effort: Pulmonary effort is normal.      Breath sounds: Normal breath sounds. No wheezing or rales. Abdominal:      Palpations: Abdomen is soft. There is no mass. Tenderness: There is no abdominal tenderness. Musculoskeletal:      Cervical back: Neck supple. Right lower leg: Edema (1) present. Left lower leg: Edema (1) present. Lymphadenopathy:      Cervical:      Right cervical: No superficial cervical adenopathy. Left cervical: No superficial cervical adenopathy. Skin:     General: Skin is warm and dry. Findings: No rash. Neurological:      Mental Status: She is alert and oriented to person, place, and time. Psychiatric:         Behavior: Behavior normal.         ASSESSMENT and PLAN      Diagnoses and all orders for this visit:    1. Murmur, cardiac  -     ECHO ADULT COMPLETE; Future  -     AMB POC EKG ROUTINE W/ 12 LEADS, INTER & REP    2. Essential hypertension  -     chlorthalidone (HYGROTON) 25 mg tablet; Take 1 Tablet by mouth daily.  -     METABOLIC PANEL, BASIC; Future  -     LIPID PANEL; Future  -     HEPATIC FUNCTION PANEL; Future  -     AMB POC EKG ROUTINE W/ 12 LEADS, INTER & REP    3. Severe obesity (BMI 35.0-39. 9) with comorbidity ([address]    4. Chronic congestive heart failure, unspecified heart failure type (HCC)  -     AMB POC EKG ROUTINE W/ 12 LEADS, INTER & REP        Pertinent laboratory and test data reviewed and discussed with patient.   See patient instructions also for other medical advice given    Medications Discontinued During This Encounter   Medication Reason    diclofenac (VOLTAREN) 1 % gel Therapy Completed    celecoxib (CELEBREX) 200 mg capsule DISCONTINUED BY ANOTHER CLINICIAN    aMILoride-hydrochlorothiazide (MODURETIC) 5-50 mg tab DISCONTINUED BY ANOTHER CLINICIAN    metoclopramide HCl (REGLAN) 5 mg tablet DISCONTINUED BY ANOTHER CLINICIAN    metoclopramide HCl (REGLAN) 5 mg tablet DISCONTINUED BY ANOTHER CLINICIAN    triamterene-hydroCHLOROthiazide (DYAZIDE) 37.5-25 mg per capsule Alternate Therapy       Follow-up and Dispositions    · Return in about 3 months (around 2/4/2022), or if symptoms worsen or fail to improve, for Get labs from PCP including lipids, BP log x 4-5 days post med changes, post test.

## 2021-11-04 NOTE — PATIENT INSTRUCTIONS
Medications Discontinued During This Encounter Medication Reason  diclofenac (VOLTAREN) 1 % gel Therapy Completed  celecoxib (CELEBREX) 200 mg capsule DISCONTINUED BY ANOTHER CLINICIAN  
 aMILoride-hydrochlorothiazide (MODURETIC) 5-50 mg tab DISCONTINUED BY ANOTHER CLINICIAN  
 metoclopramide HCl (REGLAN) 5 mg tablet DISCONTINUED BY ANOTHER CLINICIAN  
 metoclopramide HCl (REGLAN) 5 mg tablet DISCONTINUED BY ANOTHER CLINICIAN  
 triamterene-hydroCHLOROthiazide (DYAZIDE) 37.5-25 mg per capsule Alternate Therapy After the recommended changes have been made in blood pressure medicines, patient advised to keep BP/HR(pulse rate) chart twice daily and bring us results in next 4 to 5 days. Patient may send the results via \"My Chart\" if desired. Please rest for 5-10 minutes before checking blood pressure. Sit on a comfortable chair without crossing the legs and put your arm on a table. We recommend that you use an upper arm cuff. Check the blood pressure 3 times each time you check the blood pressure and record the lowest reading. If you check the blood pressure in both arms, use the higher reading. Learning About the [facility] Diet What is the Mediterranean diet? The Mediterranean diet is a style of eating rather than a diet plan. It features foods eaten in [place], [place], [place] and [place], and other countries along the [place]. It emphasizes eating foods like fish, fruits, vegetables, beans, high-fiber breads and whole grains, nuts, and olive oil. This style of eating includes limited red meat, cheese, and sweets. Why choose the Mediterranean diet? A Mediterranean-style diet may improve heart health. It contains more fat than other heart-healthy diets. But the fats are mainly from nuts, unsaturated oils (such as fish oils and olive oil), and certain nut or seed oils (such as canola, soybean, or flaxseed oil).  These fats may help protect the heart and blood vessels. How can you get started on the Mediterranean diet? Here are some things you can do to switch to a more Mediterranean way of eating. What to eat · Eat a variety of fruits and vegetables each day, such as grapes, blueberries, tomatoes, broccoli, peppers, figs, olives, spinach, eggplant, beans, lentils, and chickpeas. · Eat a variety of whole-grain foods each day, such as oats, brown rice, and whole wheat bread, pasta, and couscous. · Eat fish at least 2 times a week. Try tuna, salmon, mackerel, lake trout, herring, or sardines. · Eat moderate amounts of low-fat dairy products, such as milk, cheese, or yogurt. · Eat moderate amounts of poultry and eggs. · Choose healthy (unsaturated) fats, such as nuts, olive oil, and certain nut or seed oils like canola, soybean, and flaxseed. · Limit unhealthy (saturated) fats, such as butter, palm oil, and coconut oil. And limit fats found in animal products, such as meat and dairy products made with whole milk. Try to eat red meat only a few times a month in very small amounts. · Limit sweets and desserts to only a few times a week. This includes sugar-sweetened drinks like soda. The Mediterranean diet may also include red wine with your meal1 glass each day for women and up to 2 glasses a day for men. Tips for eating at home · Use herbs, spices, garlic, lemon zest, and citrus juice instead of salt to add flavor to foods. · Add avocado slices to your sandwich instead of marie. · Have fish for lunch or dinner instead of red meat. Brush the fish with olive oil, and broil or grill it. · Sprinkle your salad with seeds or nuts instead of cheese. · Cook with olive or canola oil instead of butter or oils that are high in saturated fat. · Switch from 2% milk or whole milk to 1% or fat-free milk.  
· Dip raw vegetables in a vinaigrette dressing or hummus instead of dips made from mayonnaise or sour cream. 
· Have a piece of fruit for dessert instead of a piece of cake. Try baked apples, or have some dried fruit. Tips for eating out · Try broiled, grilled, baked, or poached fish instead of having it fried or breaded. · Ask your  to have your meals prepared with olive oil instead of butter. · Order dishes made with marinara sauce or sauces made from olive oil. Avoid sauces made from cream or mayonnaise. · Choose whole-grain breads, whole wheat pasta and pizza crust, brown rice, beans, and lentils. · Cut back on butter or margarine on bread. Instead, you can dip your bread in a small amount of olive oil. · Ask for a side salad or grilled vegetables instead of french fries or chips. Where can you learn more? Go to http://www.theAudience/ Enter 481-359-1810 in the search box to learn more about \"Learning About the Mediterranean Diet. \" Current as of: December 17, 2020               Content Version: 13.0 © 2006-2021 Healthwise, [facility]. Care instructions adapted under license by [employer] (which disclaims liability or warranty for this information). If you have questions about a medical condition or this instruction, always ask your healthcare professional. [place] [address] any warranty or liability for your use of this information.

## 2021-12-07 ENCOUNTER — OFFICE VISIT (OUTPATIENT)
Dept: ORTHOPEDIC SURGERY | Age: 86
End: 2021-12-07
Payer: MEDICARE

## 2021-12-07 VITALS
BODY MASS INDEX: 35.85 KG/M2 | OXYGEN SATURATION: 98 % | WEIGHT: 210 LBS | HEART RATE: 90 BPM | HEIGHT: 64 IN | TEMPERATURE: 97.5 F

## 2021-12-07 DIAGNOSIS — M17.12 PRIMARY OSTEOARTHRITIS OF LEFT KNEE: Primary | ICD-10-CM

## 2021-12-07 DIAGNOSIS — Z96.651 HISTORY OF TOTAL RIGHT KNEE REPLACEMENT: ICD-10-CM

## 2021-12-07 PROCEDURE — 20611 DRAIN/INJ JOINT/BURSA W/US: CPT | Performed by: PHYSICIAN ASSISTANT

## 2021-12-07 PROCEDURE — 99214 OFFICE O/P EST MOD 30 MIN: CPT | Performed by: PHYSICIAN ASSISTANT

## 2021-12-07 PROCEDURE — G8536 NO DOC ELDER MAL SCRN: HCPCS | Performed by: PHYSICIAN ASSISTANT

## 2021-12-07 PROCEDURE — 1101F PT FALLS ASSESS-DOCD LE1/YR: CPT | Performed by: PHYSICIAN ASSISTANT

## 2021-12-07 PROCEDURE — 1090F PRES/ABSN URINE INCON ASSESS: CPT | Performed by: PHYSICIAN ASSISTANT

## 2021-12-07 PROCEDURE — G8400 PT W/DXA NO RESULTS DOC: HCPCS | Performed by: PHYSICIAN ASSISTANT

## 2021-12-07 PROCEDURE — G8432 DEP SCR NOT DOC, RNG: HCPCS | Performed by: PHYSICIAN ASSISTANT

## 2021-12-07 PROCEDURE — G8427 DOCREV CUR MEDS BY ELIG CLIN: HCPCS | Performed by: PHYSICIAN ASSISTANT

## 2021-12-07 PROCEDURE — G8417 CALC BMI ABV UP PARAM F/U: HCPCS | Performed by: PHYSICIAN ASSISTANT

## 2021-12-07 RX ORDER — BETAMETHASONE SODIUM PHOSPHATE AND BETAMETHASONE ACETATE 3; 3 MG/ML; MG/ML
6 INJECTION, SUSPENSION INTRA-ARTICULAR; INTRALESIONAL; INTRAMUSCULAR; SOFT TISSUE ONCE
Status: COMPLETED | OUTPATIENT
Start: 2021-12-07 | End: 2021-12-07

## 2021-12-07 RX ADMIN — BETAMETHASONE SODIUM PHOSPHATE AND BETAMETHASONE ACETATE 6 MG: 3; 3 INJECTION, SUSPENSION INTRA-ARTICULAR; INTRALESIONAL; INTRAMUSCULAR; SOFT TISSUE at 10:23

## 2021-12-07 NOTE — PROGRESS NOTES
[address] [place] [address]  [phone]           Patient: He Keane                MRN: [id number]       SSN: [id number]  YOB: 1935        AGE: 82 y.o. SEX: female  Body mass index is 36.05 kg/m². PCP: Soheila Akers MD  12/07/21            REVIEW OF SYSTEMS:  Constitutional: Negative for fever, chills, weight loss and malaise/fatigue. HENT: Negative. Eyes: Negative. Respiratory: Negative. Cardiovascular: Negative. Gastrointestinal: No bowel incontinence or constipation. Genitourinary: No bladder incontinence or saddle anesthesia. Skin: Negative. Neurological: Negative. Endo/Heme/Allergies: Negative. Psychiatric/Behavioral: Negative. Musculoskeletal: As per HPI above. Past Medical History:   Diagnosis Date    Arrhythmia     mur mur  long standing    Arthritis     Arthritis of both hips     Back pain     Balance problem     Chronic back pain     Cough     Easy bruising     Essential hypertension     GERD (gastroesophageal reflux disease)     Hiatal hernia     Hypertension     Ill-defined condition     vascular insuff rt ankle    Irregular heart beat     Left hip pain 10/8/2010    Neck pain     Nervousness     Night sweat     Osteoarthritis, knee bilateral     Pain with urination     Polymyalgia rheumatica (HCC)     Reflux     Spinal stenosis     Thromboembolus (HCC)     Trapezius strain     Trochanteric bursitis of left hip     Venous insufficiency          Current Outpatient Medications:     chlorthalidone (HYGROTON) 25 mg tablet, Take 1 Tablet by mouth daily. , Disp: 90 Tablet, Rfl: 1    betamethasone (CELESTONE) 6 mg/mL injection, once., Disp: , Rfl:     fluocinolone acetonide oil 0.01 % drop, by Otic route., Disp: , Rfl:     ferrous sulfate 325 mg (65 mg iron) tablet, Take 1 Tab by mouth two (2) times daily (with meals). , Disp: 60 Tab, Rfl: 2   aspirin (ASPIRIN) 325 mg tablet, Take 1 Tab by mouth daily. , Disp: 30 Tab, Rfl: 0    potassium 99 mg tablet, Take 99 mg by mouth daily. , Disp: , Rfl:     cholecalciferol (VITAMIN D3) 1,000 unit tablet, Take 1,000 Units by mouth daily. 1 tab day, Disp: , Rfl:     amLODIPine (NORVASC) 10 mg tablet, Take 10 mg by mouth daily. 1 tab, Disp: , Rfl: 3    cetirizine (ZYRTEC) 10 mg tablet, Take 10 mg by mouth daily. 1 tab daily, Disp: , Rfl:     tropicamide (MYDRIACYL) 1 % ophthalmic solution, Administer 2 Drops to right eye as needed. 45 minutes prior to schedule appointments, Disp: , Rfl:     omeprazole (PRILOSEC) 40 mg capsule, Take 40 mg by mouth daily. 1 tab (Patient not taking: Reported on 12/7/2021), Disp: , Rfl:     losartan (COZAAR) 100 mg tablet, Take 100 mg by mouth daily.  1 tab (Patient not taking: Reported on 12/7/2021), Disp: , Rfl:     Current Facility-Administered Medications:     betamethasone (CELESTONE) injection 6 mg, 6 mg, Intra artICUlar, ONCE, Kathy Dunlap PA-C    Allergies   Allergen Reactions    Citric Acid Unknown (comments)    Crinone [Progesterone Micronized] Unknown (comments)    Iodine Unknown (comments)    Percocet [Oxycodone-Acetaminophen] Other (comments)     Gets rebound headaches after taking Percocet       Social History     Socioeconomic History    Marital status:      Spouse name: Not on file    Number of children: Not on file    Years of education: Not on file    Highest education level: Not on file   Occupational History    Not on file   Tobacco Use    Smoking status: Never Smoker    Smokeless tobacco: Never Used   Substance and Sexual Activity    Alcohol use: No    Drug use: No    Sexual activity: Never   Other Topics Concern    Not on file   Social History Narrative    Not on file     Social Determinants of Health     Financial Resource Strain:     Difficulty of Paying Living Expenses: Not on file   Food Insecurity:     Worried About Running Out of Food in the Last Year: Not on file    Ran Out of Food in the Last Year: Not on file   Transportation Needs:     Lack of Transportation (Medical): Not on file    Lack of Transportation (Non-Medical): Not on file   Physical Activity:     Days of Exercise per Week: Not on file    Minutes of Exercise per Session: Not on file   Stress:     Feeling of Stress : Not on file   Social Connections:     Frequency of Communication with Friends and Family: Not on file    Frequency of Social Gatherings with Friends and Family: Not on file    Attends Jain Services: Not on file    Active Member of [address] [place] [address] or Organizations: Not on file    Attends Club or Organization Meetings: Not on file    Marital Status: Not on file   Intimate Partner Violence:     Fear of Current or Ex-Partner: Not on file    Emotionally Abused: Not on file    Physically Abused: Not on file    Sexually Abused: Not on file   Housing Stability:     Unable to Pay for Housing in the Last Year: Not on file    Number of [place] in the Last Year: Not on file    Unstable Housing in the Last Year: Not on file       Past Surgical History:   Procedure Laterality Date    COLONOSCOPY N/A 7/27/2016    COLONOSCOPY w/polypectomy performed by Karolina Middleton MD at [address] [place] [address] HX HEENT  06/2011    left cornea transplant    HX HYSTERECTOMY      HX KNEE REPLACEMENT Right 02/2017    HX ORTHOPAEDIC      right foot and ankle    HX ORTHOPAEDIC Right 04/2017       Patient seen evaluate today for bilateral knees. She has had a previous right knee replacement. She did well with it. 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 She is very functional with her knee however. Her left knee is what she presents today for her. She does have known advanced arthritis of the left knee. Injections give her about 4 to 6 weeks of relief.   They are not as efficacious as they once were. She has had no fevers or chills. No injuries to report. No falls. She does have decreased walking tolerance. She has trouble with stairs. There is no night pain. Patient denies recent fevers, chills, chest pain, SOB, or injuries. No recent systemic changes noted. A 12-point review of systems is performed today. Pertinent positives are noted. All other systems reviewed and otherwise are negative. Physical exam: General: Alert and oriented x3, nad.  well-developed, well nourished. normal affect, AF. NC/AT, EOMI, neck supple, trachea midline, no JVD present. Breathing is non-labored. Examination lower extremities reveals pain-free range of the hips. There is no pain to palpation trochanter bursa. Neck straight leg raise. Negative calf tenderness. Negative Homans. No signs of DVT present. The left knee reveals skin intact. There is no erythema, ecchymosis or warmth. There are no signs of infection or cellulitis present. She does have discomfort palpation tricompartmentally about the knee and crepitus arising into compartment. Findings are consistent with advanced arthritis of the left knee. Assessment: #1 left knee end-stage arthritis, #2 status post right total knee replacement with extensor lag, unchanged    Plan: At this point, the patient does very well with her right knee replacement. Fortunately not having any pain. She is very functional with her knee replacement. For the left knee we will move for the cortisone injection today. After informed consent, under aseptic conditions, with US guided assitance, the left knee was prepped with betadine and a mxiture of 3ml 1% lidocaine and 6mg of celestone was injected without complications. The patient tolerated the injection well. The patient is instructed on post-injection care. Surgical invention was discussed and not indicated at this time given her advanced age.   We will see her back in 3 months time for evaluation. Chart reviewed for the following:  Stanislav JAMES PA-C, have reviewed the History, Physical and updated the Allergic reactions for Wynne Satchel? TIME OUT performed immediately prior to start of procedure:  Stanislav JAMES PA-C, have performed the following reviews on Wynne Satchel prior to the start of the procedure:  ????????  * Patient was identified by name and date of birth   * Agreement on procedure being performed was verified  * Risks and Benefits explained to the patient  * Procedure site verified and marked as necessary  * Patient was positioned for comfort  * Consent was signed and verified    Time:10:17 AM    Body part: left knee, intra-articular    Medication & Dose: 3ml 1% lidocaine and 6mg celestone    Date of procedure: 12/07/21    Procedure performed by: Stanislav Brady PA-C    Provider assisted by: none    Patient assisted by: self    How tolerated by patient: tolerated the procedure well with no complications    Post Procedural Pain Scale: 7    Comments:   [address] using a frequency of 10MHz with a 12L-RS transducer head was used to confirm needle placement.   Ultrasound images captured using [address] Ultrasound machine and scanned into patient's chart       Tin Pemberton PA-C, ATC

## 2022-01-11 ENCOUNTER — HOSPITAL ENCOUNTER (OUTPATIENT)
Dept: LAB | Age: 87
Discharge: HOME | End: 2022-01-11
Payer: COMMERCIAL

## 2022-01-11 ENCOUNTER — TRANSCRIBE ORDER (OUTPATIENT)
Dept: REGISTRATION | Age: 87
End: 2022-01-11

## 2022-01-11 DIAGNOSIS — N18.2 CHRONIC KIDNEY DISEASE, STAGE II (MILD): Primary | ICD-10-CM

## 2022-01-11 DIAGNOSIS — I10 ESSENTIAL HYPERTENSION: ICD-10-CM

## 2022-01-11 DIAGNOSIS — N18.2 CHRONIC KIDNEY DISEASE, STAGE II (MILD): ICD-10-CM

## 2022-01-11 LAB
25(OH)D3 SERPL-MCNC: 71.9 NG/ML (ref 30–100)
ALBUMIN SERPL-MCNC: 3.5 G/DL (ref 3.4–5)
ALBUMIN SERPL-MCNC: 3.6 G/DL (ref 3.4–5)
ALBUMIN/GLOB SERPL: 1 {RATIO} (ref 0.8–1.7)
ALP SERPL-CCNC: 99 U/L (ref 45–117)
ALT SERPL-CCNC: 21 U/L (ref 13–56)
ANION GAP SERPL CALC-SCNC: 4 MMOL/L (ref 3–18)
ANION GAP SERPL CALC-SCNC: 5 MMOL/L (ref 3–18)
AST SERPL-CCNC: 15 U/L (ref 10–38)
BASOPHILS # BLD: 0 K/UL (ref 0–0.1)
BASOPHILS NFR BLD: 0 % (ref 0–2)
BILIRUB DIRECT SERPL-MCNC: 0.1 MG/DL (ref 0–0.2)
BILIRUB SERPL-MCNC: 0.4 MG/DL (ref 0.2–1)
BUN SERPL-MCNC: 31 MG/DL (ref 7–18)
BUN SERPL-MCNC: 31 MG/DL (ref 7–18)
BUN/CREAT SERPL: 29 (ref 12–20)
BUN/CREAT SERPL: 30 (ref 12–20)
CALCIUM SERPL-MCNC: 9.5 MG/DL (ref 8.5–10.1)
CALCIUM SERPL-MCNC: 9.6 MG/DL (ref 8.5–10.1)
CALCIUM SERPL-MCNC: 9.8 MG/DL (ref 8.5–10.1)
CHLORIDE SERPL-SCNC: 106 MMOL/L (ref 100–111)
CHLORIDE SERPL-SCNC: 106 MMOL/L (ref 100–111)
CHOLEST SERPL-MCNC: 167 MG/DL
CO2 SERPL-SCNC: 30 MMOL/L (ref 21–32)
CO2 SERPL-SCNC: 30 MMOL/L (ref 21–32)
CREAT SERPL-MCNC: 1.05 MG/DL (ref 0.6–1.3)
CREAT SERPL-MCNC: 1.07 MG/DL (ref 0.6–1.3)
CREAT UR-MCNC: 159 MG/DL (ref 30–125)
DIFFERENTIAL METHOD BLD: ABNORMAL
EOSINOPHIL # BLD: 0.2 K/UL (ref 0–0.4)
EOSINOPHIL NFR BLD: 2 % (ref 0–5)
ERYTHROCYTE [DISTWIDTH] IN BLOOD BY AUTOMATED COUNT: 13.8 % (ref 11.6–14.5)
GLOBULIN SER CALC-MCNC: 3.6 G/DL (ref 2–4)
GLUCOSE SERPL-MCNC: 87 MG/DL (ref 74–99)
GLUCOSE SERPL-MCNC: 89 MG/DL (ref 74–99)
HCT VFR BLD AUTO: 30.6 % (ref 35–45)
HDLC SERPL-MCNC: 54 MG/DL (ref 40–60)
HDLC SERPL: 3.1 {RATIO} (ref 0–5)
HGB BLD-MCNC: 9.6 G/DL (ref 12–16)
IMM GRANULOCYTES # BLD AUTO: 0 K/UL (ref 0–0.04)
IMM GRANULOCYTES NFR BLD AUTO: 0 % (ref 0–0.5)
LDLC SERPL CALC-MCNC: 97.6 MG/DL (ref 0–100)
LIPID PROFILE,FLP: NORMAL
LYMPHOCYTES # BLD: 1.8 K/UL (ref 0.9–3.6)
LYMPHOCYTES NFR BLD: 27 % (ref 21–52)
MCH RBC QN AUTO: 27 PG (ref 24–34)
MCHC RBC AUTO-ENTMCNC: 31.4 G/DL (ref 31–37)
MCV RBC AUTO: 86.2 FL (ref 78–100)
MONOCYTES # BLD: 0.5 K/UL (ref 0.05–1.2)
MONOCYTES NFR BLD: 7 % (ref 3–10)
NEUTS SEG # BLD: 4.2 K/UL (ref 1.8–8)
NEUTS SEG NFR BLD: 63 % (ref 40–73)
NRBC # BLD: 0 K/UL (ref 0–0.01)
NRBC BLD-RTO: 0 PER 100 WBC
PHOSPHATE SERPL-MCNC: 3 MG/DL (ref 2.5–4.9)
PLATELET # BLD AUTO: 296 K/UL (ref 135–420)
PMV BLD AUTO: 10.8 FL (ref 9.2–11.8)
POTASSIUM SERPL-SCNC: 3.8 MMOL/L (ref 3.5–5.5)
POTASSIUM SERPL-SCNC: 3.9 MMOL/L (ref 3.5–5.5)
PROT SERPL-MCNC: 7.1 G/DL (ref 6.4–8.2)
PROT UR-MCNC: 34 MG/DL
PROT/CREAT UR-RTO: 0.2
PTH-INTACT SERPL-MCNC: 145.9 PG/ML (ref 18.4–88)
RBC # BLD AUTO: 3.55 M/UL (ref 4.2–5.3)
SODIUM SERPL-SCNC: 140 MMOL/L (ref 136–145)
SODIUM SERPL-SCNC: 141 MMOL/L (ref 136–145)
TRIGL SERPL-MCNC: 77 MG/DL (ref ?–150)
VLDLC SERPL CALC-MCNC: 15.4 MG/DL
WBC # BLD AUTO: 6.7 K/UL (ref 4.6–13.2)

## 2022-01-11 PROCEDURE — 80076 HEPATIC FUNCTION PANEL: CPT

## 2022-01-11 PROCEDURE — 84156 ASSAY OF PROTEIN URINE: CPT

## 2022-01-11 PROCEDURE — 82306 VITAMIN D 25 HYDROXY: CPT

## 2022-01-11 PROCEDURE — 85025 COMPLETE CBC W/AUTO DIFF WBC: CPT

## 2022-01-11 PROCEDURE — 80061 LIPID PANEL: CPT

## 2022-01-11 PROCEDURE — 80069 RENAL FUNCTION PANEL: CPT

## 2022-01-11 PROCEDURE — 83970 ASSAY OF PARATHORMONE: CPT

## 2022-01-11 PROCEDURE — 80048 BASIC METABOLIC PNL TOTAL CA: CPT

## 2022-01-11 PROCEDURE — 36415 COLL VENOUS BLD VENIPUNCTURE: CPT

## 2022-02-24 ENCOUNTER — OFFICE VISIT (OUTPATIENT)
Dept: CARDIOLOGY CLINIC | Age: 87
End: 2022-02-24
Payer: MEDICARE

## 2022-02-24 VITALS
DIASTOLIC BLOOD PRESSURE: 58 MMHG | SYSTOLIC BLOOD PRESSURE: 151 MMHG | HEIGHT: 64 IN | WEIGHT: 205 LBS | HEART RATE: 77 BPM | BODY MASS INDEX: 35 KG/M2

## 2022-02-24 DIAGNOSIS — I50.32 CHRONIC DIASTOLIC CONGESTIVE HEART FAILURE (HCC): Primary | ICD-10-CM

## 2022-02-24 DIAGNOSIS — I34.0 NONRHEUMATIC MITRAL VALVE REGURGITATION: ICD-10-CM

## 2022-02-24 DIAGNOSIS — E66.01 SEVERE OBESITY (BMI 35.0-39.9) WITH COMORBIDITY (HCC): ICD-10-CM

## 2022-02-24 DIAGNOSIS — I10 ESSENTIAL HYPERTENSION: ICD-10-CM

## 2022-02-24 PROCEDURE — G8536 NO DOC ELDER MAL SCRN: HCPCS | Performed by: INTERNAL MEDICINE

## 2022-02-24 PROCEDURE — G8432 DEP SCR NOT DOC, RNG: HCPCS | Performed by: INTERNAL MEDICINE

## 2022-02-24 PROCEDURE — G8754 DIAS BP LESS 90: HCPCS | Performed by: INTERNAL MEDICINE

## 2022-02-24 PROCEDURE — G8753 SYS BP > OR = 140: HCPCS | Performed by: INTERNAL MEDICINE

## 2022-02-24 PROCEDURE — 1090F PRES/ABSN URINE INCON ASSESS: CPT | Performed by: INTERNAL MEDICINE

## 2022-02-24 PROCEDURE — G8427 DOCREV CUR MEDS BY ELIG CLIN: HCPCS | Performed by: INTERNAL MEDICINE

## 2022-02-24 PROCEDURE — 99214 OFFICE O/P EST MOD 30 MIN: CPT | Performed by: INTERNAL MEDICINE

## 2022-02-24 PROCEDURE — G8400 PT W/DXA NO RESULTS DOC: HCPCS | Performed by: INTERNAL MEDICINE

## 2022-02-24 PROCEDURE — G8417 CALC BMI ABV UP PARAM F/U: HCPCS | Performed by: INTERNAL MEDICINE

## 2022-02-24 PROCEDURE — 1101F PT FALLS ASSESS-DOCD LE1/YR: CPT | Performed by: INTERNAL MEDICINE

## 2022-02-24 RX ORDER — METOPROLOL TARTRATE 25 MG/1
25 TABLET, FILM COATED ORAL 2 TIMES DAILY
Qty: 60 TABLET | Refills: 3 | Status: SHIPPED | OUTPATIENT
Start: 2022-02-24 | End: 2022-06-30 | Stop reason: ALTCHOICE

## 2022-02-24 NOTE — PATIENT INSTRUCTIONS
There are no discontinued medications. After the recommended changes have been made in blood pressure medicines, patient advised to keep BP/HR(pulse rate) chart twice daily and bring us results in next 4 to 5 days. Patient may send the results via \"My Chart\" if desired. Please rest for 5-10 minutes before checking blood pressure. Sit on a comfortable chair without crossing the legs and put your arm on a table. We recommend that you use an upper arm cuff. Check the blood pressure 3 times each time you check the blood pressure and record the lowest reading. If you check the blood pressure in both arms, use the higher reading. Learning About the [address] [facility] Diet  What is the Mediterranean diet? The Mediterranean diet is a style of eating rather than a diet plan. It features foods eaten in [place], [place], [place] and [place], and other countries along the [facility]. It emphasizes eating foods like fish, fruits, vegetables, beans, high-fiber breads and whole grains, nuts, and olive oil. This style of eating includes limited red meat, cheese, and sweets. Why choose the Mediterranean diet? A Mediterranean-style diet may improve heart health. It contains more fat than other heart-healthy diets. But the fats are mainly from nuts, unsaturated oils (such as fish oils and olive oil), and certain nut or seed oils (such as canola, soybean, or flaxseed oil). These fats may help protect the heart and blood vessels. How can you get started on the Mediterranean diet? Here are some things you can do to switch to a more Mediterranean way of eating. What to eat  · Eat a variety of fruits and vegetables each day, such as grapes, blueberries, tomatoes, broccoli, peppers, figs, olives, spinach, eggplant, beans, lentils, and chickpeas. · Eat a variety of whole-grain foods each day, such as oats, brown rice, and whole wheat bread, pasta, and couscous. · Eat fish at least 2 times a week.  Try tuna, salmon, mackerel, lake trout, herring, or sardines. · Eat moderate amounts of low-fat dairy products, such as milk, cheese, or yogurt. · Eat moderate amounts of poultry and eggs. · Choose healthy (unsaturated) fats, such as nuts, olive oil, and certain nut or seed oils like canola, soybean, and flaxseed. · Limit unhealthy (saturated) fats, such as butter, palm oil, and coconut oil. And limit fats found in animal products, such as meat and dairy products made with whole milk. Try to eat red meat only a few times a month in very small amounts. · Limit sweets and desserts to only a few times a week. This includes sugar-sweetened drinks like soda. The Mediterranean diet may also include red wine with your meal1 glass each day for women and up to 2 glasses a day for men. Tips for eating at home  · Use herbs, spices, garlic, lemon zest, and citrus juice instead of salt to add flavor to foods. · Add avocado slices to your sandwich instead of marie. · Have fish for lunch or dinner instead of red meat. Brush the fish with olive oil, and broil or grill it. · Sprinkle your salad with seeds or nuts instead of cheese. · Cook with olive or canola oil instead of butter or oils that are high in saturated fat. · Switch from 2% milk or whole milk to 1% or fat-free milk. · Dip raw vegetables in a vinaigrette dressing or hummus instead of dips made from mayonnaise or sour cream.  · Have a piece of fruit for dessert instead of a piece of cake. Try baked apples, or have some dried fruit. Tips for eating out  · Try broiled, grilled, baked, or poached fish instead of having it fried or breaded. · Ask your  to have your meals prepared with olive oil instead of butter. · Order dishes made with marinara sauce or sauces made from olive oil. Avoid sauces made from cream or mayonnaise. · Choose whole-grain breads, whole wheat pasta and pizza crust, brown rice, beans, and lentils.   · Cut back on butter or margarine on bread. Instead, you can dip your bread in a small amount of olive oil. · Ask for a side salad or grilled vegetables instead of french fries or chips. Where can you learn more? Go to http://www.phillips.com/  Enter O407 in the search box to learn more about \"Learning About the Mediterranean Diet. \"  Current as of: December 17, 2020               Content Version: 13.0  © 2006-2021 Healthwise, [facility]. Care instructions adapted under license by [employer] (which disclaims liability or warranty for this information). If you have questions about a medical condition or this instruction, always ask your healthcare professional. [address] any warranty or liability for your use of this information.

## 2022-02-24 NOTE — PROGRESS NOTES
HISTORY OF PRESENT ILLNESS  Louisa Zuniga is a 82 y.o. female. Follow-up of CHF, hypertension, obesity    7/21 seen after many years due to history of heart murmur        Leg Swelling  The history is provided by the patient. This is a chronic problem. The current episode started more than 1 week ago (yrs). The problem occurs daily. The problem has been gradually improving. Pertinent negatives include no chest pain, no headaches and no shortness of breath. The symptoms are aggravated by standing. The symptoms are relieved by sleep and medications (support stocking). Follow-up  Pertinent negatives include no chest pain, no headaches and no shortness of breath. Review of Systems   Constitutional: Negative for chills, fever, malaise/fatigue and weight loss. HENT: Negative for nosebleeds. Eyes: Negative for discharge. Respiratory: Negative for cough, shortness of breath and wheezing. Cardiovascular: Positive for leg swelling. Negative for chest pain, palpitations, orthopnea, claudication and PND. Gastrointestinal: Negative for diarrhea, nausea and vomiting. Genitourinary: Negative for dysuria and hematuria. Musculoskeletal: Negative for joint pain. Skin: Negative for rash. Neurological: Negative for dizziness, seizures, loss of consciousness and headaches. Endo/Heme/Allergies: Negative for polydipsia. Does not bruise/bleed easily. Psychiatric/Behavioral: Negative for depression and substance abuse. The patient does not have insomnia. 11/15 nuclear stress  Conclusion:   1. Normal perfusion scan. 2. Normal wall motion and ejection fraction. 3. Low risk scan.  01/19/22    ECHO ADULT COMPLETE 01/19/2022 1/19/2022    Interpretation Summary    Left Ventricle: Left ventricle size is normal. Mildly increased wall thickness. Findings consistent with mild concentric hypertrophy. Normal wall motion. Normal left ventricular systolic function with a visually estimated EF of 60 - 65%. Diastolic dysfunction present with normal LV EF.   Mitral Valve: Mild transvalvular regurgitation.   Pulmonary Arteries: Mild pulmonary hypertension present. The estimated pulmonary artery systolic pressure is 46 mmHg. Signed by: Jackie Mclean MD on 1/19/2022  2:09 PM    Physical Exam  Constitutional:       General: She is not in acute distress. Appearance: She is well-developed. HENT:      Head: Normocephalic and atraumatic. Mouth/Throat:      Dentition: Normal dentition. Eyes:      General: No scleral icterus. Right eye: No discharge. Left eye: No discharge. Neck:      Thyroid: No thyromegaly. Vascular: No carotid bruit or JVD. Cardiovascular:      Rate and Rhythm: Normal rate and regular rhythm. Pulses: Intact distal pulses. Heart sounds: S1 normal and S2 normal. Murmur heard. Harsh midsystolic murmur is present with a grade of 3/6 at the upper right sternal border radiating to the neck. High-pitched blowing midsystolic murmur of grade 2/6 is also present at the apex. No friction rub. No gallop. Pulmonary:      Effort: Pulmonary effort is normal.      Breath sounds: Normal breath sounds. No wheezing or rales. Abdominal:      Palpations: Abdomen is soft. There is no mass. Tenderness: There is no abdominal tenderness. Musculoskeletal:      Cervical back: Neck supple. Right lower leg: No edema. Left lower leg: No edema. Lymphadenopathy:      Cervical:      Right cervical: No superficial cervical adenopathy. Left cervical: No superficial cervical adenopathy. Skin:     General: Skin is warm and dry. Findings: No rash. Neurological:      Mental Status: She is alert and oriented to person, place, and time. Psychiatric:         Behavior: Behavior normal.         ASSESSMENT and PLAN    Results for Fawn Young (MRN [id number]) as of 2/24/2022 09:48   Ref.  Range 1/11/2022 14:59   Triglyceride Latest Ref Range: <150 MG/DL 77   Cholesterol, total Latest Ref Range: <200 MG/   HDL Cholesterol Latest Ref Range: 40 - 60 MG/DL 54   CHOL/HDL Ratio Latest Ref Range: 0 - 5.0   3.1   VLDL, calculated Latest Units: MG/DL 15.4   LDL, calculated Latest Ref Range: 0 - 100 MG/DL 97.6     Mitral regurgitation: 1/22 mild;      Diagnoses and all orders for this visit:    1. Chronic diastolic congestive heart failure (HCC)  -     METABOLIC PANEL, BASIC; Future    2. Nonrheumatic mitral valve regurgitation    3. Essential hypertension  -     metoprolol tartrate (LOPRESSOR) 25 mg tablet; Take 1 Tablet by mouth two (2) times a day. -     METABOLIC PANEL, BASIC; Future    4. Severe obesity (BMI 35.0-39. 9) with comorbidity [facility])        Pertinent laboratory and test data reviewed and discussed with patient. See patient instructions also for other medical advice given    There are no discontinued medications. Follow-up and Dispositions    · Return in about 6 months (around 8/24/2022), or if symptoms worsen or fail to improve, for post test.       2/24/2022 CHF is improving well. Blood pressure is good at home but elevated today here. Add low-dose Lopressor and follow the home chart. Mitral regurgitation noted to be mild. Follow clinically. Diet weight and exercise discussed. Mediterranean diet guidelines given again.

## 2022-02-24 NOTE — PROGRESS NOTES
1. Have you been to the ER, urgent care clinic since your last visit? Hospitalized since your last visit?     no    2. Have you seen or consulted any other health care providers outside of the [address] [place] [address] since your last visit? Include any pap smears or colon screening. No     3. Since your last visit, have you had any of the following symptoms?      swelling in legs/arms. 4.  Have you had any blood work, X-rays or cardiac testing? No       5. Where do you normally have your labs drawn?   [facility]    6. Do you need any refills today?    no

## 2022-03-10 ENCOUNTER — OFFICE VISIT (OUTPATIENT)
Dept: ORTHOPEDIC SURGERY | Age: 87
End: 2022-03-10
Payer: MEDICARE

## 2022-03-10 VITALS
TEMPERATURE: 97.6 F | HEART RATE: 77 BPM | OXYGEN SATURATION: 98 % | WEIGHT: 205 LBS | BODY MASS INDEX: 36.32 KG/M2 | HEIGHT: 63 IN

## 2022-03-10 DIAGNOSIS — M17.12 PRIMARY OSTEOARTHRITIS OF LEFT KNEE: Primary | ICD-10-CM

## 2022-03-10 DIAGNOSIS — Z96.651 HISTORY OF TOTAL RIGHT KNEE REPLACEMENT: ICD-10-CM

## 2022-03-10 PROCEDURE — 20611 DRAIN/INJ JOINT/BURSA W/US: CPT | Performed by: PHYSICIAN ASSISTANT

## 2022-03-10 PROCEDURE — 1101F PT FALLS ASSESS-DOCD LE1/YR: CPT | Performed by: PHYSICIAN ASSISTANT

## 2022-03-10 PROCEDURE — G8756 NO BP MEASURE DOC: HCPCS | Performed by: PHYSICIAN ASSISTANT

## 2022-03-10 PROCEDURE — 1090F PRES/ABSN URINE INCON ASSESS: CPT | Performed by: PHYSICIAN ASSISTANT

## 2022-03-10 PROCEDURE — G8432 DEP SCR NOT DOC, RNG: HCPCS | Performed by: PHYSICIAN ASSISTANT

## 2022-03-10 PROCEDURE — G8427 DOCREV CUR MEDS BY ELIG CLIN: HCPCS | Performed by: PHYSICIAN ASSISTANT

## 2022-03-10 PROCEDURE — G8536 NO DOC ELDER MAL SCRN: HCPCS | Performed by: PHYSICIAN ASSISTANT

## 2022-03-10 PROCEDURE — G8417 CALC BMI ABV UP PARAM F/U: HCPCS | Performed by: PHYSICIAN ASSISTANT

## 2022-03-10 PROCEDURE — 99214 OFFICE O/P EST MOD 30 MIN: CPT | Performed by: PHYSICIAN ASSISTANT

## 2022-03-10 PROCEDURE — G8400 PT W/DXA NO RESULTS DOC: HCPCS | Performed by: PHYSICIAN ASSISTANT

## 2022-03-10 RX ORDER — BETAMETHASONE SODIUM PHOSPHATE AND BETAMETHASONE ACETATE 3; 3 MG/ML; MG/ML
6 INJECTION, SUSPENSION INTRA-ARTICULAR; INTRALESIONAL; INTRAMUSCULAR; SOFT TISSUE ONCE
Status: COMPLETED | OUTPATIENT
Start: 2022-03-10 | End: 2022-03-10

## 2022-03-10 RX ADMIN — BETAMETHASONE SODIUM PHOSPHATE AND BETAMETHASONE ACETATE 6 MG: 3; 3 INJECTION, SUSPENSION INTRA-ARTICULAR; INTRALESIONAL; INTRAMUSCULAR; SOFT TISSUE at 10:29

## 2022-03-10 NOTE — PROGRESS NOTES
[address] [place] [address]  [phone]           Patient: Blu Don                MRN: [id number]       SSN: [id number]  YOB: 1935        AGE: 82 y.o. SEX: female  Body mass index is 36.31 kg/m². PCP: Aguila Polanco MD  03/10/22            REVIEW OF SYSTEMS:  Constitutional: Negative for fever, chills, weight loss and malaise/fatigue. HENT: Negative. Eyes: Negative. Respiratory: Negative. Cardiovascular: Negative. Gastrointestinal: No bowel incontinence or constipation. Genitourinary: No bladder incontinence or saddle anesthesia. Skin: Negative. Neurological: Negative. Endo/Heme/Allergies: Negative. Psychiatric/Behavioral: Negative. Musculoskeletal: As per HPI above. Past Medical History:   Diagnosis Date    Arrhythmia     mur mur  long standing    Arthritis     Arthritis of both hips     Back pain     Balance problem     Chronic back pain     Cough     Easy bruising     Essential hypertension     GERD (gastroesophageal reflux disease)     Hiatal hernia     Hypertension     Ill-defined condition     vascular insuff rt ankle    Irregular heart beat     Left hip pain 10/8/2010    Neck pain     Nervousness     Night sweat     Osteoarthritis, knee bilateral     Pain with urination     Polymyalgia rheumatica (HCC)     Reflux     Spinal stenosis     Thromboembolus (HCC)     Trapezius strain     Trochanteric bursitis of left hip     Venous insufficiency          Current Outpatient Medications:     ferrous sulfate 325 mg (65 mg iron) tablet, Take 1 Tab by mouth two (2) times daily (with meals). , Disp: 60 Tab, Rfl: 2    aspirin (ASPIRIN) 325 mg tablet, Take 1 Tab by mouth daily. , Disp: 30 Tab, Rfl: 0    potassium 99 mg tablet, Take 99 mg by mouth daily. , Disp: , Rfl:     tropicamide (MYDRIACYL) 1 % ophthalmic solution, Administer 2 Drops to right eye as needed. 45 minutes prior to schedule appointments, Disp: , Rfl:     omeprazole (PRILOSEC) 40 mg capsule, Take 40 mg by mouth daily. 1 tab, Disp: , Rfl:     amLODIPine (NORVASC) 10 mg tablet, Take 10 mg by mouth daily. 1 tab, Disp: , Rfl: 3    losartan (COZAAR) 100 mg tablet, Take 100 mg by mouth daily. 1 tab, Disp: , Rfl:     metoprolol tartrate (LOPRESSOR) 25 mg tablet, Take 1 Tablet by mouth two (2) times a day. (Patient not taking: Reported on 3/10/2022), Disp: 60 Tablet, Rfl: 3    chlorthalidone (HYGROTON) 25 mg tablet, Take 1 Tablet by mouth daily. (Patient not taking: Reported on 3/10/2022), Disp: 90 Tablet, Rfl: 1    betamethasone (CELESTONE) 6 mg/mL injection, once. (Patient not taking: Reported on 3/10/2022), Disp: , Rfl:     fluocinolone acetonide oil 0.01 % drop, by Otic route. (Patient not taking: Reported on 3/10/2022), Disp: , Rfl:     cholecalciferol (VITAMIN D3) 1,000 unit tablet, Take 1,000 Units by mouth daily. 1 tab day (Patient not taking: Reported on 3/10/2022), Disp: , Rfl:     cetirizine (ZYRTEC) 10 mg tablet, Take 10 mg by mouth daily. 1 tab daily (Patient not taking: Reported on 3/10/2022), Disp: , Rfl:     Current Facility-Administered Medications:     betamethasone (CELESTONE) injection 6 mg, 6 mg, Intra artICUlar, ONCE, Josef Willingham PA-C    Allergies   Allergen Reactions    Citric Acid Unknown (comments)    Crinone [Progesterone Micronized] Unknown (comments)    Iodine Unknown (comments)    Percocet [Oxycodone-Acetaminophen] Other (comments)     Gets rebound headaches after taking Percocet       Social History     Socioeconomic History    Marital status:      Spouse name: Not on file    Number of children: Not on file    Years of education: Not on file    Highest education level: Not on file   Occupational History    Not on file   Tobacco Use    Smoking status: Never Smoker    Smokeless tobacco: Never Used   Substance and Sexual Activity    Alcohol use:  No  Drug use: No    Sexual activity: Never   Other Topics Concern    Not on file   Social History Narrative    Not on file     Social Determinants of Health     Financial Resource Strain:     Difficulty of Paying Living Expenses: Not on file   Food Insecurity:     Worried About Running Out of Food in the Last Year: Not on file    Kathe of Food in the Last Year: Not on file   Transportation Needs:     Lack of Transportation (Medical): Not on file    Lack of Transportation (Non-Medical): Not on file   Physical Activity:     Days of Exercise per Week: Not on file    Minutes of Exercise per Session: Not on file   Stress:     Feeling of Stress : Not on file   Social Connections:     Frequency of Communication with Friends and Family: Not on file    Frequency of Social Gatherings with Friends and Family: Not on file    Attends Holiness Services: Not on file    Active Member of Clubs or Organizations: Not on file    Attends Club or Organization Meetings: Not on file    Marital Status: Not on file   Intimate Partner Violence:     Fear of Current or Ex-Partner: Not on file    Emotionally Abused: Not on file    Physically Abused: Not on file    Sexually Abused: Not on file   Housing Stability:     Unable to Pay for Housing in the Last Year: Not on file    Number of [place] in the Last Year: Not on file    Unstable Housing in the Last Year: Not on file       Past Surgical History:   Procedure Laterality Date    COLONOSCOPY N/A 7/27/2016    COLONOSCOPY w/polypectomy performed by Angy Shepherd MD at [address] [place] [address] HX HEENT  06/2011    left cornea transplant    HX HYSTERECTOMY      HX KNEE REPLACEMENT Right 02/2017    HX ORTHOPAEDIC      right foot and ankle    HX ORTHOPAEDIC Right 04/2017         Patient seen evaluated today for her knees. She has had a previous right knee replacement done. Overall she did well with it.   She unfortunately had a patellar fracture which was fixed twice and went on to displace. She does have a bit of an extensor lag on that side. She is very functional with her knee however. Left knee does have known advanced osteoarthritis. Injections are still efficacious however not as much as they once were. She does have decreased walking tolerance. She has trouble stairs. She has occasional pain at night. Patient denies recent fevers, chills, chest pain, SOB, or injuries. No recent systemic changes noted. A 12-point review of systems is performed today. Pertinent positives are noted. All other systems reviewed and otherwise are negative. Physical exam: General: Alert and oriented x3, nad.  well-developed, well nourished. normal affect, AF. NC/AT, EOMI, neck supple, trachea midline, no JVD present. Breathing is non-labored. Examination of the lower extremities was pain-free range of motion hips were there is no pain to palpation trochanter bursa. Neck straight leg raise. Negative calf tenderness. Negative Homans. No signs of DVT present. Left knee reveals skin intact. There is no erythema, ecchymosis or warmth. There are no signs of infection or signs present. She does have full extension however she does have about a 15 degree extensor lag there is a defect noted to the patella. The left knee reveals skin intact. There is no erythema ecchymosis noted. There are no signs for infection or cellulitis present. Findings are consistent with advanced arthritis of the left knee with pain to palpation tricompartmentally and crepitus arising into compartment. Assessment: #1 left knee end-stage arthritis, #2 status post right knee replacement with chronic extensor mechanism dysfunction, patellar fracture    Plan: At this point, we discussed treatment options. We have previously recommended surgery for the right knee and she declined.   For the left knee we recommended total knee replacements as well. The patient elects against this at this time. She like to continue with conservative treatment. Today in the office we will move forward a course injection for the left knee. After informed consent, under aseptic conditions, with US guided assitance, the left knee was prepped with betadine and a mxiture of 3ml 1% lidocaine and 6mg of celestone was injected without complications. The patient tolerated the injection well. The patient is instructed on post-injection care. We will see her back in 3 months time for evaluation. She will call if any questions or concerns arise. Chart reviewed for the following:  Angel JAMES PA-C, have reviewed the History, Physical and updated the Allergic reactions for Alize Devine? TIME OUT performed immediately prior to start of procedure:  Angel JAMES PA-C, have performed the following reviews on Alize Devine prior to the start of the procedure:  ????????  * Patient was identified by name and date of birth   * Agreement on procedure being performed was verified  * Risks and Benefits explained to the patient  * Procedure site verified and marked as necessary  * Patient was positioned for comfort  * Consent was signed and verified    Time:10:29 AM    Body part: left knee, intra-articular    Medication & Dose: 3ml 1% lidocaine and 6mg celestone    Date of procedure: 03/10/22    Procedure performed by: Angel Rosenthal PA-C    Provider assisted by: none    Patient assisted by: self    How tolerated by patient: tolerated the procedure well with no complications    Post Procedural Pain Scale: 5    Comments:   [address] using a frequency of 10MHz with a 12L-RS transducer head was used to confirm needle placement.   Ultrasound images captured using [address] Ultrasound machine and scanned into patient's chart       Brit Cook PA-C, ATC

## 2022-03-18 PROBLEM — M17.10 ARTHRITIS OF KNEE: Status: ACTIVE | Noted: 2017-02-06

## 2022-03-19 PROBLEM — E66.01 OBESITY, MORBID (HCC): Status: ACTIVE | Noted: 2017-12-14

## 2022-03-19 PROBLEM — Z87.81 HISTORY OF PATELLAR FRACTURE: Status: ACTIVE | Noted: 2018-01-09

## 2022-03-19 PROBLEM — S82.009A PATELLA FRACTURE: Status: ACTIVE | Noted: 2017-03-31

## 2022-05-06 ENCOUNTER — HOSPITAL ENCOUNTER (OUTPATIENT)
Dept: LAB | Age: 87
Discharge: HOME | End: 2022-05-06
Payer: MEDICARE

## 2022-05-06 DIAGNOSIS — I10 ESSENTIAL HYPERTENSION: ICD-10-CM

## 2022-05-06 DIAGNOSIS — I50.32 CHRONIC DIASTOLIC CONGESTIVE HEART FAILURE (HCC): ICD-10-CM

## 2022-05-06 LAB
ANION GAP SERPL CALC-SCNC: 5 MMOL/L (ref 3–18)
BUN SERPL-MCNC: 28 MG/DL (ref 7–18)
BUN/CREAT SERPL: 27 (ref 12–20)
CALCIUM SERPL-MCNC: 9.4 MG/DL (ref 8.5–10.1)
CHLORIDE SERPL-SCNC: 108 MMOL/L (ref 100–111)
CO2 SERPL-SCNC: 29 MMOL/L (ref 21–32)
CREAT SERPL-MCNC: 1.03 MG/DL (ref 0.6–1.3)
GLUCOSE SERPL-MCNC: 94 MG/DL (ref 74–99)
POTASSIUM SERPL-SCNC: 3.9 MMOL/L (ref 3.5–5.5)
SODIUM SERPL-SCNC: 142 MMOL/L (ref 136–145)

## 2022-05-06 PROCEDURE — 80048 BASIC METABOLIC PNL TOTAL CA: CPT

## 2022-05-06 PROCEDURE — 36415 COLL VENOUS BLD VENIPUNCTURE: CPT

## 2022-06-04 DIAGNOSIS — I10 ESSENTIAL HYPERTENSION: ICD-10-CM

## 2022-06-07 RX ORDER — CHLORTHALIDONE 25 MG/1
TABLET ORAL
Qty: 90 TABLET | Refills: 1 | Status: SHIPPED | OUTPATIENT
Start: 2022-06-07

## 2022-06-30 ENCOUNTER — OFFICE VISIT (OUTPATIENT)
Dept: CARDIOLOGY CLINIC | Age: 87
End: 2022-06-30
Payer: MEDICARE

## 2022-06-30 VITALS
BODY MASS INDEX: 34.73 KG/M2 | HEIGHT: 63 IN | SYSTOLIC BLOOD PRESSURE: 136 MMHG | WEIGHT: 196 LBS | HEART RATE: 81 BPM | OXYGEN SATURATION: 98 % | DIASTOLIC BLOOD PRESSURE: 63 MMHG

## 2022-06-30 DIAGNOSIS — E66.9 OBESITY (BMI 30.0-34.9): ICD-10-CM

## 2022-06-30 DIAGNOSIS — I34.0 NONRHEUMATIC MITRAL VALVE REGURGITATION: ICD-10-CM

## 2022-06-30 DIAGNOSIS — I50.32 CHRONIC DIASTOLIC CONGESTIVE HEART FAILURE (HCC): Primary | ICD-10-CM

## 2022-06-30 DIAGNOSIS — I10 ESSENTIAL HYPERTENSION: ICD-10-CM

## 2022-06-30 PROCEDURE — G8432 DEP SCR NOT DOC, RNG: HCPCS | Performed by: INTERNAL MEDICINE

## 2022-06-30 PROCEDURE — G8417 CALC BMI ABV UP PARAM F/U: HCPCS | Performed by: INTERNAL MEDICINE

## 2022-06-30 PROCEDURE — G8754 DIAS BP LESS 90: HCPCS | Performed by: INTERNAL MEDICINE

## 2022-06-30 PROCEDURE — 99214 OFFICE O/P EST MOD 30 MIN: CPT | Performed by: INTERNAL MEDICINE

## 2022-06-30 PROCEDURE — G8427 DOCREV CUR MEDS BY ELIG CLIN: HCPCS | Performed by: INTERNAL MEDICINE

## 2022-06-30 PROCEDURE — G8400 PT W/DXA NO RESULTS DOC: HCPCS | Performed by: INTERNAL MEDICINE

## 2022-06-30 PROCEDURE — G8752 SYS BP LESS 140: HCPCS | Performed by: INTERNAL MEDICINE

## 2022-06-30 PROCEDURE — 1090F PRES/ABSN URINE INCON ASSESS: CPT | Performed by: INTERNAL MEDICINE

## 2022-06-30 PROCEDURE — 1101F PT FALLS ASSESS-DOCD LE1/YR: CPT | Performed by: INTERNAL MEDICINE

## 2022-06-30 PROCEDURE — G8536 NO DOC ELDER MAL SCRN: HCPCS | Performed by: INTERNAL MEDICINE

## 2022-06-30 PROCEDURE — 1123F ACP DISCUSS/DSCN MKR DOCD: CPT | Performed by: INTERNAL MEDICINE

## 2022-06-30 RX ORDER — TRIAMCINOLONE ACETONIDE 1 MG/G
CREAM TOPICAL 2 TIMES DAILY
COMMUNITY
Start: 2022-05-26

## 2022-06-30 RX ORDER — DEXTROMETHORPHAN HYDROBROMIDE, GUAIFENESIN 5; 100 MG/5ML; MG/5ML
650 LIQUID ORAL EVERY 8 HOURS
COMMUNITY

## 2022-06-30 NOTE — PROGRESS NOTES
HISTORY OF PRESENT ILLNESS  Nolvia Saavedra is a 82 y.o. female. Follow-up of CHF, hypertension, obesity    7/21 seen after many years due to history of heart murmur        Follow-up  Pertinent negatives include no chest pain, no headaches and no shortness of breath. Leg Swelling  The history is provided by the patient. This is a chronic problem. The current episode started more than 1 week ago (yrs). The problem occurs every several days. The problem has been rapidly improving. Pertinent negatives include no chest pain, no headaches and no shortness of breath. The symptoms are aggravated by standing. The symptoms are relieved by sleep and medications (support stocking). Review of Systems   Constitutional: Negative for chills, fever, malaise/fatigue and weight loss. HENT: Negative for nosebleeds. Eyes: Negative for discharge. Respiratory: Negative for cough, shortness of breath and wheezing. Cardiovascular: Positive for leg swelling. Negative for chest pain, palpitations, orthopnea, claudication and PND. Gastrointestinal: Negative for diarrhea, nausea and vomiting. Genitourinary: Negative for dysuria and hematuria. Musculoskeletal: Negative for joint pain. Skin: Negative for rash. Neurological: Negative for dizziness, seizures, loss of consciousness and headaches. Endo/Heme/Allergies: Negative for polydipsia. Does not bruise/bleed easily. Psychiatric/Behavioral: Negative for depression and substance abuse. The patient does not have insomnia. 11/15 nuclear stress  Conclusion:   1. Normal perfusion scan. 2. Normal wall motion and ejection fraction. 3. Low risk scan.  01/19/22    ECHO ADULT COMPLETE 01/19/2022 1/19/2022    Interpretation Summary    Left Ventricle: Left ventricle size is normal. Mildly increased wall thickness. Findings consistent with mild concentric hypertrophy. Normal wall motion.  Normal left ventricular systolic function with a visually estimated EF of 60 - 65%. Diastolic dysfunction present with normal LV EF.   Mitral Valve: Mild transvalvular regurgitation.   Pulmonary Arteries: Mild pulmonary hypertension present. The estimated pulmonary artery systolic pressure is 46 mmHg. Signed by: Lubna Pimentel MD on 1/19/2022  2:09 PM    Physical Exam  Constitutional:       General: She is not in acute distress. Appearance: She is well-developed. She is obese. HENT:      Head: Normocephalic and atraumatic. Mouth/Throat:      Dentition: Normal dentition. Eyes:      General: No scleral icterus. Right eye: No discharge. Left eye: No discharge. Neck:      Thyroid: No thyromegaly. Vascular: No carotid bruit or JVD. Cardiovascular:      Rate and Rhythm: Normal rate and regular rhythm. Pulses: Intact distal pulses. Heart sounds: S1 normal and S2 normal. Murmur heard. Harsh midsystolic murmur is present with a grade of 3/6 at the upper right sternal border radiating to the neck. High-pitched blowing midsystolic murmur of grade 2/6 is also present at the apex. No friction rub. No gallop. Pulmonary:      Effort: Pulmonary effort is normal.      Breath sounds: Normal breath sounds. No wheezing or rales. Abdominal:      Palpations: Abdomen is soft. There is no mass. Tenderness: There is no abdominal tenderness. Musculoskeletal:      Cervical back: Neck supple. Right lower leg: No edema. Left lower leg: No edema. Lymphadenopathy:      Cervical:      Right cervical: No superficial cervical adenopathy. Left cervical: No superficial cervical adenopathy. Skin:     General: Skin is warm and dry. Findings: No rash. Neurological:      Mental Status: She is alert and oriented to person, place, and time. Psychiatric:         Behavior: Behavior normal.         ASSESSMENT and PLAN    Results for Fernando Costa (MRN [id number]) as of 2/24/2022 09:48   Ref.  Range 1/11/2022 14:59   Triglyceride Latest Ref Range: <150 MG/DL 77   Cholesterol, total Latest Ref Range: <200 MG/   HDL Cholesterol Latest Ref Range: 40 - 60 MG/DL 54   CHOL/HDL Ratio Latest Ref Range: 0 - 5.0   3.1   VLDL, calculated Latest Units: MG/DL 15.4   LDL, calculated Latest Ref Range: 0 - 100 MG/DL 97.6     Mitral regurgitation: 1/22 mild;    2/24/2022 CHF is improving well. Blood pressure is good at home but elevated today here. Add low-dose Lopressor and follow the home chart. Mitral regurgitation noted to be mild. Follow clinically. Diet weight and exercise discussed. Mediterranean diet guidelines given again. Diagnoses and all orders for this visit:    1. Chronic diastolic congestive heart failure ([address]    2. Essential hypertension    3. Nonrheumatic mitral valve regurgitation    4. Obesity (BMI 30.0-34. 9)        Pertinent laboratory and test data reviewed and discussed with patient. See patient instructions also for other medical advice given    Medications Discontinued During This Encounter   Medication Reason    metoprolol tartrate (LOPRESSOR) 25 mg tablet DISCONTINUED BY ANOTHER CLINICIAN    losartan (COZAAR) 100 mg tablet Not A Current Medication       Follow-up and Dispositions    · Return in about 6 months (around 12/30/2022), or if symptoms worsen or fail to improve. 6/30/2022 CHF is improved. NYHA class II. Blood pressure is controlled well. MR is mild and follow clinically. Heart murmur is impressive and sounds like aortic stenosis but none was seen in 1/22 by echo. We will follow clinically. She is losing some weight for which she was congratulated. Encouraged to continue exercise and weight loss.

## 2022-06-30 NOTE — PROGRESS NOTES
1. Have you been to the ER, urgent care clinic since your last visit? Hospitalized since your last visit? No    2. Have you seen or consulted any other health care providers outside of the [address] [place] [address] since your last visit? Include any pap smears or colon screening. No    3. Since your last visit, have you had any of the following symptoms?      swelling in legs/arms. 4.  Have you had any blood work, X-rays or cardiac testing? Yes When: 5/6/22 Where: [facility]     Requested: NO     In [facility]: YES    5. Where do you normally have your labs drawn? [facility]    6. Do you need any refills today?    Yes: chlorthalidone

## 2022-06-30 NOTE — PATIENT INSTRUCTIONS
Medications Discontinued During This Encounter   Medication Reason    metoprolol tartrate (LOPRESSOR) 25 mg tablet DISCONTINUED BY ANOTHER CLINICIAN    losartan (COZAAR) 100 mg tablet Not A Current Medication          A Healthy Heart: Care Instructions  Your Care Instructions     Coronary artery disease, also called heart disease, occurs when a substance called plaque builds up in the vessels that supply oxygen-rich blood to your heart muscle. This can narrow the blood vessels and reduce blood flow. A heart attack happens when blood flow is completely blocked. A high-fat diet, smoking, and other factors increase the risk of heart disease. Your doctor has found that you have a chance of having heart disease. You can do lots of things to keep your heart healthy. It may not be easy, but you can change your diet, exercise more, and quit smoking. These steps really work to lower your chance of heart disease. Follow-up care is a key part of your treatment and safety. Be sure to make and go to all appointments, and call your doctor if you are having problems. It's also a good idea to know your test results and keep a list of the medicines you take. How can you care for yourself at home? Diet    · Use less salt when you cook and eat. This helps lower your blood pressure. Taste food before salting. Add only a little salt when you think you need it. With time, your taste buds will adjust to less salt.     · Eat fewer snack items, fast foods, canned soups, and other high-salt, high-fat, processed foods.     · Read food labels and try to avoid saturated and trans fats. They increase your risk of heart disease by raising cholesterol levels.     · Limit the amount of solid fat-butter, margarine, and shortening-you eat. Use olive, peanut, or canola oil when you cook. Bake, broil, and steam foods instead of frying them.     · Eat a variety of fruit and vegetables every day.  Dark green, deep orange, red, or yellow fruits and vegetables are especially good for you. Examples include spinach, carrots, peaches, and berries.     · Foods high in fiber can reduce your cholesterol and provide important vitamins and minerals. High-fiber foods include whole-grain cereals and breads, oatmeal, beans, brown rice, citrus fruits, and apples.     · Eat lean proteins. Heart-healthy proteins include seafood, lean meats and poultry, eggs, beans, peas, nuts, seeds, and soy products.     · Limit drinks and foods with added sugar. These include candy, desserts, and soda pop. Lifestyle changes    · If your doctor recommends it, get more exercise. Walking is a good choice. Bit by bit, increase the amount you walk every day. Try for at least 30 minutes on most days of the week. You also may want to swim, bike, or do other activities.     · Do not smoke. If you need help quitting, talk to your doctor about stop-smoking programs and medicines. These can increase your chances of quitting for good. Quitting smoking may be the most important step you can take to protect your heart. It is never too late to quit.     · Limit alcohol to 2 drinks a day for men and 1 drink a day for women. Too much alcohol can cause health problems.     · Manage other health problems such as diabetes, high blood pressure, and high cholesterol. If you think you may have a problem with alcohol or drug use, talk to your doctor. Medicines    · Take your medicines exactly as prescribed. Call your doctor if you think you are having a problem with your medicine.     · If your doctor recommends aspirin, take the amount directed each day. Make sure you take aspirin and not another kind of pain reliever, such as acetaminophen (Tylenol). When should you call for help? Call 911 if you have symptoms of a heart attack.  These may include:    · Chest pain or pressure, or a strange feeling in the chest.     · Sweating.     · Shortness of breath.     · Pain, pressure, or a strange feeling in the back, neck, jaw, or upper belly or in one or both shoulders or arms.     · Lightheadedness or sudden weakness.     · A fast or irregular heartbeat. After you call 911, the  may tell you to chew 1 adult-strength or 2 to 4 low-dose aspirin. Wait for an ambulance. Do not try to drive yourself. Watch closely for changes in your health, and be sure to contact your doctor if you have any problems. Where can you learn more? Go to http://www.phillips.com/  Enter F075 in the search box to learn more about \"A Healthy Heart: Care Instructions. \"  Current as of: January 10, 2022               Content Version: 13.2  © 2006-2022 [employer]. Care instructions adapted under license by [employer] (which disclaims liability or warranty for this information). If you have questions about a medical condition or this instruction, always ask your healthcare professional. [place] [address] any warranty or liability for your use of this information.

## 2022-08-18 ENCOUNTER — OFFICE VISIT (OUTPATIENT)
Dept: ORTHOPEDIC SURGERY | Age: 87
End: 2022-08-18
Payer: MEDICARE

## 2022-08-18 VITALS — WEIGHT: 194 LBS | TEMPERATURE: 96.8 F | HEIGHT: 63 IN | BODY MASS INDEX: 34.38 KG/M2

## 2022-08-18 DIAGNOSIS — Z96.651 HISTORY OF TOTAL RIGHT KNEE REPLACEMENT: ICD-10-CM

## 2022-08-18 DIAGNOSIS — M17.12 PRIMARY OSTEOARTHRITIS OF LEFT KNEE: Primary | ICD-10-CM

## 2022-08-18 PROCEDURE — 20611 DRAIN/INJ JOINT/BURSA W/US: CPT | Performed by: PHYSICIAN ASSISTANT

## 2022-08-18 RX ORDER — BETAMETHASONE SODIUM PHOSPHATE AND BETAMETHASONE ACETATE 3; 3 MG/ML; MG/ML
6 INJECTION, SUSPENSION INTRA-ARTICULAR; INTRALESIONAL; INTRAMUSCULAR; SOFT TISSUE ONCE
Status: COMPLETED | OUTPATIENT
Start: 2022-08-18 | End: 2022-08-18

## 2022-08-18 RX ADMIN — BETAMETHASONE SODIUM PHOSPHATE AND BETAMETHASONE ACETATE 6 MG: 3; 3 INJECTION, SUSPENSION INTRA-ARTICULAR; INTRALESIONAL; INTRAMUSCULAR; SOFT TISSUE at 14:38

## 2022-08-18 NOTE — PROGRESS NOTES
[address] [place] [address]  [phone]           Patient: Susan Valle                MRN: [id number]       SSN: [id number]  YOB: 1935        AGE: 82 y.o. SEX: female  Body mass index is 34.37 kg/m². PCP: Manjinder Ho MD  08/18/22            REVIEW OF SYSTEMS:  Constitutional: Negative for fever, chills, weight loss and malaise/fatigue. HENT: Negative. Eyes: Negative. Respiratory: Negative. Cardiovascular: Negative. Gastrointestinal: No bowel incontinence or constipation. Genitourinary: No bladder incontinence or saddle anesthesia. Skin: Negative. Neurological: Negative. Endo/Heme/Allergies: Negative. Psychiatric/Behavioral: Negative. Musculoskeletal: As per HPI above. Past Medical History:   Diagnosis Date    Arrhythmia     mur mur  long standing    Arthritis     Arthritis of both hips     Back pain     Balance problem     Chronic back pain     Cough     Easy bruising     Essential hypertension     GERD (gastroesophageal reflux disease)     Hiatal hernia     Hypertension     Ill-defined condition     vascular insuff rt ankle    Irregular heart beat     Left hip pain 10/8/2010    Neck pain     Nervousness     Night sweat     Osteoarthritis, knee bilateral     Pain with urination     Polymyalgia rheumatica (HCC)     Reflux     Spinal stenosis     Thromboembolus (HCC)     Trapezius strain     Trochanteric bursitis of left hip     Venous insufficiency          Current Outpatient Medications:     acetaminophen (TYLENOL) 650 mg TbER, Take 650 mg by mouth every eight (8) hours. , Disp: , Rfl:     chlorthalidone (HYGROTON) 25 mg tablet, TAKE 1 TABLET BY MOUTH EVERY DAY, Disp: 90 Tablet, Rfl: 1    betamethasone (CELESTONE) 6 mg/mL injection, once., Disp: , Rfl:     fluocinolone acetonide oil 0.01 % drop, by Otic route., Disp: , Rfl:     ferrous sulfate 325 mg (65 mg iron) tablet, Take 1 Tab by mouth two (2) times daily (with meals). , Disp: 60 Tab, Rfl: 2    aspirin (ASPIRIN) 325 mg tablet, Take 1 Tab by mouth daily. (Patient taking differently: Take 81 mg by mouth daily.), Disp: 30 Tab, Rfl: 0    potassium 99 mg tablet, Take 99 mg by mouth daily. , Disp: , Rfl:     tropicamide (MYDRIACYL) 1 % ophthalmic solution, Administer 2 Drops to right eye as needed. 45 minutes prior to schedule appointments, Disp: , Rfl:     cholecalciferol (VITAMIN D3) (1000 Units /25 mcg) tablet, Take 1,000 Units by mouth daily. 1 tab day, Disp: , Rfl:     omeprazole (PRILOSEC) 40 mg capsule, Take 40 mg by mouth daily. 1 tab, Disp: , Rfl:     amLODIPine (NORVASC) 10 mg tablet, Take 10 mg by mouth daily. 1 tab, Disp: , Rfl: 3    cetirizine (ZYRTEC) 10 mg tablet, Take 10 mg by mouth daily. 1 tab daily, Disp: , Rfl:     triamcinolone acetonide (KENALOG) 0.1 % topical cream, two (2) times a day.  APPLY TO AFFECTED AREA (Patient not taking: Reported on 8/18/2022), Disp: , Rfl:     Allergies   Allergen Reactions    Citric Acid Unknown (comments)    Crinone [Progesterone Micronized] Unknown (comments)    Iodine Unknown (comments)    Percocet [Oxycodone-Acetaminophen] Other (comments)     Gets rebound headaches after taking Percocet       Social History     Socioeconomic History    Marital status:      Spouse name: Not on file    Number of children: Not on file    Years of education: Not on file    Highest education level: Not on file   Occupational History    Not on file   Tobacco Use    Smoking status: Never    Smokeless tobacco: Never   Substance and Sexual Activity    Alcohol use: No    Drug use: No    Sexual activity: Never   Other Topics Concern    Not on file   Social History Narrative    Not on file     Social Determinants of Health     Financial Resource Strain: Not on file   Food Insecurity: Not on file   Transportation Needs: Not on file   Physical Activity: Not on file   Stress: Not on file   Social Connections: Not on file   Intimate Partner Violence: Not on file   Housing Stability: Not on file       Past Surgical History:   Procedure Laterality Date    COLONOSCOPY N/A 7/27/2016    COLONOSCOPY w/polypectomy performed by Bharat Estrella MD at [address]      HX HEENT  06/2011    left cornea transplant    HX HYSTERECTOMY      HX KNEE REPLACEMENT Right 02/2017    HX ORTHOPAEDIC      right foot and ankle    HX ORTHOPAEDIC Right 04/2017       Patient seen evaluated today for her left knee. She does have known advanced arthritis of the left knee. She has been receiving injections which are still efficacious for her. She denies any injuries. She has had a previous right knee replacement done with a patellar fracture and ended up with a nonunion and a bit of a lag. She is very functional with it. Patient denies recent fevers, chills, chest pain, SOB, or injuries. No recent systemic changes noted. A 12-point review of systems is performed today. Pertinent positives are noted. All other systems reviewed and otherwise are negative. Physical exam: General: Alert and oriented x3, nad.  well-developed, well nourished. normal affect, AF. NC/AT, EOMI, neck supple, trachea midline, no JVD present. Breathing is non-labored. Examination of the lower extremities reveals pain-free range of motion the hips. There is no pain to palpation of the trochanter bursa. Negative straight leg raise. Negative calf tenderness. Negative Homans. No signs of DVT present. The left knee reveals skin intact. There is no erythema ecchymosis noted. There are no signs for infection or status present. She does have findings consistent with advanced arthritis of the left knee with pain to palpation tricompartmentally and crepitus arising from the anterior compartment.     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    Plan: At this point, we discussed treatment options. I recommended total replacements for the left knee and she is not interested. We will continue with conservative treatment. Today in the office we will move with a cortisone junction for the left knee. After informed consent, under aseptic conditions, with US guided assitance, the left knee was prepped with betadine and a mxiture of 3ml 1% lidocaine and 6mg of celestone was injected without complications. The patient tolerated the injection well. The patient is instructed on post-injection care. We will see her back in a couple weeks time for evaluation of her shoulders. Chart reviewed for the following:  Radha JAMES PA-C, have reviewed the History, Physical and updated the Allergic reactions for Aubrie Trimble? TIME OUT performed immediately prior to start of procedure:  Radha JAMES PA-C, have performed the following reviews on Aubrie Trimble prior to the start of the procedure:  ????????  * Patient was identified by name and date of birth   * Agreement on procedure being performed was verified  * Risks and Benefits explained to the patient  * Procedure site verified and marked as necessary  * Patient was positioned for comfort  * Consent was signed and verified    Time:2:31 PM    Body part: left knee, intra-articular    Medication & Dose: 3ml 1% lidocaine and 6mg celestone    Date of procedure: 08/18/22    Procedure performed by: Radha Newell PA-C    Provider assisted by: none    Patient assisted by: self    How tolerated by patient: tolerated the procedure well with no complications    Post Procedural Pain Scale: 4    Comments:   [address] using a frequency of 10MHz with a 12L-RS transducer head was used to confirm needle placement.   Ultrasound images captured using [address] Ultrasound machine and scanned into patient's chart       Joselyn Pham PA-C, ATC

## 2022-08-23 ENCOUNTER — HOSPITAL ENCOUNTER (OUTPATIENT)
Dept: LAB | Age: 87
Discharge: HOME | End: 2022-08-23
Payer: MEDICARE

## 2022-08-23 ENCOUNTER — TRANSCRIBE ORDER (OUTPATIENT)
Dept: REGISTRATION | Age: 87
End: 2022-08-23

## 2022-08-23 DIAGNOSIS — N18.31 CHRONIC KIDNEY DISEASE (CKD) STAGE G3A/A1, MODERATELY DECREASED GLOMERULAR FILTRATION RATE (GFR) BETWEEN 45-59 ML/MIN/1.73 SQUARE METER AND ALBUMINURIA CREATININE RATIO LESS THAN 30 MG/G (HCC): Primary | ICD-10-CM

## 2022-08-23 DIAGNOSIS — N18.31 CHRONIC KIDNEY DISEASE (CKD) STAGE G3A/A1, MODERATELY DECREASED GLOMERULAR FILTRATION RATE (GFR) BETWEEN 45-59 ML/MIN/1.73 SQUARE METER AND ALBUMINURIA CREATININE RATIO LESS THAN 30 MG/G (HCC): ICD-10-CM

## 2022-08-23 LAB
25(OH)D3 SERPL-MCNC: 81.9 NG/ML (ref 30–100)
ALBUMIN SERPL-MCNC: 3.4 G/DL (ref 3.4–5)
ANION GAP SERPL CALC-SCNC: 4 MMOL/L (ref 3–18)
BUN SERPL-MCNC: 25 MG/DL (ref 7–18)
BUN/CREAT SERPL: 21 (ref 12–20)
CALCIUM SERPL-MCNC: 9.1 MG/DL (ref 8.5–10.1)
CALCIUM SERPL-MCNC: 9.4 MG/DL (ref 8.5–10.1)
CHLORIDE SERPL-SCNC: 105 MMOL/L (ref 100–111)
CO2 SERPL-SCNC: 30 MMOL/L (ref 21–32)
CREAT SERPL-MCNC: 1.19 MG/DL (ref 0.6–1.3)
ERYTHROCYTE [DISTWIDTH] IN BLOOD BY AUTOMATED COUNT: 13.9 % (ref 11.6–14.5)
GLUCOSE SERPL-MCNC: 102 MG/DL (ref 74–99)
HCT VFR BLD AUTO: 31 % (ref 35–45)
HGB BLD-MCNC: 10.3 G/DL (ref 12–16)
MCH RBC QN AUTO: 28.2 PG (ref 24–34)
MCHC RBC AUTO-ENTMCNC: 33.2 G/DL (ref 31–37)
MCV RBC AUTO: 84.9 FL (ref 78–100)
NRBC # BLD: 0 K/UL (ref 0–0.01)
NRBC BLD-RTO: 0 PER 100 WBC
PHOSPHATE SERPL-MCNC: 2.6 MG/DL (ref 2.5–4.9)
PLATELET # BLD AUTO: 286 K/UL (ref 135–420)
PMV BLD AUTO: 10.6 FL (ref 9.2–11.8)
POTASSIUM SERPL-SCNC: 3.5 MMOL/L (ref 3.5–5.5)
PROT UR-MCNC: 27 MG/DL
PTH-INTACT SERPL-MCNC: 207.5 PG/ML (ref 18.4–88)
RBC # BLD AUTO: 3.65 M/UL (ref 4.2–5.3)
SODIUM SERPL-SCNC: 139 MMOL/L (ref 136–145)
WBC # BLD AUTO: 9.5 K/UL (ref 4.6–13.2)

## 2022-08-23 PROCEDURE — 84156 ASSAY OF PROTEIN URINE: CPT

## 2022-08-23 PROCEDURE — 83970 ASSAY OF PARATHORMONE: CPT

## 2022-08-23 PROCEDURE — 80069 RENAL FUNCTION PANEL: CPT

## 2022-08-23 PROCEDURE — 82306 VITAMIN D 25 HYDROXY: CPT

## 2022-08-23 PROCEDURE — 36415 COLL VENOUS BLD VENIPUNCTURE: CPT

## 2022-08-23 PROCEDURE — 85027 COMPLETE CBC AUTOMATED: CPT

## 2022-08-30 ENCOUNTER — TRANSCRIBE ORDER (OUTPATIENT)
Dept: SCHEDULING | Age: 87
End: 2022-08-30

## 2022-08-30 DIAGNOSIS — Z12.31 VISIT FOR SCREENING MAMMOGRAM: Primary | ICD-10-CM

## 2022-09-22 ENCOUNTER — OFFICE VISIT (OUTPATIENT)
Dept: ORTHOPEDIC SURGERY | Age: 87
End: 2022-09-22
Payer: MEDICARE

## 2022-09-22 ENCOUNTER — HOSPITAL ENCOUNTER (OUTPATIENT)
Dept: MAMMOGRAPHY | Age: 87
Discharge: HOME | End: 2022-09-22
Attending: INTERNAL MEDICINE
Payer: MEDICARE

## 2022-09-22 VITALS — OXYGEN SATURATION: 100 % | TEMPERATURE: 97.5 F | BODY MASS INDEX: 34.37 KG/M2 | WEIGHT: 194 LBS | HEART RATE: 81 BPM

## 2022-09-22 DIAGNOSIS — M19.012 ARTHRITIS OF LEFT SHOULDER REGION: Primary | ICD-10-CM

## 2022-09-22 DIAGNOSIS — M54.2 CERVICAL PAIN: ICD-10-CM

## 2022-09-22 DIAGNOSIS — M25.511 BILATERAL SHOULDER PAIN, UNSPECIFIED CHRONICITY: ICD-10-CM

## 2022-09-22 DIAGNOSIS — M17.12 PRIMARY OSTEOARTHRITIS OF LEFT KNEE: ICD-10-CM

## 2022-09-22 DIAGNOSIS — Z12.31 VISIT FOR SCREENING MAMMOGRAM: ICD-10-CM

## 2022-09-22 DIAGNOSIS — R20.0 NUMBNESS AND TINGLING OF BOTH UPPER EXTREMITIES: ICD-10-CM

## 2022-09-22 DIAGNOSIS — R20.2 NUMBNESS AND TINGLING OF BOTH UPPER EXTREMITIES: ICD-10-CM

## 2022-09-22 DIAGNOSIS — Z96.651 HISTORY OF TOTAL RIGHT KNEE REPLACEMENT: ICD-10-CM

## 2022-09-22 DIAGNOSIS — M25.512 BILATERAL SHOULDER PAIN, UNSPECIFIED CHRONICITY: ICD-10-CM

## 2022-09-22 PROCEDURE — 1101F PT FALLS ASSESS-DOCD LE1/YR: CPT | Performed by: PHYSICIAN ASSISTANT

## 2022-09-22 PROCEDURE — G8432 DEP SCR NOT DOC, RNG: HCPCS | Performed by: PHYSICIAN ASSISTANT

## 2022-09-22 PROCEDURE — 1090F PRES/ABSN URINE INCON ASSESS: CPT | Performed by: PHYSICIAN ASSISTANT

## 2022-09-22 PROCEDURE — G8417 CALC BMI ABV UP PARAM F/U: HCPCS | Performed by: PHYSICIAN ASSISTANT

## 2022-09-22 PROCEDURE — G8400 PT W/DXA NO RESULTS DOC: HCPCS | Performed by: PHYSICIAN ASSISTANT

## 2022-09-22 PROCEDURE — 72040 X-RAY EXAM NECK SPINE 2-3 VW: CPT | Performed by: PHYSICIAN ASSISTANT

## 2022-09-22 PROCEDURE — 77063 BREAST TOMOSYNTHESIS BI: CPT

## 2022-09-22 PROCEDURE — 1123F ACP DISCUSS/DSCN MKR DOCD: CPT | Performed by: PHYSICIAN ASSISTANT

## 2022-09-22 PROCEDURE — 73030 X-RAY EXAM OF SHOULDER: CPT | Performed by: PHYSICIAN ASSISTANT

## 2022-09-22 PROCEDURE — G8536 NO DOC ELDER MAL SCRN: HCPCS | Performed by: PHYSICIAN ASSISTANT

## 2022-09-22 PROCEDURE — 20611 DRAIN/INJ JOINT/BURSA W/US: CPT | Performed by: PHYSICIAN ASSISTANT

## 2022-09-22 PROCEDURE — 99214 OFFICE O/P EST MOD 30 MIN: CPT | Performed by: PHYSICIAN ASSISTANT

## 2022-09-22 PROCEDURE — G8756 NO BP MEASURE DOC: HCPCS | Performed by: PHYSICIAN ASSISTANT

## 2022-09-22 PROCEDURE — G8427 DOCREV CUR MEDS BY ELIG CLIN: HCPCS | Performed by: PHYSICIAN ASSISTANT

## 2022-09-22 RX ORDER — BETAMETHASONE SODIUM PHOSPHATE AND BETAMETHASONE ACETATE 3; 3 MG/ML; MG/ML
6 INJECTION, SUSPENSION INTRA-ARTICULAR; INTRALESIONAL; INTRAMUSCULAR; SOFT TISSUE ONCE
Status: COMPLETED | OUTPATIENT
Start: 2022-09-22 | End: 2022-09-22

## 2022-09-22 RX ORDER — TRIAMTERENE AND HYDROCHLOROTHIAZIDE 37.5; 25 MG/1; MG/1
1 CAPSULE ORAL DAILY
COMMUNITY
Start: 2022-06-30

## 2022-09-22 RX ADMIN — BETAMETHASONE SODIUM PHOSPHATE AND BETAMETHASONE ACETATE 6 MG: 3; 3 INJECTION, SUSPENSION INTRA-ARTICULAR; INTRALESIONAL; INTRAMUSCULAR; SOFT TISSUE at 14:39

## 2022-09-22 NOTE — PROGRESS NOTES
[address] [place] [address]  [phone]           Patient: Zachariah Mclaughlin                MRN: [id number]       SSN: [id number]  YOB: 1935        AGE: 82 y.o. SEX: female  Body mass index is 34.37 kg/m². PCP: Herber Guthrie MD  09/22/22            REVIEW OF SYSTEMS:  Constitutional: Negative for fever, chills, weight loss and malaise/fatigue. HENT: Negative. Eyes: Negative. Respiratory: Negative. Cardiovascular: Negative. Gastrointestinal: No bowel incontinence or constipation. Genitourinary: No bladder incontinence or saddle anesthesia. Skin: Negative. Neurological: Negative. Endo/Heme/Allergies: Negative. Psychiatric/Behavioral: Negative. Musculoskeletal: As per HPI above. Past Medical History:   Diagnosis Date    Arrhythmia     mur mur  long standing    Arthritis     Arthritis of both hips     Back pain     Balance problem     Chronic back pain     Cough     Easy bruising     Essential hypertension     GERD (gastroesophageal reflux disease)     Hiatal hernia     Hypertension     Ill-defined condition     vascular insuff rt ankle    Irregular heart beat     Left hip pain 10/8/2010    Neck pain     Nervousness     Night sweat     Osteoarthritis, knee bilateral     Pain with urination     Polymyalgia rheumatica (HCC)     Reflux     Spinal stenosis     Thromboembolus (HCC)     Trapezius strain     Trochanteric bursitis of left hip     Venous insufficiency          Current Outpatient Medications:     triamterene-hydroCHLOROthiazide (DYAZIDE) 37.5-25 mg per capsule, Take 1 Capsule by mouth daily. , Disp: , Rfl:     acetaminophen (TYLENOL) 650 mg TbER, Take 650 mg by mouth every eight (8) hours. , Disp: , Rfl:     chlorthalidone (HYGROTON) 25 mg tablet, TAKE 1 TABLET BY MOUTH EVERY DAY, Disp: 90 Tablet, Rfl: 1    betamethasone (CELESTONE) 6 mg/mL injection, once., Disp: , Rfl:     fluocinolone acetonide oil 0.01 % drop, by Otic route., Disp: , Rfl:     ferrous sulfate 325 mg (65 mg iron) tablet, Take 1 Tab by mouth two (2) times daily (with meals). , Disp: 60 Tab, Rfl: 2    aspirin (ASPIRIN) 325 mg tablet, Take 1 Tab by mouth daily. (Patient taking differently: Take 81 mg by mouth daily.), Disp: 30 Tab, Rfl: 0    potassium 99 mg tablet, Take 99 mg by mouth daily. , Disp: , Rfl:     tropicamide (MYDRIACYL) 1 % ophthalmic solution, Administer 2 Drops to right eye as needed. 45 minutes prior to schedule appointments, Disp: , Rfl:     cholecalciferol (VITAMIN D3) (1000 Units /25 mcg) tablet, Take 1,000 Units by mouth daily. 1 tab day, Disp: , Rfl:     omeprazole (PRILOSEC) 40 mg capsule, Take 40 mg by mouth daily. 1 tab, Disp: , Rfl:     amLODIPine (NORVASC) 10 mg tablet, Take 10 mg by mouth daily. 1 tab, Disp: , Rfl: 3    cetirizine (ZYRTEC) 10 mg tablet, Take 10 mg by mouth daily. 1 tab daily, Disp: , Rfl:     triamcinolone acetonide (KENALOG) 0.1 % topical cream, two (2) times a day.  APPLY TO AFFECTED AREA (Patient not taking: No sig reported), Disp: , Rfl:     Current Facility-Administered Medications:     betamethasone (CELESTONE) injection 6 mg, 6 mg, Intra artICUlar, ONCE, Akilah Torres PA-C    Allergies   Allergen Reactions    Citric Acid Unknown (comments)    Crinone [Progesterone Micronized] Unknown (comments)    Iodine Unknown (comments)    Percocet [Oxycodone-Acetaminophen] Other (comments)     Gets rebound headaches after taking Percocet       Social History     Socioeconomic History    Marital status:      Spouse name: Not on file    Number of children: Not on file    Years of education: Not on file    Highest education level: Not on file   Occupational History    Not on file   Tobacco Use    Smoking status: Never    Smokeless tobacco: Never   Substance and Sexual Activity    Alcohol use: No    Drug use: No    Sexual activity: Never   Other Topics Concern    Not on file   Social History Narrative    Not on file     Social Determinants of Health     Financial Resource Strain: Not on file   Food Insecurity: Not on file   Transportation Needs: Not on file   Physical Activity: Not on file   Stress: Not on file   Social Connections: Not on file   Intimate Partner Violence: Not on file   Housing Stability: Not on file       Past Surgical History:   Procedure Laterality Date    COLONOSCOPY N/A 7/27/2016    COLONOSCOPY w/polypectomy performed by Francisca Lay MD at [address]  06/2011    left cornea transplant    HX HYSTERECTOMY      HX KNEE REPLACEMENT Right 02/2017    HX ORTHOPAEDIC      right foot and ankle    HX ORTHOPAEDIC Right 04/2017     Patient seen evaluated today with complaints of left shoulder pain as well as some pain that radiates down her upper extremities at times. She states it worse at night. She has trouble with overhead activities. She reports some tingling into her arms down to her hands at times. Denies any weakness. Denies any injury. Patient denies recent fevers, chills, chest pain, SOB, or injuries. No recent systemic changes noted. A 12-point review of systems is performed today. Pertinent positives are noted. All other systems reviewed and otherwise are negative. Physical exam: General: Alert and oriented x3, nad.  well-developed, well nourished. normal affect, AF. NC/AT, EOMI, neck supple, trachea midline, no JVD present. Breathing is non-labored. Examination of the neck reveals good range of motion cervical spine. Negative Spurling's. Neurovascular status intact. Dermatomes and myotomes intact C5-T2. Left shoulder reveals skin intact. There is no erythema or ecchymosis noted. There are no signs for infection or status present. Range of motion approximately 80 degrees of forward flexion, 80 degrees of abduction, 20 resectional rotation.   Does have a positive Gu[place] test.  Negative drop arm, speeds, Bradford's. Radial pulse 2+. Cap refills within normal limits. Radiographs in office today 9/22/2022 at the [facility] location including AP and lateral of the cervical spine shows multilevel degenerative changes without acute abnormalities noted. 3 views of the left shoulder also obtained showing end-stage arthritis of the left shoulder without acute abnormalities noted. Assessment: #1 left shoulder end-stage arthritis, #2 left shoulder subacromial bursitis, #3 cervical degenerative disc disease    Plan: At this point, we discussed treatment options. I recommended total shoulders for her however given her age of 82 she has elected against this. We will move for the cortisone injection for the left shoulder today. After informed consent, under aseptic conditions, with US guided assitance, the left shoulder was prepped with betadine and a mxiture of 3ml 1% lidocaine and 6mg of celestone was injected without complications. The patient tolerated the injection well. The patient is instructed on post-injection care. I have offered a referral to the spine center for evaluation of her neck and radicular symptomatology. The patient wishes to hold off on this at this point. She has seen Dr. Anna Marie Blanco in the past.  I have asked her to make an appointment if it worsens for her. Chart reviewed for the following:  Jessica JAMES PA-C, have reviewed the History, Physical and updated the Allergic reactions for Reshma Bone?     TIME OUT performed immediately prior to start of procedure:  Jessica JAMES PA-C, have performed the following reviews on Reshma Bone prior to the start of the procedure:  ????????  * Patient was identified by name and date of birth   * Agreement on procedure being performed was verified  * Risks and Benefits explained to the patient  * Procedure site verified and marked as necessary  * Patient was positioned for comfort  * Consent was signed and verified    Time:2:39 PM    Body part: left shoulder, intra-bursal    Medication & Dose: 3ml 1% lidocaine and 6mg celestone    Date of procedure: 09/22/22    Procedure performed by: Christopher Gross PA-C    Provider assisted by: none    Patient assisted by: self    How tolerated by patient: tolerated the procedure well with no complications    Post Procedural Pain Scale: 5    Comments:   [address] using a frequency of 10MHz with a 12L-RS transducer head was used to confirm needle placement.   Ultrasound images captured using [address] Ultrasound machine and scanned into patient's chart       Coit SENTHIL Messer, ATC

## 2022-11-10 ENCOUNTER — TELEPHONE (OUTPATIENT)
Dept: CARDIOLOGY CLINIC | Age: 87
End: 2022-11-10

## 2022-12-01 ENCOUNTER — OFFICE VISIT (OUTPATIENT)
Dept: ORTHOPEDIC SURGERY | Age: 87
End: 2022-12-01
Payer: MEDICARE

## 2022-12-01 VITALS — BODY MASS INDEX: 34.38 KG/M2 | HEIGHT: 63 IN | WEIGHT: 194 LBS

## 2022-12-01 DIAGNOSIS — M17.12 PRIMARY OSTEOARTHRITIS OF LEFT KNEE: Primary | ICD-10-CM

## 2022-12-01 DIAGNOSIS — M19.012 ARTHRITIS OF LEFT SHOULDER REGION: ICD-10-CM

## 2022-12-01 DIAGNOSIS — M25.512 BILATERAL SHOULDER PAIN, UNSPECIFIED CHRONICITY: ICD-10-CM

## 2022-12-01 DIAGNOSIS — M25.511 BILATERAL SHOULDER PAIN, UNSPECIFIED CHRONICITY: ICD-10-CM

## 2022-12-01 DIAGNOSIS — Z96.651 HISTORY OF TOTAL RIGHT KNEE REPLACEMENT: ICD-10-CM

## 2022-12-01 RX ORDER — BETAMETHASONE SODIUM PHOSPHATE AND BETAMETHASONE ACETATE 3; 3 MG/ML; MG/ML
6 INJECTION, SUSPENSION INTRA-ARTICULAR; INTRALESIONAL; INTRAMUSCULAR; SOFT TISSUE ONCE
Status: COMPLETED | OUTPATIENT
Start: 2022-12-01 | End: 2022-12-01

## 2022-12-01 RX ADMIN — BETAMETHASONE SODIUM PHOSPHATE AND BETAMETHASONE ACETATE 6 MG: 3; 3 INJECTION, SUSPENSION INTRA-ARTICULAR; INTRALESIONAL; INTRAMUSCULAR; SOFT TISSUE at 13:26

## 2022-12-01 NOTE — PROGRESS NOTES
[address] [place] [address]  [phone]           Patient: Katja Larsen                MRN: [id number]       SSN: [id number]  YOB: 1935        AGE: 82 y.o. SEX: female  Body mass index is 34.37 kg/m². PCP: Ariel Lucas MD  12/01/22            REVIEW OF SYSTEMS:  Constitutional: Negative for fever, chills, weight loss and malaise/fatigue. HENT: Negative. Eyes: Negative. Respiratory: Negative. Cardiovascular: Negative. Gastrointestinal: No bowel incontinence or constipation. Genitourinary: No bladder incontinence or saddle anesthesia. Skin: Negative. Neurological: Negative. Endo/Heme/Allergies: Negative. Psychiatric/Behavioral: Negative. Musculoskeletal: As per HPI above. Past Medical History:   Diagnosis Date    Arrhythmia     mur mur  long standing    Arthritis     Arthritis of both hips     Back pain     Balance problem     Chronic back pain     Cough     Easy bruising     Essential hypertension     GERD (gastroesophageal reflux disease)     Hiatal hernia     Hypertension     Ill-defined condition     vascular insuff rt ankle    Irregular heart beat     Left hip pain 10/8/2010    Neck pain     Nervousness     Night sweat     Osteoarthritis, knee bilateral     Pain with urination     Polymyalgia rheumatica (HCC)     Reflux     Spinal stenosis     Thromboembolus (HCC)     Trapezius strain     Trochanteric bursitis of left hip     Venous insufficiency          Current Outpatient Medications:     triamterene-hydroCHLOROthiazide (DYAZIDE) 37.5-25 mg per capsule, Take 1 Capsule by mouth daily. , Disp: , Rfl:     acetaminophen (TYLENOL) 650 mg TbER, Take 650 mg by mouth every eight (8) hours. , Disp: , Rfl:     triamcinolone acetonide (KENALOG) 0.1 % topical cream, two (2) times a day.  APPLY TO AFFECTED AREA, Disp: , Rfl:     chlorthalidone (HYGROTON) 25 mg tablet, TAKE 1 TABLET BY MOUTH EVERY DAY, Disp: 90 Tablet, Rfl: 1    betamethasone (CELESTONE) 6 mg/mL injection, once., Disp: , Rfl:     fluocinolone acetonide oil 0.01 % drop, by Otic route., Disp: , Rfl:     ferrous sulfate 325 mg (65 mg iron) tablet, Take 1 Tab by mouth two (2) times daily (with meals). , Disp: 60 Tab, Rfl: 2    aspirin (ASPIRIN) 325 mg tablet, Take 1 Tab by mouth daily. (Patient taking differently: Take 81 mg by mouth daily.), Disp: 30 Tab, Rfl: 0    potassium 99 mg tablet, Take 99 mg by mouth daily. , Disp: , Rfl:     tropicamide (MYDRIACYL) 1 % ophthalmic solution, Administer 2 Drops to right eye as needed. 45 minutes prior to schedule appointments, Disp: , Rfl:     cholecalciferol (VITAMIN D3) (1000 Units /25 mcg) tablet, Take 1,000 Units by mouth daily. 1 tab day, Disp: , Rfl:     omeprazole (PRILOSEC) 40 mg capsule, Take 40 mg by mouth daily. 1 tab, Disp: , Rfl:     amLODIPine (NORVASC) 10 mg tablet, Take 10 mg by mouth daily. 1 tab, Disp: , Rfl: 3    cetirizine (ZYRTEC) 10 mg tablet, Take 10 mg by mouth daily.  1 tab daily, Disp: , Rfl:     Current Facility-Administered Medications:     betamethasone (CELESTONE) injection 6 mg, 6 mg, Intra artICUlar, ONCE, Francisco Torres PA-C    Allergies   Allergen Reactions    Citric Acid Unknown (comments)    Crinone [Progesterone Micronized] Unknown (comments)    Iodine Unknown (comments)    Percocet [Oxycodone-Acetaminophen] Other (comments)     Gets rebound headaches after taking Percocet       Social History     Socioeconomic History    Marital status:      Spouse name: Not on file    Number of children: Not on file    Years of education: Not on file    Highest education level: Not on file   Occupational History    Not on file   Tobacco Use    Smoking status: Never    Smokeless tobacco: Never   Substance and Sexual Activity    Alcohol use: No    Drug use: No    Sexual activity: Never   Other Topics Concern    Not on file   Social History Narrative    Not on file     Social Determinants of Health     Financial Resource Strain: Not on file   Food Insecurity: Not on file   Transportation Needs: Not on file   Physical Activity: Not on file   Stress: Not on file   Social Connections: Not on file   Intimate Partner Violence: Not on file   Housing Stability: Not on file       Past Surgical History:   Procedure Laterality Date    COLONOSCOPY N/A 7/27/2016    COLONOSCOPY w/polypectomy performed by Sophy Marshall MD at [address]      HX HEENT  06/2011    left cornea transplant    HX HYSTERECTOMY      HX KNEE REPLACEMENT Right 02/2017    HX ORTHOPAEDIC      right foot and ankle    HX ORTHOPAEDIC Right 04/2017       Patient seen evaluated today for her left knee. She does have known advancing arthritis in left knee. She had a previous right knee replacement done. Did well with it. She unfortunately developed a patellar fracture which was repaired however did separate and did not want more surgery. She is functional with it. Regarding the left knee the injections are becoming less effective. They do not last as long. She has decreased walking tolerance. She has trouble in wheelchair. She has trouble stairs. Patient denies recent fevers, chills, chest pain, SOB, or injuries. No recent systemic changes noted. A 12-point review of systems is performed today. Pertinent positives are noted. All other systems reviewed and otherwise are negative. Physical exam: General: Alert and oriented x3, nad.  well-developed, well nourished. normal affect, AF. NC/AT, EOMI, neck supple, trachea midline, no JVD present. Breathing is non-labored. Examination of the lower extremities reveals pain-free range of motion the hips. There is no pain to palpation trochanter bursa. Negative straight leg raise. Negative calf tenderness. Negative Homans. No signs of DVT present. The left knee reveals skin intact.   There is no erythema or ecchymosis noted.  There are no signs for infection or cellulitis present. She does have findings consistent with advanced arthritis of the left knee with pain to palpation tricompartmentally and crepitus arising from anterior compartment. Assessment: #1 left knee end-stage osteoarthritis, #2 status post right total knee replacement, chronic inferior pole patellar fracture    Plan: At this point, we discussed treatment options. She will continue activities as tolerated regarding her knees. We did discuss total knee replacement and she declines. I agree given her advanced age. Today in the office we will move forward with a cortisone injection for the left knee. After informed consent, under aseptic conditions, with US guided assitance, the left knee was prepped with betadine and a mxiture of 3ml 1% lidocaine and 6mg of celestone was injected without complications. The patient tolerated the injection well. The patient is instructed on post-injection care. We will see her back in a couple months time for reevaluation. Chart reviewed for the following:  Jose JAMES PA-C, have reviewed the History, Physical and updated the Allergic reactions for Jose Varma?     TIME OUT performed immediately prior to start of procedure:  Jose JAMES PA-C, have performed the following reviews on Jose Varma prior to the start of the procedure:  ????????  * Patient was identified by name and date of birth   * Agreement on procedure being performed was verified  * Risks and Benefits explained to the patient  * Procedure site verified and marked as necessary  * Patient was positioned for comfort  * Consent was signed and verified    Time:1:14 PM    Body part: left knee, intra-articular    Medication & Dose: 3ml 1% lidocaine and 6mg celestone    Date of procedure: 12/01/22    Procedure performed by: Jose Torres PA-C    Provider assisted by: none    Patient assisted by: self    How tolerated by patient: tolerated the procedure well with no complications    Post Procedural Pain Scale: 7    Comments:   [address] using a frequency of 10MHz with a 12L-RS transducer head was used to confirm needle placement.   Ultrasound images captured using [address] Ultrasound machine and scanned into patient's chart       Carin Moreira PA-C, ATC

## 2022-12-08 DIAGNOSIS — I10 ESSENTIAL HYPERTENSION: ICD-10-CM

## 2022-12-09 RX ORDER — CHLORTHALIDONE 25 MG/1
TABLET ORAL
Qty: 90 TABLET | Refills: 1 | Status: SHIPPED | OUTPATIENT
Start: 2022-12-09

## 2023-01-06 ENCOUNTER — OFFICE VISIT (OUTPATIENT)
Dept: ORTHOPEDIC SURGERY | Age: 88
End: 2023-01-06
Payer: MEDICARE

## 2023-01-06 VITALS — BODY MASS INDEX: 34.38 KG/M2 | WEIGHT: 194 LBS | HEIGHT: 63 IN

## 2023-01-06 DIAGNOSIS — M25.511 BILATERAL SHOULDER PAIN, UNSPECIFIED CHRONICITY: ICD-10-CM

## 2023-01-06 DIAGNOSIS — M17.12 PRIMARY OSTEOARTHRITIS OF LEFT KNEE: ICD-10-CM

## 2023-01-06 DIAGNOSIS — M19.012 ARTHRITIS OF LEFT SHOULDER REGION: Primary | ICD-10-CM

## 2023-01-06 DIAGNOSIS — M75.52 BURSITIS OF LEFT SHOULDER: ICD-10-CM

## 2023-01-06 DIAGNOSIS — M25.512 BILATERAL SHOULDER PAIN, UNSPECIFIED CHRONICITY: ICD-10-CM

## 2023-01-06 DIAGNOSIS — Z96.651 HISTORY OF TOTAL RIGHT KNEE REPLACEMENT: ICD-10-CM

## 2023-01-06 RX ORDER — PREDNISOLONE ACETATE 10 MG/ML
1 SUSPENSION/ DROPS OPHTHALMIC
COMMUNITY

## 2023-01-06 RX ORDER — CARBOXYMETHYLCELLULOSE SODIUM 10 MG/ML
GEL OPHTHALMIC
COMMUNITY

## 2023-01-06 RX ORDER — FLUOROMETHOLONE 1 MG/ML
1 SOLUTION/ DROPS OPHTHALMIC
COMMUNITY
Start: 2021-09-13

## 2023-01-06 RX ORDER — CELECOXIB 200 MG/1
CAPSULE ORAL
COMMUNITY

## 2023-01-06 RX ORDER — OLOPATADINE HYDROCHLORIDE 2 MG/ML
1 SOLUTION/ DROPS OPHTHALMIC
COMMUNITY

## 2023-01-06 RX ORDER — BETAMETHASONE SODIUM PHOSPHATE AND BETAMETHASONE ACETATE 3; 3 MG/ML; MG/ML
6 INJECTION, SUSPENSION INTRA-ARTICULAR; INTRALESIONAL; INTRAMUSCULAR; SOFT TISSUE ONCE
Status: COMPLETED | OUTPATIENT
Start: 2023-01-06 | End: 2023-01-06

## 2023-01-06 RX ORDER — POTASSIUM ACETATE 100 %
POWDER (GRAM) MISCELLANEOUS
COMMUNITY

## 2023-01-06 RX ORDER — LOSARTAN POTASSIUM 100 MG/1
TABLET ORAL
COMMUNITY

## 2023-01-06 RX ADMIN — BETAMETHASONE SODIUM PHOSPHATE AND BETAMETHASONE ACETATE 6 MG: 3; 3 INJECTION, SUSPENSION INTRA-ARTICULAR; INTRALESIONAL; INTRAMUSCULAR; SOFT TISSUE at 13:57

## 2023-01-06 NOTE — PROGRESS NOTES
Nalini Romano presents today for   Chief Complaint   Patient presents with    Shoulder Pain     Left         Is someone accompanying this pt? no    Is the patient using any DME equipment during OV? Yes walker    Depression Screening:  3 most recent PHQ Screens 8/12/2021   PHQ Not Done -   Little interest or pleasure in doing things Not at all   Feeling down, depressed, irritable, or hopeless Not at all   Total Score PHQ 2 0       Learning Assessment:  Learning Assessment 2/9/2018   PRIMARY LEARNER Patient   HIGHEST LEVEL OF EDUCATION - PRIMARY LEARNER  -   BARRIERS PRIMARY LEARNER -   CO-LEARNER CAREGIVER -   PRIMARY LANGUAGE ENGLISH   LEARNER PREFERENCE PRIMARY READING     -   ANSWERED BY Patient   RELATIONSHIP SELF       Abuse Screening:  No flowsheet data found. Fall Risk  Fall Risk Assessment, last 12 mths 1/6/2023   Able to walk? Yes   Fall in past 12 months? 1   Do you feel unsteady? 1   Are you worried about falling 1   Is TUG test greater than 12 seconds? -   Is the gait abnormal? -   Number of falls in past 12 months 1   Fall with injury? 0       OPIOID RISK TOOL  No flowsheet data found. Coordination of Care:  1. Have you been to the ER, urgent care clinic since your last visit? no  Hospitalized since your last visit? no    2. Have you seen or consulted any other health care providers outside of the [address] [place] [address] [place] since your last visit? no Include any pap smears or colon screening.  no

## 2023-01-06 NOTE — PROGRESS NOTES
[address] [place] [address]  [phone]           Patient: Royal Pratt                MRN: [id number]       SSN: [id number]  YOB: 1935        AGE: 82 y.o. SEX: female  Body mass index is 34.37 kg/m². PCP: Sarah Floyd MD  01/06/23            REVIEW OF SYSTEMS:  Constitutional: Negative for fever, chills, weight loss and malaise/fatigue. HENT: Negative. Eyes: Negative. Respiratory: Negative. Cardiovascular: Negative. Gastrointestinal: No bowel incontinence or constipation. Genitourinary: No bladder incontinence or saddle anesthesia. Skin: Negative. Neurological: Negative. Endo/Heme/Allergies: Negative. Psychiatric/Behavioral: Negative. Musculoskeletal: As per HPI above.      Past Medical History:   Diagnosis Date    Arrhythmia     mur mur  long standing    Arthritis     Arthritis of both hips     Back pain     Balance problem     Chronic back pain     Cough     Easy bruising     Essential hypertension     GERD (gastroesophageal reflux disease)     Hiatal hernia     Hypertension     Ill-defined condition     vascular insuff rt ankle    Irregular heart beat     Left hip pain 10/8/2010    Neck pain     Nervousness     Night sweat     Osteoarthritis, knee bilateral     Pain with urination     Polymyalgia rheumatica (HCC)     Reflux     Spinal stenosis     Thromboembolus (HCC)     Trapezius strain     Trochanteric bursitis of left hip     Venous insufficiency          Current Outpatient Medications:     carboxymethylcellulose sodium (REFRESH LIQUIGEL) 1 % dlgl ophthalmic solution, , Disp: , Rfl:     fluorometholone (FML) 0.1 % ophthalmic suspension, Apply 1 Drop to eye., Disp: , Rfl:     olopatadine (PATADAY) 0.2 % drop ophthalmic solution, Apply 1 Drop to eye., Disp: , Rfl:     potassium acetate, bulk, 100 % powd, , Disp: , Rfl:     prednisoLONE acetate (PRED FORTE) 1 % ophthalmic suspension, Apply 1 Drop to eye., Disp: , Rfl:     triamterene-hydroCHLOROthiazide (DYAZIDE) 37.5-25 mg per capsule, Take 1 Capsule by mouth daily. , Disp: , Rfl:     acetaminophen (TYLENOL) 650 mg TbER, Take 650 mg by mouth every eight (8) hours. , Disp: , Rfl:     triamcinolone acetonide (KENALOG) 0.1 % topical cream, two (2) times a day. APPLY TO AFFECTED AREA, Disp: , Rfl:     betamethasone (CELESTONE) 6 mg/mL injection, once., Disp: , Rfl:     fluocinolone acetonide oil 0.01 % drop, by Otic route., Disp: , Rfl:     ferrous sulfate 325 mg (65 mg iron) tablet, Take 1 Tab by mouth two (2) times daily (with meals). , Disp: 60 Tab, Rfl: 2    aspirin (ASPIRIN) 325 mg tablet, Take 1 Tab by mouth daily. (Patient taking differently: Take 81 mg by mouth daily.), Disp: 30 Tab, Rfl: 0    potassium 99 mg tablet, Take 99 mg by mouth daily. , Disp: , Rfl:     tropicamide (MYDRIACYL) 1 % ophthalmic solution, Administer 2 Drops to right eye as needed. 45 minutes prior to schedule appointments, Disp: , Rfl:     cholecalciferol (VITAMIN D3) (1000 Units /25 mcg) tablet, Take 1,000 Units by mouth daily. 1 tab day, Disp: , Rfl:     omeprazole (PRILOSEC) 40 mg capsule, Take 40 mg by mouth daily. 1 tab, Disp: , Rfl:     amLODIPine (NORVASC) 10 mg tablet, Take 10 mg by mouth daily. 1 tab, Disp: , Rfl: 3    cetirizine (ZYRTEC) 10 mg tablet, Take 10 mg by mouth daily.  1 tab daily, Disp: , Rfl:     celecoxib (CELEBREX) 200 mg capsule, 1 capsule with food Orally Twice a day (Patient not taking: Reported on 1/6/2023), Disp: , Rfl:     losartan (COZAAR) 100 mg tablet, 1 tablet Orally Once a day (Patient not taking: Reported on 1/6/2023), Disp: , Rfl:     chlorthalidone (HYGROTON) 25 mg tablet, TAKE 1 TABLET BY MOUTH EVERY DAY, Disp: 90 Tablet, Rfl: 1    Current Facility-Administered Medications:     betamethasone (CELESTONE) injection 6 mg, 6 mg, IntraBURSal, ONCE, Mitch Torres PA-C    Allergies   Allergen Reactions    Citric Acid Unknown (comments)    Crinone [Progesterone Micronized] Unknown (comments)    Iodine Unknown (comments)    Percocet [Oxycodone-Acetaminophen] Other (comments)     Gets rebound headaches after taking Percocet       Social History     Socioeconomic History    Marital status:      Spouse name: Not on file    Number of children: Not on file    Years of education: Not on file    Highest education level: Not on file   Occupational History    Not on file   Tobacco Use    Smoking status: Never    Smokeless tobacco: Never   Substance and Sexual Activity    Alcohol use: No    Drug use: No    Sexual activity: Never   Other Topics Concern    Not on file   Social History Narrative    Not on file     Social Determinants of Health     Financial Resource Strain: Not on file   Food Insecurity: Not on file   Transportation Needs: Not on file   Physical Activity: Not on file   Stress: Not on file   Social Connections: Not on file   Intimate Partner Violence: Not on file   Housing Stability: Not on file       Past Surgical History:   Procedure Laterality Date    COLONOSCOPY N/A 7/27/2016    COLONOSCOPY w/polypectomy performed by Sheila Pond MD at [facility]      HX HEENT  06/2011    left cornea transplant    HX HYSTERECTOMY      HX KNEE REPLACEMENT Right 02/2017    HX ORTHOPAEDIC      right foot and ankle    HX ORTHOPAEDIC Right 04/2017       Patient seen evaluated today for her left shoulder. She does have known rotator cuff pathology and arthritis of the shoulder. She has trouble with overhead activities. She has had no recent injury. She does have some night pain. She states that her discomfort has been worsening. Patient denies recent fevers, chills, chest pain, SOB, or injuries. No recent systemic changes noted. A 12-point review of systems is performed today. Pertinent positives are noted. All other systems reviewed and otherwise are negative.     Physical exam: General: Alert and oriented x3, nad.  well-developed, well nourished. normal affect, AF. NC/AT, EOMI, neck supple, trachea midline, no JVD present. Breathing is non-labored. Examination of the left shoulder reveals skin intact. There is no erythema ecchymosis noted. There are no signs of infection or cellulitis present. Range of motion is approximate 90 degrees of forward flexion, 90 degrees of abduction, 20 degrees external rotation. Decree strength external rotation. Positive Guadarrama test.  Negative drop arm, speeds, Fleming's. Radial pulse 2+. Cap refills within normal limits. Assessment: Left shoulder subacromial bursitis, impingement syndrome, rotator cuff pathology, osteoarthritis    Plan: At this point, we discussed treatment options. Surgical intervention including rotator cuff repair as well as total shoulders were discussed and elected against to continue with conservative treatment given her age. Today in the office we will move forth a cortisone injection for the left shoulder. After informed consent, under aseptic conditions, with US guided assitance, the left shoulder was prepped with betadine and a mxiture of 3ml 1% lidocaine and 6mg of celestone was injected without complications. The patient tolerated the injection well. The patient is instructed on post-injection care. We will see her back in a couple months time for reevaluation. She will continue with a home exercise program and range of motion activities of her shoulder to prevent arthrofibrosis. Chart reviewed for the following:  Ernestine JAMES PA-C, have reviewed the History, Physical and updated the Allergic reactions for [facility]?     TIME OUT performed immediately prior to start of procedure:  Ernestine JAMES PA-C, have performed the following reviews on [facility] prior to the start of the procedure:  ????????  * Patient was identified by name and date of birth   * Agreement on procedure being performed was verified  * Risks and Benefits explained to the patient  * Procedure site verified and marked as necessary  * Patient was positioned for comfort  * Consent was signed and verified    Time:1:51 PM    Body part: left shoulder, intra-bursal    Medication & Dose: 3ml 1% lidocaine and 6mg celestone    Date of procedure: 01/06/23    Procedure performed by: Mary Mcgrath PA-C    Provider assisted by: none    Patient assisted by: self    How tolerated by patient: tolerated the procedure well with no complications    Post Procedural Pain Scale: 3    Comments:   [address] using a frequency of 10MHz with a 12L-RS transducer head was used to confirm needle placement.   Ultrasound images captured using [address] Ultrasound machine and scanned into patient's chart       Cathleen Gonsales PA-C, ATC

## 2023-02-09 ENCOUNTER — OFFICE VISIT (OUTPATIENT)
Dept: CARDIOLOGY CLINIC | Age: 88
End: 2023-02-09
Payer: MEDICARE

## 2023-02-09 VITALS
DIASTOLIC BLOOD PRESSURE: 50 MMHG | WEIGHT: 195.39 LBS | HEART RATE: 64 BPM | HEIGHT: 63 IN | OXYGEN SATURATION: 98 % | BODY MASS INDEX: 34.62 KG/M2 | SYSTOLIC BLOOD PRESSURE: 139 MMHG

## 2023-02-09 DIAGNOSIS — E66.9 OBESITY (BMI 30.0-34.9): ICD-10-CM

## 2023-02-09 DIAGNOSIS — I10 ESSENTIAL HYPERTENSION: ICD-10-CM

## 2023-02-09 DIAGNOSIS — I34.0 NONRHEUMATIC MITRAL VALVE REGURGITATION: ICD-10-CM

## 2023-02-09 DIAGNOSIS — I50.32 CHRONIC DIASTOLIC CONGESTIVE HEART FAILURE (HCC): Primary | ICD-10-CM

## 2023-02-09 PROBLEM — E66.811 OBESITY (BMI 30.0-34.9): Status: ACTIVE | Noted: 2023-02-09

## 2023-02-09 RX ORDER — FUROSEMIDE 40 MG/1
40 TABLET ORAL
Qty: 90 TABLET | Refills: 1 | Status: SHIPPED | OUTPATIENT
Start: 2023-02-09

## 2023-02-09 NOTE — PATIENT INSTRUCTIONS
Medications Discontinued During This Encounter   Medication Reason    fluocinolone acetonide oil 0.01 % drop Therapy Completed    losartan (COZAAR) 100 mg tablet Not A Current Medication    potassium acetate, bulk, 100 % powd ERROR    celecoxib (CELEBREX) 200 mg capsule Not A Current Medication    triamterene-hydroCHLOROthiazide (DYAZIDE) 37.5-25 mg per capsule Alternate Therapy         Avoiding Triggers With Heart Failure: Care Instructions  Your Care Instructions     Triggers are anything that make your heart failure flare up. A flare-up is also called \"sudden heart failure\" or \"acute heart failure. \" When you have a flare-up, fluid builds up in your lungs, and you have problems breathing. You might need to go to the hospital. By watching for changes in your condition and avoiding triggers, you can prevent heart failure flare-ups. Follow-up care is a key part of your treatment and safety. Be sure to make and go to all appointments, and call your doctor if you are having problems. It's also a good idea to know your test results and keep a list of the medicines you take. How can you care for yourself at home? Watch for changes in your weight and condition  Weigh yourself without clothing at the same time each day. Record your weight. Call your doctor if you have sudden weight gain, such as more than 2 to 3 pounds in a day or 5 pounds in a week. (Your doctor may suggest a different range of weight gain.) A sudden weight gain may mean that your heart failure is getting worse. Keep a daily record of your symptoms. Write down any changes in how you feel, such as new shortness of breath, cough, or problems eating. Also record if your ankles are more swollen than usual and if you feel more tired than usual. Note anything that you ate or did that could have triggered these changes. Limit sodium  Sodium causes your body to hold on to extra water. This may cause your heart failure symptoms to get worse.  People get most of their sodium from processed foods. Fast food and restaurant meals also tend to be very high in sodium. Your doctor may suggest that you limit sodium. Your doctor can tell you how much sodium is right for you. This includes limiting sodium in cooked and packaged foods. Read food labels on cans and food packages. They tell you how much sodium you get in one serving. Check the serving size. If you eat more than one serving, you are getting more sodium. Be aware that sodium can come in forms other than salt, including monosodium glutamate (MSG), sodium citrate, and sodium bicarbonate (baking soda). MSG is often added to Asian food. You can sometimes ask for food without MSG or salt. Slowly reducing salt will help you adjust to the taste. Take the salt shaker off the table. Flavor your food with garlic, lemon juice, onion, vinegar, herbs, and spices instead of salt. Do not use soy sauce, steak sauce, onion salt, garlic salt, mustard, or ketchup on your food, unless it is labeled \"low-sodium\" or \"low-salt. \"  Make your own salad dressings, sauces, and ketchup without adding salt. Use fresh or frozen ingredients, instead of canned ones, whenever you can. Choose low-sodium canned goods. Eat less processed food and food from restaurants, including fast food. Exercise as directed  Moderate, regular exercise is very good for your heart. It improves your blood flow and helps control your weight. But too much exercise can stress your heart and cause a heart failure flare-up. Check with your doctor before you start an exercise program.  Walking is an easy way to get exercise. Start out slowly. Gradually increase the length and pace of your walk. Swimming, riding a bike, and using a treadmill are also good forms of exercise. When you exercise, watch for signs that your heart is working too hard. You are pushing yourself too hard if you cannot talk while you are exercising.  If you become short of breath or dizzy or have chest pain, stop, sit down, and rest.  Do not exercise when you do not feel well. Take medicines correctly  Take your medicines exactly as prescribed. Call your doctor if you think you are having a problem with your medicine. Make a list of all the medicines you take. Include those prescribed to you by other doctors and any over-the-counter medicines, vitamins, or supplements you take. Take this list with you when you go to any doctor. Take your medicines at the same time every day. It may help you to post a list of all the medicines you take every day and what time of day you take them. Make taking your medicine as simple as you can. Plan times to take your medicines when you are doing other things, such as eating a meal or getting ready for bed. This will make it easier to remember to take your medicines. Get organized. Use helpful tools, such as daily or weekly pill containers. When should you call for help? Call 911  if you have symptoms of sudden heart failure such as: You have severe trouble breathing. You cough up pink, foamy mucus. You have a new irregular or rapid heartbeat. Call your doctor now or seek immediate medical care if:    You have new or increased shortness of breath. You are dizzy or lightheaded, or you feel like you may faint. You have sudden weight gain, such as more than 2 to 3 pounds in a day or 5 pounds in a week. (Your doctor may suggest a different range of weight gain.)     You have increased swelling in your legs, ankles, or feet. You are suddenly so tired or weak that you cannot do your usual activities. Watch closely for changes in your health, and be sure to contact your doctor if you develop new symptoms. Where can you learn more? Go to http://www.gray.com/  Enter V089 in the search box to learn more about \"Avoiding Triggers With Heart Failure: Care Instructions. \"  Current as of: January 10, 2022               Content Version: 13.4  © 9078-9007 [employer]. Care instructions adapted under license by [employer] (which disclaims liability or warranty for this information). If you have questions about a medical condition or this instruction, always ask your healthcare professional. [address] any warranty or liability for your use of this information.

## 2023-02-09 NOTE — PROGRESS NOTES
HISTORY OF PRESENT ILLNESS  Natali Carcamo is a 82 y.o. female. Follow-up of CHF, hypertension, MR, obesity    7/21 seen after many years due to history of heart murmur  2/23 has pain in multiple joints in the body. Follow-up  Pertinent negatives include no chest pain, no headaches and no shortness of breath. Leg Swelling  The history is provided by the Patient. This is a chronic problem. The current episode started more than 1 week ago (yrs). The problem occurs every several days. The problem has been rapidly improving. Pertinent negatives include no chest pain, no headaches and no shortness of breath. The symptoms are aggravated by standing. The symptoms are relieved by sleep and medications (support stocking). Review of Systems   Constitutional:  Negative for chills, fever, malaise/fatigue and weight loss. HENT:  Negative for nosebleeds. Eyes:  Negative for discharge. Respiratory:  Negative for cough, shortness of breath and wheezing. Cardiovascular:  Positive for leg swelling. Negative for chest pain, palpitations, orthopnea, claudication and PND. Gastrointestinal:  Negative for diarrhea, nausea and vomiting. Genitourinary:  Negative for dysuria and hematuria. Musculoskeletal:  Negative for joint pain. Skin:  Negative for rash. Neurological:  Negative for dizziness, seizures, loss of consciousness and headaches. Endo/Heme/Allergies:  Negative for polydipsia. Does not bruise/bleed easily. Psychiatric/Behavioral:  Negative for depression and substance abuse. The patient does not have insomnia. 11/15 nuclear stress  Conclusion:   1. Normal perfusion scan. 2. Normal wall motion and ejection fraction. 3. Low risk scan.  01/19/22    ECHO ADULT COMPLETE 01/19/2022 1/19/2022    Interpretation Summary    Left Ventricle: Left ventricle size is normal. Mildly increased wall thickness. Findings consistent with mild concentric hypertrophy. Normal wall motion.  Normal left ventricular systolic function with a visually estimated EF of 60 - 65%. Diastolic dysfunction present with normal LV EF. Mitral Valve: Mild transvalvular regurgitation. Pulmonary Arteries: Mild pulmonary hypertension present. The estimated pulmonary artery systolic pressure is 46 mmHg. Signed by: Mejia Anderson MD on 1/19/2022  2:09 PM    Physical Exam  Constitutional:       General: She is not in acute distress. Appearance: She is well-developed. She is obese. HENT:      Head: Normocephalic and atraumatic. Mouth/Throat:      Dentition: Normal dentition. Eyes:      General: No scleral icterus. Right eye: No discharge. Left eye: No discharge. Neck:      Thyroid: No thyromegaly. Vascular: No carotid bruit or JVD. Cardiovascular:      Rate and Rhythm: Normal rate and regular rhythm. Pulses: Intact distal pulses. Heart sounds: S1 normal and S2 normal. Murmur heard. Harsh midsystolic murmur is present with a grade of 3/6 at the upper right sternal border radiating to the neck. High-pitched blowing midsystolic murmur of grade 2/6 is also present at the apex. No friction rub. No gallop. Pulmonary:      Effort: Pulmonary effort is normal.      Breath sounds: Normal breath sounds. No wheezing or rales. Abdominal:      Palpations: Abdomen is soft. There is no mass. Tenderness: There is no abdominal tenderness. Musculoskeletal:      Cervical back: Neck supple. Right lower leg: No edema. Left lower leg: Edema (1) present. Lymphadenopathy:      Cervical:      Right cervical: No superficial cervical adenopathy. Left cervical: No superficial cervical adenopathy. Skin:     General: Skin is warm and dry. Findings: No rash. Neurological:      Mental Status: She is alert and oriented to person, place, and time.    Psychiatric:         Behavior: Behavior normal.       ASSESSMENT and PLAN    Results for Gama Ferrell (MRN [id number]) as of 2/24/2022 09:48   Ref. Range 1/11/2022 14:59   Triglyceride Latest Ref Range: <150 MG/DL 77   Cholesterol, total Latest Ref Range: <200 MG/   HDL Cholesterol Latest Ref Range: 40 - 60 MG/DL 54   CHOL/HDL Ratio Latest Ref Range: 0 - 5.0   3.1   VLDL, calculated Latest Units: MG/DL 15.4   LDL, calculated Latest Ref Range: 0 - 100 MG/DL 97.6     Mitral regurgitation: 1/22 mild;    2/24/2022 CHF is improving well. Blood pressure is good at home but elevated today here. Add low-dose Lopressor and follow the home chart. Mitral regurgitation noted to be mild. Follow clinically. Diet weight and exercise discussed. Mediterranean diet guidelines given again. 6/30/2022 CHF is improved. NYHA class II. Blood pressure is controlled well. MR is mild and follow clinically. Heart murmur is impressive and sounds like aortic stenosis but none was seen in 1/22 by echo. We will follow clinically. She is losing some weight for which she was congratulated. Encouraged to continue exercise and weight loss. Diagnoses and all orders for this visit:    1. Chronic diastolic congestive heart failure (HCC)  -     AMB POC EKG ROUTINE W/ 12 LEADS, INTER & REP  -     furosemide (LASIX) 40 mg tablet; Take 1 Tablet by mouth daily as needed (Edema). -     METABOLIC PANEL, BASIC; Future    2. Essential hypertension    3. Nonrheumatic mitral valve regurgitation    4. Obesity (BMI 30.0-34. 9)        Pertinent laboratory and test data reviewed and discussed with patient.   See patient instructions also for other medical advice given    Medications Discontinued During This Encounter   Medication Reason    fluocinolone acetonide oil 0.01 % drop Therapy Completed    losartan (COZAAR) 100 mg tablet Not A Current Medication    potassium acetate, bulk, 100 % powd ERROR    celecoxib (CELEBREX) 200 mg capsule Not A Current Medication    triamterene-hydroCHLOROthiazide (DYAZIDE) 37.5-25 mg per capsule Alternate Therapy Follow-up and Dispositions    Return in about 6 months (around 8/9/2023), or if symptoms worsen or fail to improve, for post test.       2/9/2023 CHF is fairly compensated NYHA class II-III. Ambulation limited due to arthritis also. Blood pressure is controlled  MR is mild and can follow. Follow clinically  Diet discussed again and now recommended to avoid salty foods like processed meats. She claims allergy to fruits and vegetables which have citric acid.   I recommended that she see an allergist.

## 2023-02-09 NOTE — PROGRESS NOTES
1. Have you been to the ER, urgent care clinic since your last visit? Hospitalized since your last visit? No    2. Have you seen or consulted any other health care providers outside of the [address] [place] [address] since your last visit? Include any pap smears or colon screening. No    3. Since your last visit, have you had any of the following symptoms?      swelling in legs/arms. 4.  Have you had any blood work, X-rays or cardiac testing? No       5. Where do you normally have your labs drawn? [facility]    6. Do you need any refills today?    No

## 2023-02-16 ENCOUNTER — TRANSCRIBE ORDERS (OUTPATIENT)
Facility: HOSPITAL | Age: 88
End: 2023-02-16

## 2023-02-16 ENCOUNTER — HOSPITAL ENCOUNTER (OUTPATIENT)
Facility: HOSPITAL | Age: 88
End: 2023-02-16
Payer: MEDICARE

## 2023-02-16 ENCOUNTER — HOSPITAL ENCOUNTER (OUTPATIENT)
Facility: HOSPITAL | Age: 88
Discharge: HOME | End: 2023-02-16
Payer: MEDICARE

## 2023-02-16 DIAGNOSIS — N18.31 STAGE 3A CHRONIC KIDNEY DISEASE (HCC): ICD-10-CM

## 2023-02-16 DIAGNOSIS — N18.31 STAGE 3A CHRONIC KIDNEY DISEASE (HCC): Primary | ICD-10-CM

## 2023-02-16 DIAGNOSIS — I50.32 CHRONIC DIASTOLIC HEART FAILURE (HCC): Primary | ICD-10-CM

## 2023-02-16 DIAGNOSIS — I50.32 CHRONIC DIASTOLIC HEART FAILURE (HCC): ICD-10-CM

## 2023-02-16 LAB
25(OH)D3 SERPL-MCNC: 70.9 NG/ML (ref 30–100)
ALBUMIN SERPL-MCNC: 3.5 G/DL (ref 3.4–5)
ANION GAP SERPL CALC-SCNC: 4 MMOL/L (ref 3–18)
ANION GAP SERPL CALC-SCNC: 6 MMOL/L (ref 3–18)
BUN SERPL-MCNC: 30 MG/DL (ref 7–18)
BUN SERPL-MCNC: 31 MG/DL (ref 7–18)
BUN/CREAT SERPL: 25 (ref 12–20)
BUN/CREAT SERPL: 27 (ref 12–20)
CALCIUM SERPL-MCNC: 8.9 MG/DL (ref 8.5–10.1)
CALCIUM SERPL-MCNC: 9 MG/DL (ref 8.5–10.1)
CALCIUM SERPL-MCNC: 9 MG/DL (ref 8.5–10.1)
CHLORIDE SERPL-SCNC: 104 MMOL/L (ref 100–111)
CHLORIDE SERPL-SCNC: 106 MMOL/L (ref 100–111)
CO2 SERPL-SCNC: 31 MMOL/L (ref 21–32)
CO2 SERPL-SCNC: 33 MMOL/L (ref 21–32)
CREAT SERPL-MCNC: 1.16 MG/DL (ref 0.6–1.3)
CREAT SERPL-MCNC: 1.2 MG/DL (ref 0.6–1.3)
CREAT UR-MCNC: 152 MG/DL (ref 30–125)
ERYTHROCYTE [DISTWIDTH] IN BLOOD BY AUTOMATED COUNT: 13.8 % (ref 11.6–14.5)
GLUCOSE SERPL-MCNC: 100 MG/DL (ref 74–99)
GLUCOSE SERPL-MCNC: 103 MG/DL (ref 74–99)
HCT VFR BLD AUTO: 30 % (ref 35–45)
HGB BLD-MCNC: 9.8 G/DL (ref 12–16)
MCH RBC QN AUTO: 28.1 PG (ref 24–34)
MCHC RBC AUTO-ENTMCNC: 32.7 G/DL (ref 31–37)
MCV RBC AUTO: 86 FL (ref 78–100)
NRBC # BLD: 0 K/UL (ref 0–0.01)
NRBC BLD-RTO: 0 PER 100 WBC
PHOSPHATE SERPL-MCNC: 3.4 MG/DL (ref 2.5–4.9)
PLATELET # BLD AUTO: 259 K/UL (ref 135–420)
PMV BLD AUTO: 10.6 FL (ref 9.2–11.8)
POTASSIUM SERPL-SCNC: 3 MMOL/L (ref 3.5–5.5)
POTASSIUM SERPL-SCNC: 3 MMOL/L (ref 3.5–5.5)
PROT UR-MCNC: 20 MG/DL
PROT/CREAT UR-RTO: 0.1
PTH-INTACT SERPL-MCNC: 223.6 PG/ML (ref 18.4–88)
RBC # BLD AUTO: 3.49 M/UL (ref 4.2–5.3)
SODIUM SERPL-SCNC: 141 MMOL/L (ref 136–145)
SODIUM SERPL-SCNC: 143 MMOL/L (ref 136–145)
WBC # BLD AUTO: 7.8 K/UL (ref 4.6–13.2)

## 2023-02-16 PROCEDURE — 36415 COLL VENOUS BLD VENIPUNCTURE: CPT

## 2023-02-16 PROCEDURE — 84156 ASSAY OF PROTEIN URINE: CPT

## 2023-02-16 PROCEDURE — 83970 ASSAY OF PARATHORMONE: CPT

## 2023-02-16 PROCEDURE — 80069 RENAL FUNCTION PANEL: CPT

## 2023-02-16 PROCEDURE — 80048 BASIC METABOLIC PNL TOTAL CA: CPT

## 2023-02-16 PROCEDURE — 82306 VITAMIN D 25 HYDROXY: CPT

## 2023-02-16 PROCEDURE — 85027 COMPLETE CBC AUTOMATED: CPT

## 2023-02-23 DIAGNOSIS — I50.32 CHRONIC DIASTOLIC CONGESTIVE HEART FAILURE (HCC): Primary | ICD-10-CM

## 2023-03-02 ENCOUNTER — OFFICE VISIT (OUTPATIENT)
Age: 88
End: 2023-03-02

## 2023-03-02 DIAGNOSIS — M17.12 UNILATERAL PRIMARY OSTEOARTHRITIS, LEFT KNEE: Primary | ICD-10-CM

## 2023-03-02 RX ORDER — BETAMETHASONE SODIUM PHOSPHATE AND BETAMETHASONE ACETATE 3; 3 MG/ML; MG/ML
6 INJECTION, SUSPENSION INTRA-ARTICULAR; INTRALESIONAL; INTRAMUSCULAR; SOFT TISSUE ONCE
Status: COMPLETED | OUTPATIENT
Start: 2023-03-02 | End: 2023-03-02

## 2023-03-02 RX ADMIN — BETAMETHASONE SODIUM PHOSPHATE AND BETAMETHASONE ACETATE 6 MG: 3; 3 INJECTION, SUSPENSION INTRA-ARTICULAR; INTRALESIONAL; INTRAMUSCULAR; SOFT TISSUE at 13:46

## 2023-03-02 NOTE — PROGRESS NOTES
Patient: Precious Aguilar                MRN: [id number]       SSN: [id number]  YOB: 1935        AGE: 82 y.o. SEX: female  There is no height or weight on file to calculate BMI. PCP: Ximena Greene MD  03/02/23            REVIEW OF SYSTEMS:  Constitutional: Negative for fever, chills, weight loss and malaise/fatigue. HENT: Negative. Eyes: Negative. Respiratory: Negative. Cardiovascular: Negative. Gastrointestinal: No bowel incontinence or constipation. Genitourinary: No bladder incontinence or saddle anesthesia. Skin: Negative. Neurological: Negative. Endo/Heme/Allergies: Negative. Psychiatric/Behavioral: Negative. Musculoskeletal: As per HPI above.      Past Medical History:   Diagnosis Date    Arrhythmia     mur mur  long standing    Arthritis     Arthritis of both hips     Back pain     Balance problem     Chronic back pain     Cough     Easy bruising     Essential hypertension     GERD (gastroesophageal reflux disease)     Hiatal hernia     Hypertension     Ill-defined condition     vascular insuff rt ankle    Irregular heart beat     Left hip pain 10/8/2010    Neck pain     Nervousness     Night sweat     Osteoarthritis, knee     Pain with urination     Polymyalgia rheumatica (HCC)     Reflux     Spinal stenosis     Thromboembolus (HCC)     Trapezius strain     Trochanteric bursitis of left hip     Venous insufficiency          Current Outpatient Medications:     acetaminophen (TYLENOL) 650 MG extended release tablet, Take 650 mg by mouth in the morning and 650 mg at noon and 650 mg in the evening., Disp: , Rfl:     amLODIPine (NORVASC) 10 MG tablet, Take 10 mg by mouth daily, Disp: , Rfl:     aspirin 325 MG tablet, Take 325 mg by mouth daily, Disp: , Rfl:     betamethasone acetate-betamethasone sodium phosphate (CELESTONE) 6 (3-3) MG/ML injection, once, Disp: , Rfl:     carboxymethylcellulose (THERATEARS) 1 % ophthalmic gel, ceived the following from [facility]: Outside name: carboxymethylcellulose sodium (REFRESH LIQUIGEL) 1 % dlgl ophthalmic solution, Disp: , Rfl:     cetirizine (ZYRTEC) 10 MG tablet, Take 10 mg by mouth daily, Disp: , Rfl:     chlorthalidone (HYGROTON) 25 MG tablet, TAKE 1 TABLET BY MOUTH EVERY DAY, Disp: , Rfl:     vitamin D (CHOLECALCIFEROL) 25 MCG (1000 UT) TABS tablet, Take 1,000 Units by mouth daily, Disp: , Rfl:     ferrous sulfate (IRON 325) 325 (65 Fe) MG tablet, Take 325 mg by mouth 2 times daily (with meals), Disp: , Rfl:     fluorometholone (FML) 0.1 % ophthalmic suspension, Apply 1 drop to eye, Disp: , Rfl:     furosemide (LASIX) 40 MG tablet, Take 40 mg by mouth daily as needed, Disp: , Rfl:     olopatadine (PATADAY) 0.2 % SOLN ophthalmic solution, Apply 1 drop to eye, Disp: , Rfl:     omeprazole (PRILOSEC) 40 MG delayed release capsule, Take 40 mg by mouth daily, Disp: , Rfl:     potassium gluconate 550 mg tablet, Take 99 mg by mouth daily, Disp: , Rfl:     prednisoLONE acetate (PRED FORTE) 1 % ophthalmic suspension, Apply 1 drop to eye, Disp: , Rfl:     triamcinolone (KENALOG) 0.1 % cream, 2 times daily, Disp: , Rfl:     tropicamide (MYDRIACYL) 1 % ophthalmic solution, Apply 2 drops to eye as needed, Disp: , Rfl:     Allergies   Allergen Reactions    Citric Acid      Other reaction(s): Unknown (comments)    Iodine      Other reaction(s): Unknown (comments)    Oxycodone-Acetaminophen Other (See Comments)     Gets rebound headaches after taking Percocet    Progesterone Micronized      Other reaction(s): Unknown (comments)       Social History     Socioeconomic History    Marital status:      Spouse name: Not on file    Number of children: Not on file    Years of education: Not on file    Highest education level: Not on file   Occupational History    Not on file   Tobacco Use    Smoking status: Never    Smokeless tobacco: Never   Substance and Sexual Activity    Alcohol use: No    Drug use: No    Sexual activity: Not on file   Other Topics Concern    Not on file   Social History Narrative    Not on file     Social Determinants of Health     Financial Resource Strain: Not on file   Food Insecurity: Not on file   Transportation Needs: Not on file   Physical Activity: Not on file   Stress: Not on file   Social Connections: Not on file   Intimate Partner Violence: Not on file   Housing Stability: Not on file       Past Surgical History:   Procedure Laterality Date    ANKLE FRACTURE SURGERY      CHOLECYSTECTOMY      COLONOSCOPY N/A 7/27/2016    COLONOSCOPY w/polypectomy performed by Mariano Ruth MD at [address]  06/2011    left cornea transplant    HYSTERECTOMY ([facility])      ORTHOPEDIC SURGERY Right 04/2017    ORTHOPEDIC SURGERY      right foot and ankle    TOTAL KNEE ARTHROPLASTY Right 02/2017       Patient seen evaluated today for her left knee. Does have known advanced arthritis of the left knee. Injections are still efficacious for her. She has had a previous right knee replacement done with a subsequent patella fracture which was repaired in that failed. We discussed further surgery with her and she declined. She does have a bit of extensor lag noted on the right knee in which she is very functional with. Regarding the left knee she has decreased walking tolerance. She has trouble with chair. She has trouble stairs. Does have pain at night. Patient denies recent fevers, chills, chest pain, SOB, or injuries. No recent systemic changes noted. A 12-point review of systems is performed today. Pertinent positives are noted. All other systems reviewed and otherwise are negative. Physical exam: General: Alert and oriented x3, nad.  well-developed, well nourished. normal affect, AF. NC/AT, EOMI, neck supple, trachea midline, no JVD present. Breathing is non-labored. Examination of the lower extremities reveals pain-free range of motion the hips.   There is no pain to palpation of the trochanteric bursa. Negative straight leg raise. Negative calf tenderness. Negative Homans. No signs of DVT present. The left knee reveals skin intact with is no erythema or ecchymosis noted. No signs of infection or cellulitis present. She does have pain to palpation tricompartmentally worse in the lateral compartment as well as patellofemoral grind. There is crepitus anteriorly noted    Assessment: #1 left knee end-stage osteoarthritis, #2 status post right total knee replacement with patellar fracture and extensor lag, functional    Plan: At this point, we discussed treatment options. Total replacement discussed and recommended patient elects against this. She will continue with conservative treatment. Today in the office we will move forward with a cortisone junction for the left knee. After informed consent, under aseptic conditions, with US guided assitance, the left knee was prepped with betadine and a mxiture of 3ml 1% lidocaine and 6mg of celestone was injected without complications. The patient tolerated the injection well. The patient is instructed on post-injection care. She will continue with a home exercise program to work on mobility and range of motion. We will see her back in a couple weeks for her shoulder. She      Chart reviewed for the following:  Maribeth BOSWELL PA-C, have reviewed the History, Physical and updated the Allergic reactions for Susan Ax?     TIME OUT performed immediately prior to start of procedure:  Maribeth BOSWELL PA-C, have performed the following reviews on Susan Ax prior to the start of the procedure:  ????????  * Patient was identified by name and date of birth   * Agreement on procedure being performed was verified  * Risks and Benefits explained to the patient  * Procedure site verified and marked as necessary  * Patient was positioned for comfort  * Consent was signed and verified    Time:1:45 PM    Body part: left knee, intra-articular    Medication & Dose: 3ml 1% lidocaine and 6mg celestone    Date of procedure: 03/02/23    Procedure performed by: Adama Quesada PA-C    Provider assisted by: none    Patient assisted by: self    How tolerated by patient: tolerated the procedure well with no complications    Post Procedural Pain Scale: 6    Comments:   [address] using a frequency of 10MHz with a 12L-RS transducer head was used to confirm needle placement.   Ultrasound images captured using [address] Ultrasound machine and scanned into patient's chart       Heather Real PA-C, ATC

## 2023-04-20 ENCOUNTER — OFFICE VISIT (OUTPATIENT)
Age: 88
End: 2023-04-20

## 2023-04-20 VITALS — BODY MASS INDEX: 32.44 KG/M2 | HEIGHT: 64 IN | WEIGHT: 190 LBS

## 2023-04-20 DIAGNOSIS — M75.52 BURSITIS OF LEFT SHOULDER: ICD-10-CM

## 2023-04-20 DIAGNOSIS — M25.512 LEFT SHOULDER PAIN, UNSPECIFIED CHRONICITY: ICD-10-CM

## 2023-04-20 DIAGNOSIS — M25.511 RIGHT SHOULDER PAIN, UNSPECIFIED CHRONICITY: ICD-10-CM

## 2023-04-20 DIAGNOSIS — M19.012 PRIMARY OSTEOARTHRITIS, LEFT SHOULDER: Primary | ICD-10-CM

## 2023-04-20 RX ORDER — BETAMETHASONE SODIUM PHOSPHATE AND BETAMETHASONE ACETATE 3; 3 MG/ML; MG/ML
6 INJECTION, SUSPENSION INTRA-ARTICULAR; INTRALESIONAL; INTRAMUSCULAR; SOFT TISSUE ONCE
Status: COMPLETED | OUTPATIENT
Start: 2023-04-20 | End: 2023-04-20

## 2023-04-20 RX ADMIN — BETAMETHASONE SODIUM PHOSPHATE AND BETAMETHASONE ACETATE 6 MG: 3; 3 INJECTION, SUSPENSION INTRA-ARTICULAR; INTRALESIONAL; INTRAMUSCULAR; SOFT TISSUE at 13:35

## 2023-04-20 NOTE — PROGRESS NOTES
head was used to confirm needle placement.   Ultrasound images captured using [address] [place] [address] Ultrasound machine and scanned into patient's chart       Jan Snow PA-C, ATC

## 2023-06-02 ENCOUNTER — OFFICE VISIT (OUTPATIENT)
Age: 88
End: 2023-06-02

## 2023-06-02 DIAGNOSIS — M17.12 UNILATERAL PRIMARY OSTEOARTHRITIS, LEFT KNEE: Primary | ICD-10-CM

## 2023-06-02 RX ORDER — BETAMETHASONE SODIUM PHOSPHATE AND BETAMETHASONE ACETATE 3; 3 MG/ML; MG/ML
6 INJECTION, SUSPENSION INTRA-ARTICULAR; INTRALESIONAL; INTRAMUSCULAR; SOFT TISSUE ONCE
Status: COMPLETED | OUTPATIENT
Start: 2023-06-02 | End: 2023-06-02

## 2023-06-02 RX ADMIN — BETAMETHASONE SODIUM PHOSPHATE AND BETAMETHASONE ACETATE 6 MG: 3; 3 INJECTION, SUSPENSION INTRA-ARTICULAR; INTRALESIONAL; INTRAMUSCULAR; SOFT TISSUE at 13:16

## 2023-06-02 NOTE — PROGRESS NOTES
Patient: Ascencion Bean                MRN: [id number]       SSN: [id number]  YOB: 1935        AGE: 82 y.o. SEX: female  There is no height or weight on file to calculate BMI. PCP: Elier Bolton MD  06/02/23            REVIEW OF SYSTEMS:  Constitutional: Negative for fever, chills, weight loss and malaise/fatigue. HENT: Negative. Eyes: Negative. Respiratory: Negative. Cardiovascular: Negative. Gastrointestinal: No bowel incontinence or constipation. Genitourinary: No bladder incontinence or saddle anesthesia. Skin: Negative. Neurological: Negative. Endo/Heme/Allergies: Negative. Psychiatric/Behavioral: Negative. Musculoskeletal: As per HPI above.      Past Medical History:   Diagnosis Date    Arrhythmia     mur mur  long standing    Arthritis     Arthritis of both hips     Back pain     Balance problem     Chronic back pain     Cough     Easy bruising     Essential hypertension     GERD (gastroesophageal reflux disease)     Hiatal hernia     Hypertension     Ill-defined condition     vascular insuff rt ankle    Irregular heart beat     Left hip pain 10/8/2010    Neck pain     Nervousness     Night sweat     Osteoarthritis, knee     Pain with urination     Polymyalgia rheumatica (HCC)     Reflux     Spinal stenosis     Thromboembolus (HCC)     Trapezius strain     Trochanteric bursitis of left hip     Venous insufficiency          Current Outpatient Medications:     acetaminophen (TYLENOL) 650 MG extended release tablet, Take 650 mg by mouth in the morning and 650 mg at noon and 650 mg in the evening., Disp: , Rfl:     amLODIPine (NORVASC) 10 MG tablet, Take 10 mg by mouth daily, Disp: , Rfl:     aspirin 325 MG tablet, Take 325 mg by mouth daily, Disp: , Rfl:     betamethasone acetate-betamethasone sodium phosphate (CELESTONE) 6 (3-3) MG/ML injection, once, Disp: , Rfl:     carboxymethylcellulose (THERATEARS) 1 % ophthalmic gel, ceived the following from

## 2023-07-10 DIAGNOSIS — I10 ESSENTIAL (PRIMARY) HYPERTENSION: ICD-10-CM

## 2023-07-10 RX ORDER — CHLORTHALIDONE 25 MG/1
TABLET ORAL
Qty: 90 TABLET | Refills: 1 | Status: SHIPPED | OUTPATIENT
Start: 2023-07-10

## 2023-07-20 ENCOUNTER — HOSPITAL ENCOUNTER (OUTPATIENT)
Facility: HOSPITAL | Age: 88
Discharge: HOME | End: 2023-07-20
Payer: MEDICARE

## 2023-07-20 ENCOUNTER — OFFICE VISIT (OUTPATIENT)
Age: 88
End: 2023-07-20
Payer: MEDICARE

## 2023-07-20 VITALS — HEIGHT: 64 IN | WEIGHT: 190 LBS | BODY MASS INDEX: 32.44 KG/M2

## 2023-07-20 DIAGNOSIS — N39.41 URGE INCONTINENCE: ICD-10-CM

## 2023-07-20 DIAGNOSIS — L20.9 ATOPIC DERMATITIS, UNSPECIFIED TYPE: ICD-10-CM

## 2023-07-20 DIAGNOSIS — M17.0 PRIMARY OSTEOARTHRITIS OF BOTH KNEES: ICD-10-CM

## 2023-07-20 DIAGNOSIS — K59.00 CONSTIPATION, UNSPECIFIED CONSTIPATION TYPE: ICD-10-CM

## 2023-07-20 DIAGNOSIS — M75.52 BURSITIS OF LEFT SHOULDER: Primary | ICD-10-CM

## 2023-07-20 DIAGNOSIS — E55.9 VITAMIN D DEFICIENCY: ICD-10-CM

## 2023-07-20 DIAGNOSIS — K21.9 GASTROESOPHAGEAL REFLUX DISEASE WITHOUT ESOPHAGITIS: ICD-10-CM

## 2023-07-20 DIAGNOSIS — I10 HYPERTENSION, ESSENTIAL: ICD-10-CM

## 2023-07-20 DIAGNOSIS — M25.512 LEFT SHOULDER PAIN, UNSPECIFIED CHRONICITY: ICD-10-CM

## 2023-07-20 DIAGNOSIS — M19.012 PRIMARY OSTEOARTHRITIS, LEFT SHOULDER: ICD-10-CM

## 2023-07-20 DIAGNOSIS — D64.9 ANEMIA, UNSPECIFIED TYPE: ICD-10-CM

## 2023-07-20 DIAGNOSIS — M25.511 RIGHT SHOULDER PAIN, UNSPECIFIED CHRONICITY: ICD-10-CM

## 2023-07-20 LAB
25(OH)D3 SERPL-MCNC: 86 NG/ML (ref 30–100)
ALBUMIN SERPL-MCNC: 3.6 G/DL (ref 3.4–5)
ALBUMIN/GLOB SERPL: 0.9 (ref 0.8–1.7)
ALP SERPL-CCNC: 95 U/L (ref 45–117)
ALT SERPL-CCNC: 21 U/L (ref 13–56)
ANION GAP SERPL CALC-SCNC: 4 MMOL/L (ref 3–18)
AST SERPL-CCNC: 20 U/L (ref 10–38)
BASOPHILS # BLD: 0 K/UL (ref 0–0.1)
BASOPHILS NFR BLD: 1 % (ref 0–2)
BILIRUB SERPL-MCNC: 0.4 MG/DL (ref 0.2–1)
BUN SERPL-MCNC: 28 MG/DL (ref 7–18)
BUN/CREAT SERPL: 29 (ref 12–20)
CALCIUM SERPL-MCNC: 9.3 MG/DL (ref 8.5–10.1)
CHLORIDE SERPL-SCNC: 108 MMOL/L (ref 100–111)
CHOLEST SERPL-MCNC: 180 MG/DL
CO2 SERPL-SCNC: 29 MMOL/L (ref 21–32)
CREAT SERPL-MCNC: 0.96 MG/DL (ref 0.6–1.3)
DIFFERENTIAL METHOD BLD: ABNORMAL
EOSINOPHIL # BLD: 0.2 K/UL (ref 0–0.4)
EOSINOPHIL NFR BLD: 3 % (ref 0–5)
ERYTHROCYTE [DISTWIDTH] IN BLOOD BY AUTOMATED COUNT: 13.8 % (ref 11.6–14.5)
GLOBULIN SER CALC-MCNC: 3.8 G/DL (ref 2–4)
GLUCOSE SERPL-MCNC: 88 MG/DL (ref 74–99)
HCT VFR BLD AUTO: 32.2 % (ref 35–45)
HDLC SERPL-MCNC: 59 MG/DL (ref 40–60)
HDLC SERPL: 3.1 (ref 0–5)
HGB BLD-MCNC: 10.6 G/DL (ref 12–16)
IMM GRANULOCYTES # BLD AUTO: 0 K/UL (ref 0–0.04)
IMM GRANULOCYTES NFR BLD AUTO: 0 % (ref 0–0.5)
LDLC SERPL CALC-MCNC: 105.2 MG/DL (ref 0–100)
LIPID PANEL: ABNORMAL
LYMPHOCYTES # BLD: 2 K/UL (ref 0.9–3.6)
LYMPHOCYTES NFR BLD: 26 % (ref 21–52)
MCH RBC QN AUTO: 28 PG (ref 24–34)
MCHC RBC AUTO-ENTMCNC: 32.9 G/DL (ref 31–37)
MCV RBC AUTO: 85 FL (ref 78–100)
MONOCYTES # BLD: 0.5 K/UL (ref 0.05–1.2)
MONOCYTES NFR BLD: 6 % (ref 3–10)
NEUTS SEG # BLD: 4.8 K/UL (ref 1.8–8)
NEUTS SEG NFR BLD: 64 % (ref 40–73)
NRBC # BLD: 0 K/UL (ref 0–0.01)
NRBC BLD-RTO: 0 PER 100 WBC
PLATELET # BLD AUTO: 267 K/UL (ref 135–420)
PMV BLD AUTO: 11.1 FL (ref 9.2–11.8)
POTASSIUM SERPL-SCNC: 3.1 MMOL/L (ref 3.5–5.5)
PROT SERPL-MCNC: 7.4 G/DL (ref 6.4–8.2)
RBC # BLD AUTO: 3.79 M/UL (ref 4.2–5.3)
SODIUM SERPL-SCNC: 141 MMOL/L (ref 136–145)
T4 SERPL-MCNC: 10 UG/DL (ref 4.8–13.9)
TRIGL SERPL-MCNC: 79 MG/DL
TSH SERPL DL<=0.05 MIU/L-ACNC: 0.72 UIU/ML (ref 0.36–3.74)
VLDLC SERPL CALC-MCNC: 15.8 MG/DL
WBC # BLD AUTO: 7.5 K/UL (ref 4.6–13.2)

## 2023-07-20 PROCEDURE — 82306 VITAMIN D 25 HYDROXY: CPT

## 2023-07-20 PROCEDURE — 80053 COMPREHEN METABOLIC PANEL: CPT

## 2023-07-20 PROCEDURE — 1123F ACP DISCUSS/DSCN MKR DOCD: CPT | Performed by: PHYSICIAN ASSISTANT

## 2023-07-20 PROCEDURE — 84443 ASSAY THYROID STIM HORMONE: CPT

## 2023-07-20 PROCEDURE — 80061 LIPID PANEL: CPT

## 2023-07-20 PROCEDURE — 85025 COMPLETE CBC W/AUTO DIFF WBC: CPT

## 2023-07-20 PROCEDURE — 20611 DRAIN/INJ JOINT/BURSA W/US: CPT | Performed by: PHYSICIAN ASSISTANT

## 2023-07-20 PROCEDURE — 36415 COLL VENOUS BLD VENIPUNCTURE: CPT

## 2023-07-20 PROCEDURE — 99213 OFFICE O/P EST LOW 20 MIN: CPT | Performed by: PHYSICIAN ASSISTANT

## 2023-07-20 PROCEDURE — 84436 ASSAY OF TOTAL THYROXINE: CPT

## 2023-07-20 RX ORDER — BETAMETHASONE SODIUM PHOSPHATE AND BETAMETHASONE ACETATE 3; 3 MG/ML; MG/ML
6 INJECTION, SUSPENSION INTRA-ARTICULAR; INTRALESIONAL; INTRAMUSCULAR; SOFT TISSUE ONCE
Status: COMPLETED | OUTPATIENT
Start: 2023-07-20 | End: 2023-07-20

## 2023-07-20 RX ADMIN — BETAMETHASONE SODIUM PHOSPHATE AND BETAMETHASONE ACETATE 6 MG: 3; 3 INJECTION, SUSPENSION INTRA-ARTICULAR; INTRALESIONAL; INTRAMUSCULAR; SOFT TISSUE at 13:29

## 2023-07-20 NOTE — PROGRESS NOTES
Patient: Ra Singh                MRN: [id number]       SSN: [id number]  YOB: 1935        AGE: 82 y.o. SEX: female  There is no height or weight on file to calculate BMI. PCP: Best Hensley MD  07/20/23            REVIEW OF SYSTEMS:  Constitutional: Negative for fever, chills, weight loss and malaise/fatigue. HENT: Negative. Eyes: Negative. Respiratory: Negative. Cardiovascular: Negative. Gastrointestinal: No bowel incontinence or constipation. Genitourinary: No bladder incontinence or saddle anesthesia. Skin: Negative. Neurological: Negative. Endo/Heme/Allergies: Negative. Psychiatric/Behavioral: Negative. Musculoskeletal: As per HPI above.      Past Medical History:   Diagnosis Date    Arrhythmia     mur mur  long standing    Arthritis     Arthritis of both hips     Back pain     Balance problem     Chronic back pain     Cough     Easy bruising     Essential hypertension     GERD (gastroesophageal reflux disease)     Hiatal hernia     Hypertension     Ill-defined condition     vascular insuff rt ankle    Irregular heart beat     Left hip pain 10/8/2010    Neck pain     Nervousness     Night sweat     Osteoarthritis, knee     Pain with urination     Polymyalgia rheumatica (HCC)     Reflux     Spinal stenosis     Thromboembolus (HCC)     Trapezius strain     Trochanteric bursitis of left hip     Venous insufficiency          Current Outpatient Medications:     chlorthalidone (HYGROTON) 25 MG tablet, TAKE 1 TABLET BY MOUTH EVERY DAY, Disp: 90 tablet, Rfl: 1    acetaminophen (TYLENOL) 650 MG extended release tablet, Take 650 mg by mouth in the morning and 650 mg at noon and 650 mg in the evening., Disp: , Rfl:     amLODIPine (NORVASC) 10 MG tablet, Take 10 mg by mouth daily, Disp: , Rfl:     aspirin 325 MG tablet, Take 325 mg by mouth daily, Disp: , Rfl:     betamethasone acetate-betamethasone sodium phosphate (CELESTONE) 6 (3-3) MG/ML injection, once,

## 2023-08-09 ENCOUNTER — TELEPHONE (OUTPATIENT)
Age: 88
End: 2023-08-09

## 2023-08-09 ENCOUNTER — OFFICE VISIT (OUTPATIENT)
Age: 88
End: 2023-08-09
Payer: MEDICARE

## 2023-08-09 VITALS
WEIGHT: 186 LBS | BODY MASS INDEX: 31.76 KG/M2 | HEART RATE: 68 BPM | SYSTOLIC BLOOD PRESSURE: 141 MMHG | OXYGEN SATURATION: 100 % | DIASTOLIC BLOOD PRESSURE: 56 MMHG | HEIGHT: 64 IN

## 2023-08-09 DIAGNOSIS — E66.9 OBESITY (BMI 30.0-34.9): ICD-10-CM

## 2023-08-09 DIAGNOSIS — I34.0 NONRHEUMATIC MITRAL (VALVE) INSUFFICIENCY: ICD-10-CM

## 2023-08-09 DIAGNOSIS — I10 ESSENTIAL (PRIMARY) HYPERTENSION: Primary | ICD-10-CM

## 2023-08-09 DIAGNOSIS — I50.32 CHRONIC DIASTOLIC (CONGESTIVE) HEART FAILURE (HCC): ICD-10-CM

## 2023-08-09 DIAGNOSIS — I50.32 CHRONIC DIASTOLIC (CONGESTIVE) HEART FAILURE (HCC): Primary | ICD-10-CM

## 2023-08-09 PROCEDURE — 99214 OFFICE O/P EST MOD 30 MIN: CPT | Performed by: INTERNAL MEDICINE

## 2023-08-09 PROCEDURE — 1123F ACP DISCUSS/DSCN MKR DOCD: CPT | Performed by: INTERNAL MEDICINE

## 2023-08-09 RX ORDER — SPIRONOLACTONE 25 MG/1
12.5 TABLET ORAL DAILY
Qty: 45 TABLET | Refills: 1 | Status: SHIPPED | OUTPATIENT
Start: 2023-08-09

## 2023-08-09 RX ORDER — ASPIRIN 81 MG/1
81 TABLET ORAL DAILY
COMMUNITY

## 2023-08-09 ASSESSMENT — ENCOUNTER SYMPTOMS
GASTROINTESTINAL NEGATIVE: 1
RESPIRATORY NEGATIVE: 1
EYES NEGATIVE: 1

## 2023-08-09 NOTE — PROGRESS NOTES
1. Have you been to the ER, urgent care clinic since your last visit? Hospitalized since your last visit?     no      2. Where do you normally have your labs drawn? [facility]    3. Do you need any refills today?   no    4. Which local pharmacy do you use (enter pharmacy)? [facility]    5. Which mail order pharmacy do you use (enter pharmacy)? 6. Are you here for surgical clearance and if so who will be doing your     procedure/surgery (care team)?    no

## 2023-08-09 NOTE — TELEPHONE ENCOUNTER
Discontinue potassium. I discontinued in med list but please stress this to the patient. Start Aldactone 12.5 mg a day. I sent the prescription. Check BMP in 1 week. Please explain to patient that it is extremely important to do the blood test as potassium can go high on this medication.

## 2023-08-09 NOTE — PROGRESS NOTES
Mary Jo Moscoso is a 82y.o. year old female. Follow-up of CHF, hypertension, MR, obesity    7/21 seen after many years due to history of heart murmur  2/23 has pain in multiple joints in the body. 8/9/2023 diffuse body aches. Had diarhhea, got hives post gatorade and got prednisone. Edema chronically. Improves overnight usually. Denies CP, SOB, dizzyness or palpitations. Review of Systems   Constitutional: Negative. HENT: Negative. Eyes: Negative. Respiratory: Negative. Cardiovascular:  Positive for leg swelling. Gastrointestinal: Negative. Endocrine: Negative. Genitourinary: Negative. Musculoskeletal: Negative. Neurological: Negative. Psychiatric/Behavioral: Negative. All other systems reviewed and are negative. Physical Exam  Vitals and nursing note reviewed. Constitutional:       Appearance: Normal appearance. She is obese. HENT:      Head: Normocephalic and atraumatic. Nose: Nose normal.   Eyes:      Conjunctiva/sclera: Conjunctivae normal.   Cardiovascular:      Rate and Rhythm: Normal rate and regular rhythm. Pulses: Normal pulses. Heart sounds: Murmur heard. High-pitched blowing midsystolic murmur is present with a grade of 3/6 at the upper left sternal border and apex. Pulmonary:      Effort: Pulmonary effort is normal.      Breath sounds: Normal breath sounds. Abdominal:      General: Bowel sounds are normal.      Palpations: Abdomen is soft. Musculoskeletal:         General: Normal range of motion. Right lower leg: Edema (1) present. Left lower leg: Edema (1) present. Skin:     General: Skin is warm and dry. Neurological:      General: No focal deficit present. Mental Status: She is alert and oriented to person, place, and time.    Psychiatric:         Mood and Affect: Mood normal.         Behavior: Behavior normal.      Allergies   Allergen Reactions    Citric Acid      Other reaction(s): Unknown (comments)

## 2023-08-10 ENCOUNTER — TELEPHONE (OUTPATIENT)
Age: 88
End: 2023-08-10

## 2023-08-10 NOTE — TELEPHONE ENCOUNTER
Patient made aware of message below. Discontinue potassium. I discontinued in med list but please stress this to the patient. Start Aldactone 12.5 mg a day. I sent the prescription. Check BMP in 1 week. Please explain to patient that it is extremely important to do the blood test as potassium can go high on this medication.

## 2023-08-22 ENCOUNTER — OFFICE VISIT (OUTPATIENT)
Age: 88
End: 2023-08-22
Payer: MEDICARE

## 2023-08-22 VITALS — BODY MASS INDEX: 31.58 KG/M2 | HEIGHT: 64 IN | WEIGHT: 185 LBS

## 2023-08-22 DIAGNOSIS — M17.12 UNILATERAL PRIMARY OSTEOARTHRITIS, LEFT KNEE: Primary | ICD-10-CM

## 2023-08-22 DIAGNOSIS — M79.662 PAIN IN LEFT LOWER LEG: ICD-10-CM

## 2023-08-22 DIAGNOSIS — Z91.81 HISTORY OF RECENT FALL: ICD-10-CM

## 2023-08-22 PROCEDURE — 1123F ACP DISCUSS/DSCN MKR DOCD: CPT | Performed by: PHYSICIAN ASSISTANT

## 2023-08-22 PROCEDURE — 73590 X-RAY EXAM OF LOWER LEG: CPT | Performed by: PHYSICIAN ASSISTANT

## 2023-08-22 PROCEDURE — 99214 OFFICE O/P EST MOD 30 MIN: CPT | Performed by: PHYSICIAN ASSISTANT

## 2023-08-25 ENCOUNTER — HOSPITAL ENCOUNTER (OUTPATIENT)
Facility: HOSPITAL | Age: 88
End: 2023-08-25
Payer: MEDICARE

## 2023-08-25 DIAGNOSIS — I50.32 CHRONIC DIASTOLIC (CONGESTIVE) HEART FAILURE (HCC): ICD-10-CM

## 2023-08-25 LAB
ANION GAP SERPL CALC-SCNC: 6 MMOL/L (ref 3–18)
BUN SERPL-MCNC: 25 MG/DL (ref 7–18)
BUN/CREAT SERPL: 22 (ref 12–20)
CALCIUM SERPL-MCNC: 9 MG/DL (ref 8.5–10.1)
CHLORIDE SERPL-SCNC: 107 MMOL/L (ref 100–111)
CO2 SERPL-SCNC: 29 MMOL/L (ref 21–32)
CREAT SERPL-MCNC: 1.15 MG/DL (ref 0.6–1.3)
GLUCOSE SERPL-MCNC: 101 MG/DL (ref 74–99)
POTASSIUM SERPL-SCNC: 3.3 MMOL/L (ref 3.5–5.5)
SODIUM SERPL-SCNC: 142 MMOL/L (ref 136–145)

## 2023-08-25 PROCEDURE — 36415 COLL VENOUS BLD VENIPUNCTURE: CPT

## 2023-08-25 PROCEDURE — 80048 BASIC METABOLIC PNL TOTAL CA: CPT

## 2023-08-25 NOTE — RESULT ENCOUNTER NOTE
Please contact patient and do the following asap  Increase spironolactone to 25 mg a day  Rpt BMP  in 30 days

## 2023-09-05 ENCOUNTER — TRANSCRIBE ORDERS (OUTPATIENT)
Facility: HOSPITAL | Age: 88
End: 2023-09-05

## 2023-09-05 DIAGNOSIS — Z12.31 ENCOUNTER FOR SCREENING MAMMOGRAM FOR MALIGNANT NEOPLASM OF BREAST: Primary | ICD-10-CM

## 2023-09-07 ENCOUNTER — OFFICE VISIT (OUTPATIENT)
Age: 88
End: 2023-09-07
Payer: MEDICARE

## 2023-09-07 ENCOUNTER — HOSPITAL ENCOUNTER (OUTPATIENT)
Facility: HOSPITAL | Age: 88
Discharge: HOME | End: 2023-09-07
Payer: MEDICARE

## 2023-09-07 VITALS — HEIGHT: 64 IN | WEIGHT: 185 LBS | BODY MASS INDEX: 31.58 KG/M2

## 2023-09-07 DIAGNOSIS — M17.12 PRIMARY OSTEOARTHRITIS OF LEFT KNEE: Primary | ICD-10-CM

## 2023-09-07 DIAGNOSIS — M79.662 PAIN IN LEFT LOWER LEG: ICD-10-CM

## 2023-09-07 DIAGNOSIS — Z91.81 HISTORY OF RECENT FALL: ICD-10-CM

## 2023-09-07 DIAGNOSIS — M79.89 SWELLING OF LEFT LOWER EXTREMITY: ICD-10-CM

## 2023-09-07 PROCEDURE — 99214 OFFICE O/P EST MOD 30 MIN: CPT | Performed by: PHYSICIAN ASSISTANT

## 2023-09-07 PROCEDURE — 73718 MRI LOWER EXTREMITY W/O DYE: CPT

## 2023-09-07 PROCEDURE — 1123F ACP DISCUSS/DSCN MKR DOCD: CPT | Performed by: PHYSICIAN ASSISTANT

## 2023-09-07 PROCEDURE — 20611 DRAIN/INJ JOINT/BURSA W/US: CPT | Performed by: PHYSICIAN ASSISTANT

## 2023-09-07 RX ORDER — BETAMETHASONE SODIUM PHOSPHATE AND BETAMETHASONE ACETATE 3; 3 MG/ML; MG/ML
6 INJECTION, SUSPENSION INTRA-ARTICULAR; INTRALESIONAL; INTRAMUSCULAR; SOFT TISSUE ONCE
Status: COMPLETED | OUTPATIENT
Start: 2023-09-07 | End: 2023-09-07

## 2023-09-07 RX ADMIN — BETAMETHASONE SODIUM PHOSPHATE AND BETAMETHASONE ACETATE 6 MG: 3; 3 INJECTION, SUSPENSION INTRA-ARTICULAR; INTRALESIONAL; INTRAMUSCULAR; SOFT TISSUE at 14:24

## 2023-09-15 ENCOUNTER — OFFICE VISIT (OUTPATIENT)
Age: 88
End: 2023-09-15
Payer: MEDICARE

## 2023-09-15 ENCOUNTER — HOSPITAL ENCOUNTER (OUTPATIENT)
Facility: HOSPITAL | Age: 88
End: 2023-09-15
Payer: MEDICARE

## 2023-09-15 VITALS — BODY MASS INDEX: 31.41 KG/M2 | WEIGHT: 184 LBS | HEIGHT: 64 IN | RESPIRATION RATE: 18 BRPM

## 2023-09-15 DIAGNOSIS — Z91.81 HISTORY OF RECENT FALL: ICD-10-CM

## 2023-09-15 DIAGNOSIS — M79.89 SWELLING OF LEFT LOWER EXTREMITY: ICD-10-CM

## 2023-09-15 DIAGNOSIS — M17.12 PRIMARY OSTEOARTHRITIS OF LEFT KNEE: Primary | ICD-10-CM

## 2023-09-15 DIAGNOSIS — N18.32 STAGE 3B CHRONIC KIDNEY DISEASE (HCC): ICD-10-CM

## 2023-09-15 DIAGNOSIS — M79.662 PAIN IN LEFT LOWER LEG: ICD-10-CM

## 2023-09-15 LAB
ALBUMIN SERPL-MCNC: 3.3 G/DL (ref 3.4–5)
ANION GAP SERPL CALC-SCNC: 5 MMOL/L (ref 3–18)
BUN SERPL-MCNC: 24 MG/DL (ref 7–18)
BUN/CREAT SERPL: 22 (ref 12–20)
CALCIUM SERPL-MCNC: 9.3 MG/DL (ref 8.5–10.1)
CALCIUM SERPL-MCNC: 9.4 MG/DL (ref 8.5–10.1)
CHLORIDE SERPL-SCNC: 104 MMOL/L (ref 100–111)
CO2 SERPL-SCNC: 31 MMOL/L (ref 21–32)
CREAT SERPL-MCNC: 1.1 MG/DL (ref 0.6–1.3)
CREAT UR-MCNC: 142 MG/DL (ref 30–125)
GLUCOSE SERPL-MCNC: 105 MG/DL (ref 74–99)
MAGNESIUM SERPL-MCNC: 1.6 MG/DL (ref 1.6–2.6)
MICROALBUMIN UR-MCNC: 11.6 MG/DL (ref 0–3)
MICROALBUMIN/CREAT UR-RTO: 82 MG/G (ref 0–30)
PHOSPHATE SERPL-MCNC: 2.8 MG/DL (ref 2.5–4.9)
POTASSIUM SERPL-SCNC: 3.7 MMOL/L (ref 3.5–5.5)
PTH-INTACT SERPL-MCNC: 190.8 PG/ML (ref 18.4–88)
SODIUM SERPL-SCNC: 140 MMOL/L (ref 136–145)

## 2023-09-15 PROCEDURE — 82043 UR ALBUMIN QUANTITATIVE: CPT

## 2023-09-15 PROCEDURE — 83735 ASSAY OF MAGNESIUM: CPT

## 2023-09-15 PROCEDURE — 99214 OFFICE O/P EST MOD 30 MIN: CPT | Performed by: PHYSICIAN ASSISTANT

## 2023-09-15 PROCEDURE — 83970 ASSAY OF PARATHORMONE: CPT

## 2023-09-15 PROCEDURE — 82570 ASSAY OF URINE CREATININE: CPT

## 2023-09-15 PROCEDURE — 1123F ACP DISCUSS/DSCN MKR DOCD: CPT | Performed by: PHYSICIAN ASSISTANT

## 2023-09-15 PROCEDURE — 80069 RENAL FUNCTION PANEL: CPT

## 2023-09-15 PROCEDURE — 36415 COLL VENOUS BLD VENIPUNCTURE: CPT

## 2023-09-15 NOTE — PROGRESS NOTES
mouth daily, Disp: , Rfl:     betamethasone acetate-betamethasone sodium phosphate (CELESTONE) 6 (3-3) MG/ML injection, every 3 months, Disp: , Rfl:     carboxymethylcellulose (THERATEARS) 1 % ophthalmic gel, ceived the following from [facility]: Outside name: carboxymethylcellulose sodium (REFRESH LIQUIGEL) 1 % dlgl ophthalmic solution, Disp: , Rfl:     cetirizine (ZYRTEC) 10 MG tablet, Take 1 tablet by mouth daily, Disp: , Rfl:     vitamin D (CHOLECALCIFEROL) 25 MCG (1000 UT) TABS tablet, Take 1 tablet by mouth daily, Disp: , Rfl:     ferrous sulfate (IRON 325) 325 (65 Fe) MG tablet, Take 1 tablet by mouth 2 times daily (with meals), Disp: , Rfl:     fluorometholone (FML) 0.1 % ophthalmic suspension, Apply 1 drop to eye, Disp: , Rfl:     olopatadine (PATADAY) 0.2 % SOLN ophthalmic solution, Apply 1 drop to eye, Disp: , Rfl:     omeprazole (PRILOSEC) 40 MG delayed release capsule, Take 1 capsule by mouth daily, Disp: , Rfl:     prednisoLONE acetate (PRED FORTE) 1 % ophthalmic suspension, Apply 1 drop to eye, Disp: , Rfl:     triamcinolone (KENALOG) 0.1 % cream, 2 times daily, Disp: , Rfl:     tropicamide (MYDRIACYL) 1 % ophthalmic solution, Apply 2 drops to eye as needed, Disp: , Rfl:     Allergies   Allergen Reactions    Citric Acid      Other reaction(s): Unknown (comments)    Iodine      Other reaction(s): Unknown (comments)    Oxycodone-Acetaminophen Other (See Comments)     Gets rebound headaches after taking Percocet    Progesterone Micronized      Other reaction(s): Unknown (comments)       Social History     Socioeconomic History    Marital status:      Spouse name: Not on file    Number of children: Not on file    Years of education: Not on file    Highest education level: Not on file   Occupational History    Not on file   Tobacco Use    Smoking status: Never    Smokeless tobacco: Never   Substance and Sexual Activity    Alcohol use: No    Drug use: No    Sexual activity: Not on

## 2023-09-26 ENCOUNTER — HOSPITAL ENCOUNTER (OUTPATIENT)
Facility: HOSPITAL | Age: 88
Discharge: HOME | End: 2023-09-29
Payer: MEDICARE

## 2023-09-26 VITALS — WEIGHT: 184 LBS | HEIGHT: 64 IN | BODY MASS INDEX: 31.41 KG/M2

## 2023-09-26 DIAGNOSIS — Z12.31 ENCOUNTER FOR SCREENING MAMMOGRAM FOR MALIGNANT NEOPLASM OF BREAST: ICD-10-CM

## 2023-09-26 PROCEDURE — 77063 BREAST TOMOSYNTHESIS BI: CPT

## 2023-09-29 ENCOUNTER — OFFICE VISIT (OUTPATIENT)
Age: 88
End: 2023-09-29
Payer: MEDICARE

## 2023-09-29 VITALS — HEIGHT: 64 IN | BODY MASS INDEX: 31.58 KG/M2

## 2023-09-29 DIAGNOSIS — M25.562 ACUTE PAIN OF LEFT KNEE: Primary | ICD-10-CM

## 2023-09-29 DIAGNOSIS — M17.12 PRIMARY OSTEOARTHRITIS OF LEFT KNEE: ICD-10-CM

## 2023-09-29 PROCEDURE — 73560 X-RAY EXAM OF KNEE 1 OR 2: CPT | Performed by: PHYSICIAN ASSISTANT

## 2023-09-29 PROCEDURE — 1123F ACP DISCUSS/DSCN MKR DOCD: CPT | Performed by: PHYSICIAN ASSISTANT

## 2023-09-29 PROCEDURE — 99213 OFFICE O/P EST LOW 20 MIN: CPT | Performed by: PHYSICIAN ASSISTANT

## 2023-11-02 ENCOUNTER — OFFICE VISIT (OUTPATIENT)
Age: 88
End: 2023-11-02
Payer: MEDICARE

## 2023-11-02 DIAGNOSIS — M17.12 PRIMARY OSTEOARTHRITIS OF LEFT KNEE: Primary | ICD-10-CM

## 2023-11-02 DIAGNOSIS — M25.562 ACUTE PAIN OF LEFT KNEE: ICD-10-CM

## 2023-11-02 DIAGNOSIS — Z96.651 PRESENCE OF RIGHT ARTIFICIAL KNEE JOINT: ICD-10-CM

## 2023-11-02 PROCEDURE — 73560 X-RAY EXAM OF KNEE 1 OR 2: CPT | Performed by: PHYSICIAN ASSISTANT

## 2023-11-02 PROCEDURE — 99213 OFFICE O/P EST LOW 20 MIN: CPT | Performed by: PHYSICIAN ASSISTANT

## 2023-11-02 PROCEDURE — 1123F ACP DISCUSS/DSCN MKR DOCD: CPT | Performed by: PHYSICIAN ASSISTANT

## 2023-11-02 NOTE — PROGRESS NOTES
Sexual activity: Not on file   Other Topics Concern    Not on file   Social History Narrative    Not on file     Social Determinants of Health     Financial Resource Strain: Not on file   Food Insecurity: Not on file   Transportation Needs: Not on file   Physical Activity: Not on file   Stress: Not on file   Social Connections: Not on file   Intimate Partner Violence: Not on file   Housing Stability: Not on file       Past Surgical History:   Procedure Laterality Date    ANKLE FRACTURE SURGERY      CHOLECYSTECTOMY      COLONOSCOPY N/A 7/27/2016    COLONOSCOPY w/polypectomy performed by Geremias Rice MD at  [facility]  06/2011    left cornea transplant    [facility]OMY ([facility])      ORTHOPEDIC SURGERY Right 04/2017    ORTHOPEDIC SURGERY      right foot and ankle    TOTAL KNEE ARTHROPLASTY Right 02/2017       Patient seen evaluated today for her knees. Said previous right knee replacement and did well with it. Did unfortunately have a patella fracture which was fixed a couple times he had still displaced. We discussed further surgery and she elected not to have any more surgery on her. She is quite happy with the knee itself. She has no pain. She does have known advanced arthritis of the left knee. She was found to have an insufficiency fracture of the tibia in which she has been with protected weightbearing. She is having no pain at this point. She has had no recent fevers or chills. No injuries to report. Patient denies recent fevers, chills, chest pain, SOB, or injuries. No recent systemic changes noted. A 12-point review of systems is performed today. Pertinent positives are noted. All other systems reviewed and otherwise are negative. Physical exam: General: Alert and oriented x3, nad.  well-developed, well nourished. normal affect, AF. NC/AT, EOMI, neck supple, trachea midline, no JVD present. Breathing is non-labored.   Examination of the lower extremities reveals

## 2024-01-02 ENCOUNTER — OFFICE VISIT (OUTPATIENT)
Age: 89
End: 2024-01-02
Payer: MEDICARE

## 2024-01-02 VITALS — HEIGHT: 64 IN | BODY MASS INDEX: 31.24 KG/M2 | WEIGHT: 183 LBS | TEMPERATURE: 97.5 F

## 2024-01-02 DIAGNOSIS — M17.12 PRIMARY OSTEOARTHRITIS OF LEFT KNEE: Primary | ICD-10-CM

## 2024-01-02 DIAGNOSIS — M25.562 ACUTE PAIN OF LEFT KNEE: ICD-10-CM

## 2024-01-02 DIAGNOSIS — M50.30 DDD (DEGENERATIVE DISC DISEASE), CERVICAL: ICD-10-CM

## 2024-01-02 DIAGNOSIS — M54.2 NECK PAIN: ICD-10-CM

## 2024-01-02 DIAGNOSIS — M54.2 CERVICALGIA: ICD-10-CM

## 2024-01-02 PROCEDURE — 99214 OFFICE O/P EST MOD 30 MIN: CPT | Performed by: PHYSICIAN ASSISTANT

## 2024-01-02 PROCEDURE — 73564 X-RAY EXAM KNEE 4 OR MORE: CPT | Performed by: PHYSICIAN ASSISTANT

## 2024-01-02 PROCEDURE — 1123F ACP DISCUSS/DSCN MKR DOCD: CPT | Performed by: PHYSICIAN ASSISTANT

## 2024-01-02 PROCEDURE — 72040 X-RAY EXAM NECK SPINE 2-3 VW: CPT | Performed by: PHYSICIAN ASSISTANT

## 2024-01-02 RX ORDER — CYCLOBENZAPRINE HCL 5 MG
2.5 TABLET ORAL
Qty: 10 TABLET | Refills: 0 | Status: SHIPPED | OUTPATIENT
Start: 2024-01-02 | End: 2024-01-12

## 2024-01-04 ENCOUNTER — TELEPHONE (OUTPATIENT)
Age: 89
End: 2024-01-04

## 2024-01-04 NOTE — TELEPHONE ENCOUNTER
Patient called and stated that she just spoke with [facility] pharmacy and they advised her that they don't have her rx for Flexeril. I advised her that the rx was sent in on 01/02.    She says that she's in so much pain and is asking if it can be sent again.     Pharmacy:    [facility] #[id number] - [place], [place] - [address] - P [phone] - F [phone] [[id number]]

## 2024-01-05 DIAGNOSIS — M54.2 NECK PAIN: ICD-10-CM

## 2024-01-05 DIAGNOSIS — M50.30 DDD (DEGENERATIVE DISC DISEASE), CERVICAL: ICD-10-CM

## 2024-01-08 RX ORDER — CYCLOBENZAPRINE HCL 5 MG
2.5 TABLET ORAL
Qty: 10 TABLET | Refills: 0 | OUTPATIENT
Start: 2024-01-08 | End: 2024-01-18

## 2024-03-07 ENCOUNTER — HOSPITAL ENCOUNTER (OUTPATIENT)
Facility: HOSPITAL | Age: 89
Discharge: HOME | End: 2024-03-07
Payer: MEDICARE

## 2024-03-07 ENCOUNTER — TRANSCRIBE ORDERS (OUTPATIENT)
Facility: HOSPITAL | Age: 89
End: 2024-03-07

## 2024-03-07 DIAGNOSIS — N18.31 STAGE 3A CHRONIC KIDNEY DISEASE (HCC): ICD-10-CM

## 2024-03-07 DIAGNOSIS — N18.31 STAGE 3A CHRONIC KIDNEY DISEASE (HCC): Primary | ICD-10-CM

## 2024-03-07 LAB
ALBUMIN SERPL-MCNC: 3.4 G/DL (ref 3.4–5)
ANION GAP SERPL CALC-SCNC: 4 MMOL/L (ref 3–18)
BUN SERPL-MCNC: 28 MG/DL (ref 7–18)
BUN/CREAT SERPL: 31 (ref 12–20)
CALCIUM SERPL-MCNC: 9.5 MG/DL (ref 8.5–10.1)
CALCIUM SERPL-MCNC: 9.7 MG/DL (ref 8.5–10.1)
CHLORIDE SERPL-SCNC: 107 MMOL/L (ref 100–111)
CO2 SERPL-SCNC: 32 MMOL/L (ref 21–32)
CREAT SERPL-MCNC: 0.9 MG/DL (ref 0.6–1.3)
CREAT UR-MCNC: 69 MG/DL (ref 30–125)
GLUCOSE SERPL-MCNC: 104 MG/DL (ref 74–99)
PHOSPHATE SERPL-MCNC: 2.9 MG/DL (ref 2.5–4.9)
POTASSIUM SERPL-SCNC: 3.4 MMOL/L (ref 3.5–5.5)
PROT UR-MCNC: 44 MG/DL
PROT/CREAT UR-RTO: 0.6
PTH-INTACT SERPL-MCNC: 110.5 PG/ML (ref 18.4–88)
SODIUM SERPL-SCNC: 143 MMOL/L (ref 136–145)

## 2024-03-07 PROCEDURE — 82570 ASSAY OF URINE CREATININE: CPT

## 2024-03-07 PROCEDURE — 83970 ASSAY OF PARATHORMONE: CPT

## 2024-03-07 PROCEDURE — 36415 COLL VENOUS BLD VENIPUNCTURE: CPT

## 2024-03-07 PROCEDURE — 84156 ASSAY OF PROTEIN URINE: CPT

## 2024-03-07 PROCEDURE — 80069 RENAL FUNCTION PANEL: CPT

## 2024-04-12 NOTE — PROGRESS NOTES
Assessment completed. Patient alert and oriented x 4. Patient resting with eyes open. Patient denies SOB and chest pain. No respiratory distress noted. Bowel sounds present, but hypoactive in all four quadrants. Incision to right knee. Dressing is clean, dry and intact. Call bell and phone within reach.   No further concerns at this time This was a shared visit with the AMY. I reviewed and verified the documentation.

## 2024-04-30 NOTE — INTERVAL H&P NOTE
H&P Update:  Mallory Sommers was seen and examined. History and physical has been reviewed. The patient has been examined.  There have been no significant clinical changes since the completion of the originally dated History and Physical.    Signed By: Joan Montoya MD     February 6, 2017 7:11 AM Subjective:     Patient ID: Neil Cesar is a 62 y.o. male  PCP: Vic Hannah MD  Referring Provider: No ref. provider found    HPI  Patient presents to the office today with the following complaints: Follow-up      Patient seen in the office today for follow up after EGD   EGD and pathology reports reviewed and discussed with the patient and all questions answered   Reports are in Epic  Denies any postprocedure complications     Today he states he is swallowing much better after esophageal dilation  Also states his GERD is under control, he is taking lansoprazole 30 mg (2tabs) daily       Assessment:     1. Gastroesophageal reflux disease, unspecified whether esophagitis present  -     lansoprazole (PREVACID) 30 MG delayed release capsule; Take one to two tablets daily., Disp-90 capsule, R-3Normal  2. Dysphagia, unspecified type       Review of Systems   Constitutional:  Negative for activity change, appetite change, fatigue, fever and unexpected weight change.   HENT:  Negative for trouble swallowing.    Respiratory:  Negative for cough, choking and shortness of breath.    Cardiovascular:  Negative for chest pain.   Gastrointestinal:  Negative for abdominal distention, abdominal pain, anal bleeding, blood in stool, constipation, diarrhea, nausea, rectal pain and vomiting.   Allergic/Immunologic: Negative for food allergies.   All other systems reviewed and are negative.      Plan:   Continue lansoprazole   Follow up in 1 year or sooner if needed   Orders  No orders of the defined types were placed in this encounter.    Medications  Orders Placed This Encounter   Medications    lansoprazole (PREVACID) 30 MG delayed release capsule     Sig: Take one to two tablets daily.     Dispense:  90 capsule     Refill:  3         Patient History:     Past Medical History:   Diagnosis Date    Abnormal ECG     Brachial neuritis or radiculitis NOS     Left bundle branch block     Neck pain     Shoulder pain, bilateral

## 2024-05-03 NOTE — ROUTINE PROCESS
Pt in stable condition, no needs at this time. + pulse and sensation, denies numbness and tingling, able to move toes, No distress noted. Bed in lowest position, call bell in reach. instruct to call for assistance. Will continue to monitor. Her/She

## 2024-05-17 ENCOUNTER — OFFICE VISIT (OUTPATIENT)
Age: 89
End: 2024-05-17
Payer: MEDICARE

## 2024-05-17 DIAGNOSIS — M25.562 ACUTE PAIN OF LEFT KNEE: ICD-10-CM

## 2024-05-17 DIAGNOSIS — M17.12 PRIMARY OSTEOARTHRITIS OF LEFT KNEE: Primary | ICD-10-CM

## 2024-05-17 PROCEDURE — 99213 OFFICE O/P EST LOW 20 MIN: CPT | Performed by: PHYSICIAN ASSISTANT

## 2024-05-17 PROCEDURE — 1123F ACP DISCUSS/DSCN MKR DOCD: CPT | Performed by: PHYSICIAN ASSISTANT

## 2024-05-17 NOTE — PROGRESS NOTES
Patient: Denice Rankin                MRN: [id number]       SSN: [id number]  YOB: 1935        AGE: 88 y.o.        SEX: female  There is no height or weight on file to calculate BMI.    PCP: Reginald Gonzales MD  05/17/24      This office note has been dictated.      REVIEW OF SYSTEMS:  Constitutional: Negative for fever, chills, weight loss and malaise/fatigue.   HENT: Negative.    Eyes: Negative.    Respiratory: Negative.   Cardiovascular: Negative.   Gastrointestinal: No bowel incontinence or constipation.  Genitourinary: No bladder incontinence or saddle anesthesia.  Skin: Negative.   Neurological: Negative.    Endo/Heme/Allergies: Negative.    Psychiatric/Behavioral: Negative.  Musculoskeletal: As per HPI above.     Past Medical History:   Diagnosis Date    Arrhythmia     mur mur  long standing    Arthritis     Arthritis of both hips     Back pain     Balance problem     Chronic back pain     Cough     Easy bruising     Essential hypertension     GERD (gastroesophageal reflux disease)     Hiatal hernia     Hypertension     Ill-defined condition     vascular insuff rt ankle    Irregular heart beat     Left hip pain 10/8/2010    Neck pain     Nervousness     Night sweat     Osteoarthritis, knee     Pain with urination     Polymyalgia rheumatica (HCC)     Reflux     Spinal stenosis     Thromboembolus (HCC)     Trapezius strain     Trochanteric bursitis of left hip     Venous insufficiency          Current Outpatient Medications:     aspirin 81 MG EC tablet, Take 1 tablet by mouth daily, Disp: , Rfl:     spironolactone (ALDACTONE) 25 MG tablet, Take 0.5 tablets by mouth daily, Disp: 45 tablet, Rfl: 1    chlorthalidone (HYGROTON) 25 MG tablet, TAKE 1 TABLET BY MOUTH EVERY DAY, Disp: 90 tablet, Rfl: 1    acetaminophen (TYLENOL) 650 MG extended release tablet, Take 1 tablet by mouth in the morning and 1 tablet at noon and 1 tablet in the evening., Disp: , Rfl:     amLODIPine (NORVASC) 10 MG

## 2024-09-13 ENCOUNTER — TRANSCRIBE ORDERS (OUTPATIENT)
Facility: HOSPITAL | Age: 89
End: 2024-09-13

## 2024-09-13 DIAGNOSIS — Z12.31 ENCOUNTER FOR SCREENING MAMMOGRAM FOR MALIGNANT NEOPLASM OF BREAST: Primary | ICD-10-CM

## 2024-09-19 ENCOUNTER — OFFICE VISIT (OUTPATIENT)
Age: 89
End: 2024-09-19
Payer: MEDICARE

## 2024-09-19 VITALS
WEIGHT: 180 LBS | SYSTOLIC BLOOD PRESSURE: 165 MMHG | DIASTOLIC BLOOD PRESSURE: 75 MMHG | HEIGHT: 64 IN | OXYGEN SATURATION: 98 % | BODY MASS INDEX: 30.73 KG/M2 | HEART RATE: 71 BPM

## 2024-09-19 DIAGNOSIS — I10 ESSENTIAL (PRIMARY) HYPERTENSION: Primary | ICD-10-CM

## 2024-09-19 DIAGNOSIS — I50.32 CHRONIC DIASTOLIC (CONGESTIVE) HEART FAILURE (HCC): ICD-10-CM

## 2024-09-19 DIAGNOSIS — E66.9 OBESITY (BMI 30.0-34.9): ICD-10-CM

## 2024-09-19 DIAGNOSIS — I34.0 NONRHEUMATIC MITRAL (VALVE) INSUFFICIENCY: ICD-10-CM

## 2024-09-19 PROCEDURE — 93000 ELECTROCARDIOGRAM COMPLETE: CPT | Performed by: INTERNAL MEDICINE

## 2024-09-19 PROCEDURE — 1123F ACP DISCUSS/DSCN MKR DOCD: CPT | Performed by: INTERNAL MEDICINE

## 2024-09-19 PROCEDURE — 99214 OFFICE O/P EST MOD 30 MIN: CPT | Performed by: INTERNAL MEDICINE

## 2024-09-19 RX ORDER — LOSARTAN POTASSIUM 100 MG/1
100 TABLET ORAL DAILY
COMMUNITY

## 2024-09-25 ASSESSMENT — ENCOUNTER SYMPTOMS
GASTROINTESTINAL NEGATIVE: 1
RESPIRATORY NEGATIVE: 1
EYES NEGATIVE: 1

## 2024-10-21 ENCOUNTER — TELEPHONE (OUTPATIENT)
Age: 89
End: 2024-10-21

## 2024-10-21 RX ORDER — PREDNISONE 10 MG/1
10 TABLET ORAL AS NEEDED
COMMUNITY

## 2024-10-21 NOTE — TELEPHONE ENCOUNTER
Patient came in today for echo and bought in her blood pressure monitor and medications. She was informed to bring them in. Medication was updated and blood pressure was taking.     Blood pressure:175/65 75  Second readin/55 70

## 2024-10-30 ENCOUNTER — OFFICE VISIT (OUTPATIENT)
Age: 89
End: 2024-10-30

## 2024-10-30 ENCOUNTER — HOSPITAL ENCOUNTER (OUTPATIENT)
Facility: HOSPITAL | Age: 89
Discharge: HOME | End: 2024-11-02
Payer: MEDICARE

## 2024-10-30 VITALS — WEIGHT: 184 LBS | HEIGHT: 64 IN | BODY MASS INDEX: 31.41 KG/M2

## 2024-10-30 DIAGNOSIS — M75.52 BURSITIS OF LEFT SHOULDER: Primary | ICD-10-CM

## 2024-10-30 DIAGNOSIS — Z12.31 ENCOUNTER FOR SCREENING MAMMOGRAM FOR MALIGNANT NEOPLASM OF BREAST: ICD-10-CM

## 2024-10-30 PROCEDURE — 77063 BREAST TOMOSYNTHESIS BI: CPT

## 2024-10-30 RX ORDER — BETAMETHASONE SODIUM PHOSPHATE AND BETAMETHASONE ACETATE 3; 3 MG/ML; MG/ML
6 INJECTION, SUSPENSION INTRA-ARTICULAR; INTRALESIONAL; INTRAMUSCULAR; SOFT TISSUE ONCE
Status: COMPLETED | OUTPATIENT
Start: 2024-10-30 | End: 2024-10-30

## 2024-10-30 RX ORDER — LIDOCAINE HYDROCHLORIDE 10 MG/ML
3 INJECTION, SOLUTION INFILTRATION; PERINEURAL ONCE
Status: COMPLETED | OUTPATIENT
Start: 2024-10-30 | End: 2024-10-30

## 2024-10-30 RX ADMIN — LIDOCAINE HYDROCHLORIDE 3 ML: 10 INJECTION, SOLUTION INFILTRATION; PERINEURAL at 11:15

## 2024-10-30 RX ADMIN — BETAMETHASONE SODIUM PHOSPHATE AND BETAMETHASONE ACETATE 6 MG: 3; 3 INJECTION, SUSPENSION INTRA-ARTICULAR; INTRALESIONAL; INTRAMUSCULAR; SOFT TISSUE at 11:15

## 2024-10-30 NOTE — PROGRESS NOTES
Patient: Denice Rankin                MRN: [id number]       SSN: [id number]  YOB: 1935        AGE: 89 y.o.        SEX: female  There is no height or weight on file to calculate BMI.    PCP: Reginald Gonzales MD  10/30/24            REVIEW OF SYSTEMS:  Constitutional: Negative for fever, chills, weight loss and malaise/fatigue.   HENT: Negative.    Eyes: Negative.    Respiratory: Negative.   Cardiovascular: Negative.   Gastrointestinal: No bowel incontinence or constipation.  Genitourinary: No bladder incontinence or saddle anesthesia.  Skin: Negative.   Neurological: Negative.    Endo/Heme/Allergies: Negative.    Psychiatric/Behavioral: Negative.  Musculoskeletal: As per HPI above.     Past Medical History:   Diagnosis Date    Arrhythmia     mur mur  long standing    Arthritis     Arthritis of both hips     Back pain     Balance problem     Chronic back pain     Cough     Easy bruising     Essential hypertension     GERD (gastroesophageal reflux disease)     Hiatal hernia     Hypertension     Ill-defined condition     vascular insuff rt ankle    Irregular heart beat     Left hip pain 10/8/2010    Neck pain     Nervousness     Night sweat     Osteoarthritis, knee     Pain with urination     Polymyalgia rheumatica (HCC)     Reflux     Spinal stenosis     Thromboembolus (HCC)     Trapezius strain     Trochanteric bursitis of left hip     Venous insufficiency          Current Outpatient Medications:     predniSONE (DELTASONE) 10 MG tablet, Take 1 tablet by mouth as needed, Disp: , Rfl:     losartan (COZAAR) 100 MG tablet, Take 1 tablet by mouth daily, Disp: , Rfl:     Potassium 99 MG TABS, Take by mouth, Disp: , Rfl:     aspirin 81 MG EC tablet, Take 1 tablet by mouth daily, Disp: , Rfl:     spironolactone (ALDACTONE) 25 MG tablet, Take 0.5 tablets by mouth daily (Patient not taking: Reported on 9/19/2024), Disp: 45 tablet, Rfl: 1    chlorthalidone (HYGROTON) 25 MG tablet, TAKE 1 TABLET BY

## 2025-01-09 ENCOUNTER — HOSPITAL ENCOUNTER (OUTPATIENT)
Facility: HOSPITAL | Age: 89
Discharge: HOME | End: 2025-01-12
Payer: MEDICARE

## 2025-01-09 DIAGNOSIS — I12.9 RENAL HYPERTENSION: ICD-10-CM

## 2025-01-09 DIAGNOSIS — R80.1 PERSISTENT PROTEINURIA: ICD-10-CM

## 2025-01-09 DIAGNOSIS — N18.31 CHRONIC KIDNEY DISEASE (CKD) STAGE G3A/A1, MODERATELY DECREASED GLOMERULAR FILTRATION RATE (GFR) BETWEEN 45-59 ML/MIN/1.73 SQUARE METER AND ALBUMINURIA CREATININE RATIO LESS THAN 30 MG/G (HCC): ICD-10-CM

## 2025-01-09 DIAGNOSIS — N18.2 CHRONIC KIDNEY DISEASE, STAGE II (MILD): ICD-10-CM

## 2025-01-09 DIAGNOSIS — N25.81 SECONDARY HYPERPARATHYROIDISM OF RENAL ORIGIN (HCC): ICD-10-CM

## 2025-01-09 LAB
ALBUMIN SERPL-MCNC: 3.4 G/DL (ref 3.4–5)
ANION GAP SERPL CALC-SCNC: 5 MMOL/L (ref 3–18)
BUN SERPL-MCNC: 32 MG/DL (ref 7–18)
BUN/CREAT SERPL: 29 (ref 12–20)
CALCIUM SERPL-MCNC: 9.2 MG/DL (ref 8.5–10.1)
CHLORIDE SERPL-SCNC: 107 MMOL/L (ref 100–111)
CO2 SERPL-SCNC: 28 MMOL/L (ref 21–32)
CREAT SERPL-MCNC: 1.09 MG/DL (ref 0.6–1.3)
GLUCOSE SERPL-MCNC: 113 MG/DL (ref 74–99)
PHOSPHATE SERPL-MCNC: 2.7 MG/DL (ref 2.5–4.9)
POTASSIUM SERPL-SCNC: 3.7 MMOL/L (ref 3.5–5.5)
SODIUM SERPL-SCNC: 140 MMOL/L (ref 136–145)

## 2025-01-09 PROCEDURE — 80069 RENAL FUNCTION PANEL: CPT

## 2025-01-09 PROCEDURE — 36415 COLL VENOUS BLD VENIPUNCTURE: CPT

## 2025-01-30 ENCOUNTER — OFFICE VISIT (OUTPATIENT)
Age: 89
End: 2025-01-30
Payer: MEDICARE

## 2025-01-30 DIAGNOSIS — Z78.0 POSTMENOPAUSE: ICD-10-CM

## 2025-01-30 DIAGNOSIS — M51.369 DEGENERATION OF INTERVERTEBRAL DISC OF LUMBAR REGION, UNSPECIFIED WHETHER PAIN PRESENT: ICD-10-CM

## 2025-01-30 DIAGNOSIS — M81.0 AGE RELATED OSTEOPOROSIS, UNSPECIFIED PATHOLOGICAL FRACTURE PRESENCE: ICD-10-CM

## 2025-01-30 DIAGNOSIS — M16.12 PRIMARY OSTEOARTHRITIS OF LEFT HIP: ICD-10-CM

## 2025-01-30 DIAGNOSIS — M25.552 HIP PAIN, LEFT: ICD-10-CM

## 2025-01-30 DIAGNOSIS — M54.50 LUMBAR SPINE PAIN: ICD-10-CM

## 2025-01-30 DIAGNOSIS — M75.52 BURSITIS OF LEFT SHOULDER: Primary | ICD-10-CM

## 2025-01-30 PROCEDURE — 1123F ACP DISCUSS/DSCN MKR DOCD: CPT | Performed by: PHYSICIAN ASSISTANT

## 2025-01-30 PROCEDURE — 99214 OFFICE O/P EST MOD 30 MIN: CPT | Performed by: PHYSICIAN ASSISTANT

## 2025-01-30 PROCEDURE — 1125F AMNT PAIN NOTED PAIN PRSNT: CPT | Performed by: PHYSICIAN ASSISTANT

## 2025-01-30 PROCEDURE — 72100 X-RAY EXAM L-S SPINE 2/3 VWS: CPT | Performed by: PHYSICIAN ASSISTANT

## 2025-01-30 PROCEDURE — 72170 X-RAY EXAM OF PELVIS: CPT | Performed by: PHYSICIAN ASSISTANT

## 2025-01-30 RX ORDER — BETAMETHASONE SODIUM PHOSPHATE AND BETAMETHASONE ACETATE 3; 3 MG/ML; MG/ML
6 INJECTION, SUSPENSION INTRA-ARTICULAR; INTRALESIONAL; INTRAMUSCULAR; SOFT TISSUE ONCE
Status: SHIPPED | OUTPATIENT
Start: 2025-01-30

## 2025-01-30 NOTE — PROGRESS NOTES
Patient: Denice Rankin                MRN: [id number]       SSN: [id number]  YOB: 1935        AGE: 89 y.o.        SEX: female  There is no height or weight on file to calculate BMI.    PCP: Reginald Gonzales MD  01/30/25      This office note has been dictated.      REVIEW OF SYSTEMS:  Constitutional: Negative for fever, chills, weight loss and malaise/fatigue.   HENT: Negative.    Eyes: Negative.    Respiratory: Negative.   Cardiovascular: Negative.   Gastrointestinal: No bowel incontinence or constipation.  Genitourinary: No bladder incontinence or saddle anesthesia.  Skin: Negative.   Neurological: Negative.    Endo/Heme/Allergies: Negative.    Psychiatric/Behavioral: Negative.  Musculoskeletal: As per HPI above.     Past Medical History:   Diagnosis Date    Arrhythmia     mur mur  long standing    Arthritis     Arthritis of both hips     Back pain     Balance problem     Chronic back pain     Cough     Easy bruising     Essential hypertension     GERD (gastroesophageal reflux disease)     Hiatal hernia     Hypertension     Ill-defined condition     vascular insuff rt ankle    Irregular heart beat     Left hip pain 10/8/2010    Neck pain     Nervousness     Night sweat     Osteoarthritis, knee     Pain with urination     Polymyalgia rheumatica (HCC)     Reflux     Spinal stenosis     Thromboembolus (HCC)     Trapezius strain     Trochanteric bursitis of left hip     Venous insufficiency          Current Outpatient Medications:     predniSONE (DELTASONE) 10 MG tablet, Take 1 tablet by mouth as needed, Disp: , Rfl:     losartan (COZAAR) 100 MG tablet, Take 1 tablet by mouth daily, Disp: , Rfl:     Potassium 99 MG TABS, Take by mouth, Disp: , Rfl:     aspirin 81 MG EC tablet, Take 1 tablet by mouth daily, Disp: , Rfl:     spironolactone (ALDACTONE) 25 MG tablet, Take 0.5 tablets by mouth daily (Patient not taking: Reported on 9/19/2024), Disp: 45 tablet, Rfl: 1    chlorthalidone (HYGROTON)

## 2025-02-25 ENCOUNTER — HOSPITAL ENCOUNTER (OUTPATIENT)
Facility: HOSPITAL | Age: 89
Discharge: HOME | End: 2025-02-28
Payer: MEDICARE

## 2025-02-25 DIAGNOSIS — M81.0 AGE RELATED OSTEOPOROSIS, UNSPECIFIED PATHOLOGICAL FRACTURE PRESENCE: ICD-10-CM

## 2025-02-25 DIAGNOSIS — Z78.0 POSTMENOPAUSE: ICD-10-CM

## 2025-02-25 PROCEDURE — 77080 DXA BONE DENSITY AXIAL: CPT

## 2025-02-26 ENCOUNTER — OFFICE VISIT (OUTPATIENT)
Age: 89
End: 2025-02-26
Payer: MEDICARE

## 2025-02-26 VITALS
OXYGEN SATURATION: 98 % | SYSTOLIC BLOOD PRESSURE: 130 MMHG | HEART RATE: 75 BPM | HEIGHT: 64 IN | BODY MASS INDEX: 30.73 KG/M2 | DIASTOLIC BLOOD PRESSURE: 58 MMHG | WEIGHT: 180 LBS

## 2025-02-26 DIAGNOSIS — I89.0 LYMPHEDEMA: Primary | ICD-10-CM

## 2025-02-26 PROCEDURE — 1159F MED LIST DOCD IN RCRD: CPT | Performed by: SURGERY

## 2025-02-26 PROCEDURE — 1160F RVW MEDS BY RX/DR IN RCRD: CPT | Performed by: SURGERY

## 2025-02-26 PROCEDURE — 99203 OFFICE O/P NEW LOW 30 MIN: CPT | Performed by: SURGERY

## 2025-02-26 PROCEDURE — 1123F ACP DISCUSS/DSCN MKR DOCD: CPT | Performed by: SURGERY

## 2025-02-26 NOTE — PATIENT INSTRUCTIONS
Please start doing the following:     - elevate your legs at all times when sitting down  - elevate legs steeply 3x/day for 20 minutes \"toes above your nose\"  - wear compression stockings all day every day. You may take them off at night    You will return for lymphedema demo day

## 2025-02-26 NOTE — PROGRESS NOTES
Denice Rankin    Chief Complaint   Patient presents with    Leg Swelling     Referred by PCP        History and Physical    Denice Rankin is a 89 y.o. 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     she presents today for L>R LE edema.     she describes L > R LE edema which has been occurring daily and worsens over the course of the day. She states that the edema improves with elevation and is painful, tight and stiff. She also endorses pain shooting through her ankles, knees and hips.   Edema worsens with prolonged standing. .     H/o ulceration on LLE several years. This required advanced wound care to get it healed.     Onset of symptoms was years ago, with her first pregnancy at age 22. Worse over the past 5 years.     Occupation: flower , now retired    History of right ankle and foot surgery,, right total knee arthroplasty. L bunionectomy, partial hysterectomy through open incision.     Associated symptoms:   [x] edema  [] varicose veins  [x] heaviness/aching  [] fatigue  [] Pain  [x] H/o or current ulcer(s)    Aggravating factors include standing. Relieving factors include elevation.     Patient   [x] has   [] has not   been wearing compression stockings. Stockings are knee high, 20-30mm Hg compression strength and partially relieve symptoms. Started wearing compression stockings 10 years ago in .       Relevant history:   [x] female gender  [] Family history of venous disease: no  [x] history of pregnancy:  - edema started with first pregnancy  [] history of DVT/PE  [] history of vein procedure  [] Personal or family history of coronary artery disease      Pertinent edema history:  [x] CHF/pulmonary HTN - moderate pulm HTN, CHF  [] ALBIN/snoring  [x] CKD - CKD stage III  [] Pulmonary disease  [x] Obesity - BMI > 31  [] H/o DVT  []

## 2025-03-03 ENCOUNTER — HOSPITAL ENCOUNTER (OUTPATIENT)
Facility: HOSPITAL | Age: 89
Discharge: HOME | End: 2025-03-06
Payer: MEDICARE

## 2025-03-03 DIAGNOSIS — M51.369 DEGENERATION OF INTERVERTEBRAL DISC OF LUMBAR REGION, UNSPECIFIED WHETHER PAIN PRESENT: ICD-10-CM

## 2025-03-03 DIAGNOSIS — M16.12 PRIMARY OSTEOARTHRITIS OF LEFT HIP: ICD-10-CM

## 2025-03-03 PROCEDURE — 20610 DRAIN/INJ JOINT/BURSA W/O US: CPT

## 2025-03-03 PROCEDURE — 72148 MRI LUMBAR SPINE W/O DYE: CPT

## 2025-03-03 PROCEDURE — 6360000002 HC RX W HCPCS: Performed by: PHYSICIAN ASSISTANT

## 2025-03-03 RX ORDER — LIDOCAINE HYDROCHLORIDE 10 MG/ML
10 INJECTION, SOLUTION EPIDURAL; INFILTRATION; INTRACAUDAL; PERINEURAL PRN
Status: DISCONTINUED | OUTPATIENT
Start: 2025-03-03 | End: 2025-03-07 | Stop reason: HOSPADM

## 2025-03-03 RX ORDER — IOPAMIDOL 408 MG/ML
5 INJECTION, SOLUTION INTRATHECAL
Status: DISCONTINUED | OUTPATIENT
Start: 2025-03-03 | End: 2025-03-03

## 2025-03-03 RX ORDER — TRIAMCINOLONE ACETONIDE 40 MG/ML
40 INJECTION, SUSPENSION INTRA-ARTICULAR; INTRAMUSCULAR PRN
Status: DISCONTINUED | OUTPATIENT
Start: 2025-03-03 | End: 2025-03-07 | Stop reason: HOSPADM

## 2025-03-03 RX ADMIN — LIDOCAINE HYDROCHLORIDE 10 ML: 10 INJECTION, SOLUTION EPIDURAL; INFILTRATION; INTRACAUDAL; PERINEURAL at 13:54

## 2025-03-03 RX ADMIN — TRIAMCINOLONE ACETONIDE 40 MG: 40 INJECTION, SUSPENSION INTRA-ARTICULAR; INTRAMUSCULAR at 13:55

## 2025-03-03 NOTE — PROGRESS NOTES
X-ray Contrast not administered due to patient unsure if she is allergic for steroid hip injection.

## 2025-04-01 ENCOUNTER — HOSPITAL ENCOUNTER (OUTPATIENT)
Facility: HOSPITAL | Age: 89
Discharge: HOME | End: 2025-04-04
Payer: MEDICARE

## 2025-04-01 DIAGNOSIS — K21.9 GASTROESOPHAGEAL REFLUX DISEASE WITHOUT ESOPHAGITIS: ICD-10-CM

## 2025-04-01 DIAGNOSIS — K59.00 CONSTIPATION, UNSPECIFIED CONSTIPATION TYPE: ICD-10-CM

## 2025-04-01 DIAGNOSIS — L20.9 ATOPIC DERMATITIS, UNSPECIFIED TYPE: ICD-10-CM

## 2025-04-01 DIAGNOSIS — E55.9 AVITAMINOSIS D: ICD-10-CM

## 2025-04-01 DIAGNOSIS — M54.41 ACUTE BACK PAIN WITH SCIATICA, RIGHT: ICD-10-CM

## 2025-04-01 DIAGNOSIS — N39.41 URGE INCONTINENCE: ICD-10-CM

## 2025-04-01 DIAGNOSIS — E78.1 PURE HYPERGLYCERIDEMIA: ICD-10-CM

## 2025-04-01 DIAGNOSIS — D50.9 IRON DEFICIENCY ANEMIA, UNSPECIFIED IRON DEFICIENCY ANEMIA TYPE: ICD-10-CM

## 2025-04-01 DIAGNOSIS — I10 ESSENTIAL HYPERTENSION, MALIGNANT: ICD-10-CM

## 2025-04-01 DIAGNOSIS — M17.0 PRIMARY OSTEOARTHRITIS OF BOTH KNEES: ICD-10-CM

## 2025-04-01 LAB
25(OH)D3 SERPL-MCNC: 76.1 NG/ML (ref 30–100)
ALBUMIN SERPL-MCNC: 3.3 G/DL (ref 3.4–5)
ALBUMIN/GLOB SERPL: 0.9 (ref 0.8–1.7)
ALP SERPL-CCNC: 97 U/L (ref 45–117)
ALT SERPL-CCNC: 19 U/L (ref 13–56)
ANION GAP SERPL CALC-SCNC: 5 MMOL/L (ref 3–18)
AST SERPL-CCNC: 18 U/L (ref 10–38)
BASOPHILS # BLD: 0.04 K/UL (ref 0–0.1)
BASOPHILS NFR BLD: 0.5 % (ref 0–2)
BILIRUB SERPL-MCNC: 0.4 MG/DL (ref 0.2–1)
BUN SERPL-MCNC: 28 MG/DL (ref 7–18)
BUN/CREAT SERPL: 26 (ref 12–20)
CALCIUM SERPL-MCNC: 9.6 MG/DL (ref 8.5–10.1)
CHLORIDE SERPL-SCNC: 107 MMOL/L (ref 100–111)
CHOLEST SERPL-MCNC: 175 MG/DL
CO2 SERPL-SCNC: 29 MMOL/L (ref 21–32)
CREAT SERPL-MCNC: 1.07 MG/DL (ref 0.6–1.3)
DIFFERENTIAL METHOD BLD: ABNORMAL
EOSINOPHIL # BLD: 0.21 K/UL (ref 0–0.4)
EOSINOPHIL NFR BLD: 2.8 % (ref 0–5)
ERYTHROCYTE [DISTWIDTH] IN BLOOD BY AUTOMATED COUNT: 13.9 % (ref 11.6–14.5)
GLOBULIN SER CALC-MCNC: 3.6 G/DL (ref 2–4)
GLUCOSE SERPL-MCNC: 104 MG/DL (ref 74–99)
HCT VFR BLD AUTO: 30.4 % (ref 35–45)
HDLC SERPL-MCNC: 58 MG/DL (ref 40–60)
HDLC SERPL: 3 (ref 0–5)
HGB BLD-MCNC: 9.8 G/DL (ref 12–16)
IMM GRANULOCYTES # BLD AUTO: 0.03 K/UL (ref 0–0.04)
IMM GRANULOCYTES NFR BLD AUTO: 0.4 % (ref 0–0.5)
LDLC SERPL CALC-MCNC: 99.8 MG/DL (ref 0–100)
LIPID PANEL: NORMAL
LYMPHOCYTES # BLD: 2.03 K/UL (ref 0.9–3.6)
LYMPHOCYTES NFR BLD: 26.7 % (ref 21–52)
MCH RBC QN AUTO: 28.2 PG (ref 24–34)
MCHC RBC AUTO-ENTMCNC: 32.2 G/DL (ref 31–37)
MCV RBC AUTO: 87.4 FL (ref 78–100)
MONOCYTES # BLD: 0.51 K/UL (ref 0.05–1.2)
MONOCYTES NFR BLD: 6.7 % (ref 3–10)
NEUTS SEG # BLD: 4.78 K/UL (ref 1.8–8)
NEUTS SEG NFR BLD: 62.9 % (ref 40–73)
NRBC # BLD: 0 K/UL (ref 0–0.01)
NRBC BLD-RTO: 0 PER 100 WBC
PLATELET # BLD AUTO: 314 K/UL (ref 135–420)
PMV BLD AUTO: 10.8 FL (ref 9.2–11.8)
POTASSIUM SERPL-SCNC: 3.7 MMOL/L (ref 3.5–5.5)
PROT SERPL-MCNC: 6.9 G/DL (ref 6.4–8.2)
RBC # BLD AUTO: 3.48 M/UL (ref 4.2–5.3)
SODIUM SERPL-SCNC: 141 MMOL/L (ref 136–145)
T4 FREE SERPL-MCNC: 1 NG/DL (ref 0.7–1.5)
TRIGL SERPL-MCNC: 86 MG/DL
TSH SERPL DL<=0.05 MIU/L-ACNC: 0.82 UIU/ML (ref 0.36–3.74)
VLDLC SERPL CALC-MCNC: 17.2 MG/DL
WBC # BLD AUTO: 7.6 K/UL (ref 4.6–13.2)

## 2025-04-01 PROCEDURE — 80061 LIPID PANEL: CPT

## 2025-04-01 PROCEDURE — 84439 ASSAY OF FREE THYROXINE: CPT

## 2025-04-01 PROCEDURE — 80053 COMPREHEN METABOLIC PANEL: CPT

## 2025-04-01 PROCEDURE — 82306 VITAMIN D 25 HYDROXY: CPT

## 2025-04-01 PROCEDURE — 85025 COMPLETE CBC W/AUTO DIFF WBC: CPT

## 2025-04-01 PROCEDURE — 84443 ASSAY THYROID STIM HORMONE: CPT

## 2025-04-01 PROCEDURE — 36415 COLL VENOUS BLD VENIPUNCTURE: CPT

## 2025-05-01 ENCOUNTER — OFFICE VISIT (OUTPATIENT)
Age: 89
End: 2025-05-01

## 2025-05-01 DIAGNOSIS — M54.50 LUMBAR SPINE PAIN: ICD-10-CM

## 2025-05-01 DIAGNOSIS — M75.51 BURSITIS OF RIGHT SHOULDER: ICD-10-CM

## 2025-05-01 DIAGNOSIS — M75.52 BURSITIS OF LEFT SHOULDER: Primary | ICD-10-CM

## 2025-05-01 RX ORDER — BETAMETHASONE SODIUM PHOSPHATE AND BETAMETHASONE ACETATE 3; 3 MG/ML; MG/ML
12 INJECTION, SUSPENSION INTRA-ARTICULAR; INTRALESIONAL; INTRAMUSCULAR; SOFT TISSUE ONCE
Status: COMPLETED | OUTPATIENT
Start: 2025-05-01 | End: 2025-05-01

## 2025-05-01 RX ORDER — MELOXICAM 15 MG/1
15 TABLET ORAL DAILY
Qty: 30 TABLET | Refills: 3 | Status: SHIPPED | OUTPATIENT
Start: 2025-05-01

## 2025-05-01 RX ADMIN — BETAMETHASONE SODIUM PHOSPHATE AND BETAMETHASONE ACETATE 12 MG: 3; 3 INJECTION, SUSPENSION INTRA-ARTICULAR; INTRALESIONAL; INTRAMUSCULAR; SOFT TISSUE at 16:07

## 2025-05-01 NOTE — PROGRESS NOTES
infection or cellulitis present.  Her range of motion is about 90 degrees of forward flexion, 80 degrees of abduction, 20 degrees external rotation.  Positive Shannon test.  Negative drop arm, speeds, Gilpin's.  Examination of lower extremities reveals good range of motion of the hips.  There is no pain to palpation trochanter bursa.  Negative straight leg raise.  Negative calf tenderness.  Negative Homans.  Signs of DVT present.    Review of MRI lumbar spine:  IMPRESSION:  Degenerative changes involving lumbosacral spine. Moderate central  canal stenosis at the L2-3 level. Left lateral disc bulge at the L3-4 level  impinging on the left L3 nerve root. Moderate to severe central canal stenosis  at the L3-4 level. Right lateral disc bulge at the L4-5 level impinging on the  right L4 nerve root. Moderate central canal stenosis at the L4-5 level. Annular  fissure at the L5-S1 intervertebral disc. Neural foraminal stenoses at multiple  levels. Transitional anatomy at the lumbosacral junction. Please review above.    Assessment: #1 bilateral shoulder subacromial bursitis, impingement syndrome, rotator cuff pathology, #2 left hip arthritis improved, #3 lumbar stenosis, radiculopathy    Plan: At this point, we discussed treatment options.  We will move forward with a cortisone injection for each of her shoulders.  After informed consent, under aseptic conditions, with US guided assitance, each shoulder was prepped with betadine and a mxiture of 3ml 1% lidocaine and 6mg of celestone was injected without complications.  The patient tolerated the injection well.  The patient is instructed on post-injection care.  We will place a referral for the spine center for further evaluation treatment of her back.  We will place an order for physical therapy for low back program.  I will start her on Mobic 15 mg once a day with food.  She is instructed in use and precautions.    Care plan outlined and precautions discussed. Radiographs

## 2025-05-15 ENCOUNTER — OFFICE VISIT (OUTPATIENT)
Age: 89
End: 2025-05-15
Payer: MEDICARE

## 2025-05-15 VITALS
HEIGHT: 64 IN | BODY MASS INDEX: 30.73 KG/M2 | DIASTOLIC BLOOD PRESSURE: 80 MMHG | OXYGEN SATURATION: 96 % | WEIGHT: 180 LBS | HEART RATE: 77 BPM | SYSTOLIC BLOOD PRESSURE: 150 MMHG

## 2025-05-15 DIAGNOSIS — I89.0 LYMPHEDEMA: Primary | ICD-10-CM

## 2025-05-15 PROCEDURE — 99213 OFFICE O/P EST LOW 20 MIN: CPT | Performed by: SURGERY

## 2025-05-15 PROCEDURE — 1159F MED LIST DOCD IN RCRD: CPT | Performed by: SURGERY

## 2025-05-15 PROCEDURE — 1123F ACP DISCUSS/DSCN MKR DOCD: CPT | Performed by: SURGERY

## 2025-05-15 PROCEDURE — 1160F RVW MEDS BY RX/DR IN RCRD: CPT | Performed by: SURGERY

## 2025-05-15 NOTE — PROGRESS NOTES
Denice Rankin    Chief Complaint   Patient presents with    Lymphedema     Follow Up,LE Day       History and Physical    Denice Rankin is a 89 y.o. 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     she returns in follow up today.     Since her last visit:   - has received injections to both shoulders and she feels a lot better  - continues to wear compression stockings daily and elevates legs      Previously obtained venous history:   25 visit:    she describes L > R LE edema which has been occurring daily and worsens over the course of the day. She states that the edema improves with elevation and is painful, tight and stiff. She also endorses pain shooting through her ankles, knees and hips.   Edema worsens with prolonged standing. .     H/o ulceration on LLE several years. This required advanced wound care to get it healed.     Onset of symptoms was years ago, with her first pregnancy at age 22. Worse over the past 5 years.     Occupation: flower , now retired    History of right ankle and foot surgery,, right total knee arthroplasty. L bunionectomy, partial hysterectomy through open incision.     Associated symptoms:   [x] edema  [] varicose veins  [x] heaviness/aching  [] fatigue  [] Pain  [x] H/o or current ulcer(s)    Aggravating factors include standing. Relieving factors include elevation.     Patient   [x] has   [] has not   been wearing compression stockings. Stockings are knee high, 20-30mm Hg compression strength and partially relieve symptoms. Started wearing compression stockings 10 years ago in .       Relevant history:   [x] female gender  [] Family history of venous disease: no  [x] history of pregnancy:  - edema started with first pregnancy  [] history of DVT/PE  [] history of vein procedure  [] Personal or family

## 2025-07-30 ENCOUNTER — OFFICE VISIT (OUTPATIENT)
Age: 89
End: 2025-07-30
Payer: MEDICARE

## 2025-07-30 VITALS
HEART RATE: 76 BPM | TEMPERATURE: 97.2 F | HEIGHT: 62 IN | WEIGHT: 184.41 LBS | OXYGEN SATURATION: 98 % | BODY MASS INDEX: 33.94 KG/M2

## 2025-07-30 DIAGNOSIS — M25.512 CHRONIC PAIN OF BOTH SHOULDERS: ICD-10-CM

## 2025-07-30 DIAGNOSIS — G89.29 CHRONIC PAIN OF BOTH SHOULDERS: ICD-10-CM

## 2025-07-30 DIAGNOSIS — M48.062 SPINAL STENOSIS OF LUMBAR REGION WITH NEUROGENIC CLAUDICATION: Primary | ICD-10-CM

## 2025-07-30 DIAGNOSIS — R29.898 BILATERAL ARM WEAKNESS: ICD-10-CM

## 2025-07-30 DIAGNOSIS — M47.816 LUMBAR FACET ARTHROPATHY: ICD-10-CM

## 2025-07-30 DIAGNOSIS — M25.511 CHRONIC PAIN OF BOTH SHOULDERS: ICD-10-CM

## 2025-07-30 DIAGNOSIS — M54.16 LUMBAR RADICULITIS: ICD-10-CM

## 2025-07-30 PROCEDURE — 1125F AMNT PAIN NOTED PAIN PRSNT: CPT | Performed by: PHYSICAL MEDICINE & REHABILITATION

## 2025-07-30 PROCEDURE — 1160F RVW MEDS BY RX/DR IN RCRD: CPT | Performed by: PHYSICAL MEDICINE & REHABILITATION

## 2025-07-30 PROCEDURE — 99204 OFFICE O/P NEW MOD 45 MIN: CPT | Performed by: PHYSICAL MEDICINE & REHABILITATION

## 2025-07-30 PROCEDURE — 1159F MED LIST DOCD IN RCRD: CPT | Performed by: PHYSICAL MEDICINE & REHABILITATION

## 2025-07-30 PROCEDURE — 1123F ACP DISCUSS/DSCN MKR DOCD: CPT | Performed by: PHYSICAL MEDICINE & REHABILITATION

## 2025-07-30 RX ORDER — MULTIVITAMIN WITH IRON
250 TABLET ORAL DAILY
COMMUNITY
Start: 2025-07-10 | End: 2025-10-08

## 2025-07-30 NOTE — PROGRESS NOTES
[facility]  [address] [place] [address]., [address]  [place] [address]  Phone: [phone]  Fax: [phone]    NEW PATIENT  Patient's YOB: 1935    ASSESSMENT   Denice was seen today for pain.    Diagnoses and all orders for this visit:    Spinal stenosis of lumbar region with neurogenic claudication  -     DME Order for (Specify) as OP  -     Amb External Referral To Pain Medicine    Lumbar radiculitis  -     Amb External Referral To Pain Medicine    Lumbar facet arthropathy  -     DME Order for (Specify) as OP    Bilateral arm weakness  -     DME Order for (Specify) as OP    Chronic pain of both shoulders  -     DME Order for (Specify) as OP         IMPRESSION AND PLAN:  Denice Rankin is a 89 y.o. female with history of GERD, HTN, and polymyalgia rheumatica. Pt complains of lumbar and cervical pain.   Patient was given information on medial branch neurotomy.    Patient was given information on medial branch neurotomy.  Patient was given information on Lumbar Spinal Stenosis.   Reviewed Lumbar Spine MRI WO Contrast images from 3/3/25 with patient.   Ms. Rankin has a reminder for a \"due or due soon\" health maintenance. I have asked that she contact her primary care provider, Reginald Gonzales MD, for follow-up on this health maintenance.   demonstrated consistency with prescribing.   Ordered DME - chair lift  Referral to Pain Medicine - Lumbar LAYA  Patient is not a candidate for NSAIDs d/t decreasing renal function.  Discussed updating cervical x-rays at next office visit.     Return in about 2 months (around 9/30/2025) for follow up.      HISTORY OF PRESENT ILLNESS:  Denice Rankin is a 89 y.o. female who presents for evaluation of lower back pain. Patient presents to the office today as a new patient referred by Joel Tate PA-C.     Patient notes to be a previous patient. Patient c/o upper trapezius pain with BUE radicular symptoms. Patient

## 2025-08-01 ENCOUNTER — OFFICE VISIT (OUTPATIENT)
Age: 89
End: 2025-08-01

## 2025-08-01 VITALS — BODY MASS INDEX: 33.73 KG/M2 | HEIGHT: 62 IN

## 2025-08-01 DIAGNOSIS — M75.52 BURSITIS OF LEFT SHOULDER: Primary | ICD-10-CM

## 2025-08-01 DIAGNOSIS — M75.51 BURSITIS OF RIGHT SHOULDER: ICD-10-CM

## 2025-08-01 RX ORDER — BETAMETHASONE SODIUM PHOSPHATE AND BETAMETHASONE ACETATE 3; 3 MG/ML; MG/ML
12 INJECTION, SUSPENSION INTRA-ARTICULAR; INTRALESIONAL; INTRAMUSCULAR; SOFT TISSUE ONCE
Status: COMPLETED | OUTPATIENT
Start: 2025-08-01 | End: 2025-08-01

## 2025-08-01 RX ADMIN — BETAMETHASONE SODIUM PHOSPHATE AND BETAMETHASONE ACETATE 12 MG: 3; 3 INJECTION, SUSPENSION INTRA-ARTICULAR; INTRALESIONAL; INTRAMUSCULAR; SOFT TISSUE at 13:24

## 2025-08-01 NOTE — PROGRESS NOTES
Patient: Denice Rankin                MRN: [id number]       SSN: [id number]  YOB: 1935        AGE: 89 y.o.        SEX: female  There is no height or weight on file to calculate BMI.    PCP: Reignald Gonzales MD  08/01/25            REVIEW OF SYSTEMS:  Constitutional: Negative for fever, chills, weight loss and malaise/fatigue.   HENT: Negative.    Eyes: Negative.    Respiratory: Negative.   Cardiovascular: Negative.   Gastrointestinal: No bowel incontinence or constipation.  Genitourinary: No bladder incontinence or saddle anesthesia.  Skin: Negative.   Neurological: Negative.    Endo/Heme/Allergies: Negative.    Psychiatric/Behavioral: Negative.  Musculoskeletal: As per HPI above.     Past Medical History:   Diagnosis Date    Arrhythmia     mur mur  long standing    Arthritis     Arthritis of both hips     Back pain     Balance problem     Chronic back pain     Cough     Easy bruising     Essential hypertension     GERD (gastroesophageal reflux disease)     Hiatal hernia     Hypertension     Ill-defined condition     vascular insuff rt ankle    Irregular heart beat     Left hip pain 10/8/2010    Neck pain     Nervousness     Night sweat     Osteoarthritis, knee     Pain with urination     Polymyalgia rheumatica     Reflux     Spinal stenosis     Thromboembolus (HCC)     Trapezius strain     Trochanteric bursitis of left hip     Venous insufficiency          Current Outpatient Medications:     magnesium (MAGNESIUM-OXIDE) 250 MG TABS tablet, Take 1 tablet by mouth daily, Disp: , Rfl:     meloxicam (MOBIC) 15 MG tablet, Take 1 tablet by mouth daily (Patient not taking: Reported on 7/30/2025), Disp: 30 tablet, Rfl: 3    predniSONE (DELTASONE) 10 MG tablet, Take 1 tablet by mouth as needed (Patient not taking: Reported on 7/30/2025), Disp: , Rfl:     losartan (COZAAR) 100 MG tablet, Take 1 tablet by mouth daily, Disp: , Rfl:     Potassium 99 MG TABS, Take by mouth, Disp: , Rfl:     aspirin 81

## (undated) DEVICE — GOWN,REINFORCED,POLY,AURORA,XXLARGE,STR: Brand: MEDLINE

## (undated) DEVICE — SYRINGE 30CC LUER LOCK: Brand: CARDINAL HEALTH

## (undated) DEVICE — SUTURE VCRL SZ 0 L36IN ABSRB UD L36MM CT-1 1/2 CIR J946H

## (undated) DEVICE — SYRINGE MED 3ML NDL 22GA L1IN PLAS N CTRL LUERLOCK TIP REG

## (undated) DEVICE — PACK SURG BSHR TOT KNEE LF

## (undated) DEVICE — MEDI-VAC NON-CONDUCTIVE SUCTION TUBING: Brand: CARDINAL HEALTH

## (undated) DEVICE — NEEDLE NRV BLK 21GA L4IN 30DEG INSUL BVL EXTN SET STIMUPLEX

## (undated) DEVICE — (D)PREP SKN CHLRAPRP APPL 26ML -- CONVERT TO ITEM 371833

## (undated) DEVICE — BOWL AND CEMENT CARTRIDGE WITH BREAKAWAY FEMORAL NOZZLE: Brand: ACM

## (undated) DEVICE — BIPOLAR SEALER 23-112-1 AQM 6.0: Brand: AQUAMANTYS ®

## (undated) DEVICE — NEEDLE SPNL 22GA L3.5IN BLK HUB S STL REG WALL FIT STYL W/

## (undated) DEVICE — (D)SYR 10ML 1/5ML GRAD NSAF -- PKGING CHANGE USE ITEM 338027

## (undated) DEVICE — GAUZE SPONGES,16 PLY: Brand: CURITY

## (undated) DEVICE — Device

## (undated) DEVICE — SUTURE VCRL SZ 0 L27IN ABSRB UD L36MM CT-1 1/2 CIR J260H

## (undated) DEVICE — SOLUTION SCRB 4OZ 4% CHG CLN BASE FOR PT SKIN ANTISEPSIS

## (undated) DEVICE — BLADE ASSEMB CLP HAIR FINE --

## (undated) DEVICE — SYR LR LCK 1ML GRAD NSAF 30ML --

## (undated) DEVICE — ELASTIC BANDAGE 6": Brand: CARDINAL HEALTH

## (undated) DEVICE — X-RAY SPONGES,12 PLY: Brand: DERMACEA

## (undated) DEVICE — TRAY CATH OD16FR SIL URIN M STATLOK STBL DEV SURSTP

## (undated) DEVICE — INTENDED FOR TISSUE SEPARATION, AND OTHER PROCEDURES THAT REQUIRE A SHARP SURGICAL BLADE TO PUNCTURE OR CUT.: Brand: BARD-PARKER ®  SAFETY SCALPED

## (undated) DEVICE — REM POLYHESIVE ADULT PATIENT RETURN ELECTRODE: Brand: VALLEYLAB

## (undated) DEVICE — SMARTSLEEVE SURGICAL GOWN, 3XL LONG: Brand: CONVERTORS

## (undated) DEVICE — 3M™ BAIR PAWS FLEX™ WARMING GOWN, STANDARD, 20 PER CASE 81003: Brand: BAIR PAWS™

## (undated) DEVICE — STERILE POLYISOPRENE POWDER-FREE SURGICAL GLOVES: Brand: PROTEXIS

## (undated) DEVICE — SYR 50ML SLIP TIP NSAF LF STRL --

## (undated) DEVICE — DISPOSABLE TOURNIQUET CUFF SINGLE BLADDER, DUAL PORT AND QUICK CONNECT CONNECTOR: Brand: COLOR CUFF

## (undated) DEVICE — (D)GLOVE EXAM LG NITRL NS -- DISC BY MFR NO SUB

## (undated) DEVICE — SLIM BODY SKIN STAPLER: Brand: APPOSE ULC

## (undated) DEVICE — PADDING CAST SOF-ROL 6INX4YD --

## (undated) DEVICE — SOLUTION IV 1000ML 0.9% SOD CHL

## (undated) DEVICE — BITE BLOCK ENDOSCP UNIV AD 6 TO 9.4 MM

## (undated) DEVICE — LIGHT HANDLE: Brand: DEVON

## (undated) DEVICE — STOCKING COMPR L L16-18IN LNG 19MMHG ANK 9-10IN CALF

## (undated) DEVICE — SPONGE LAP 18X18IN STRL -- 5/PK

## (undated) DEVICE — 3M™ STERI-DRAPE™ INSTRUMENT POUCH 1018: Brand: STERI-DRAPE™

## (undated) DEVICE — PACK PROCEDURE SURG MAJ W/ BASIN LF

## (undated) DEVICE — IMMOBILIZER KNEE PREMIER PRO TRI PNL 24INCH FOAM TIETEX PAT

## (undated) DEVICE — BOUGIE ESOPH MLNY 54 FR TAPR TIP TUNGSTEN FILLED SIL NS

## (undated) DEVICE — T5 HOOD WITH PEEL AWAY FACE SHIELD

## (undated) DEVICE — SOLUTION IRRIG 1000ML H2O STRL BLT

## (undated) DEVICE — SUTURE VCRL SZ 2 L27IN ABSRB VLT L65MM TP-1 1/2 CIR J649G

## (undated) DEVICE — 3M™ DURAPORE™ SURGICAL TAPE 1538-3, 3 INCH X 10 YARD (7,5CM X 9,1M), 4 ROLLS/BOX: Brand: 3M™ DURAPORE™

## (undated) DEVICE — SOFT SILICONE HYDROCELLULAR SACRUM DRESSING WITH LOCK AWAY LAYER: Brand: ALLEVYN LIFE SACRUM (LARGE) PACK OF 10

## (undated) DEVICE — JP 3-SPRING RES W/15FR PVC DRAIN/TR: Brand: CARDINAL HEALTH

## (undated) DEVICE — BASIN EMESIS 500CC ROSE 250/CS 60/PLT: Brand: MEDEGEN MEDICAL PRODUCTS, LLC

## (undated) DEVICE — SUTURE RETRIEVER

## (undated) DEVICE — ENDOSCOPY PUMP TUBING/ CAP SET: Brand: ERBE

## (undated) DEVICE — MEDI-VAC SUCTION HIGH CAPACITY: Brand: CARDINAL HEALTH

## (undated) DEVICE — SUTURE MCRYL SZ 2-0 L36IN ABSRB UD L36MM CT-1 1/2 CIR Y945H

## (undated) DEVICE — NEEDLE HYPO 21GA L1.5IN INTRAMUSCULAR S STL LATCH BVL UP

## (undated) DEVICE — PREP CHLORAPREP 10.5 ML ORG --

## (undated) DEVICE — SOLUTION IRRIG 3000ML 0.9% SOD CHL FLX CONT 0797208] ICU MEDICAL INC]

## (undated) DEVICE — INTENDED FOR TISSUE SEPARATION, AND OTHER PROCEDURES THAT REQUIRE A SHARP SURGICAL BLADE TO PUNCTURE OR CUT.: Brand: BARD-PARKER SAFETY BLADES SIZE 10, STERILE

## (undated) DEVICE — SKIN MARKER,REGULAR TIP WITH RULER AND LABELS: Brand: DEVON

## (undated) DEVICE — FLEX ADVANTAGE 3000CC: Brand: FLEX ADVANTAGE

## (undated) DEVICE — CATH IV SAFE STR 22GX1IN BLU -- PROTECTIV PLUS

## (undated) DEVICE — AIRLIFE™ NASAL OXYGEN CANNULA CURVED, NONFLARED TIP WITH 14 FOOT (4.3 M) CRUSH-RESISTANT TUBING, OVER-THE-EAR STYLE: Brand: AIRLIFE™

## (undated) DEVICE — Z DISCONTINUED BY MEDLINE USE 2711682 TRAY SKIN PREP DRY W/ PREM GLV

## (undated) DEVICE — STOCKING COMPR XL L16-18IN LNG 19MMHG ANK 10-11IN CALF

## (undated) DEVICE — KIT CLN UP BON SECOURS MARYV

## (undated) DEVICE — KENDALL SCD EXPRESS SLEEVES, KNEE LENGTH, MEDIUM: Brand: KENDALL SCD

## (undated) DEVICE — NEEDLE HYPO 18GA L1.5IN PNK S STL HUB POLYPR SHLD REG BVL

## (undated) DEVICE — SYRINGE MED 3ML NDL 22GA L1 1/2IN REG BVL SFGLDE

## (undated) DEVICE — Z DISCONTINUED USE 2744636  DRESSING AQUACEL 14 IN ALG W3.5XL14IN POLYUR FLM CVR W/ HYDRCOLL

## (undated) DEVICE — 3M™ COBAN™ NL STERILE NON-LATEX SELF-ADHERENT WRAP, 2086S, 6 IN X 5 YD (15 CM X 4,5 M), 12 ROLLS/CASE: Brand: 3M™ COBAN™

## (undated) DEVICE — 4-PORT MANIFOLD: Brand: NEPTUNE 2

## (undated) DEVICE — FLUFF AND POLYMER UNDERPAD,EXTRA HEAVY: Brand: WINGS

## (undated) DEVICE — (D)DRSG BORD MPLX SACRUM 23X23 -- DISC BY MFR USE ITEM 340908

## (undated) DEVICE — (D)PACK ICE DISP -- DISC BY MFR

## (undated) DEVICE — ABDOMINAL PAD: Brand: DERMACEA

## (undated) DEVICE — 3M™ TEGADERM™ TRANSPARENT FILM DRESSING FRAME STYLE, 1626W, 4 IN X 4-3/4 IN (10 CM X 12 CM), 50/CT 4CT/CASE: Brand: 3M™ TEGADERM™

## (undated) DEVICE — SUTURE FIBERWIRE SZ 2 W/ TAPERED NEEDLE BLUE L38IN NONABSORB BLU L26.5MM 1/2 CIRCLE AR7200

## (undated) DEVICE — HANDPIECE SET WITH HIGH FLOW TIP AND SUCTION TUBE: Brand: INTERPULSE

## (undated) DEVICE — CATHETER SUCT TR FL TIP 14FR W/ O CTRL

## (undated) DEVICE — AIRLIFE™ NASAL OXYGEN CANNULA CURVED, FLARED TIP WITH 14 FOOT (4.3 M) CRUSH-RESISTANT TUBING, OVER-THE-EAR STYLE: Brand: AIRLIFE™

## (undated) DEVICE — SYRINGE NDL 23GA 3ML L1IN TURQ PLAS NDL S STL SHLD HYPO BVL

## (undated) DEVICE — SYRINGE MED 25GA 3ML L5/8IN SUBQ PLAS W/ DETACH NDL SFTY

## (undated) DEVICE — PREP SKN CHLRAPRP APPL 10.5ML --

## (undated) DEVICE — INTENDED FOR TISSUE SEPARATION, AND OTHER PROCEDURES THAT REQUIRE A SHARP SURGICAL BLADE TO PUNCTURE OR CUT.: Brand: BARD-PARKER ® STAINLESS STEEL BLADES

## (undated) DEVICE — COVER LT HNDL BLU STRL -- MEDICHOICE

## (undated) DEVICE — BANDAGE COMPR W6INXL3YD EXSANGUATION 1 PLY ESMARCH

## (undated) DEVICE — BLADE RMFG DBL RECIP DBL SD --

## (undated) DEVICE — STRYKER PERFORMANCE SERIES SAGITTAL BLADE: Brand: STRYKER PERFORMANCE SERIES